# Patient Record
Sex: FEMALE | Race: BLACK OR AFRICAN AMERICAN | Employment: OTHER | URBAN - METROPOLITAN AREA
[De-identification: names, ages, dates, MRNs, and addresses within clinical notes are randomized per-mention and may not be internally consistent; named-entity substitution may affect disease eponyms.]

---

## 2017-01-13 ENCOUNTER — TELEPHONE (OUTPATIENT)
Dept: SURGERY | Age: 67
End: 2017-01-13

## 2017-01-13 NOTE — TELEPHONE ENCOUNTER
Returned call to Macho DILLARD appt 1/25/17 for newly diagnosed right breast cancer. Patient has also had an MRI, left breast MRI Bx is recommended. Patient is considering prophylactic mastectomies. Please advise if you would like to see patient sooner.

## 2017-01-13 NOTE — TELEPHONE ENCOUNTER
Spoke to Mane Quinones, appt 1/20/17      I can see her 1/20. Mariel Magallanes should have the left breast biopsy even if leaning toward bilateral mastectomies     Macho Vasquez MD FACS

## 2017-01-19 ENCOUNTER — OFFICE VISIT (OUTPATIENT)
Dept: SURGERY | Age: 67
End: 2017-01-19

## 2017-01-19 VITALS
SYSTOLIC BLOOD PRESSURE: 151 MMHG | DIASTOLIC BLOOD PRESSURE: 79 MMHG | OXYGEN SATURATION: 100 % | HEIGHT: 65 IN | BODY MASS INDEX: 32.96 KG/M2 | WEIGHT: 197.8 LBS | HEART RATE: 74 BPM | RESPIRATION RATE: 20 BRPM

## 2017-01-19 DIAGNOSIS — C50.411 BREAST CANCER OF UPPER-OUTER QUADRANT OF RIGHT FEMALE BREAST (HCC): Primary | ICD-10-CM

## 2017-01-19 DIAGNOSIS — Z85.3 HX OF BREAST CANCER: ICD-10-CM

## 2017-01-19 DIAGNOSIS — R92.8 ABNORMAL MRI, BREAST: ICD-10-CM

## 2017-01-19 NOTE — PROGRESS NOTES
HISTORY OF PRESENT ILLNESS  Presley Crawley is a 73 y.o. female who comes in for consultation by Dr Eve Duong for a new right breast cancer  HPI  She went for routine screening mammogram in late Dec 2016 and was noted to have a developing density in the outer right breast.  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 She had a breast MRI suggesting the area to be 2.3 cm or two separate lesions. She was also noted to have two foci in the left breast for which MRI biopsy was recommended. She denies feeling any masses, skin changes, nipple changes, unexplained weight loss or bone pain. She previously underwent a right lumpectomy and ALND followed by chemotherapy Dortha Pea) and WBRT in 2003 for a ??stage 1-2 triple negative breast cancer. She has had other bilateral breast biopsies as well. She had menarche at 16, first of two pregnancies at 35, menopause at 51 and did not take HRT. She denies family hx of breast or ovarian cancer but her mother had kidney cancer and her brother had lung cancer. History reviewed. No pertinent past medical history. Past Surgical History   Procedure Laterality Date    Pr breast surgery procedure unlisted  2003     right breast lumpectomy     Family History   Problem Relation Age of Onset    Cancer Mother      kidney    Cancer Brother      lung     Social History   Substance Use Topics    Smoking status: Unknown If Ever Smoked    Smokeless tobacco: None    Alcohol use No     No current outpatient prescriptions on file. No current facility-administered medications for this visit. No Known Allergies    Review of Systems   Constitutional: Negative for chills, diaphoresis, fever, malaise/fatigue and weight loss. HENT: Negative for congestion, ear pain and sore throat. Eyes: Negative for blurred vision and pain. Respiratory: Negative for cough, hemoptysis, sputum production, shortness of breath, wheezing and stridor. Cardiovascular: Negative for chest pain, palpitations, orthopnea, claudication, leg swelling and PND. Gastrointestinal: Negative for abdominal pain, blood in stool, constipation, diarrhea, heartburn, melena, nausea and vomiting. Genitourinary: Negative for dysuria, flank pain, frequency, hematuria and urgency. Musculoskeletal: Negative for back pain, joint pain, myalgias and neck pain. Skin: Negative for itching and rash. Neurological: Negative for dizziness, tremors, focal weakness, seizures, weakness and headaches. Endo/Heme/Allergies: Negative for polydipsia. Psychiatric/Behavioral: Negative for depression and memory loss. The patient is nervous/anxious. Blood pressure 151/79, pulse 74, resp. rate 20, height 5' 5\" (1.651 m), weight 89.7 kg (197 lb 12.8 oz), SpO2 100 %. Physical Exam   Constitutional: She is oriented to person, place, and time. She appears well-developed and well-nourished. No distress. HENT:   Head: Normocephalic and atraumatic. Mouth/Throat: Oropharynx is clear and moist. No oropharyngeal exudate. Eyes: Conjunctivae and EOM are normal. Pupils are equal, round, and reactive to light. No scleral icterus. Neck: Normal range of motion. Neck supple. No JVD present. No tracheal deviation present. No thyromegaly present. Cardiovascular: Normal rate and regular rhythm. Exam reveals no gallop and no friction rub. No murmur heard. Pulmonary/Chest: Effort normal and breath sounds normal. No respiratory distress. She has no wheezes. She has no rales. Right breast exhibits tenderness (lower outer at bx site). Right breast exhibits no inverted nipple, no mass, no nipple discharge and no skin change. Left breast exhibits no inverted nipple, no mass, no nipple discharge, no skin change and no tenderness. Breasts are symmetrical.       Abdominal: Soft.  Bowel sounds are normal. She exhibits no distension and no mass. There is no hepatosplenomegaly. There is no tenderness. There is no rebound, no guarding and no CVA tenderness. No hernia. Hernia confirmed negative in the ventral area. Musculoskeletal: Normal range of motion. She exhibits no edema. Lymphadenopathy:     She has no cervical adenopathy. She has no axillary adenopathy. Right: No supraclavicular adenopathy present. Left: No supraclavicular adenopathy present. Neurological: She is alert and oriented to person, place, and time. No cranial nerve deficit. Skin: Skin is warm and dry. No rash noted. She is not diaphoretic. No erythema. No pallor. Psychiatric: She has a normal mood and affect. Her behavior is normal. Judgment and thought content normal.     I reviewed her pathology and radiology reports today. I was unable to open the images in office today and brought them to radiology for review  ASSESSMENT and PLAN  1. Newly diagnosed right breast cancer early stage, Clinically stage 2 (T2NxM0)  ER 98% RI 83% and her2/wil amplified. Her lymph nodes were previously removed and cannot be assessed. I spent over 60 minutes discussing the anatomy and pathophysiology of breast cancer and treatment options, prognosis. We discussed breast conservation therapy vs mastectomy with and without reconstruction, sentinel lymph node biopsy and axillary dissection, various forms of radiation (whole vs accelerated partial breast), medical oncology therapy (SERM vs Aromatase inhibitors, chemotherapy, herceptin). I discussed about BRCA genetic testing and oncotype DX gene mapping of the tumor. I discussed the risks and benefits of surgery including bleeding, infection, paresthesias and numbness, cosmetic changes, lymphedema, seroma, chronic pain, and need for reoperation for positive margins/sentinel nodes. I discussed websites for her to review.   She will require right mastectomy unless we can get her on a clinical trial for APBI after WBRT. She is a good candidate for reconstruction if she opts for   I explained that given the size of the tumor (thought to be 2.4 cm per radiology) she would likely benefit from neoadjuvant chemotherapy as pertuzamab is only available in the neoadjuvant setting. 2.  Hx right breast cancer in 2003 triple negative (treated by DR Jorge Hernández). This is unrelated to the new cancer  3. Abnormal left breast MRI with two suspicious foci. Recommended biopsy to see if they are malignant as it would change whether to perform SLNB if she opts for bilateral mastectomy  She is in agreement and we will arrange this at [facility]  4. High risk for genetic mutation due to triple negative breast ca at age 49 and now a metachronous breast cancer.    Will perform My Risk genetic testing  Will get Dr Jorge Hernández to see for possible neoadjuvant chemotherapy  Discussed port a cath insertion  Will ultimately need referral for plastic surgery as we get closer to surgery  RTC 3-4 weeks    Otilia Haley MD FACS

## 2017-01-19 NOTE — MR AVS SNAPSHOT
Visit Information Date & Time Provider Department Dept. Phone Encounter #  
 1/19/2017  2:20 PM Heidi Hamilton MD [facility]  [address] [phone] [id number] Upcoming Health Maintenance Date Due Hepatitis C Screening 1950 DTaP/Tdap/Td series (1 - Tdap) 7/17/1972 BREAST CANCER SCRN MAMMOGRAM 7/17/2001 FOBT Q 1 YEAR AGE 50-75 7/17/2001 ZOSTER VACCINE AGE 60> 7/17/2011 GLAUCOMA SCREENING Q2Y 7/17/2016 OSTEOPOROSIS SCREENING (DEXA) 7/17/2016 Pneumococcal 65+ High/Highest Risk (1 of 2 - PCV13) 7/17/2016 MEDICARE YEARLY EXAM 7/17/2016 INFLUENZA AGE 9 TO ADULT 8/1/2016 Allergies as of 1/19/2017  Review Complete On: 1/19/2017 By: Heidi Hamilton MD  
 No Known Allergies Current Immunizations  Never Reviewed No immunizations on file. Not reviewed this visit You Were Diagnosed With   
  
 Codes Comments Breast cancer of upper-outer quadrant of right female breast ([facility] [address]    -  Primary ICD-10-CM: J28.137 ICD-9-CM: 174.4 T2NxM0 ER pos [place] pos Her2/wil 3+ amplified Abnormal MRI, breast     ICD-10-CM: R92.8 ICD-9-CM: 793.89 Hx of breast cancer     ICD-10-CM: Z85.3 ICD-9-CM: V10.3 2003 T2N0M0 triple negative Vitals BP Pulse Resp Height(growth percentile) Weight(growth percentile) SpO2  
 151/79 74 20 5' 5\" (1.651 m) 197 lb 12.8 oz (89.7 kg) 100% BMI Smoking Status 32.92 kg/m2 Unknown If Ever Smoked BMI and BSA Data Body Mass Index Body Surface Area  
 32.92 kg/m 2 2.03 m 2 Your Updated Medication List  
  
Notice  As of 1/19/2017 11:59 PM  
 You have not been prescribed any medications. To-Do List   
 01/19/2017 Imaging:  MRI BX BREAST VAC LT 1ST LESION W/CLIP AND SPECIMEN   
  
 01/27/2017 1:00 PM  
  Appointment with [facility] 2 at [facility] ([phone]) 1. Please bring a list or a bag of your current medications to your appointment 2. Please be sure to remove ALL hair clips, pins, extensions, etc., prior to arriving for your MRI procedure. 3. Bring any non [facility] films or CDs pertaining to the area being imaged with you on the day of appointment. 4. A written order with a valid diagnosis and Physicians  signature is required for all scheduled tests. 5. Check in at registration 1 hour before your appointment time unless you were instructed to do otherwise. 6. If taking birth control, hormone replacement therapy or herbal supplements (black cohosh) it is advised that you cease 1 month prior to appointment to ensure quality of imaging obtained. Introducing [facility] & HEALTH SERVICES! [facility] introduces Tidal patient portal. Now you can access parts of your medical record, email your doctor's office, and request medication refills online. 1. In your internet browser, go to [email or web address] 2. Click on the First Time User? Click Here link in the Sign In box. You will see the New Member Sign Up page. 3. Enter your Tidal Access Code exactly as it appears below. You will not need to use this code after youve completed the sign-up process. If you do not sign up before the expiration date, you must request a new code. · Tidal Access Code: [email or web address] Expires: 4/19/2017  1:53 PM 
 
4. Enter the last four digits of your Social Security Number (xxxx) and Date of Birth (mm/dd/yyyy) as indicated and click Submit. You will be taken to the next sign-up page. 5. Create a ICVRxt ID. This will be your Tidal login ID and cannot be changed, so think of one that is secure and easy to remember. 6. Create a Tidal password. You can change your password at any time. 7. Enter your Password Reset Question and Answer. This can be used at a later time if you forget your password. 8. Enter your e-mail address. You will receive e-mail notification when new information is available in [address] 9. Click Sign Up. You can now view and download portions of your medical record. 10. Click the Download Summary menu link to download a portable copy of your medical information. If you have questions, please visit the Frequently Asked Questions section of the Novelo website. Remember, Novelo is NOT to be used for urgent needs. For medical emergencies, dial 911. Now available from your iPhone and Android! Please provide this summary of care documentation to your next provider. Your primary care clinician is listed as Danielle Blanc. If you have any questions after today's visit, please call [phone].

## 2017-01-24 ENCOUNTER — HOSPITAL ENCOUNTER (OUTPATIENT)
Dept: GENERAL RADIOLOGY | Age: 67
Discharge: HOME | End: 2017-01-24
Payer: COMMERCIAL

## 2017-01-24 DIAGNOSIS — C50.311 MALIGNANT NEOPLASM OF LOWER-INNER QUADRANT OF RIGHT FEMALE BREAST (HCC): ICD-10-CM

## 2017-01-24 PROCEDURE — 71020 XR CHEST PA LAT: CPT

## 2017-01-27 ENCOUNTER — HOSPITAL ENCOUNTER (OUTPATIENT)
Dept: MRI IMAGING | Age: 67
Discharge: HOME | End: 2017-01-27
Attending: SURGERY
Payer: COMMERCIAL

## 2017-01-27 ENCOUNTER — HOSPITAL ENCOUNTER (OUTPATIENT)
Dept: MAMMOGRAPHY | Age: 67
Discharge: HOME | End: 2017-01-27
Attending: SURGERY
Payer: COMMERCIAL

## 2017-01-27 DIAGNOSIS — C50.411 BREAST CANCER OF UPPER-OUTER QUADRANT OF RIGHT FEMALE BREAST (HCC): ICD-10-CM

## 2017-01-27 DIAGNOSIS — R92.8 ABNORMAL MRI, BREAST: ICD-10-CM

## 2017-01-27 DIAGNOSIS — R92.8 ABNORMAL MAMMOGRAM: ICD-10-CM

## 2017-01-27 PROCEDURE — 19085 BX BREAST 1ST LESION MR IMAG: CPT

## 2017-01-27 PROCEDURE — A9585 GADOBUTROL INJECTION: HCPCS | Performed by: SURGERY

## 2017-01-27 PROCEDURE — 77065 DX MAMMO INCL CAD UNI: CPT

## 2017-01-27 PROCEDURE — 88305 TISSUE EXAM BY PATHOLOGIST: CPT | Performed by: RADIOLOGY

## 2017-01-27 PROCEDURE — 74011250636 HC RX REV CODE- 250/636: Performed by: SURGERY

## 2017-01-27 RX ADMIN — GADOBUTROL 10 ML: 604.72 INJECTION INTRAVENOUS at 13:58

## 2017-01-27 NOTE — IP AVS SNAPSHOT
[address] [facility] 
[phone] Patient: Zoila Allan MRN: [id number] NZZ:[id number] You are allergic to the following No active allergies Recent Documentation Height Weight Breastfeeding? BMI Smoking Status 1.651 m 89.8 kg No 32.95 kg/m2 Unknown If Ever Smoked Emergency Contacts Name Discharge Info Relation Home Work Mobile Yuri Ron DISCHARGE CAREGIVER [3] Spouse [3] [phone] About your hospitalization You were admitted on:  January 27, 2017 You last received care in the:  [facility] You were discharged on:  January 27, 2017 Unit phone number:  [phone] Why you were hospitalized Your primary diagnosis was:  Not on File Providers Seen During Your Hospitalizations Provider Role Specialty Primary office phone Luigi Dahl MD Attending Provider General Surgery [phone] Your Primary Care Physician (PCP) Primary Care Physician Office Phone Office Fax Reyes Solo [phone] Follow-up Information None Your Appointments Friday February 17, 2017  9:40 AM EST  
ESTABLISHED PATIENT with Luigi Dahl MD  
[facility] Dr. Frank Bentley ([facility]) [address] [place] [address], [address] [facility]  
[phone] Current Discharge Medication List  
  
Notice You have not been prescribed any medications. Discharge Instructions Instructions Following Your Breast Biopsy Pain Control · Tylenol should be taken as directed on the back of the bottle (pradip 4-6 hours) for the next 24 hours post breast biopsy unless directed otherwise by a physician.  
· No Aspirin or Anti-Inflammatory  (Motrin, Advil ) medications for 24 hours. 
· Wearing a soft, comfortable (Wire free ) bra, even at night, for 24 hours is helpful. · Use a clean, covered ice pack over the site every 1- 2 hours til bedtime, for 20-30 minutes at a time for the first 24 hours. Biopsy Site · A small amount of bleeding and /or oozing from the Biopsy site is normal, as well as bloody nipple discharge, which is rare. · You may notice bruising on the skin over the next few days. · Steri Strips and a dressing have been applied. · The steri strips can remain on for up to 5 days. · The clean dressing can be removed in 48 hours, however, if the dressing becomes loose, causes redness or irritation of the skin or any drainage has collected, please remove it an replace it with a Band-Aid. · Avoid getting the Biopsy site wet for 48 hours, avoid showering, swimming and hot tubs. · Should you have excessive bleeding or pain, please seek medical treatment or call [address] [phone], 7:30 - 4:00 p.m. After hours (ask to speak to the on-call Radiology Nurse-RN)                        [phone] or [phone] Activity · Rest the day of the biopsy, avoiding strenuous activities and any heavy lifting. · You may resume most activities/ work the following day. Pathology Report · The results of your Pathology report, from the laboratory, should be available in 3-5 business days. The Radiologist will review your results and, based on your preference, we will be happy to provide results to you by phone or in person. · You will also be advised, at that time, of any further appointments or follow- up you may need. Discharge Orders None Introducing [facility] & HEALTH SERVICES! [employer] introduces Starriser patient portal. Now you can access parts of your medical record, email your doctor's office, and request medication refills online.    
 
1. In your internet browser, go to [email or web address] 2. Click on the First Time User? Click Here link in the Sign In box. You will see the New Member Sign Up page. 3. Enter your Fanitics Access Code exactly as it appears below. You will not need to use this code after youve completed the sign-up process. If you do not sign up before the expiration date, you must request a new code. · Fanitics Access Code: [email or web address] Expires: 4/19/2017  1:53 PM 
 
4. Enter the last four digits of your Social Security Number (xxxx) and Date of Birth (mm/dd/yyyy) as indicated and click Submit. You will be taken to the next sign-up page. 5. Create a Fanitics ID. This will be your Fanitics login ID and cannot be changed, so think of one that is secure and easy to remember. 6. Create a Fanitics password. You can change your password at any time. 7. Enter your Password Reset Question and Answer. This can be used at a later time if you forget your password. 8. Enter your e-mail address. You will receive e-mail notification when new information is available in [address] 9. Click Sign Up. You can now view and download portions of your medical record. 10. Click the Download Summary menu link to download a portable copy of your medical information. If you have questions, please visit the Frequently Asked Questions section of the Fanitics website. Remember, Fanitics is NOT to be used for urgent needs. For medical emergencies, dial 911. Now available from your iPhone and Android! General Information Please provide this summary of care documentation to your next provider. Patient Signature:  ____________________________________________________________ Date:  ____________________________________________________________  
  
To [place] Provider Signature:  ____________________________________________________________ Date:  ____________________________________________________________

## 2017-01-27 NOTE — DISCHARGE INSTRUCTIONS
Instructions Following Your Breast Biopsy         Pain Control    · Tylenol should be taken as directed on the back of the bottle (pradip 4-6 hours) for the next 24 hours post breast biopsy unless directed otherwise by a physician. · No Aspirin or Anti-Inflammatory  (Motrin, Advil ) medications for 24 hours. · Wearing a soft, comfortable (Wire free ) bra, even at night, for 24 hours is helpful. · Use a clean, covered ice pack over the site every 1- 2 hours til bedtime, for 20-30 minutes at a time for the first 24 hours. Biopsy Site     · A small amount of bleeding and /or oozing from the Biopsy site is normal, as well as bloody nipple discharge, which is rare. · You may notice bruising on the skin over the next few days. · Steri Strips and a dressing have been applied. · The steri strips can remain on for up to 5 days. · The clean dressing can be removed in 48 hours, however, if the dressing becomes loose, causes redness or irritation of the skin or any drainage has collected, please remove it an replace it with a Band-Aid. · Avoid getting the Biopsy site wet for 48 hours, avoid showering, swimming and hot tubs. · Should you have excessive bleeding or pain, please seek medical treatment or call                 [address] [phone], 7:30 - 4:00 p.m. After hours (ask to speak to the on-call Radiology Nurse-RN)                        [phone] or [phone]      Activity      · Rest the day of the biopsy, avoiding strenuous activities and any heavy lifting. · You may resume most activities/ work the following day. Pathology Report     · The results of your Pathology report, from the laboratory, should be available in 3-5 business days. The Radiologist will review your results and, based on your preference, we will be happy to provide results to you by phone or in person.   · You will also be advised, at that time, of any further appointments or follow- up you may need.

## 2017-01-30 VITALS — HEIGHT: 65 IN | RESPIRATION RATE: 20 BRPM | BODY MASS INDEX: 32.99 KG/M2 | WEIGHT: 198 LBS

## 2017-01-30 NOTE — PROGRESS NOTES
Dr. Vishal Guzman reviewed pathology report. I spoke w/Jimmie, Dr. Ny Rose nurse that pathology results are available in [address] [facility] (Dr. Chelly Page is in surgery). I spoke w/pt because she had questions concerning if she should keep the appointment for preadmit testing - advised her she needed to continue as Dr. Chelly Page and her had planned. I encouraged pt to contact Dr. Ny Rose office for any additional question.

## 2017-01-30 NOTE — ROUTINE PROCESS
Discharge instructions reviewed with understanding, questions reviewed, copy given to patient. Post procedure Mammo completed and reviewed MD, pt cleared for discharge. Biopsy sites clean and dry, hemostasis achieved. Steri strips with DSD placed. Ice pack held by patient. Pt verbalized she would like to be notified by phone of any results. Pt encouraged to call department if she has not received her results in 3-5 business days. Pt advised not answer cell phone call while driving. Specimens carried to lab by RN.

## 2017-01-30 NOTE — PROGRESS NOTES
Present for pre procedure consult and consent - questions reviewed with understanding - consent signed.

## 2017-01-31 ENCOUNTER — TELEPHONE (OUTPATIENT)
Dept: SURGERY | Age: 67
End: 2017-01-31

## 2017-01-31 ENCOUNTER — HOSPITAL ENCOUNTER (OUTPATIENT)
Dept: PREADMISSION TESTING | Age: 67
Discharge: HOME | End: 2017-01-31
Attending: SURGERY
Payer: COMMERCIAL

## 2017-01-31 VITALS
SYSTOLIC BLOOD PRESSURE: 164 MMHG | OXYGEN SATURATION: 99 % | HEART RATE: 82 BPM | BODY MASS INDEX: 32.58 KG/M2 | WEIGHT: 195.55 LBS | TEMPERATURE: 98.8 F | DIASTOLIC BLOOD PRESSURE: 77 MMHG | RESPIRATION RATE: 18 BRPM | HEIGHT: 65 IN

## 2017-01-31 PROCEDURE — 93005 ELECTROCARDIOGRAM TRACING: CPT

## 2017-01-31 RX ORDER — SODIUM CHLORIDE, SODIUM LACTATE, POTASSIUM CHLORIDE, CALCIUM CHLORIDE 600; 310; 30; 20 MG/100ML; MG/100ML; MG/100ML; MG/100ML
25 INJECTION, SOLUTION INTRAVENOUS CONTINUOUS
Status: CANCELLED | OUTPATIENT
Start: 2017-02-01

## 2017-01-31 RX ORDER — ASPIRIN 81 MG/1
81 TABLET ORAL DAILY
COMMUNITY
End: 2021-01-01

## 2017-01-31 RX ORDER — BISMUTH SUBSALICYLATE 262 MG
1 TABLET,CHEWABLE ORAL
COMMUNITY

## 2017-01-31 RX ORDER — CITALOPRAM 20 MG/1
20 TABLET, FILM COATED ORAL DAILY
COMMUNITY
End: 2017-02-17 | Stop reason: ALTCHOICE

## 2017-01-31 RX ORDER — ALPRAZOLAM 0.25 MG/1
0.25 TABLET ORAL
COMMUNITY
End: 2017-02-17 | Stop reason: ALTCHOICE

## 2017-01-31 NOTE — PERIOP NOTES
[facility]  Preoperative Instructions        Surgery Date 02/01/2017          Time of Arrival 0900 am Contact # [phone] or Monique Stanislav cell [phone]    1. On the day of your surgery, please report to the Surgical Services Registration Desk and sign in at your designated time. The Surgery Center is located to the right of the Emergency Room. 2. You must have someone with you to drive you home. You should not drive a car for 24 hours following surgery. Please make arrangements for a friend or family member to stay with you for the first 24 hours after your surgery. 3. Do not have anything to eat or drink (including water, gum, mints, coffee, juice) after midnight 01/31/2017. This may not apply to medications prescribed by your physician. Please note special instructions, if applicable. If you are currently taking Plavix, Coumadin, or other blood-thinning agents, contact your surgeon for instructions. 4. We recommend you do not drink any alcoholic beverages for 24 hours before and after your surgery. 5. Have a list of all current medications, including vitamins, herbal supplements and any other over the counter medications. Stop all Aspirin and non-steroidal anti-inflammatory drugs (I.e. Advil, Aleve), as directed by your surgeon's office. Stop all vitamins and herbal supplements seven days prior to your surgery. 6. Wear comfortable clothes. Wear glasses instead of contacts. Do not bring any money or jewelry. Please bring picture ID, insurance card, and any prearranged co-payment or hospital payment. Do not wear make-up, particularly mascara the morning of your surgery. Do not wear nail polish, particularly if you are having foot /hand surgery. Wear your hair loose or down, no ponytails, buns, elena pins or clips. All body piercings must be removed.   Please shower with antibacterial soap for three consecutive days before and on the morning of surgery, but do not apply any lotions, powders or deodorants after the shower on the day of surgery. Please use a fresh towels after each shower. Please sleep in clean clothes and change bed linens the night before surgery. Please do not shave for 48 hours prior to surgery. Shaving of the face is acceptable. 7. You should understand that if you do not follow these instructions your surgery may be cancelled. If your physical condition changes (I.e. fever, cold or flu) please contact your surgeon as soon as possible. 8. It is important that you be on time. If a situation occurs where you may be late, please call [phone] (OR Holding Area). 9. If you have any questions and or problems, please call [phone] (Pre-admission Testing). 10. Your surgery time may be subject to change. You will receive a phone call the evening prior if your time changes. 11.  If having outpatient surgery, you must have someone to drive you here, stay with you during the duration of your stay, and to drive you home at time of discharge. Special Instructions:    MEDICATIONS TO TAKE THE MORNING OF SURGERY WITH A SIP OF WATER:xanax if needed, celexa      I understand a pre-operative phone call will be made to verify my surgery time. In the event that I am not available, I give permission for a message to be left on my answering service and/or with another person?   no         ___________________      __________   _________    (Signature of Patient)             (Witness)                (Date and Time)

## 2017-01-31 NOTE — TELEPHONE ENCOUNTER
Left breast MRI bxs are benign    Continue with the option for neoadjuvant chemotherapy  Plan port placement      Denilson Rouse MD FACS

## 2017-02-01 ENCOUNTER — APPOINTMENT (OUTPATIENT)
Dept: GENERAL RADIOLOGY | Age: 67
End: 2017-02-01
Attending: SURGERY
Payer: COMMERCIAL

## 2017-02-01 ENCOUNTER — HOSPITAL ENCOUNTER (OUTPATIENT)
Age: 67
Setting detail: OUTPATIENT SURGERY
Discharge: HOME | End: 2017-02-01
Attending: SURGERY | Admitting: SURGERY
Payer: COMMERCIAL

## 2017-02-01 ENCOUNTER — ANESTHESIA EVENT (OUTPATIENT)
Dept: SURGERY | Age: 67
End: 2017-02-01
Payer: COMMERCIAL

## 2017-02-01 ENCOUNTER — ANESTHESIA (OUTPATIENT)
Dept: SURGERY | Age: 67
End: 2017-02-01
Payer: COMMERCIAL

## 2017-02-01 VITALS
TEMPERATURE: 98 F | RESPIRATION RATE: 16 BRPM | HEART RATE: 74 BPM | HEIGHT: 65 IN | WEIGHT: 195.11 LBS | OXYGEN SATURATION: 99 % | SYSTOLIC BLOOD PRESSURE: 170 MMHG | DIASTOLIC BLOOD PRESSURE: 71 MMHG | BODY MASS INDEX: 32.51 KG/M2

## 2017-02-01 LAB
ATRIAL RATE: 77 BPM
CALCULATED P AXIS, ECG09: 63 DEGREES
CALCULATED R AXIS, ECG10: 58 DEGREES
CALCULATED T AXIS, ECG11: 34 DEGREES
DIAGNOSIS, 93000: NORMAL
P-R INTERVAL, ECG05: 106 MS
Q-T INTERVAL, ECG07: 406 MS
QRS DURATION, ECG06: 80 MS
QTC CALCULATION (BEZET), ECG08: 459 MS
VENTRICULAR RATE, ECG03: 77 BPM

## 2017-02-01 PROCEDURE — 76210000020 HC REC RM PH II FIRST 0.5 HR: Performed by: SURGERY

## 2017-02-01 PROCEDURE — 77030020256 HC SOL INJ NACL 0.9%  500ML: Performed by: SURGERY

## 2017-02-01 PROCEDURE — 76060000033 HC ANESTHESIA 1 TO 1.5 HR: Performed by: SURGERY

## 2017-02-01 PROCEDURE — 74011000250 HC RX REV CODE- 250: Performed by: SURGERY

## 2017-02-01 PROCEDURE — 77030002986 HC SUT PROL J&J -A: Performed by: SURGERY

## 2017-02-01 PROCEDURE — 74011000250 HC RX REV CODE- 250

## 2017-02-01 PROCEDURE — 74011000272 HC RX REV CODE- 272: Performed by: SURGERY

## 2017-02-01 PROCEDURE — C1788 PORT, INDWELLING, IMP: HCPCS | Performed by: SURGERY

## 2017-02-01 PROCEDURE — 76210000006 HC OR PH I REC 0.5 TO 1 HR: Performed by: SURGERY

## 2017-02-01 PROCEDURE — 76010000149 HC OR TIME 1 TO 1.5 HR: Performed by: SURGERY

## 2017-02-01 PROCEDURE — 76000 FLUOROSCOPY <1 HR PHYS/QHP: CPT

## 2017-02-01 PROCEDURE — 74011250636 HC RX REV CODE- 250/636: Performed by: SURGERY

## 2017-02-01 PROCEDURE — 77030011640 HC PAD GRND REM COVD -A: Performed by: SURGERY

## 2017-02-01 PROCEDURE — 71010 XR CHEST PORT: CPT

## 2017-02-01 PROCEDURE — 77030031139 HC SUT VCRL2 J&J -A: Performed by: SURGERY

## 2017-02-01 PROCEDURE — 74011250636 HC RX REV CODE- 250/636

## 2017-02-01 PROCEDURE — 74011250636 HC RX REV CODE- 250/636: Performed by: ANESTHESIOLOGY

## 2017-02-01 PROCEDURE — 77030010507 HC ADH SKN DERMBND J&J -B: Performed by: SURGERY

## 2017-02-01 DEVICE — POWERPORT IMPLANTABLE PORT WITH ATTACHABLE 8F CHRONOFLEX OPEN-ENDED SINGLE-LUMEN VENOUS CATHETER INTERMEDIATE KIT (WITH SUTURE PLUGS)
Type: IMPLANTABLE DEVICE | Site: CHEST | Status: FUNCTIONAL
Brand: POWERPORT, CHRONOFLEX

## 2017-02-01 RX ORDER — SODIUM CHLORIDE, SODIUM LACTATE, POTASSIUM CHLORIDE, CALCIUM CHLORIDE 600; 310; 30; 20 MG/100ML; MG/100ML; MG/100ML; MG/100ML
25 INJECTION, SOLUTION INTRAVENOUS CONTINUOUS
Status: DISCONTINUED | OUTPATIENT
Start: 2017-02-01 | End: 2017-02-01 | Stop reason: HOSPADM

## 2017-02-01 RX ORDER — MORPHINE SULFATE 10 MG/ML
2 INJECTION, SOLUTION INTRAMUSCULAR; INTRAVENOUS
Status: DISCONTINUED | OUTPATIENT
Start: 2017-02-01 | End: 2017-02-01 | Stop reason: HOSPADM

## 2017-02-01 RX ORDER — SODIUM CHLORIDE 0.9 % (FLUSH) 0.9 %
5-10 SYRINGE (ML) INJECTION AS NEEDED
Status: DISCONTINUED | OUTPATIENT
Start: 2017-02-01 | End: 2017-02-01 | Stop reason: HOSPADM

## 2017-02-01 RX ORDER — CEFAZOLIN SODIUM IN 0.9 % NACL 2 G/100 ML
2 PLASTIC BAG, INJECTION (ML) INTRAVENOUS ONCE
Status: COMPLETED | OUTPATIENT
Start: 2017-02-01 | End: 2017-02-01

## 2017-02-01 RX ORDER — SODIUM CHLORIDE 0.9 % (FLUSH) 0.9 %
5-10 SYRINGE (ML) INJECTION EVERY 8 HOURS
Status: DISCONTINUED | OUTPATIENT
Start: 2017-02-01 | End: 2017-02-01 | Stop reason: HOSPADM

## 2017-02-01 RX ORDER — ONDANSETRON 2 MG/ML
INJECTION INTRAMUSCULAR; INTRAVENOUS AS NEEDED
Status: DISCONTINUED | OUTPATIENT
Start: 2017-02-01 | End: 2017-02-01 | Stop reason: HOSPADM

## 2017-02-01 RX ORDER — DIPHENHYDRAMINE HYDROCHLORIDE 50 MG/ML
12.5 INJECTION, SOLUTION INTRAMUSCULAR; INTRAVENOUS AS NEEDED
Status: DISCONTINUED | OUTPATIENT
Start: 2017-02-01 | End: 2017-02-01 | Stop reason: HOSPADM

## 2017-02-01 RX ORDER — LIDOCAINE HYDROCHLORIDE AND EPINEPHRINE 10; 10 MG/ML; UG/ML
1.5 INJECTION, SOLUTION INFILTRATION; PERINEURAL ONCE
Status: COMPLETED | OUTPATIENT
Start: 2017-02-01 | End: 2017-02-01

## 2017-02-01 RX ORDER — ACETAMINOPHEN 10 MG/ML
INJECTION, SOLUTION INTRAVENOUS AS NEEDED
Status: DISCONTINUED | OUTPATIENT
Start: 2017-02-01 | End: 2017-02-01 | Stop reason: HOSPADM

## 2017-02-01 RX ORDER — HYDROCODONE BITARTRATE AND ACETAMINOPHEN 5; 325 MG/1; MG/1
1-2 TABLET ORAL
Qty: 30 TAB | Refills: 0 | Status: SHIPPED | OUTPATIENT
Start: 2017-02-01 | End: 2017-02-17 | Stop reason: ALTCHOICE

## 2017-02-01 RX ORDER — FENTANYL CITRATE 50 UG/ML
25 INJECTION, SOLUTION INTRAMUSCULAR; INTRAVENOUS
Status: DISCONTINUED | OUTPATIENT
Start: 2017-02-01 | End: 2017-02-01 | Stop reason: HOSPADM

## 2017-02-01 RX ORDER — PROPOFOL 10 MG/ML
INJECTION, EMULSION INTRAVENOUS AS NEEDED
Status: DISCONTINUED | OUTPATIENT
Start: 2017-02-01 | End: 2017-02-01 | Stop reason: HOSPADM

## 2017-02-01 RX ORDER — FENTANYL CITRATE 50 UG/ML
INJECTION, SOLUTION INTRAMUSCULAR; INTRAVENOUS AS NEEDED
Status: DISCONTINUED | OUTPATIENT
Start: 2017-02-01 | End: 2017-02-01 | Stop reason: HOSPADM

## 2017-02-01 RX ORDER — HYDROMORPHONE HYDROCHLORIDE 1 MG/ML
.2-.5 INJECTION, SOLUTION INTRAMUSCULAR; INTRAVENOUS; SUBCUTANEOUS
Status: DISCONTINUED | OUTPATIENT
Start: 2017-02-01 | End: 2017-02-01 | Stop reason: HOSPADM

## 2017-02-01 RX ORDER — PROPOFOL 10 MG/ML
INJECTION, EMULSION INTRAVENOUS
Status: DISCONTINUED | OUTPATIENT
Start: 2017-02-01 | End: 2017-02-01 | Stop reason: HOSPADM

## 2017-02-01 RX ORDER — LIDOCAINE HYDROCHLORIDE 20 MG/ML
INJECTION, SOLUTION EPIDURAL; INFILTRATION; INTRACAUDAL; PERINEURAL AS NEEDED
Status: DISCONTINUED | OUTPATIENT
Start: 2017-02-01 | End: 2017-02-01 | Stop reason: HOSPADM

## 2017-02-01 RX ORDER — DEXAMETHASONE SODIUM PHOSPHATE 4 MG/ML
INJECTION, SOLUTION INTRA-ARTICULAR; INTRALESIONAL; INTRAMUSCULAR; INTRAVENOUS; SOFT TISSUE AS NEEDED
Status: DISCONTINUED | OUTPATIENT
Start: 2017-02-01 | End: 2017-02-01 | Stop reason: HOSPADM

## 2017-02-01 RX ORDER — GLYCOPYRROLATE 0.2 MG/ML
INJECTION INTRAMUSCULAR; INTRAVENOUS AS NEEDED
Status: DISCONTINUED | OUTPATIENT
Start: 2017-02-01 | End: 2017-02-01 | Stop reason: HOSPADM

## 2017-02-01 RX ORDER — ONDANSETRON 2 MG/ML
4 INJECTION INTRAMUSCULAR; INTRAVENOUS AS NEEDED
Status: DISCONTINUED | OUTPATIENT
Start: 2017-02-01 | End: 2017-02-01 | Stop reason: HOSPADM

## 2017-02-01 RX ORDER — MIDAZOLAM HYDROCHLORIDE 1 MG/ML
INJECTION, SOLUTION INTRAMUSCULAR; INTRAVENOUS AS NEEDED
Status: DISCONTINUED | OUTPATIENT
Start: 2017-02-01 | End: 2017-02-01 | Stop reason: HOSPADM

## 2017-02-01 RX ADMIN — PROPOFOL 20 MG: 10 INJECTION, EMULSION INTRAVENOUS at 10:34

## 2017-02-01 RX ADMIN — ONDANSETRON 4 MG: 2 INJECTION INTRAMUSCULAR; INTRAVENOUS at 11:10

## 2017-02-01 RX ADMIN — PROPOFOL 100 MCG/KG/MIN: 10 INJECTION, EMULSION INTRAVENOUS at 10:34

## 2017-02-01 RX ADMIN — CEFAZOLIN 2 G: 10 INJECTION, POWDER, FOR SOLUTION INTRAVENOUS; PARENTERAL at 10:27

## 2017-02-01 RX ADMIN — FENTANYL CITRATE 25 MCG: 50 INJECTION, SOLUTION INTRAMUSCULAR; INTRAVENOUS at 10:32

## 2017-02-01 RX ADMIN — LIDOCAINE HYDROCHLORIDE 20 MG: 20 INJECTION, SOLUTION EPIDURAL; INFILTRATION; INTRACAUDAL; PERINEURAL at 10:32

## 2017-02-01 RX ADMIN — ACETAMINOPHEN 1000 MG: 10 INJECTION, SOLUTION INTRAVENOUS at 10:52

## 2017-02-01 RX ADMIN — SODIUM CHLORIDE, POTASSIUM CHLORIDE, SODIUM LACTATE AND CALCIUM CHLORIDE: 600; 310; 30; 20 INJECTION, SOLUTION INTRAVENOUS at 10:04

## 2017-02-01 RX ADMIN — MIDAZOLAM HYDROCHLORIDE 2 MG: 1 INJECTION, SOLUTION INTRAMUSCULAR; INTRAVENOUS at 10:27

## 2017-02-01 RX ADMIN — GLYCOPYRROLATE 0.2 MG: 0.2 INJECTION INTRAMUSCULAR; INTRAVENOUS at 10:27

## 2017-02-01 RX ADMIN — DEXAMETHASONE SODIUM PHOSPHATE 8 MG: 4 INJECTION, SOLUTION INTRA-ARTICULAR; INTRALESIONAL; INTRAMUSCULAR; INTRAVENOUS; SOFT TISSUE at 11:10

## 2017-02-01 NOTE — DISCHARGE INSTRUCTIONS
Discharge Instructions:  Port a cath Insertion  Dr. Alma Trimble    Call for appointment for follow up in 2 weeks [phone]    Activity:    Walk regularly. You may resume driving in 24 hours unless still requiring narcotics for pain. It is ok to use the arm on side of surgery but do not over do it. Work:    You may return to work in 3-7 days to light activity. No lifting more than 10 pounds for one week. Diet:    You may resume normal diet after 24 hours. Anesthesia and narcotics may cause nausea and vomiting. If persistent please call the office. Wound Care: You have a special dressing called Dermabond. It is okay to shower and let the water run over the incision but do not scrub the area or soak in a tub. If you have a small amount of drainage you may place a dry bandage over the wound and change it daily. If you experience a lot of drainage, develop redness around the wound, or a fever over 101 F occurs please call the office. Medications:    Resume home medications as indicated on the Medical Reconciliation form. Aspirin, Coumadin, and Plavix can be restarted on post operative day 2 if you were taking them preoperatively. Pain medications:  Non steroidal antiinflammatories seem to work best for post surgical pain. Try these first as prescribed. A narcotic prescription will also be given for breakthrough pain. Over the counter stool softeners and laxatives may be used if needed. Do not hesitate to call with questions or concerns. How to Care for Your Wound After Its Treated With  DERMABOND* Topical Skin Adhesive  DERMABOND* Topical Skin Adhesive (2-octyl cyanoacrylate) is a sterile, liquid skin adhesive  that holds wound edges together. The film will usually remain in place for 5 to 10 days, then  naturally fall off your skin.   The following will answer some of your questions and provide instructions for proper care for your  wound while it is healing:    CHECK WOUND APPEARANCE   Some swelling, redness, and pain are common with all wounds and normally will go away as the  wound heals. If swelling, redness, or pain increases or if the wound feels warm to the touch,  contact a doctor. Also contact a doctor if the wound edges reopen or separate. REPLACE BANDAGES   If your wound is bandaged, keep the bandage dry.  Replace the dressing daily until the adhesive film has fallen off or if the  bandage should become wet, unless otherwise instructed by your  physician.  When changing the dressing, do not place tape directly over the  DERMABOND adhesive film, because removing the tape later may also  remove the film. AVOID TOPICAL MEDICATIONS   Do not apply liquid or ointment medications or any other product to your wound while the  DERMABOND adhesive film is in place. These may loosen the film before your wound is healed. KEEP WOUND DRY AND PROTECTED   You may occasionally and briefly wet your wound in the shower or bath. Do not soak or scrub  your wound, do not swim, and avoid periods of heavy perspiration until the DERMABOND  adhesive has naturally fallen off. After showering or bathing, gently blot your wound dry with a  soft towel. If a protective dressing is being used, apply a fresh, dry bandage, being sure to keep  the tape off the DERMABOND adhesive film.  Apply a clean, dry bandage over the wound if necessary to protect it.  Protect your wound from injury until the skin has had sufficient time to heal.   Do not scratch, rub, or pick at the DERMABOND adhesive film. This may loosen the film before  your wound is healed.  Protect the wound from prolonged exposure to sunlight or tanning lamps while the film is in  place. If you have any questions or concerns about this product, please consult your doctor.   *Trademark ©[employer] 2002    Hydrocodone/Acetaminophen (By mouth)   Acetaminophen (f-asmo-a-MIN-oh-fen), Hydrocodone Bitartrate (tyo-cpmj-NQZ-done bye-TAR-trate)  Treats pain. This medicine contains a narcotic pain reliever. Brand Name(s):Hycet, Lorcet, Lorcet HD, Lorcet Plus, Lortab 10/325, Lortab 5/325, Lortab 7.5/325, Lortab Elixir, Norco, Verdrocet, Vicodin, Vicodin ES, Vicodin HP, Xodol, Xodol 5/300   There may be other brand names for this medicine. When This Medicine Should Not Be Used: This medicine is not right for everyone. Do not use it if you had an allergic reaction to acetaminophen, hydrocodone, or other narcotic medicines. How to Use This Medicine:   Tablet, Liquid, Capsule  · Your doctor will tell you how much medicine to use. Do not use more than directed. · Measure the oral liquid medicine with a marked measuring spoon, oral syringe, or medicine cup. · Drink plenty of liquids to help avoid constipation. · Missed dose: Take a dose as soon as you remember. If it is almost time for your next dose, wait until then and take a regular dose. Do not take extra medicine to make up for a missed dose. · Store the medicine in a closed container at room temperature, away from heat, moisture, and direct light. Drugs and Foods to Avoid:   Ask your doctor or pharmacist before using any other medicine, including over-the-counter medicines, vitamins, and herbal products. · Some medicines can affect how hydrocodone/acetaminophen works. Tell your doctor if you are using any of the following:   ¨ An MAO inhibitor  ¨ Medicine to treat depression  · This medicine can intensify the effects of alcohol, sedatives, or tranquilizers. Tell your doctor if you drink alcohol or if you are using any medicine that makes you sleepy, such as allergy medicine or another narcotic pain medicine. Acetaminophen can damage your liver, and your risk is higher if you also drink alcohol.   Warnings While Using This Medicine:   · Tell your doctor if you are pregnant or breastfeeding, or if you have lung disease, liver disease, kidney disease, problems urinating, an underactive thyroid, Qu[place] disease, prostate problems, stomach problems, or a history of head injury or brain tumor. · This medicine may cause the following problems:   ¨ Liver problems  ¨ Serious skin reactions  · This medicine can be habit-forming. Do not use more than your prescribed dose. Call your doctor if you think your medicine is not working. · This medicine may make you dizzy or drowsy. Do not drive or doing anything else that could be dangerous until you know how this medicine affects you. · Too much of this medicine can cause death. Symptoms of an overdose include extreme dizziness or weakness, trouble breathing, slow heartbeat, seizure, and cold, clammy skin. · This medicine contains acetaminophen. Read the labels of all other medicines you are using to see if they also contain acetaminophen, or ask your doctor or pharmacist. David Cainell not use more than 4 grams (4,000 milligrams) total of acetaminophen in one day. · Tell any doctor or dentist who treats you that you are using this medicine. This medicine may affect certain medical test results. · This medicine may cause constipation, especially with long-term use. Ask your doctor if you should use a laxative to prevent and treat constipation. · Keep all medicine out of the reach of children. Never share your medicine with anyone.   Possible Side Effects While Using This Medicine:   Call your doctor right away if you notice any of these side effects:  · Allergic reaction: Itching or hives, swelling in your face or hands, swelling or tingling in your mouth or throat, chest tightness, trouble breathing  · Blistering, peeling, red skin rash  · Change in how much or how often you urinate  · Dark urine or pale stools, loss of appetite, stomach pain, yellow skin or eyes  · Extreme weakness, shallow breathing, slow heartbeat, sweating, cold or clammy skin  · Lightheadedness, dizziness, fainting  · Unusual bleeding or bruising  If you notice these less serious side effects, talk with your doctor:   · Constipation, nausea, vomiting  · Tiredness or sleepiness  If you notice other side effects that you think are caused by this medicine, tell your doctor. Call your doctor for medical advice about side effects. You may report side effects to FDA at 1-634-IYZ-7911  © 2016 [address] is for End User's use only and may not be sold, redistributed or otherwise used for commercial purposes. The above information is an  only. It is not intended as medical advice for individual conditions or treatments. Talk to your doctor, nurse or pharmacist before following any medical regimen to see if it is safe and effective for you. DISCHARGE SUMMARY from Nurse    The following personal items are in your possession at time of discharge:    Dental Appliances:  (given to )  Visual Aid: Glasses     Home Medications: None  Jewelry: None  Clothing: Other (comment) (street clothes)  Other Valuables: None       PATIENT INSTRUCTIONS:    After general anesthesia or intravenous sedation, for 24 hours or while taking prescription Narcotics:  · Limit your activities  · Do not drive and operate hazardous machinery  · Do not make important personal or business decisions  · Do  not drink alcoholic beverages  · If you have not urinated within 8 hours after discharge, please contact your surgeon on call.     Report the following to your surgeon:  · Excessive pain, swelling, redness or odor of or around the surgical area  · Temperature over 100.5  · Nausea and vomiting lasting longer than 4 hours or if unable to take medications  · Any signs of decreased circulation or nerve impairment to extremity: change in color, persistent  numbness, tingling, coldness or increase pain  · Any questions      These are general instructions for a healthy lifestyle:    No smoking/ No tobacco products/ Avoid exposure to second hand smoke    Surgeon Mario Rebollar Warning:  Quitting smoking now greatly reduces serious risk to your health. Obesity, smoking, and sedentary lifestyle greatly increases your risk for illness    A healthy diet, regular physical exercise & weight monitoring are important for maintaining a healthy lifestyle    You may be retaining fluid if you have a history of heart failure or if you experience any of the following symptoms:  Weight gain of 3 pounds or more overnight or 5 pounds in a week, increased swelling in our hands or feet or shortness of breath while lying flat in bed. Please call your doctor as soon as you notice any of these symptoms; do not wait until your next office visit. Recognize signs and symptoms of STROKE:    F-face looks uneven    A-arms unable to move or move unevenly    S-speech slurred or non-existent    T-time-call 911 as soon as signs and symptoms begin-DO NOT go       Back to bed or wait to see if you get better-TIME IS BRAIN. Warning Signs of HEART ATTACK     Call 911 if you have these symptoms:   Chest discomfort. Most heart attacks involve discomfort in the center of the chest that lasts more than a few minutes, or that goes away and comes back. It can feel like uncomfortable pressure, squeezing, fullness, or pain.  Discomfort in other areas of the upper body. Symptoms can include pain or discomfort in one or both arms, the back, neck, jaw, or stomach.  Shortness of breath with or without chest discomfort.  Other signs may include breaking out in a cold sweat, nausea, or lightheadedness. Don't wait more than five minutes to call 911 - MINUTES MATTER! Fast action can save your life. Calling 911 is almost always the fastest way to get lifesaving treatment. Emergency Medical Services staff can begin treatment when they arrive -- up to an hour sooner than if someone gets to the hospital by car. The discharge information has been reviewed with the patient and caregiver.   The patient and caregiver verbalized understanding. Discharge medications reviewed with the patient and caregiver and appropriate educational materials and side effects teaching were provided.

## 2017-02-01 NOTE — ANESTHESIA POSTPROCEDURE EVALUATION
Post-Anesthesia Evaluation and Assessment    Patient: cEtor Hamm MRN: [id number]  SSN: [id number]    YOB: 1950  Age: 73 y.o. Sex: female       Cardiovascular Function/Vital Signs  Visit Vitals    /67 (BP 1 Location: Right arm, BP Patient Position: At rest)    Pulse 80    Temp 36.7 °C (98 °F)    Resp 16    Ht 5' 5\" (1.651 m)    Wt 88.5 kg (195 lb 1.7 oz)    SpO2 98%    BMI 32.47 kg/m2       Patient is status post MAC anesthesia for Procedure(s):  LEFT SIDE PORTACATH INSERTION. Nausea/Vomiting: None    Postoperative hydration reviewed and adequate. Pain:  Pain Scale 1: Numeric (0 - 10) (02/01/17 1230)  Pain Intensity 1: 0 (02/01/17 1230)   Managed    Neurological Status:   Neuro (WDL): Exceptions to WDL (02/01/17 1230)  Neuro  Neurologic State: Alert;Eyes open spontaneously (02/01/17 1230)  Orientation Level: Oriented X4 (02/01/17 1230)  Cognition: Follows commands (02/01/17 1230)  Speech: Clear (02/01/17 1230)  LUE Motor Response: Purposeful (02/01/17 1230)  LLE Motor Response: Purposeful (02/01/17 1230)  RUE Motor Response: Purposeful (02/01/17 1230)  RLE Motor Response: Purposeful (02/01/17 1230)   At baseline    Mental Status and Level of Consciousness: Arousable    Pulmonary Status:   O2 Device: Room air (02/01/17 1230)   Adequate oxygenation and airway patent    Complications related to anesthesia: None    Post-anesthesia assessment completed.  No concerns    Signed By: Mariam Ellington MD     February 1, 2017

## 2017-02-01 NOTE — PERIOP NOTES
Discharge instructions reviewed with patient, , & daughter. Opportunity for questions/answers given, able to indicate understanding. Patient dressed, voided, and escorted out for discharge home via wheelchair by nurse. Patient provided with one prescription for pain medication.

## 2017-02-01 NOTE — OP NOTES
Doctor Duffy [phone]   OP NOTE       Name:  Shelbie Hernandez   MR#:  [id number]   :  1950   Account #:  [de-identified]    Surgery Date:  2017   Date of Adm:  2017       PREOPERATIVE DIAGNOSIS: Right breast carcinoma. POSTOPERATIVE DIAGNOSIS: Right breast carcinoma. PROCEDURES PERFORMED: Insertion of a left subclavian vein 8-  Swati Lasso PowerPort with supervision and interpretation by Radiology. SURGEON: Fatmata Dial. Adams Ramirez MD     ANESTHESIA: MAC.    ESTIMATED BLOOD LOSS: 20 mL. COMPLICATIONS: None. SPECIMENS REMOVED: None. FINDINGS: Left subclavian vein approach. COMPLICATIONS: None. 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 She is to receive   neoadjuvant chemotherapy and needs central access. She now   presents for that. She understands the risks and benefits and wished   to proceed. DESCRIPTION OF PROCEDURE: The patient was taken to the   operating room and placed on the operating table in a supine position,   underwent IV sedation, and the arms were tucked and padded at the   side and the neck and chest were prepped and draped in the usual   sterile fashion. After appropriate time-out and antibiotics were given,   1% lidocaine with epinephrine and bicarbonate were infiltrated into the   skin and subcutaneous tissues in the left brachial pectoral fold. She   had previously had a Port-A-Cath for a right breast cancer in the   remote past. Utilizing, with the patient in Trendelenburg, an 18-gauge   needle was inserted in the subclavian vein on first pass and wire   placed over it utilizing Seldinger technique without difficulty. After that   was completed, the needle was removed and the wire left in place. A transverse incision was made through the old port site and including   the insertion site of the wire.  We created a pocket inferiorly for the   Port-A-Cath to sit in. An 8-[place] dilator and peel-away sheath were   placed over the guidewire, and the wire and the dilator were removed   and the catheter placed through the peel-away sheath. Utilizing   fluoroscopic guidance in the right direction and the peel-away sheath   removed and the catheter pulled back to the right atrial-SVC junction. The catheter was amputated off and attached to the port. The port was   sewn onto the chest wall with 2 interrupted 2-0 Prolene sutures. The   port was next accessed with a right angle 21-gauge Estrada needle, had   excellent blood return, was flushed with heparinized saline, followed by   3 mL of concentrated heparin flush and interrupted 4-0 Vicryl was used   to approximate the deep tissue and deep dermis, and a running 4-0   Vicryl used to close the skin. Port was then de-accessed. Running 4-0   Vicryl was used to close the skin and a Dermabond dressing applied. Upon completion of the operation, the needle, sponge, and instrument   counts were correct x2. The patient tolerated the procedure well and   the patient was brought to the recovery room in stable condition. MD Meghana Vega / [facility]   D:  02/01/2017   11:27   T:  02/01/2017   14:52   Job #:  [id number]

## 2017-02-01 NOTE — IP AVS SNAPSHOT
[address] [facility] 
[phone] Patient: Branden Valenzuela MRN: [id number] IOO:[id number] You are allergic to the following No active allergies Recent Documentation Height Weight BMI OB Status Smoking Status 1.651 m 88.5 kg 32.47 kg/m2 Postmenopausal Never Smoker Emergency Contacts Name Discharge Info Relation Home Work Mobile Yuri Ron DISCHARGE CAREGIVER [3] Spouse [3] [phone] About your hospitalization You were admitted on:  February 1, 2017 You last received care in the:  [facility] PACU You were discharged on:  February 1, 2017 Unit phone number:  [phone] Why you were hospitalized Your primary diagnosis was:  Not on File Providers Seen During Your Hospitalizations Provider Role Specialty Primary office phone Denilson Rouse MD Attending Provider General Surgery [phone] Your Primary Care Physician (PCP) Primary Care Physician Office Phone Office Fax Steven Carpenter [phone] Follow-up Information Follow up With Details Comments Contact Info Chaitanya Jones MD   [address] [address] 
[phone] Denilson Rouse MD Schedule an appointment as soon as possible for a visit in 2 week(s)  [address] [address] [address] [facility] 
[phone] Your Appointments Friday February 17, 2017  9:40 AM EST  
ESTABLISHED PATIENT with Denilson Rouse MD  
[facility] Dr. Frank Alegre [facility] ([facility]) [address] [place] [address], [address] [address] [facility]  
[phone] Current Discharge Medication List  
  
START taking these medications Dose & Instructions Dispensing Information Comments Morning Noon Evening Bedtime HYDROcodone-acetaminophen 5-325 mg per tablet Commonly known as:  Lenon Gauze Your next dose is: Today, Tomorrow Other:  _________ Dose:  1-2 Tab Take 1-2 Tabs by mouth every six (6) hours as needed for Pain. Max Daily Amount: 8 Tabs. Quantity:  30 Tab Refills:  0 CONTINUE these medications which have NOT CHANGED Dose & Instructions Dispensing Information Comments Morning Noon Evening Bedtime  
 aspirin delayed-release 81 mg tablet Your next dose is: Today, Tomorrow Other:  _________ Dose:  81 mg Take 81 mg by mouth daily. Refills:  0  
     
   
   
   
  
 CALTRATE 600 + D PO Your next dose is: Today, Tomorrow Other:  _________ Dose:  2 Tab Take 2 Tabs by mouth daily. Refills:  0 CeleXA 20 mg tablet Generic drug:  citalopram  
   
Your next dose is: Today, Tomorrow Other:  _________ Dose:  20 mg Take 20 mg by mouth daily. Refills:  0  
     
   
   
   
  
 FISH OIL 1,000 mg Cap Generic drug:  omega-3 fatty acids-vitamin e Your next dose is: Today, Tomorrow Other:  _________ Dose:  1 Cap Take 1 Cap by mouth daily. Refills:  0  
     
   
   
   
  
 multivitamin tablet Commonly known as:  ONE A DAY Your next dose is: Today, Tomorrow Other:  _________ Dose:  1 Tab Take 1 Tab by mouth daily. Refills:  0  
     
   
   
   
  
 XANAX 0.25 mg tablet Generic drug:  ALPRAZolam  
   
Your next dose is: Today, Tomorrow Other:  _________ Dose:  0.25 mg Take 0.25 mg by mouth two (2) times daily as needed for Anxiety. Indications: ANXIETY Refills:  0 Where to Get Your Medications Information on where to get these meds will be given to you by the nurse or doctor. ! Ask your nurse or doctor about these medications HYDROcodone-acetaminophen 5-325 mg per tablet Discharge Instructions Discharge Instructions:  Port a cath Insertion Dr. Sathya Milian Call for appointment for follow up in 2 weeks [phone] Activity: 
 
Walk regularly. You may resume driving in 24 hours unless still requiring narcotics for pain. It is ok to use the arm on side of surgery but do not over do it. Work: 
 
You may return to work in 3-7 days to light activity. No lifting more than 10 pounds for one week. Diet: 
 
You may resume normal diet after 24 hours. Anesthesia and narcotics may cause nausea and vomiting. If persistent please call the office. Wound Care: You have a special dressing called Dermabond. It is okay to shower and let the water run over the incision but do not scrub the area or soak in a tub. If you have a small amount of drainage you may place a dry bandage over the wound and change it daily. If you experience a lot of drainage, develop redness around the wound, or a fever over 101 F occurs please call the office. Medications: 
 
Resume home medications as indicated on the Medical Reconciliation form. Aspirin, Coumadin, and Plavix can be restarted on post operative day 2 if you were taking them preoperatively. Pain medications:  Non steroidal antiinflammatories seem to work best for post surgical pain. Try these first as prescribed. A narcotic prescription will also be given for breakthrough pain. Over the counter stool softeners and laxatives may be used if needed. Do not hesitate to call with questions or concerns. How to Care for Your Wound After Its Treated With DERMABOND* Topical Skin Adhesive DERMABOND* Topical Skin Adhesive (2-octyl cyanoacrylate) is a sterile, liquid skin adhesive 
that holds wound edges together. The film will usually remain in place for 5 to 10 days, then 
naturally fall off your skin. The following will answer some of your questions and provide instructions for proper care for your wound while it is healing: CHECK WOUND APPEARANCE 
 Some swelling, redness, and pain are common with all wounds and normally will go away as the 
wound heals. If swelling, redness, or pain increases or if the wound feels warm to the touch, 
contact a doctor. Also contact a doctor if the wound edges reopen or separate. REPLACE BANDAGES 
 If your wound is bandaged, keep the bandage dry.  Replace the dressing daily until the adhesive film has fallen off or if the 
bandage should become wet, unless otherwise instructed by your 
physician.  When changing the dressing, do not place tape directly over the DERMABOND adhesive film, because removing the tape later may also 
remove the film. AVOID TOPICAL MEDICATIONS  Do not apply liquid or ointment medications or any other product to your wound while the DERMABOND adhesive film is in place. These may loosen the film before your wound is healed. KEEP WOUND DRY AND PROTECTED  You may occasionally and briefly wet your wound in the shower or bath. Do not soak or scrub 
your wound, do not swim, and avoid periods of heavy perspiration until the DERMABOND 
adhesive has naturally fallen off. After showering or bathing, gently blot your wound dry with a 
soft towel. If a protective dressing is being used, apply a fresh, dry bandage, being sure to keep 
the tape off the DERMABOND adhesive film.  Apply a clean, dry bandage over the wound if necessary to protect it.  Protect your wound from injury until the skin has had sufficient time to heal. 
 Do not scratch, rub, or pick at the DERMABOND adhesive film. This may loosen the film before 
your wound is healed.  Protect the wound from prolonged exposure to sunlight or tanning lamps while the film is in 
place. If you have any questions or concerns about this product, please consult your doctor. *Trademark ©[employer] 2002 Hydrocodone/Acetaminophen (By mouth) Acetaminophen (r-cojd-v-MIN-oh-fen), Hydrocodone Bitartrate (qcm-fiqx-HSP-done bye-TAR-trate) Treats pain. This medicine contains a narcotic pain reliever. Brand Name(s):Hycet, Lorcet, Lorcet HD, Lorcet Plus, Lortab 10/325, Lortab 5/325, Lortab 7.5/325, Lortab Elixir, Norco, Verdrocet, Vicodin, Vicodin ES, Vicodin HP, Leticia Noon 5/300 There may be other brand names for this medicine. When This Medicine Should Not Be Used: This medicine is not right for everyone. Do not use it if you had an allergic reaction to acetaminophen, hydrocodone, or other narcotic medicines. How to Use This Medicine:  
Tablet, Liquid, Capsule · Your doctor will tell you how much medicine to use. Do not use more than directed. · Measure the oral liquid medicine with a marked measuring spoon, oral syringe, or medicine cup. · Drink plenty of liquids to help avoid constipation. · Missed dose: Take a dose as soon as you remember. If it is almost time for your next dose, wait until then and take a regular dose. Do not take extra medicine to make up for a missed dose. · Store the medicine in a closed container at room temperature, away from heat, moisture, and direct light. Drugs and Foods to Avoid: Ask your doctor or pharmacist before using any other medicine, including over-the-counter medicines, vitamins, and herbal products. · Some medicines can affect how hydrocodone/acetaminophen works. Tell your doctor if you are using any of the following: ¨ An MAO inhibitor ¨ Medicine to treat depression · This medicine can intensify the effects of alcohol, sedatives, or tranquilizers. Tell your doctor if you drink alcohol or if you are using any medicine that makes you sleepy, such as allergy medicine or another narcotic pain medicine. Acetaminophen can damage your liver, and your risk is higher if you also drink alcohol. Warnings While Using This Medicine: · Tell your doctor if you are pregnant or breastfeeding, or if you have lung disease, liver disease, kidney disease, problems urinating, an underactive thyroid, Tejas disease, prostate problems, stomach problems, or a history of head injury or brain tumor. · This medicine may cause the following problems:  
¨ Liver problems ¨ Serious skin reactions · This medicine can be habit-forming. Do not use more than your prescribed dose. Call your doctor if you think your medicine is not working. · This medicine may make you dizzy or drowsy. Do not drive or doing anything else that could be dangerous until you know how this medicine affects you. · Too much of this medicine can cause death. Symptoms of an overdose include extreme dizziness or weakness, trouble breathing, slow heartbeat, seizure, and cold, clammy skin. · This medicine contains acetaminophen. Read the labels of all other medicines you are using to see if they also contain acetaminophen, or ask your doctor or pharmacist. Peggy Andersonbrynn not use more than 4 grams (4,000 milligrams) total of acetaminophen in one day. · Tell any doctor or dentist who treats you that you are using this medicine. This medicine may affect certain medical test results. · This medicine may cause constipation, especially with long-term use. Ask your doctor if you should use a laxative to prevent and treat constipation. · Keep all medicine out of the reach of children. Never share your medicine with anyone. Possible Side Effects While Using This Medicine:  
Call your doctor right away if you notice any of these side effects: · Allergic reaction: Itching or hives, swelling in your face or hands, swelling or tingling in your mouth or throat, chest tightness, trouble breathing · Blistering, peeling, red skin rash · Change in how much or how often you urinate · Dark urine or pale stools, loss of appetite, stomach pain, yellow skin or eyes · Extreme weakness, shallow breathing, slow heartbeat, sweating, cold or clammy skin · Lightheadedness, dizziness, fainting · Unusual bleeding or bruising If you notice these less serious side effects, talk with your doctor: · Constipation, nausea, vomiting · Tiredness or sleepiness If you notice other side effects that you think are caused by this medicine, tell your doctor. Call your doctor for medical advice about side effects. You may report side effects to FDA at 3-535-JOG-4667 © 2016 [address] is for End User's use only and may not be sold, redistributed or otherwise used for commercial purposes. The above information is an  only. It is not intended as medical advice for individual conditions or treatments. Talk to your doctor, nurse or pharmacist before following any medical regimen to see if it is safe and effective for you. DISCHARGE SUMMARY from Nurse The following personal items are in your possession at time of discharge: 
 
Dental Appliances:  (given to ) Visual Aid: Glasses Home Medications: None Jewelry: None Clothing: Other (comment) (street clothes) Other Valuables: None PATIENT INSTRUCTIONS: 
 
After general anesthesia or intravenous sedation, for 24 hours or while taking prescription Narcotics: · Limit your activities · Do not drive and operate hazardous machinery · Do not make important personal or business decisions · Do  not drink alcoholic beverages · If you have not urinated within 8 hours after discharge, please contact your surgeon on call. Report the following to your surgeon: 
· Excessive pain, swelling, redness or odor of or around the surgical area · Temperature over 100.5 · Nausea and vomiting lasting longer than 4 hours or if unable to take medications · Any signs of decreased circulation or nerve impairment to extremity: change in color, persistent  numbness, tingling, coldness or increase pain · Any questions These are general instructions for a healthy lifestyle: No smoking/ No tobacco products/ Avoid exposure to second hand smoke Surgeon General's Warning:  Quitting smoking now greatly reduces serious risk to your health. Obesity, smoking, and sedentary lifestyle greatly increases your risk for illness A healthy diet, regular physical exercise & weight monitoring are important for maintaining a healthy lifestyle You may be retaining fluid if you have a history of heart failure or if you experience any of the following symptoms:  Weight gain of 3 pounds or more overnight or 5 pounds in a week, increased swelling in our hands or feet or shortness of breath while lying flat in bed. Please call your doctor as soon as you notice any of these symptoms; do not wait until your next office visit. Recognize signs and symptoms of STROKE: 
 
F-face looks uneven A-arms unable to move or move unevenly S-speech slurred or non-existent T-time-call 911 as soon as signs and symptoms begin-DO NOT go Back to bed or wait to see if you get better-TIME IS BRAIN. Warning Signs of HEART ATTACK Call 911 if you have these symptoms: 
? Chest discomfort. Most heart attacks involve discomfort in the center of the chest that lasts more than a few minutes, or that goes away and comes back. It can feel like uncomfortable pressure, squeezing, fullness, or pain. ? Discomfort in other areas of the upper body. Symptoms can include pain or discomfort in one or both arms, the back, neck, jaw, or stomach. ? Shortness of breath with or without chest discomfort. ? Other signs may include breaking out in a cold sweat, nausea, or lightheadedness. Don't wait more than five minutes to call [address]! Fast action can save your life. Calling 911 is almost always the fastest way to get lifesaving treatment. Emergency Medical Services staff can begin treatment when they arrive  up to an hour sooner than if someone gets to the hospital by car. The discharge information has been reviewed with the patient and caregiver. The patient and caregiver verbalized understanding. Discharge medications reviewed with the patient and caregiver and appropriate educational materials and side effects teaching were provided. Discharge Orders None Introducing [facility]! Maximozana Frost introduces Eventful patient portal. Now you can access parts of your medical record, email your doctor's office, and request medication refills online. 1. In your internet browser, go to https://[employer]. [employer] 2. Click on the First Time User? Click Here link in the Sign In box. You will see the New Member Sign Up page. 3. Enter your Eventful Access Code exactly as it appears below. You will not need to use this code after youve completed the sign-up process. If you do not sign up before the expiration date, you must request a new code. · Eventful Access Code: [email or web address] Expires: 4/19/2017  1:53 PM 
 
4. Enter the last four digits of your Social Security Number (xxxx) and Date of Birth (mm/dd/yyyy) as indicated and click Submit. You will be taken to the next sign-up page. 5. Create a Eventful ID. This will be your Eventful login ID and cannot be changed, so think of one that is secure and easy to remember. 6. Create a Eventful password. You can change your password at any time. 7. Enter your Password Reset Question and Answer. This can be used at a later time if you forget your password. 8. Enter your e-mail address. You will receive e-mail notification when new information is available in [address] 9. Click Sign Up. You can now view and download portions of your medical record. 10. Click the Download Summary menu link to download a portable copy of your medical information. If you have questions, please visit the Frequently Asked Questions section of the Eventful website.  Remember, Eventful is NOT to be used for urgent needs. For medical emergencies, dial 911. Now available from your iPhone and Android! General Information Please provide this summary of care documentation to your next provider. Patient Signature:  ____________________________________________________________ Date:  ____________________________________________________________  
  
Aloma Snellen Provider Signature:  ____________________________________________________________ Date:  ____________________________________________________________

## 2017-02-01 NOTE — PERIOP NOTES
Handoff Report from Operating Room to PACU    Report received from [address] [place] [address],  [address], DWAYNE and Sharmin Hamm RN regarding Jeri Rings. Surgeon(s):  Paris Vickers MD  And Procedure(s) (LRB):  LEFT SIDE PORTACATH INSERTION (Left)  confirmed   with allergies and dressings discussed. Anesthesia type, drugs, patient history, complications, estimated blood loss, vital signs, intake and output, and last pain medication, lines and temperature were reviewed.

## 2017-02-01 NOTE — ANESTHESIA PREPROCEDURE EVALUATION
Anesthetic History   No history of anesthetic complications            Review of Systems / Medical History  Patient summary reviewed, nursing notes reviewed and pertinent labs reviewed    Pulmonary  Within defined limits                 Neuro/Psych         Psychiatric history (anxiety)     Cardiovascular  Within defined limits                Exercise tolerance: >4 METS     GI/Hepatic/Renal  Within defined limits              Endo/Other        Obesity and cancer (breast cancer)     Other Findings            Physical Exam    Airway  Mallampati: II  TM Distance: 4 - 6 cm  Neck ROM: normal range of motion   Mouth opening: Normal     Cardiovascular  Regular rate and rhythm,  S1 and S2 normal,  no murmur, click, rub, or gallop             Dental    Dentition: Full upper dentures and Full lower dentures     Pulmonary  Breath sounds clear to auscultation               Abdominal  GI exam deferred       Other Findings            Anesthetic Plan    ASA: 2  Anesthesia type: MAC            Anesthetic plan and risks discussed with: Patient

## 2017-02-01 NOTE — BRIEF OP NOTE
BRIEF OPERATIVE NOTE    Date of Procedure: 2/1/2017   Preoperative Diagnosis: RIGHT BREAST CANCER  Postoperative Diagnosis: RIGHT BREAST CANCER    Procedure(s):  LEFT SIDE PORTACATH INSERTION with supervision and interpretation of radiology  Surgeon(s) and Role:     * Yoel Cohen MD - Primary            Surgical Staff:  Circ-1: Patel Pinon  Circ-Relief: Tami Lieberman RN  Radiology Technician: Evangelina Lucero, RT  Scrub Tech-1: Nitza Vazquez  Surg Asst-1: Phoebe Hong McPhipps  Event Time In   Incision Start 1101   Incision Close 1114     Anesthesia: MAC   Estimated Blood Loss: 25 ml  Specimens: * No specimens in log *   Findings: left subclavian vein approach   Complications: none  Implants:   Implant Name Type Inv.  Item Serial No.  Lot No. LRB No. Used Action   PORT ATTACH CATH POWERPORT 8FR --  - [id number]   PORT ATTACH CATH POWERPORT 8FR --    [employer] [id number] 1 Implanted

## 2017-02-17 ENCOUNTER — OFFICE VISIT (OUTPATIENT)
Dept: SURGERY | Age: 67
End: 2017-02-17

## 2017-02-17 VITALS
DIASTOLIC BLOOD PRESSURE: 64 MMHG | WEIGHT: 195 LBS | HEART RATE: 99 BPM | SYSTOLIC BLOOD PRESSURE: 132 MMHG | HEIGHT: 65 IN | RESPIRATION RATE: 20 BRPM | OXYGEN SATURATION: 98 % | BODY MASS INDEX: 32.49 KG/M2

## 2017-02-17 DIAGNOSIS — Z09 POSTOPERATIVE EXAMINATION: Primary | ICD-10-CM

## 2017-02-17 NOTE — PROGRESS NOTES
Surgery  Follow up  Procedure: port a cath placement  OR date:  2/1/2017  Path:  none    S I feel fine, no drainage    Visit Vitals    /64 (BP 1 Location: Right arm, BP Patient Position: Sitting)    Pulse 99    Resp 20    Ht 5' 5\" (1.651 m)    Wt 88.5 kg (195 lb)    SpO2 98%    BMI 32.45 kg/m2       O Incisions healing well without infection     A/P Doing well   Continue with chemotherapy   She is leaning toward bilateral mastectomy and right SLNB/ALND and reconstruction after chemotherapy   MyRisk testing is still pending   RTC 2 months    Shanna Chandler MD FACS

## 2017-02-17 NOTE — MR AVS SNAPSHOT
Visit Information Date & Time Provider Department Dept. Phone Encounter #  
 2/17/2017  9:40 AM Macho Vasquez MD [facility] [address] [facility] [phone] [id number] Your Appointments 4/21/2017  9:40 AM  
ESTABLISHED PATIENT with Macho Vasquez MD  
[facility] [address] rFank Bentley ([address]) Appt Note: 2 month breast check [address] [facility] [address], [address] [address]  
[address] [facility], [address] [place] [address] [address] [address] Upcoming Health Maintenance Date Due Hepatitis C Screening 1950 DTaP/Tdap/Td series (1 - Tdap) 7/17/1972 FOBT Q 1 YEAR AGE 50-75 7/17/2001 ZOSTER VACCINE AGE 60> 7/17/2011 GLAUCOMA SCREENING Q2Y 7/17/2016 OSTEOPOROSIS SCREENING (DEXA) 7/17/2016 Pneumococcal 65+ High/Highest Risk (1 of 2 - PCV13) 7/17/2016 MEDICARE YEARLY EXAM 7/17/2016 INFLUENZA AGE 9 TO ADULT 8/1/2016 BREAST CANCER SCRN MAMMOGRAM 1/3/2019 Allergies as of 2/17/2017  Review Complete On: 2/17/2017 By: Macho Vasquez MD  
 No Known Allergies Current Immunizations  Never Reviewed No immunizations on file. Not reviewed this visit You Were Diagnosed With   
  
 Codes Comments Postoperative examination    -  Primary ICD-10-CM: C36 ICD-9-CM: V67.00 Vitals BP Pulse Resp Height(growth percentile) Weight(growth percentile) SpO2  
 132/64 (BP 1 Location: Right arm, BP Patient Position: Sitting) 99 20 5' 5\" (1.651 m) 195 lb (88.5 kg) 98% BMI OB Status Smoking Status 32.45 kg/m2 Postmenopausal Never Smoker BMI and BSA Data Body Mass Index Body Surface Area  
 32.45 kg/m 2 2.01 m 2 Preferred Pharmacy Pharmacy Name Phone [facility] [phone] [address] [phone] Your Updated Medication List  
  
   
 This list is accurate as of: 2/17/17 10:51 AM.  Always use your most recent med list.  
  
  
  
  
 aspirin delayed-release 81 mg tablet Take 81 mg by mouth daily. CALTRATE 600 + D PO Take 2 Tabs by mouth daily. FISH OIL 1,000 mg Cap Generic drug:  omega-3 fatty acids-vitamin e Take 1 Cap by mouth daily. multivitamin tablet Commonly known as:  ONE A DAY Take 1 Tab by mouth daily. Introducing [facility] SERVICES! John Membreno introduces [employer] patient portal. Now you can access parts of your medical record, email your doctor's office, and request medication refills online. 1. In your internet browser, go to [email or web address] 2. Click on the First Time User? Click Here link in the Sign In box. You will see the New Member Sign Up page. 3. Enter your Shop[employer] Access Code exactly as it appears below. You will not need to use this code after youve completed the sign-up process. If you do not sign up before the expiration date, you must request a new code. · Shop[employer] Access Code: [email or web address] Expires: 4/19/2017  1:53 PM 
 
4. Enter the last four digits of your Social Security Number (xxxx) and Date of Birth (mm/dd/yyyy) as indicated and click Submit. You will be taken to the next sign-up page. 5. Create a Shopcliq ID. This will be your Shopcliq login ID and cannot be changed, so think of one that is secure and easy to remember. 6. Create a Shopcliq password. You can change your password at any time. 7. Enter your Password Reset Question and Answer. This can be used at a later time if you forget your password. 8. Enter your e-mail address. You will receive e-mail notification when new information is available in [address] 9. Click Sign Up. You can now view and download portions of your medical record. 10. Click the Download Summary menu link to download a portable copy of your medical information. If you have questions, please visit the Frequently Asked Questions section of the [employer] website. Remember, [employer] is NOT to be used for urgent needs. For medical emergencies, dial 911. Now available from your iPhone and Android! Please provide this summary of care documentation to your next provider. Your primary care clinician is listed as Avril Trotter. If you have any questions after today's visit, please call [phone].

## 2017-03-23 ENCOUNTER — TELEPHONE (OUTPATIENT)
Dept: SURGERY | Age: 67
End: 2017-03-23

## 2017-04-21 ENCOUNTER — OFFICE VISIT (OUTPATIENT)
Dept: SURGERY | Age: 67
End: 2017-04-21

## 2017-04-21 VITALS
HEIGHT: 65 IN | BODY MASS INDEX: 30.16 KG/M2 | WEIGHT: 181 LBS | DIASTOLIC BLOOD PRESSURE: 80 MMHG | SYSTOLIC BLOOD PRESSURE: 145 MMHG | HEART RATE: 124 BPM | OXYGEN SATURATION: 99 %

## 2017-04-21 DIAGNOSIS — C50.411 BREAST CANCER OF UPPER-OUTER QUADRANT OF RIGHT FEMALE BREAST (HCC): Primary | ICD-10-CM

## 2017-04-21 RX ORDER — GRANISETRON 3.1 MG/24H
PATCH TRANSDERMAL
COMMUNITY
Start: 2017-04-17 | End: 2017-06-23

## 2017-04-21 RX ORDER — ONDANSETRON 8 MG/1
8 TABLET, ORALLY DISINTEGRATING ORAL
Refills: 0 | COMMUNITY
Start: 2017-04-11 | End: 2019-02-13 | Stop reason: ALTCHOICE

## 2017-04-21 RX ORDER — PROCHLORPERAZINE MALEATE 10 MG
10 TABLET ORAL
COMMUNITY
End: 2019-02-13 | Stop reason: ALTCHOICE

## 2017-04-21 NOTE — PROGRESS NOTES
HISTORY OF PRESENT ILLNESS  Marcelle Leiva is a 73 y.o. female who comes in for her right breast cancer  Breast Problem   Pertinent negatives include no chest pain, no abdominal pain, no headaches and no shortness of breath. She went for routine screening mammogram in late Dec 2016 and was noted to have a developing density in the outer right breast.  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 She had a breast MRI suggesting the area to be 2.3 cm or two separate lesions. She was also noted to have two foci in the left breast for which MRI biopsy was recommended. She denies feeling any masses, skin changes, nipple changes, unexplained weight loss or bone pain. She previously underwent a right lumpectomy and ALND followed by chemotherapy Nick Emerson and WBRT in 2003 for a ??stage 1-2 triple negative breast cancer. She has had other bilateral breast biopsies as well. She had menarche at 16, first of two pregnancies at 35, menopause at 51 and did not take HRT. She denies family hx of breast or ovarian cancer but her mother had kidney cancer and her brother had lung cancer. She has been receiving neoadjuvant chemotherapy by Dr Vinita Lennox.     Past Medical History:   Diagnosis Date    Cancer [facility]) 2003    right breast cancer    Psychiatric disorder     anxiety     Past Surgical History:   Procedure Laterality Date    BREAST SURGERY PROCEDURE UNLISTED  2003    right breast lumpectomy     Family History   Problem Relation Age of Onset    Cancer Mother      kidney    Cancer Brother      lung    Heart Disease Father     Diabetes Sister     Diabetes Brother      Social History   Substance Use Topics    Smoking status: Never Smoker    Smokeless tobacco: Never Used    Alcohol use No     Current Outpatient Prescriptions   Medication Sig    SANCUSO 3.1 mg/24 hour ptwk     ondansetron (ZOFRAN ODT) 8 mg disintegrating tablet     prochlorperazine (COMPAZINE) 10 mg tablet Take 5 mg by mouth every six (6) hours as needed.  multivitamin (ONE A DAY) tablet Take 1 Tab by mouth daily.  aspirin delayed-release 81 mg tablet Take 81 mg by mouth daily.  omega-3 fatty acids-vitamin e (FISH OIL) 1,000 mg cap Take 1 Cap by mouth daily.  CALCIUM CARBONATE/VITAMIN D3 (CALTRATE 600 + D PO) Take 2 Tabs by mouth daily. No current facility-administered medications for this visit. No Known Allergies    Review of Systems   Constitutional: Negative for chills, diaphoresis, fever, malaise/fatigue and weight loss. HENT: Negative for congestion, ear pain and sore throat. Eyes: Negative for blurred vision and pain. Respiratory: Negative for cough, hemoptysis, sputum production, shortness of breath, wheezing and stridor. Cardiovascular: Negative for chest pain, palpitations, orthopnea, claudication, leg swelling and PND. Gastrointestinal: Negative for abdominal pain, blood in stool, constipation, diarrhea, heartburn, melena, nausea and vomiting. Genitourinary: Negative for dysuria, flank pain, frequency, hematuria and urgency. Musculoskeletal: Negative for back pain, joint pain, myalgias and neck pain. Skin: Negative for itching and rash. Neurological: Negative for dizziness, tremors, focal weakness, seizures, weakness and headaches. Endo/Heme/Allergies: Negative for polydipsia. Psychiatric/Behavioral: Negative for depression and memory loss. The patient is nervous/anxious. Blood pressure 145/80, pulse (!) 124, height 5' 5\" (1.651 m), weight 82.1 kg (181 lb), SpO2 99 %. Physical Exam   Constitutional: She is oriented to person, place, and time. She appears well-developed and well-nourished. No distress. HENT:   Head: Normocephalic and atraumatic. Mouth/Throat: Oropharynx is clear and moist. No oropharyngeal exudate. Eyes: Conjunctivae and EOM are normal. Pupils are equal, round, and reactive to light. No scleral icterus. Neck: Normal range of motion. Neck supple. No JVD present. No tracheal deviation present. No thyromegaly present. Cardiovascular: Normal rate and regular rhythm. Exam reveals no gallop and no friction rub. No murmur heard. Pulmonary/Chest: Effort normal and breath sounds normal. No respiratory distress. She has no wheezes. She has no rales. Right breast exhibits tenderness (lower outer at bx site). Right breast exhibits no inverted nipple, no mass, no nipple discharge and no skin change. Left breast exhibits no inverted nipple, no mass, no nipple discharge, no skin change and no tenderness. Breasts are symmetrical.       Abdominal: Soft. Bowel sounds are normal. She exhibits no distension and no mass. There is no hepatosplenomegaly. There is no tenderness. There is no rebound, no guarding and no CVA tenderness. No hernia. Hernia confirmed negative in the ventral area. Musculoskeletal: Normal range of motion. She exhibits no edema. Lymphadenopathy:     She has no cervical adenopathy. She has no axillary adenopathy. Right: No supraclavicular adenopathy present. Left: No supraclavicular adenopathy present. Neurological: She is alert and oriented to person, place, and time. No cranial nerve deficit. Skin: Skin is warm and dry. No rash noted. She is not diaphoretic. No erythema. No pallor. Psychiatric: She has a normal mood and affect. Her behavior is normal. Judgment and thought content normal.     I reviewed her pathology and radiology reports today. I was unable to open the images in office today and brought them to radiology for review  ASSESSMENT and PLAN  1. Right breast cancer early stage, Clinically stage 2 (T2NxM0)  ER 98% NH 83% and her2/wil amplified. Her lymph nodes were previously removed and cannot be assessed.   I spent over 60 minutes discussing the anatomy and pathophysiology of breast cancer and treatment options, prognosis. We discussed breast conservation therapy vs mastectomy with and without reconstruction, sentinel lymph node biopsy and axillary dissection, various forms of radiation (whole vs accelerated partial breast), medical oncology therapy (SERM vs Aromatase inhibitors, chemotherapy, herceptin). I discussed about BRCA genetic testing and oncotype DX gene mapping of the tumor. I discussed the risks and benefits of surgery including bleeding, infection, paresthesias and numbness, cosmetic changes, lymphedema, seroma, chronic pain, and need for reoperation for positive margins/sentinel nodes. I discussed websites for her to review. She will require right mastectomy unless we can get her on a clinical trial for APBI after WBRT. She is a good candidate for reconstruction if she opts for     2. Hx right breast cancer in 2003 triple negative (treated by DR Dana Omalley). This is unrelated to the new cancer  3. Abnormal left breast MRI with two suspicious foci. bx benign 1/2017  4. High risk for genetic mutation due to triple negative breast ca at age 49 and now a metachronous breast cancer.    My Risk genetic testing negative  She is interested in bilateral mastectomy with right axillary SLNB/ALND after completion of chemotherapy  Offered reconstruction with plastic surgery consultation by she is not interested at this time  Plan for surgery 3-4 weeks after completion of reconstruction  RTC at end of chemotherapy (late May/early June) to schedule surgery    Genaro Lancaster MD FACS

## 2017-04-21 NOTE — MR AVS SNAPSHOT
Visit Information Date & Time Provider Department Dept. Phone Encounter #  
 4/21/2017  9:40 AM Laureen Pate MD [facility] [address] [facility] [phone] [id number] Your Appointments 6/9/2017 11:40 AM  
ESTABLISHED PATIENT with Laureen Pate MD  
[facility] [address] Mc ([address]) Appt Note: 2 month breast check [address] [facility] [address] [address]  
[address] [place] [facility], [address] [place] [address] [address] [address] Upcoming Health Maintenance Date Due Hepatitis C Screening 1950 DTaP/Tdap/Td series (1 - Tdap) 7/17/1972 FOBT Q 1 YEAR AGE 50-75 7/17/2001 ZOSTER VACCINE AGE 60> 7/17/2011 GLAUCOMA SCREENING Q2Y 7/17/2016 OSTEOPOROSIS SCREENING (DEXA) 7/17/2016 Pneumococcal 65+ High/Highest Risk (1 of 2 - PCV13) 7/17/2016 MEDICARE YEARLY EXAM 7/17/2016 INFLUENZA AGE 9 TO ADULT 8/1/2016 BREAST CANCER SCRN MAMMOGRAM 1/3/2019 Allergies as of 4/21/2017  Review Complete On: 4/21/2017 By: Laureen Pate MD  
 No Known Allergies Current Immunizations  Never Reviewed No immunizations on file. Not reviewed this visit You Were Diagnosed With   
  
 Codes Comments Breast cancer of upper-outer quadrant of right female breast ([facility] [address]    -  Primary ICD-10-CM: H07.866 ICD-9-CM: 174.4 Vitals BP Pulse Height(growth percentile) Weight(growth percentile) SpO2 BMI  
 145/80 (BP 1 Location: Right arm, BP Patient Position: Sitting) (!) 124 5' 5\" (1.651 m) 181 lb (82.1 kg) 99% 30.12 kg/m2 OB Status Smoking Status Postmenopausal Never Smoker Vitals History BMI and BSA Data Body Mass Index Body Surface Area  
 30.12 kg/m 2 1.94 m 2 Preferred Pharmacy Pharmacy Name Phone [facility] [phone] [address] [phone] Your Updated Medication List  
  
   
This list is accurate as of: 4/21/17 10:46 AM.  Always use your most recent med list.  
  
  
  
  
 aspirin delayed-release 81 mg tablet Take 81 mg by mouth daily. CALTRATE 600 + D PO Take 2 Tabs by mouth daily. COMPAZINE 10 mg tablet Generic drug:  prochlorperazine Take 5 mg by mouth every six (6) hours as needed. FISH OIL 1,000 mg Cap Generic drug:  omega-3 fatty acids-vitamin e Take 1 Cap by mouth daily. multivitamin tablet Commonly known as:  ONE A DAY Take 1 Tab by mouth daily. ondansetron 8 mg disintegrating tablet Commonly known as:  ZOFRAN ODT  
  
 SANCUSO 3.1 mg/24 hour Ptwk Generic drug:  Granisetron Introducing [facility] HE[facility] SERVICES! Paco Valenzuela introduces [employer] patient portal. Now you can access parts of your medical record, email your doctor's office, and request medication refills online. 1. In your internet browser, go to [email or web address]. [email or web address] 2. Click on the First Time User? Click Here link in the Sign In box. You will see the New Member Sign Up page. 3. Enter your Dime Access Code exactly as it appears below. You will not need to use this code after youve completed the sign-up process. If you do not sign up before the expiration date, you must request a new code. · [employer] Access Code: [email or web address] Expires: 7/20/2017  9:37 AM 
 
4. Enter the last four digits of your Social Security Number (xxxx) and Date of Birth (mm/dd/yyyy) as indicated and click Submit. You will be taken to the next sign-up page. 5. Create a InitMet ID. This will be your Dime login ID and cannot be changed, so think of one that is secure and easy to remember. 6. Create a Dime password. You can change your password at any time. 7. Enter your Password Reset Question and Answer. This can be used at a later time if you forget your password. 8. Enter your e-mail address. You will receive e-mail notification when new information is available in [address] 9. Click Sign Up. You can now view and download portions of your medical record. 10. Click the Download Summary menu link to download a portable copy of your medical information. If you have questions, please visit the Frequently Asked Questions section of the [facility] website. Remember, [facility] is NOT to be used for urgent needs. For medical emergencies, dial 911. Now available from your iPhone and Android! Please provide this summary of care documentation to your next provider. Your primary care clinician is listed as Aubrey Mccoy. If you have any questions after today's visit, please call [phone].

## 2017-06-16 ENCOUNTER — OFFICE VISIT (OUTPATIENT)
Dept: SURGERY | Age: 67
End: 2017-06-16

## 2017-06-16 VITALS
HEART RATE: 56 BPM | BODY MASS INDEX: 30.49 KG/M2 | DIASTOLIC BLOOD PRESSURE: 73 MMHG | HEIGHT: 65 IN | WEIGHT: 183 LBS | SYSTOLIC BLOOD PRESSURE: 143 MMHG | OXYGEN SATURATION: 96 %

## 2017-06-16 DIAGNOSIS — C50.411 BREAST CANCER OF UPPER-OUTER QUADRANT OF RIGHT FEMALE BREAST (HCC): Primary | ICD-10-CM

## 2017-06-16 NOTE — PROGRESS NOTES
HISTORY OF PRESENT ILLNESS  Eva Phillip is a 73 y.o. female who comes in for her right breast cancer  Breast Cancer   Pertinent negatives include no chest pain, no abdominal pain, no headaches and no shortness of breath. Breast Problem   Pertinent negatives include no chest pain, no abdominal pain, no headaches and no shortness of breath. She went for routine screening mammogram in late Dec 2016 and was noted to have a developing density in the outer right breast.  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 She had a breast MRI suggesting the area to be 2.3 cm or two separate lesions. She was also noted to have two foci in the left breast for which MRI biopsy was recommended. She denies feeling any masses, skin changes, nipple changes, unexplained weight loss or bone pain. She previously underwent a right lumpectomy and ALND followed by chemotherapy Krishan Huber and WBRT in 2003 for a ??stage 1-2 triple negative breast cancer. She has had other bilateral breast biopsies as well. She had menarche at 16, first of two pregnancies at 35, menopause at 51 and did not take HRT. She denies family hx of breast or ovarian cancer but her mother had kidney cancer and her brother had lung cancer. She has been received neoadjuvant chemotherapy by Dr Padmini Willis. She is having ongoing herceptin.     Past Medical History:   Diagnosis Date    Cancer [facility]) 2003    right breast cancer    Psychiatric disorder     anxiety     Past Surgical History:   Procedure Laterality Date    BREAST SURGERY PROCEDURE UNLISTED  2003    right breast lumpectomy     Family History   Problem Relation Age of Onset    Cancer Mother      kidney    Cancer Brother      lung    Heart Disease Father     Diabetes Sister     Diabetes Brother      Social History   Substance Use Topics    Smoking status: Never Smoker    Smokeless tobacco: Never Used    Alcohol use No     Current Outpatient Prescriptions   Medication Sig    SANCUSO 3.1 mg/24 hour ptwk     ondansetron (ZOFRAN ODT) 8 mg disintegrating tablet     prochlorperazine (COMPAZINE) 10 mg tablet Take 5 mg by mouth every six (6) hours as needed.  multivitamin (ONE A DAY) tablet Take 1 Tab by mouth daily.  aspirin delayed-release 81 mg tablet Take 81 mg by mouth daily.  omega-3 fatty acids-vitamin e (FISH OIL) 1,000 mg cap Take 1 Cap by mouth daily.  CALCIUM CARBONATE/VITAMIN D3 (CALTRATE 600 + D PO) Take 2 Tabs by mouth daily. No current facility-administered medications for this visit. No Known Allergies    Review of Systems   Constitutional: Negative for chills, diaphoresis, fever, malaise/fatigue and weight loss. HENT: Negative for congestion, ear pain and sore throat. Eyes: Negative for blurred vision and pain. Respiratory: Negative for cough, hemoptysis, sputum production, shortness of breath, wheezing and stridor. Cardiovascular: Negative for chest pain, palpitations, orthopnea, claudication, leg swelling and PND. Gastrointestinal: Negative for abdominal pain, blood in stool, constipation, diarrhea, heartburn, melena, nausea and vomiting. Genitourinary: Negative for dysuria, flank pain, frequency, hematuria and urgency. Musculoskeletal: Negative for back pain, joint pain, myalgias and neck pain. Skin: Negative for itching and rash. Neurological: Negative for dizziness, tremors, focal weakness, seizures, weakness and headaches. Endo/Heme/Allergies: Negative for polydipsia. Psychiatric/Behavioral: Negative for depression and memory loss. The patient is nervous/anxious. Blood pressure 143/73, pulse (!) 56, height 5' 5\" (1.651 m), weight 83 kg (183 lb), SpO2 96 %. Physical Exam   Constitutional: She is oriented to person, place, and time.  She appears well-developed and well-nourished. No distress. HENT:   Head: Normocephalic and atraumatic. Mouth/Throat: Oropharynx is clear and moist. No oropharyngeal exudate. Eyes: Conjunctivae and EOM are normal. Pupils are equal, round, and reactive to light. No scleral icterus. Neck: Normal range of motion. Neck supple. No JVD present. No tracheal deviation present. No thyromegaly present. Cardiovascular: Normal rate and regular rhythm. Exam reveals no gallop and no friction rub. No murmur heard. Pulmonary/Chest: Effort normal and breath sounds normal. No respiratory distress. She has no wheezes. She has no rales. Right breast exhibits tenderness (lower outer at bx site). Right breast exhibits no inverted nipple, no mass, no nipple discharge and no skin change. Left breast exhibits no inverted nipple, no mass, no nipple discharge, no skin change and no tenderness. Breasts are symmetrical.       Abdominal: Soft. Bowel sounds are normal. She exhibits no distension and no mass. There is no hepatosplenomegaly. There is no tenderness. There is no rebound, no guarding and no CVA tenderness. No hernia. Hernia confirmed negative in the ventral area. Musculoskeletal: Normal range of motion. She exhibits no edema. Lymphadenopathy:     She has no cervical adenopathy. She has no axillary adenopathy. Right: No supraclavicular adenopathy present. Left: No supraclavicular adenopathy present. Neurological: She is alert and oriented to person, place, and time. No cranial nerve deficit. Skin: Skin is warm and dry. No rash noted. She is not diaphoretic. No erythema. No pallor. Psychiatric: She has a normal mood and affect. Her behavior is normal. Judgment and thought content normal.     I reviewed her pathology and radiology reports today. I was unable to open the images in office today and brought them to radiology for review  ASSESSMENT and PLAN  1.   Right breast cancer early stage, Clinically stage 2 (T2NxM0)  ER 98% IA 83% and her2/wil amplified. Her lymph nodes were previously removed and cannot be assessed. I spent over 60 minutes discussing the anatomy and pathophysiology of breast cancer and treatment options, prognosis. We discussed breast conservation therapy vs mastectomy with and without reconstruction, sentinel lymph node biopsy and axillary dissection, various forms of radiation (whole vs accelerated partial breast), medical oncology therapy (SERM vs Aromatase inhibitors, chemotherapy, herceptin). I discussed about BRCA genetic testing and oncotype DX gene mapping of the tumor. I discussed the risks and benefits of surgery including bleeding, infection, paresthesias and numbness, cosmetic changes, lymphedema, seroma, chronic pain, and need for reoperation for positive margins/sentinel nodes. I discussed websites for her to review. She will require right mastectomy unless we can get her on a clinical trial for APBI after WBRT. She is a good candidate for reconstruction if she opts for it but is not interested. 2.  Hx right breast cancer in 2003 triple negative (treated by DR Sri Jeong). This is unrelated to the new cancer  3. Abnormal left breast MRI with two suspicious foci. bx benign 1/2017  4. High risk for genetic mutation due to triple negative breast ca at age 49 and now a metachronous breast cancer.    My Risk genetic testing negative  She is interested in bilateral mastectomy with right axillary SLNB/ALND after completion of chemotherapy  Offered reconstruction with plastic surgery consultation by she is not interested at this time    She desires a left prophylactic mastectomy, right mastectomy and sentinel lymph node biopsy and possible axillary node dissection under general anesthesia as an outpatient with an overnight stay    Kena Crews MD FACS

## 2017-06-16 NOTE — PATIENT INSTRUCTIONS
Surgery Instruction Sheet    You have been scheduled for surgery on 06/29/2017 at 4:00 at [facility]. Please report to the Surgery Center at 2:00, this is approximately 2 hours prior to your surgery time. The Surgery Center is located on the [facility] [address] side of the hospital, just next to the Emergency Room. Reserved parking is available and  parking if lot is full. You will need to have a Pre-op Visit prior to your surgery. Report to the Surgery Center on 06/23/2017 at 10:00. Bring a list of medications and your insurance cards with you. You may eat/drink prior to this visit. Call your physician immediately if you notice a change in your health between the time you saw your physician and the day of surgery. If you take a blood thinner, please let us know. Call your ordering Doctor to make sure you can stop taking it prior to your surgery. STOP YOUR ASPIRIN 10 DAYS PRIOR TO SURGERY. DO NOT TAKE  IBUPROFEN, ADVIL, MOTRIN, ALEVE, EXCEDRIN, BC POWDER, GOODIES, FISH OIL OR ANY MEDICATION CONTAINING ASPIRIN 10 DAYS PRIOR TO YOUR SURGERY. MAY TAKE TYLENOL. Eat a light dinner the evening before your surgery. DO NOT EAT OR DRINK ANYTHING AFTER MIDNIGHT THE NIGHT BEFORE YOUR SURGERY. This includes water, chewing gum, lifesavers, etc.  The Pre op nurse will check with you about any medication that you may need to take the morning of surgery. Shower with a new bar of anti-bacterial soap (Dial, Safeguard) or solution given to you by Pre-op, the night before surgery. Do not use lotion, powder, deodorant on the skin after showering. Wear loose, comfortable clothing the day of surgery and bring a container to store your contacts, eyeglasses, dentures, hearing aid, etc.  Do not bring money, valuables, jewelry, etc. to the hospital.      If you are having outpatient surgery, someone must come with you the morning of surgery to drive you home.   You can not drive for 24 hours after any anesthesia. Sometimes it is necessary to stay overnight and leave the next morning. This is still considered outpatient for most insurance deductibles. Someone will still need to drive you home. If you have questions or concerns, please feel free to call Dr Romulo Martel at [phone]. If you need to cancel your surgery, please call as soon as possible.

## 2017-06-16 NOTE — MR AVS SNAPSHOT
Visit Information Date & Time Provider Department Dept. Phone Encounter #  
 6/16/2017 11:00 AM Feroz Bernal MD [facility]  [address] [phone] [id number] Upcoming Health Maintenance Date Due Hepatitis C Screening 1950 DTaP/Tdap/Td series (1 - Tdap) 7/17/1972 FOBT Q 1 YEAR AGE 50-75 7/17/2001 ZOSTER VACCINE AGE 60> 7/17/2011 GLAUCOMA SCREENING Q2Y 7/17/2016 OSTEOPOROSIS SCREENING (DEXA) 7/17/2016 Pneumococcal 65+ High/Highest Risk (1 of 2 - PCV13) 7/17/2016 MEDICARE YEARLY EXAM 7/17/2016 INFLUENZA AGE 9 TO ADULT 8/1/2017 BREAST CANCER SCRN MAMMOGRAM 1/3/2019 Allergies as of 6/16/2017  Review Complete On: 6/16/2017 By: Feroz Bernal MD  
 No Known Allergies Current Immunizations  Never Reviewed No immunizations on file. Not reviewed this visit Vitals BP Pulse Height(growth percentile) Weight(growth percentile) SpO2 BMI  
 143/73 (BP 1 Location: Right arm, BP Patient Position: Sitting) (!) 56 5' 5\" (1.651 m) 183 lb (83 kg) 96% 30.45 kg/m2 OB Status Smoking Status Postmenopausal Never Smoker BMI and BSA Data Body Mass Index Body Surface Area  
 30.45 kg/m 2 1.95 m 2 Preferred Pharmacy Pharmacy Name Phone [facility] [phone] [facility] [phone] Your Updated Medication List  
  
   
This list is accurate as of: 6/16/17 12:15 PM.  Always use your most recent med list.  
  
  
  
  
 aspirin delayed-release 81 mg tablet Take 81 mg by mouth daily. CALTRATE 600 + D PO Take 2 Tabs by mouth daily. COMPAZINE 10 mg tablet Generic drug:  prochlorperazine Take 5 mg by mouth every six (6) hours as needed. FISH OIL 1,000 mg Cap Generic drug:  omega-3 fatty acids-vitamin e Take 1 Cap by mouth daily. multivitamin tablet Commonly known as:  ONE A DAY Take 1 Tab by mouth daily. ondansetron 8 mg disintegrating tablet Commonly known as:  ZOFRAN ODT  
  
 SANCUSO 3.1 mg/24 hour Ptwk Generic drug:  Granisetron To-Do List   
 06/23/2017 10:00 AM  
  Appointment with [facility] P3 at [address] [place] [address] ([phone]) Patient Instructions Surgery Instruction Sheet You have been scheduled for surgery on 06/29/2017 at 4:00 at [facility]. Please report to the Surgery Center at 2:00, this is approximately 2 hours prior to your surgery time. The Surgery Center is located on the [address] [place] [address] Street side of the [facility], just next to the Emergency Room. Reserved parking is available and  parking if lot is full. You will need to have a Pre-op Visit prior to your surgery. Report to the Surgery Center on 06/23/2017 at 10:00. Bring a list of medications and your insurance cards with you. You may eat/drink prior to this visit. Call your physician immediately if you notice a change in your health between the time you saw your physician and the day of surgery. If you take a blood thinner, please let us know. Call your ordering Doctor to make sure you can stop taking it prior to your surgery. STOP YOUR ASPIRIN 10 DAYS PRIOR TO SURGERY. DO NOT TAKE  IBUPROFEN, ADVIL, MOTRIN, ALEVE, EXCEDRIN, BC POWDER, GOODIES, FISH OIL OR ANY MEDICATION CONTAINING ASPIRIN 10 DAYS PRIOR TO YOUR SURGERY. MAY TAKE TYLENOL. Eat a light dinner the evening before your surgery. DO NOT EAT OR DRINK ANYTHING AFTER MIDNIGHT THE NIGHT BEFORE YOUR SURGERY. This includes water, chewing gum, lifesavers, etc.  The Pre op nurse will check with you about any medication that you may need to take the morning of surgery. Shower with a new bar of anti-bacterial soap (Dial, Safeguard) or solution given to you by Pre-op, the night before surgery. Do not use lotion, powder, deodorant on the skin after showering.   Wear loose, comfortable clothing the day of surgery and bring a container to store your contacts, eyeglasses, dentures, hearing aid, etc.  Do not bring money, valuables, jewelry, etc. to the hospital.   
 
If you are having outpatient surgery, someone must come with you the morning of surgery to drive you home. You can not drive for 24 hours after any anesthesia. Sometimes it is necessary to stay overnight and leave the next morning. This is still considered outpatient for most insurance deductibles. Someone will still need to drive you home. If you have questions or concerns, please feel free to call Dr Shira Patterson at [phone]. If you need to cancel your surgery, please call as soon as possible. Introducing \[facility]\"" & HEALTH SERVICES! Tanis Epley introduces [employer] patient portal. Now you can access parts of your medical record, email your doctor's office, and request medication refills online. 1. In your internet browser, go to [email or web address] 2. Click on the First Time User? Click Here link in the Sign In box. You will see the New Member Sign Up page. 3. Enter your [employer] Access Code exactly as it appears below. You will not need to use this code after youve completed the sign-up process. If you do not sign up before the expiration date, you must request a new code. · [employer] Access Code: [email or web address] Expires: 7/20/2017  9:37 AM 
 
4. Enter the last four digits of your Social Security Number (xxxx) and Date of Birth (mm/dd/yyyy) as indicated and click Submit. You will be taken to the next sign-up page. 5. Create a [employer] ID. This will be your [employer] login ID and cannot be changed, so think of one that is secure and easy to remember. 6. Create a [employer] password. You can change your password at any time. 7. Enter your Password Reset Question and Answer. This can be used at a later time if you forget your password. 8. Enter your e-mail address.  You will receive e-mail notification when new information is available in [employer]. 9. Click Sign Up. You can now view and download portions of your medical record. 10. Click the Download Summary menu link to download a portable copy of your medical information. If you have questions, please visit the Frequently Asked Questions section of the [employer] website. Remember, [employer] is NOT to be used for urgent needs. For medical emergencies, dial 911. Now available from your iPhone and Android! Please provide this summary of care documentation to your next provider. Your primary care clinician is listed as Arely Beltran. If you have any questions after today's visit, please call [phone].

## 2017-06-20 DIAGNOSIS — C50.411 BREAST CANCER OF UPPER-OUTER QUADRANT OF RIGHT FEMALE BREAST (HCC): Primary | ICD-10-CM

## 2017-06-23 ENCOUNTER — HOSPITAL ENCOUNTER (OUTPATIENT)
Dept: PREADMISSION TESTING | Age: 67
Discharge: HOME | End: 2017-06-23
Attending: SURGERY
Payer: COMMERCIAL

## 2017-06-23 VITALS
OXYGEN SATURATION: 100 % | HEIGHT: 65 IN | TEMPERATURE: 98.8 F | SYSTOLIC BLOOD PRESSURE: 138 MMHG | HEART RATE: 100 BPM | BODY MASS INDEX: 30.41 KG/M2 | RESPIRATION RATE: 18 BRPM | DIASTOLIC BLOOD PRESSURE: 61 MMHG | WEIGHT: 182.54 LBS

## 2017-06-23 LAB
ALBUMIN SERPL BCP-MCNC: 3.3 G/DL (ref 3.5–5)
ALBUMIN/GLOB SERPL: 0.9 {RATIO} (ref 1.1–2.2)
ALP SERPL-CCNC: 104 U/L (ref 45–117)
ALT SERPL-CCNC: 31 U/L (ref 12–78)
ANION GAP BLD CALC-SCNC: 7 MMOL/L (ref 5–15)
APPEARANCE UR: CLEAR
AST SERPL W P-5'-P-CCNC: 29 U/L (ref 15–37)
BACTERIA URNS QL MICRO: NEGATIVE /HPF
BILIRUB SERPL-MCNC: 0.3 MG/DL (ref 0.2–1)
BILIRUB UR QL: NEGATIVE
BUN SERPL-MCNC: 8 MG/DL (ref 6–20)
BUN/CREAT SERPL: 11 (ref 12–20)
CALCIUM SERPL-MCNC: 9 MG/DL (ref 8.5–10.1)
CHLORIDE SERPL-SCNC: 104 MMOL/L (ref 97–108)
CO2 SERPL-SCNC: 25 MMOL/L (ref 21–32)
COLOR UR: ABNORMAL
CREAT SERPL-MCNC: 0.71 MG/DL (ref 0.55–1.02)
EPITH CASTS URNS QL MICRO: ABNORMAL /LPF
ERYTHROCYTE [DISTWIDTH] IN BLOOD BY AUTOMATED COUNT: 16.3 % (ref 11.5–14.5)
GLOBULIN SER CALC-MCNC: 3.5 G/DL (ref 2–4)
GLUCOSE SERPL-MCNC: 91 MG/DL (ref 65–100)
GLUCOSE UR STRIP.AUTO-MCNC: NEGATIVE MG/DL
HCT VFR BLD AUTO: 34.6 % (ref 35–47)
HGB BLD-MCNC: 11.2 G/DL (ref 11.5–16)
HGB UR QL STRIP: NEGATIVE
KETONES UR QL STRIP.AUTO: NEGATIVE MG/DL
LEUKOCYTE ESTERASE UR QL STRIP.AUTO: NEGATIVE
MCH RBC QN AUTO: 31.6 PG (ref 26–34)
MCHC RBC AUTO-ENTMCNC: 32.4 G/DL (ref 30–36.5)
MCV RBC AUTO: 97.7 FL (ref 80–99)
MUCOUS THREADS URNS QL MICRO: ABNORMAL /LPF
NITRITE UR QL STRIP.AUTO: NEGATIVE
PH UR STRIP: 5 [PH] (ref 5–8)
PLATELET # BLD AUTO: 295 K/UL (ref 150–400)
POTASSIUM SERPL-SCNC: 3.7 MMOL/L (ref 3.5–5.1)
PROT SERPL-MCNC: 6.8 G/DL (ref 6.4–8.2)
PROT UR STRIP-MCNC: NEGATIVE MG/DL
RBC # BLD AUTO: 3.54 M/UL (ref 3.8–5.2)
RBC #/AREA URNS HPF: ABNORMAL /HPF (ref 0–5)
SODIUM SERPL-SCNC: 136 MMOL/L (ref 136–145)
SP GR UR REFRACTOMETRY: 1.02 (ref 1–1.03)
UA: UC IF INDICATED,UAUC: ABNORMAL
UROBILINOGEN UR QL STRIP.AUTO: 0.2 EU/DL (ref 0.2–1)
WBC # BLD AUTO: 7.7 K/UL (ref 3.6–11)
WBC URNS QL MICRO: ABNORMAL /HPF (ref 0–4)

## 2017-06-23 PROCEDURE — 85027 COMPLETE CBC AUTOMATED: CPT | Performed by: SURGERY

## 2017-06-23 PROCEDURE — 36415 COLL VENOUS BLD VENIPUNCTURE: CPT | Performed by: SURGERY

## 2017-06-23 PROCEDURE — 81001 URINALYSIS AUTO W/SCOPE: CPT | Performed by: SURGERY

## 2017-06-23 PROCEDURE — 87086 URINE CULTURE/COLONY COUNT: CPT | Performed by: SURGERY

## 2017-06-23 PROCEDURE — 80053 COMPREHEN METABOLIC PANEL: CPT | Performed by: SURGERY

## 2017-06-23 RX ORDER — PYRIDOXINE HCL (VITAMIN B6) 100 MG
100 TABLET ORAL DAILY
COMMUNITY
End: 2019-02-13 | Stop reason: ALTCHOICE

## 2017-06-23 NOTE — PERIOP NOTES
Called Dr Jona Chung office and spoke to Yadiel Barajas to clarify posting and order for consent. Pt will sign consent the day of surgery.

## 2017-06-23 NOTE — PERIOP NOTES
[facility]  Preoperative Instructions        Surgery Date 06/29/2017          Time of Arrival 1200 Contact # [phone]    1. On the day of your surgery, please report to the Surgical Services Registration Desk and sign in at your designated time. The Surgery Center is located to the right of the Emergency Room. 2. You must have someone with you to drive you home. You should not drive a car for 24 hours following surgery. Please make arrangements for a friend or family member to stay with you for the first 24 hours after your surgery. 3. Do not have anything to eat or drink (including water, gum, mints, coffee, juice) after midnight 06/28/2017. This may not apply to medications prescribed by your physician. Please note special instructions, if applicable. If you are currently taking Plavix, Coumadin, or other blood-thinning agents, contact your surgeon for instructions. 4. We recommend you do not drink any alcoholic beverages for 24 hours before and after your surgery. 5. Have a list of all current medications, including vitamins, herbal supplements and any other over the counter medications. Stop all Aspirin and non-steroidal anti-inflammatory drugs (I.e. Advil, Aleve), as directed by your surgeon's office. Stop all vitamins and herbal supplements seven days prior to your surgery. 6. Wear comfortable clothes. Wear glasses instead of contacts. Do not bring any money or jewelry. Please bring picture ID, insurance card, and any prearranged co-payment or hospital payment. Do not wear make-up, particularly mascara the morning of your surgery. Do not wear nail polish, particularly if you are having foot /hand surgery. Wear your hair loose or down, no ponytails, buns, elena pins or clips. All body piercings must be removed.   Please shower with antibacterial soap for three consecutive days before and on the morning of surgery, but do not apply any lotions, powders or deodorants after the shower on the day of surgery. Please use a fresh towels after each shower. Please sleep in clean clothes and change bed linens the night before surgery. Please do not shave for 48 hours prior to surgery. Shaving of the face is acceptable. 7. You should understand that if you do not follow these instructions your surgery may be cancelled. If your physical condition changes (I.e. fever, cold or flu) please contact your surgeon as soon as possible. 8. It is important that you be on time. If a situation occurs where you may be late, please call [phone] (OR Holding Area). 9. If you have any questions and or problems, please call [phone] (Pre-admission Testing). 10. Your surgery time may be subject to change. You will receive a phone call the evening prior if your time changes. 11.  If having outpatient surgery, you must have someone to drive you here, stay with you during the duration of your stay, and to drive you home at time of discharge. Special Instructions:    MEDICATIONS TO TAKE THE MORNING OF SURGERY WITH A SIP OF WATER:ondansetron or compazine if needed      I understand a pre-operative phone call will be made to verify my surgery time. In the event that I am not available, I give permission for a message to be left on my answering service and/or with another person?   yes         ___________________      __________   _________    (Signature of Patient)             (Witness)                (Date and Time)

## 2017-06-25 LAB
BACTERIA SPEC CULT: NORMAL
CC UR VC: NORMAL
SERVICE CMNT-IMP: NORMAL

## 2017-06-29 ENCOUNTER — ANESTHESIA EVENT (OUTPATIENT)
Dept: SURGERY | Age: 67
End: 2017-06-29
Payer: MEDICARE

## 2017-06-29 ENCOUNTER — ANESTHESIA (OUTPATIENT)
Dept: SURGERY | Age: 67
End: 2017-06-29
Payer: MEDICARE

## 2017-06-29 ENCOUNTER — HOSPITAL ENCOUNTER (OUTPATIENT)
Age: 67
Setting detail: OBSERVATION
Discharge: HOME | End: 2017-06-30
Attending: SURGERY | Admitting: SURGERY
Payer: MEDICARE

## 2017-06-29 ENCOUNTER — APPOINTMENT (OUTPATIENT)
Dept: NUCLEAR MEDICINE | Age: 67
End: 2017-06-29
Attending: SURGERY
Payer: MEDICARE

## 2017-06-29 DIAGNOSIS — C50.411 BREAST CANCER OF UPPER-OUTER QUADRANT OF RIGHT FEMALE BREAST (HCC): ICD-10-CM

## 2017-06-29 PROBLEM — C50.919 BREAST CANCER (HCC): Status: ACTIVE | Noted: 2017-06-29

## 2017-06-29 PROCEDURE — 77030032490 HC SLV COMPR SCD KNE COVD -B: Performed by: SURGERY

## 2017-06-29 PROCEDURE — 77030026438 HC STYL ET INTUB CARD -A: Performed by: ANESTHESIOLOGY

## 2017-06-29 PROCEDURE — 77030010938 HC CLP LIG TELE -A: Performed by: SURGERY

## 2017-06-29 PROCEDURE — 88331 PATH CONSLTJ SURG 1 BLK 1SPC: CPT | Performed by: SURGERY

## 2017-06-29 PROCEDURE — 77030034626 HC LIGASURE SM JAW SEAL OPN SURG COVD -E: Performed by: SURGERY

## 2017-06-29 PROCEDURE — 74011000250 HC RX REV CODE- 250

## 2017-06-29 PROCEDURE — 74011250636 HC RX REV CODE- 250/636: Performed by: ANESTHESIOLOGY

## 2017-06-29 PROCEDURE — 77030012407 HC DRN WND BARD -B: Performed by: SURGERY

## 2017-06-29 PROCEDURE — 77030018846 HC SOL IRR STRL H20 ICUM -A: Performed by: SURGERY

## 2017-06-29 PROCEDURE — 74011250636 HC RX REV CODE- 250/636: Performed by: SURGERY

## 2017-06-29 PROCEDURE — 77030020782 HC GWN BAIR PAWS FLX 3M -B

## 2017-06-29 PROCEDURE — 88342 IMHCHEM/IMCYTCHM 1ST ANTB: CPT | Performed by: SURGERY

## 2017-06-29 PROCEDURE — 77030010516 HC APPL HEMA CLP TELE -B: Performed by: SURGERY

## 2017-06-29 PROCEDURE — 77030018836 HC SOL IRR NACL ICUM -A: Performed by: SURGERY

## 2017-06-29 PROCEDURE — 76060000037 HC ANESTHESIA 3 TO 3.5 HR: Performed by: SURGERY

## 2017-06-29 PROCEDURE — 99218 HC RM OBSERVATION: CPT

## 2017-06-29 PROCEDURE — 77030010507 HC ADH SKN DERMBND J&J -B: Performed by: SURGERY

## 2017-06-29 PROCEDURE — 77030031139 HC SUT VCRL2 J&J -A: Performed by: SURGERY

## 2017-06-29 PROCEDURE — 77030013567 HC DRN WND RESERV BARD -A: Performed by: SURGERY

## 2017-06-29 PROCEDURE — 88307 TISSUE EXAM BY PATHOLOGIST: CPT | Performed by: SURGERY

## 2017-06-29 PROCEDURE — 76210000000 HC OR PH I REC 2 TO 2.5 HR: Performed by: SURGERY

## 2017-06-29 PROCEDURE — 76010000133 HC OR TIME 3 TO 3.5 HR: Performed by: SURGERY

## 2017-06-29 PROCEDURE — 77030020061 HC IV BLD WRMR ADMIN SET 3M -B: Performed by: ANESTHESIOLOGY

## 2017-06-29 PROCEDURE — 77030008684 HC TU ET CUF COVD -B: Performed by: ANESTHESIOLOGY

## 2017-06-29 PROCEDURE — 77030011640 HC PAD GRND REM COVD -A: Performed by: SURGERY

## 2017-06-29 PROCEDURE — 74011250636 HC RX REV CODE- 250/636

## 2017-06-29 PROCEDURE — 77030003029 HC SUT VCRL J&J -B: Performed by: SURGERY

## 2017-06-29 PROCEDURE — 77030011264 HC ELECTRD BLD EXT COVD -A: Performed by: SURGERY

## 2017-06-29 PROCEDURE — 77030002996 HC SUT SLK J&J -A: Performed by: SURGERY

## 2017-06-29 PROCEDURE — 77030013079 HC BLNKT BAIR HGGR 3M -A: Performed by: ANESTHESIOLOGY

## 2017-06-29 PROCEDURE — A9541 TC99M SULFUR COLLOID: HCPCS

## 2017-06-29 RX ORDER — MIDAZOLAM HYDROCHLORIDE 1 MG/ML
INJECTION, SOLUTION INTRAMUSCULAR; INTRAVENOUS AS NEEDED
Status: DISCONTINUED | OUTPATIENT
Start: 2017-06-29 | End: 2017-06-29 | Stop reason: HOSPADM

## 2017-06-29 RX ORDER — ONDANSETRON 4 MG/1
4 TABLET, ORALLY DISINTEGRATING ORAL
Status: DISCONTINUED | OUTPATIENT
Start: 2017-06-29 | End: 2017-06-30 | Stop reason: HOSPADM

## 2017-06-29 RX ORDER — DEXTROSE, SODIUM CHLORIDE, AND POTASSIUM CHLORIDE 5; .45; .15 G/100ML; G/100ML; G/100ML
75 INJECTION INTRAVENOUS CONTINUOUS
Status: DISCONTINUED | OUTPATIENT
Start: 2017-06-29 | End: 2017-06-30 | Stop reason: HOSPADM

## 2017-06-29 RX ORDER — PROPOFOL 10 MG/ML
INJECTION, EMULSION INTRAVENOUS AS NEEDED
Status: DISCONTINUED | OUTPATIENT
Start: 2017-06-29 | End: 2017-06-29 | Stop reason: HOSPADM

## 2017-06-29 RX ORDER — HYDROMORPHONE HYDROCHLORIDE 1 MG/ML
0.2 INJECTION, SOLUTION INTRAMUSCULAR; INTRAVENOUS; SUBCUTANEOUS
Status: DISCONTINUED | OUTPATIENT
Start: 2017-06-29 | End: 2017-06-29 | Stop reason: HOSPADM

## 2017-06-29 RX ORDER — SODIUM CHLORIDE, SODIUM LACTATE, POTASSIUM CHLORIDE, CALCIUM CHLORIDE 600; 310; 30; 20 MG/100ML; MG/100ML; MG/100ML; MG/100ML
25 INJECTION, SOLUTION INTRAVENOUS CONTINUOUS
Status: DISCONTINUED | OUTPATIENT
Start: 2017-06-29 | End: 2017-06-29 | Stop reason: HOSPADM

## 2017-06-29 RX ORDER — FENTANYL CITRATE 50 UG/ML
INJECTION, SOLUTION INTRAMUSCULAR; INTRAVENOUS AS NEEDED
Status: DISCONTINUED | OUTPATIENT
Start: 2017-06-29 | End: 2017-06-29 | Stop reason: HOSPADM

## 2017-06-29 RX ORDER — NALOXONE HYDROCHLORIDE 0.4 MG/ML
0.2 INJECTION, SOLUTION INTRAMUSCULAR; INTRAVENOUS; SUBCUTANEOUS
Status: DISCONTINUED | OUTPATIENT
Start: 2017-06-29 | End: 2017-06-30 | Stop reason: HOSPADM

## 2017-06-29 RX ORDER — DEXAMETHASONE SODIUM PHOSPHATE 4 MG/ML
INJECTION, SOLUTION INTRA-ARTICULAR; INTRALESIONAL; INTRAMUSCULAR; INTRAVENOUS; SOFT TISSUE AS NEEDED
Status: DISCONTINUED | OUTPATIENT
Start: 2017-06-29 | End: 2017-06-29 | Stop reason: HOSPADM

## 2017-06-29 RX ORDER — MORPHINE SULFATE 2 MG/ML
1-2 INJECTION, SOLUTION INTRAMUSCULAR; INTRAVENOUS
Status: DISCONTINUED | OUTPATIENT
Start: 2017-06-29 | End: 2017-06-30 | Stop reason: HOSPADM

## 2017-06-29 RX ORDER — ROCURONIUM BROMIDE 10 MG/ML
INJECTION, SOLUTION INTRAVENOUS AS NEEDED
Status: DISCONTINUED | OUTPATIENT
Start: 2017-06-29 | End: 2017-06-29 | Stop reason: HOSPADM

## 2017-06-29 RX ORDER — SODIUM CHLORIDE 0.9 % (FLUSH) 0.9 %
5-10 SYRINGE (ML) INJECTION AS NEEDED
Status: DISCONTINUED | OUTPATIENT
Start: 2017-06-29 | End: 2017-06-29 | Stop reason: HOSPADM

## 2017-06-29 RX ORDER — OXYCODONE AND ACETAMINOPHEN 5; 325 MG/1; MG/1
1-2 TABLET ORAL
Qty: 35 TAB | Refills: 0 | Status: SHIPPED | OUTPATIENT
Start: 2017-06-29 | End: 2017-07-19

## 2017-06-29 RX ORDER — ENOXAPARIN SODIUM 100 MG/ML
40 INJECTION SUBCUTANEOUS EVERY 24 HOURS
Status: DISCONTINUED | OUTPATIENT
Start: 2017-06-30 | End: 2017-06-30 | Stop reason: HOSPADM

## 2017-06-29 RX ORDER — CEFAZOLIN SODIUM IN 0.9 % NACL 2 G/100 ML
2 PLASTIC BAG, INJECTION (ML) INTRAVENOUS EVERY 6 HOURS
Status: DISCONTINUED | OUTPATIENT
Start: 2017-06-29 | End: 2017-06-30 | Stop reason: HOSPADM

## 2017-06-29 RX ORDER — LIDOCAINE HYDROCHLORIDE 10 MG/ML
0.1 INJECTION, SOLUTION EPIDURAL; INFILTRATION; INTRACAUDAL; PERINEURAL AS NEEDED
Status: DISCONTINUED | OUTPATIENT
Start: 2017-06-29 | End: 2017-06-29 | Stop reason: HOSPADM

## 2017-06-29 RX ORDER — SODIUM CHLORIDE 0.9 % (FLUSH) 0.9 %
5-10 SYRINGE (ML) INJECTION AS NEEDED
Status: DISCONTINUED | OUTPATIENT
Start: 2017-06-29 | End: 2017-06-30 | Stop reason: HOSPADM

## 2017-06-29 RX ORDER — PHENYLEPHRINE HCL IN 0.9% NACL 0.4MG/10ML
SYRINGE (ML) INTRAVENOUS AS NEEDED
Status: DISCONTINUED | OUTPATIENT
Start: 2017-06-29 | End: 2017-06-29 | Stop reason: HOSPADM

## 2017-06-29 RX ORDER — OXYCODONE AND ACETAMINOPHEN 5; 325 MG/1; MG/1
1 TABLET ORAL
Status: DISCONTINUED | OUTPATIENT
Start: 2017-06-29 | End: 2017-06-30 | Stop reason: HOSPADM

## 2017-06-29 RX ORDER — HYDROMORPHONE HYDROCHLORIDE 2 MG/ML
INJECTION, SOLUTION INTRAMUSCULAR; INTRAVENOUS; SUBCUTANEOUS AS NEEDED
Status: DISCONTINUED | OUTPATIENT
Start: 2017-06-29 | End: 2017-06-29 | Stop reason: HOSPADM

## 2017-06-29 RX ORDER — LIDOCAINE HYDROCHLORIDE 20 MG/ML
INJECTION, SOLUTION EPIDURAL; INFILTRATION; INTRACAUDAL; PERINEURAL AS NEEDED
Status: DISCONTINUED | OUTPATIENT
Start: 2017-06-29 | End: 2017-06-29 | Stop reason: HOSPADM

## 2017-06-29 RX ORDER — FENTANYL CITRATE 50 UG/ML
25 INJECTION, SOLUTION INTRAMUSCULAR; INTRAVENOUS
Status: DISCONTINUED | OUTPATIENT
Start: 2017-06-29 | End: 2017-06-29 | Stop reason: HOSPADM

## 2017-06-29 RX ORDER — SODIUM CHLORIDE 0.9 % (FLUSH) 0.9 %
5-10 SYRINGE (ML) INJECTION EVERY 8 HOURS
Status: DISCONTINUED | OUTPATIENT
Start: 2017-06-29 | End: 2017-06-30 | Stop reason: HOSPADM

## 2017-06-29 RX ORDER — ONDANSETRON 2 MG/ML
INJECTION INTRAMUSCULAR; INTRAVENOUS AS NEEDED
Status: DISCONTINUED | OUTPATIENT
Start: 2017-06-29 | End: 2017-06-29 | Stop reason: HOSPADM

## 2017-06-29 RX ORDER — SODIUM CHLORIDE 0.9 % (FLUSH) 0.9 %
5-10 SYRINGE (ML) INJECTION EVERY 8 HOURS
Status: DISCONTINUED | OUTPATIENT
Start: 2017-06-29 | End: 2017-06-29 | Stop reason: HOSPADM

## 2017-06-29 RX ORDER — DIPHENHYDRAMINE HYDROCHLORIDE 50 MG/ML
12.5 INJECTION, SOLUTION INTRAMUSCULAR; INTRAVENOUS AS NEEDED
Status: DISCONTINUED | OUTPATIENT
Start: 2017-06-29 | End: 2017-06-29 | Stop reason: HOSPADM

## 2017-06-29 RX ORDER — OXYCODONE AND ACETAMINOPHEN 5; 325 MG/1; MG/1
1-2 TABLET ORAL
Qty: 30 TAB | Refills: 0 | Status: SHIPPED | OUTPATIENT
Start: 2017-06-29 | End: 2017-06-29

## 2017-06-29 RX ORDER — ACETAMINOPHEN 10 MG/ML
INJECTION, SOLUTION INTRAVENOUS AS NEEDED
Status: DISCONTINUED | OUTPATIENT
Start: 2017-06-29 | End: 2017-06-29 | Stop reason: HOSPADM

## 2017-06-29 RX ADMIN — LIDOCAINE HYDROCHLORIDE 100 MG: 20 INJECTION, SOLUTION EPIDURAL; INFILTRATION; INTRACAUDAL; PERINEURAL at 14:52

## 2017-06-29 RX ADMIN — ONDANSETRON 4 MG: 2 INJECTION INTRAMUSCULAR; INTRAVENOUS at 17:30

## 2017-06-29 RX ADMIN — PROPOFOL 200 MG: 10 INJECTION, EMULSION INTRAVENOUS at 14:52

## 2017-06-29 RX ADMIN — DEXTROSE MONOHYDRATE, SODIUM CHLORIDE, AND POTASSIUM CHLORIDE 75 ML/HR: 50; 4.5; 1.49 INJECTION, SOLUTION INTRAVENOUS at 19:22

## 2017-06-29 RX ADMIN — Medication 100 MCG: at 17:08

## 2017-06-29 RX ADMIN — SODIUM CHLORIDE, POTASSIUM CHLORIDE, SODIUM LACTATE AND CALCIUM CHLORIDE: 600; 310; 30; 20 INJECTION, SOLUTION INTRAVENOUS at 14:11

## 2017-06-29 RX ADMIN — Medication 80 MCG: at 15:58

## 2017-06-29 RX ADMIN — DEXAMETHASONE SODIUM PHOSPHATE 8 MG: 4 INJECTION, SOLUTION INTRA-ARTICULAR; INTRALESIONAL; INTRAMUSCULAR; INTRAVENOUS; SOFT TISSUE at 17:30

## 2017-06-29 RX ADMIN — CEFAZOLIN 2 G: 10 INJECTION, POWDER, FOR SOLUTION INTRAVENOUS; PARENTERAL at 14:49

## 2017-06-29 RX ADMIN — Medication 100 MCG: at 17:11

## 2017-06-29 RX ADMIN — FENTANYL CITRATE 50 MCG: 50 INJECTION, SOLUTION INTRAMUSCULAR; INTRAVENOUS at 16:17

## 2017-06-29 RX ADMIN — Medication 10 ML: at 20:55

## 2017-06-29 RX ADMIN — Medication 80 MCG: at 16:04

## 2017-06-29 RX ADMIN — ACETAMINOPHEN 1000 MG: 10 INJECTION, SOLUTION INTRAVENOUS at 16:40

## 2017-06-29 RX ADMIN — HYDROMORPHONE HYDROCHLORIDE 1 MG: 2 INJECTION, SOLUTION INTRAMUSCULAR; INTRAVENOUS; SUBCUTANEOUS at 15:26

## 2017-06-29 RX ADMIN — FENTANYL CITRATE 100 MCG: 50 INJECTION, SOLUTION INTRAMUSCULAR; INTRAVENOUS at 14:52

## 2017-06-29 RX ADMIN — MIDAZOLAM HYDROCHLORIDE 2 MG: 1 INJECTION, SOLUTION INTRAMUSCULAR; INTRAVENOUS at 14:49

## 2017-06-29 RX ADMIN — SODIUM CHLORIDE, POTASSIUM CHLORIDE, SODIUM LACTATE AND CALCIUM CHLORIDE: 600; 310; 30; 20 INJECTION, SOLUTION INTRAVENOUS at 16:36

## 2017-06-29 RX ADMIN — FENTANYL CITRATE 50 MCG: 50 INJECTION, SOLUTION INTRAMUSCULAR; INTRAVENOUS at 16:43

## 2017-06-29 RX ADMIN — Medication 80 MCG: at 16:01

## 2017-06-29 RX ADMIN — ROCURONIUM BROMIDE 50 MG: 10 INJECTION, SOLUTION INTRAVENOUS at 14:52

## 2017-06-29 RX ADMIN — Medication 100 MCG: at 16:15

## 2017-06-29 NOTE — ANESTHESIA PREPROCEDURE EVALUATION
Anesthetic History   No history of anesthetic complications            Review of Systems / Medical History  Patient summary reviewed, nursing notes reviewed and pertinent labs reviewed    Pulmonary  Within defined limits                 Neuro/Psych         Psychiatric history    Comments: Anxiety Cardiovascular  Within defined limits                Exercise tolerance: >4 METS     GI/Hepatic/Renal  Within defined limits              Endo/Other        Obesity, cancer and anemia    Comments: Right Breast Cancer Other Findings            Physical Exam    Airway  Mallampati: II    Neck ROM: normal range of motion   Mouth opening: Normal     Cardiovascular  Regular rate and rhythm,  S1 and S2 normal,  no murmur, click, rub, or gallop             Dental    Dentition: Edentulous, Full lower dentures and Full upper dentures     Pulmonary  Breath sounds clear to auscultation               Abdominal  GI exam deferred       Other Findings            Anesthetic Plan    ASA: 3  Anesthesia type: general          Induction: Intravenous  Anesthetic plan and risks discussed with: Patient

## 2017-06-29 NOTE — IP AVS SNAPSHOT
[address] [facility] 
[phone] Patient: Abhilash Jean-Baptiste MRN: [id number] You are allergic to the following No active allergies Recent Documentation Height Weight Breastfeeding? BMI OB Status Smoking Status 1.651 m 83.2 kg No 30.52 kg/m2 Postmenopausal Never Smoker Emergency Contacts Name Discharge Info Relation Home Work Mobile Yuri Ron DISCHARGE CAREGIVER [3] Spouse [3] [phone] About your hospitalization You were admitted on:  June 29, 2017 You last received care in the:  [facility] 2 GENERAL SURGERY You were discharged on:  June 30, 2017 Unit phone number:  [phone] Why you were hospitalized Your primary diagnosis was:  Not on File Your diagnoses also included:  Breast Cancer (Hcc) Providers Seen During Your Hospitalizations Provider Role Specialty Primary office phone Earl Burciaga MD Attending Provider General Surgery [phone] Your Primary Care Physician (PCP) Primary Care Physician Office Phone Office Fax Yahir Wallace [phone] Follow-up Information Follow up With Details Comments Contact Info Ghazala Ugalde MD   [address] [address] 
[phone] Current Discharge Medication List  
  
START taking these medications Dose & Instructions Dispensing Information Comments Morning Noon Evening Bedtime  
 oxyCODONE-acetaminophen 5-325 mg per tablet Commonly known as:  PERCOCET Your last dose was: Your next dose is:    
   
   
 Dose:  1-2 Tab Take 1-2 Tabs by mouth every six (6) hours as needed for Pain for up to 20 days. Max Daily Amount: 8 Tabs. Quantity:  35 Tab Refills:  0 CONTINUE these medications which have NOT CHANGED Dose & Instructions Dispensing Information Comments Morning Noon Evening Bedtime  
 aspirin delayed-release 81 mg tablet Your last dose was: Your next dose is:    
   
   
 Dose:  81 mg Take 81 mg by mouth daily. Refills:  0  
     
   
   
   
  
 CALTRATE 600 + D PO Your last dose was: Your next dose is:    
   
   
 Dose:  1 Tab Take 1 Tab by mouth two (2) times a day. Refills:  0  
     
   
   
   
  
 COMPAZINE 10 mg tablet Generic drug:  prochlorperazine Your last dose was: Your next dose is:    
   
   
 Dose:  10 mg Take 10 mg by mouth every four (4) hours as needed. Refills:  0  
     
   
   
   
  
 FISH OIL 1,000 mg Cap Generic drug:  omega-3 fatty acids-vitamin e Your last dose was: Your next dose is:    
   
   
 Dose:  1 Cap Take 1 Cap by mouth three (3) times daily. Refills:  0  
     
   
   
   
  
 multivitamin tablet Commonly known as:  ONE A DAY Your last dose was: Your next dose is:    
   
   
 Dose:  1 Tab Take 1 Tab by mouth daily. Refills:  0  
     
   
   
   
  
 ondansetron 8 mg disintegrating tablet Commonly known as:  ZOFRAN ODT Your last dose was: Your next dose is:    
   
   
 Dose:  8 mg Take 8 mg by mouth every eight (8) hours as needed. Refills:  0  
     
   
   
   
  
 VITAMIN B-6 100 mg tablet Generic drug:  pyridoxine (vitamin B6) Your last dose was: Your next dose is:    
   
   
 Dose:  100 mg Take 100 mg by mouth daily. Refills:  0 Where to Get Your Medications Information on where to get these meds will be given to you by the nurse or doctor. ! Ask your nurse or doctor about these medications  
  oxyCODONE-acetaminophen 5-325 mg per tablet Discharge Instructions Discharge Instructions:   Krzysztof Gama Call on discharge for anappointment for follow up in 10 days  [phone] Activity: 
 
Walk regularly. You may resume driving in once drains are removed unless still requiring narcotics for pain. It is ok to use the arm on side of surgery but do not over do it. Perform range of motion exercises to arm less than 90 degrees three times daily. Work: 
 
You may return to work in 2 to 4 weeks to light activity. No lifting more than 10 pounds for three weeks. Diet: 
 
You may resume normal diet after 24 hours. Anesthesia and narcotics may cause nausea and vomiting. If persistent please call the office. Wound Care: 
 
Remove dressing over the drains on 7/2/2017. It is ok to shower after that. You have a special dressing called Dermabond. It is okay to shower and let the water run over the incision but do not scrub the area or soak in a tub. If you have a small amount of drainage you may place a dry bandage over the wound and change it daily. If you experience a lot of drainage, develop redness around the wound, or a fever over 101 F occurs please call the office. No deodorant, lotions, or powders to right axilla for four weeks. Empty, milk, and record drain output three times daily. Keep log for each drain and bring to office visit. Medications: 
 
Resume home medications as indicated on the Medical Reconciliation form. Aspirin, Coumadin, and Plavix can be restarted on post operative day 2 if you were taking them preoperatively. Pain medications:  Non steroidal antiinflammatories seem to work best for post surgical pain. Try these first as prescribed. A narcotic prescription will also be given for breakthrough pain. Over the counter stool softeners and laxatives may be used if needed. Do not hesitate to call with questions or concerns.  
 
There is a lifetime risk of lymphedema if nodes were removed, so no blood pressures or needle sticks should be performed to arm on side of procedure. Discharge Orders None Introducing [facility] & HEALTH SERVICES! [address] introduces [employer] patient portal. Now you can access parts of your medical record, email your doctor's office, and request medication refills online. 1. In your internet browser, go to [email or web address] 2. Click on the First Time User? Click Here link in the Sign In box. You will see the New Member Sign Up page. 3. Enter your [employer] Access Code exactly as it appears below. You will not need to use this code after youve completed the sign-up process. If you do not sign up before the expiration date, you must request a new code. · [employer] Access Code: [email or web address] Expires: 7/20/2017  9:37 AM 
 
4. Enter the last four digits of your Social Security Number (xxxx) and Date of Birth (mm/dd/yyyy) as indicated and click Submit. You will be taken to the next sign-up page. 5. Create a [employer] ID. This will be your [employer] login ID and cannot be changed, so think of one that is secure and easy to remember. 6. Create a [employer] password. You can change your password at any time. 7. Enter your Password Reset Question and Answer. This can be used at a later time if you forget your password. 8. Enter your e-mail address. You will receive e-mail notification when new information is available in [address] 9. Click Sign Up. You can now view and download portions of your medical record. 10. Click the Download Summary menu link to download a portable copy of your medical information. If you have questions, please visit the Frequently Asked Questions section of the [employer] website. Remember, [employer] is NOT to be used for urgent needs. For medical emergencies, dial 911. Now available from your iPhone and Android! General Information Please provide this summary of care documentation to your next provider.  
  
  
    
    
 Patient Signature: ____________________________________________________________ Date:  ____________________________________________________________  
  
Stefan Cart Provider Signature:  ____________________________________________________________ Date:  ____________________________________________________________

## 2017-06-29 NOTE — IP AVS SNAPSHOT
Current Discharge Medication List  
  
START taking these medications Dose & Instructions Dispensing Information Comments Morning Noon Evening Bedtime  
 oxyCODONE-acetaminophen 5-325 mg per tablet Commonly known as:  PERCOCET Your last dose was: Your next dose is:    
   
   
 Dose:  1-2 Tab Take 1-2 Tabs by mouth every six (6) hours as needed for Pain for up to 20 days. Max Daily Amount: 8 Tabs. Quantity:  35 Tab Refills:  0 CONTINUE these medications which have NOT CHANGED Dose & Instructions Dispensing Information Comments Morning Noon Evening Bedtime  
 aspirin delayed-release 81 mg tablet Your last dose was: Your next dose is:    
   
   
 Dose:  81 mg Take 81 mg by mouth daily. Refills:  0  
     
   
   
   
  
 CALTRATE 600 + D PO Your last dose was: Your next dose is:    
   
   
 Dose:  1 Tab Take 1 Tab by mouth two (2) times a day. Refills:  0  
     
   
   
   
  
 COMPAZINE 10 mg tablet Generic drug:  prochlorperazine Your last dose was: Your next dose is:    
   
   
 Dose:  10 mg Take 10 mg by mouth every four (4) hours as needed. Refills:  0  
     
   
   
   
  
 FISH OIL 1,000 mg Cap Generic drug:  omega-3 fatty acids-vitamin e Your last dose was: Your next dose is:    
   
   
 Dose:  1 Cap Take 1 Cap by mouth three (3) times daily. Refills:  0  
     
   
   
   
  
 multivitamin tablet Commonly known as:  ONE A DAY Your last dose was: Your next dose is:    
   
   
 Dose:  1 Tab Take 1 Tab by mouth daily. Refills:  0  
     
   
   
   
  
 ondansetron 8 mg disintegrating tablet Commonly known as:  ZOFRAN ODT Your last dose was: Your next dose is:    
   
   
 Dose:  8 mg Take 8 mg by mouth every eight (8) hours as needed. Refills:  0  
     
   
   
   
  
 VITAMIN B-6 100 mg tablet Generic drug:  pyridoxine (vitamin B6) Your last dose was: Your next dose is:    
   
   
 Dose:  100 mg Take 100 mg by mouth daily. Refills:  0 Where to Get Your Medications Information on where to get these meds will be given to you by the nurse or doctor. ! Ask your nurse or doctor about these medications  
  oxyCODONE-acetaminophen 5-325 mg per tablet

## 2017-06-29 NOTE — DISCHARGE INSTRUCTIONS
Discharge Instructions:   Mastectomy  Dr. Addison Aldana    Call on discharge for anappointment for follow up in 10 days  [phone]    Activity:    Walk regularly. You may resume driving in once drains are removed unless still requiring narcotics for pain. It is ok to use the arm on side of surgery but do not over do it. Perform range of motion exercises to arm less than 90 degrees three times daily. Work:    You may return to work in 2 to 4 weeks to light activity. No lifting more than 10 pounds for three weeks. Diet:    You may resume normal diet after 24 hours. Anesthesia and narcotics may cause nausea and vomiting. If persistent please call the office. Wound Care:    Remove dressing over the drains on 7/2/2017. It is ok to shower after that. You have a special dressing called Dermabond. It is okay to shower and let the water run over the incision but do not scrub the area or soak in a tub. If you have a small amount of drainage you may place a dry bandage over the wound and change it daily. If you experience a lot of drainage, develop redness around the wound, or a fever over 101 F occurs please call the office. No deodorant, lotions, or powders to right axilla for four weeks. Empty, milk, and record drain output three times daily. Keep log for each drain and bring to office visit. Medications:    Resume home medications as indicated on the Medical Reconciliation form. Aspirin, Coumadin, and Plavix can be restarted on post operative day 2 if you were taking them preoperatively. Pain medications:  Non steroidal antiinflammatories seem to work best for post surgical pain. Try these first as prescribed. A narcotic prescription will also be given for breakthrough pain. Over the counter stool softeners and laxatives may be used if needed. Do not hesitate to call with questions or concerns.     There is a lifetime risk of lymphedema if nodes were removed, so no blood pressures or needle sticks should be performed to arm on side of procedure.

## 2017-06-29 NOTE — PERIOP NOTES
09:33= spoke with Dr Francois Wagoner in [place]; wanted to see if pt could be brought in earlier for surgery. Called and spoke with pt; she lives in [place] and the earliest she can get here is 11:30. Informed Leonor Covert; she will inform Dr Francois Wagoner of pt's arrival time. Also called [facility] to see if they could take pt in earlier for needle localization; stated that is fine. 11:52= called [facility] and stated pt is ready to go there. Will have Tech send. 11:58= off floor by wheelchair to [facility].    12:31= [facility] called to state pt is ready for pick-up; GEOFF Cronin on way to . 12:36= pt returned to room by wheelchair; informed to use CHG wipes then place purple gown on.

## 2017-06-29 NOTE — ANESTHESIA POSTPROCEDURE EVALUATION
Post-Anesthesia Evaluation and Assessment    Patient: Gearldine Severe MRN: [id number]  SSN: [id number]    YOB: 1950  Age: 73 y.o. Sex: female       Cardiovascular Function/Vital Signs  Visit Vitals    /60    Pulse (!) 111    Temp 36.8 °C (98.3 °F)    Resp 16    Ht 5' 5\" (1.651 m)    Wt 83.2 kg (183 lb 6.8 oz)    SpO2 100%    BMI 30.52 kg/m2       Patient is status post general anesthesia for Procedure(s):  RIGHT AND LEFT MASTECTOMY, RIGHT AXILLARY SENTINAL LYMPH NODE BIOPSY. Nausea/Vomiting: None    Postoperative hydration reviewed and adequate. Pain:  Pain Scale 1: Numeric (0 - 10) (06/29/17 1915)  Pain Intensity 1: 0 (06/29/17 1915)   Managed    Neurological Status:   Neuro (WDL): Exceptions to WDL (06/29/17 1751)  Neuro  Neurologic State: Drowsy (06/29/17 1751)  Orientation Level: Oriented to person (06/29/17 1751)  Cognition: Decreased attention/concentration;Decreased command following (06/29/17 1751)  Speech: Clear (06/29/17 1751)  LUE Motor Response: Purposeful (06/29/17 1751)  LLE Motor Response: Purposeful (06/29/17 1751)  RUE Motor Response: Purposeful (06/29/17 1751)  RLE Motor Response: Purposeful (06/29/17 1751)   At baseline    Mental Status and Level of Consciousness: Arousable    Pulmonary Status:   O2 Device: Nasal cannula (06/29/17 1915)   Adequate oxygenation and airway patent    Complications related to anesthesia: None    Post-anesthesia assessment completed.  No concerns    Signed By: Bernardino Cunningham MD     June 29, 2017

## 2017-06-29 NOTE — PERIOP NOTES
Handoff Report from Operating Room to PACU    Report received from Sabiha Bright RN and Shanna Garcia CRNA regarding Corine Morel. Surgeon(s):  Andra Berry MD  And Procedure(s) (LRB):  RIGHT AND LEFT MASTECTOMY, RIGHT AXILLARY SENTINAL LYMPH NODE BIOPSY (Bilateral)  confirmed   with drains and dressings discussed. Anesthesia type, drugs, patient history, complications, estimated blood loss, vital signs, intake and output, and last pain medication, lines and temperature were reviewed.

## 2017-06-29 NOTE — BRIEF OP NOTE
BRIEF OPERATIVE NOTE    Date of Procedure: 6/29/2017   Preoperative Diagnosis: RIGHT BREAST CANCER  Postoperative Diagnosis: RIGHT BREAST CANCER    Procedure(s):  RIGHT AND LEFT MASTECTOMY, RIGHT AXILLARY SENTINAL LYMPH NODE BIOPSY  Surgeon(s) and Role:     * Candace Hollis MD - Primary         Assistant Staff:       Surgical Staff:  Circ-1: Bryce Faith RN  Circ-Relief: Flomanuelene Violette; Lorry Kanner, RN  Scrub Tech-1: [facility]  Scrub Tech-Relief: Asad Fanning  Surg Asst-1: Sarika Lambing  Surg Asst-Relief: Jalil Tejada Liggitt  Event Time In   Incision Start 1510   Incision Close 1724     Anesthesia: General   Estimated Blood Loss: 50 ml  Specimens:   ID Type Source Tests Collected by Time Destination   1 : LEFT BREAST, STITCH AXILLARY TAIL Preservative Breast  Candace Hollis MD 6/29/2017 1547 Pathology   2 : Right Sentinal Node Biopsy Frozen Section Breast  Candace Hollis MD 6/29/2017 1645 Pathology   3 : Right Breast - Stitch Axillary Tail Preservative Breast  Candace Hollis MD 6/29/2017 1657 Pathology      Findings: SLNB benign on FS x 1   Complications: none  Implants: * No implants in log *

## 2017-06-30 VITALS
RESPIRATION RATE: 20 BRPM | HEIGHT: 65 IN | TEMPERATURE: 98.4 F | WEIGHT: 183.42 LBS | DIASTOLIC BLOOD PRESSURE: 55 MMHG | OXYGEN SATURATION: 100 % | BODY MASS INDEX: 30.56 KG/M2 | HEART RATE: 110 BPM | SYSTOLIC BLOOD PRESSURE: 108 MMHG

## 2017-06-30 PROCEDURE — 99218 HC RM OBSERVATION: CPT

## 2017-06-30 PROCEDURE — 74011250636 HC RX REV CODE- 250/636: Performed by: SURGERY

## 2017-06-30 RX ADMIN — CEFAZOLIN 2 G: 10 INJECTION, POWDER, FOR SOLUTION INTRAVENOUS; PARENTERAL at 13:43

## 2017-06-30 RX ADMIN — Medication 10 ML: at 07:58

## 2017-06-30 RX ADMIN — CEFAZOLIN 2 G: 10 INJECTION, POWDER, FOR SOLUTION INTRAVENOUS; PARENTERAL at 10:13

## 2017-06-30 RX ADMIN — DEXTROSE MONOHYDRATE, SODIUM CHLORIDE, AND POTASSIUM CHLORIDE 75 ML/HR: 50; 4.5; 1.49 INJECTION, SOLUTION INTRAVENOUS at 10:13

## 2017-06-30 RX ADMIN — CEFAZOLIN 2 G: 10 INJECTION, POWDER, FOR SOLUTION INTRAVENOUS; PARENTERAL at 02:26

## 2017-06-30 RX ADMIN — Medication 10 ML: at 13:43

## 2017-06-30 RX ADMIN — ENOXAPARIN SODIUM 40 MG: 40 INJECTION SUBCUTANEOUS at 07:58

## 2017-06-30 NOTE — PROGRESS NOTES
Problem: Pain - Acute  Goal: *Pain is controlled to three or less  Outcome: Progressing Towards Goal  Assess pain Q4h & prn. Medicating prn.

## 2017-06-30 NOTE — PROGRESS NOTES
Primary Nurse Mikhail Jansen RN and Juan Serrato RN performed a dual skin assessment on this patient Impairment noted- see wound doc flow sheet incision across breasts with dermabond cdi, 2 anju's left, 2 anju's right.   Bruce score is 21

## 2017-06-30 NOTE — PROGRESS NOTES
Patient being discharged home with family. Patient is A&OX3, resting in bed. IVF & Abx infusing. Family in room.

## 2017-06-30 NOTE — PROGRESS NOTES
End of Shift Nursing Note    Bedside shift change report given to Taylor Amezquita (oncoming nurse) by Marcella Srinivasan (offgoing nurse). Report included the following information SBAR, Kardex, Procedure Summary and Recent Results. Zone Phone:   [phone]    Significant changes during shift:    none   Non-emergent issues for physician to address:   none     Number times ambulated in hallway past shift: none      Number of times OOB to chair past shift: none    POD #: 0     Vital Signs:    Temp: 98.8 °F (37.1 °C)     Pulse (Heart Rate): (!) 110     BP: 156/78     Resp Rate: 16     O2 Sat (%): 100 %    Lines & Drains:     Urinary Catheter? No   Placement Date:    Medical Necessity:   Central Line? No   Placement Date:    Medical Necessity:   PICC Line? No   Placement Date:    Medical Necessity:     NG tube [] in [] removed [x] not applicable   Drains [x] in [] removed [] not applicable     Skin Integrity:      Wounds: no   Dressings Present: no    Wound Concerns: no      GI:    Current diet:  DIET CLEAR LIQUID  DIET REGULAR    Nausea: NO  Vomiting: NO  Bowel Sounds: YES  Flatus: NO  Last Bowel Movement: yesterday   Appearance:     Respiratory:  Supplemental O2: Yes      Device: n/c   via 2 Liters/min     Incentive Spirometer: YES  Volume:   Coughing and Deep Breathing: YES  Oral Care: NO  Understanding (patient/family education): YES   Getting out of bed: YES  Head of bed elevation: YES    Patient Safety:    Falls Score: 1  Mobility Score: 1  Bed Alarm On? No  Sitter? No      Opportunity for questions and clarification was given to oncoming nurse. Patient bed is in low position, side rails are up x 2, door & observation blinds open as needed, call bell within reach and patient not in distress.     Yoana Guerra RN

## 2017-06-30 NOTE — PROGRESS NOTES
Interdisciplinary Rounds were completed on this patient. Rounds included nursing, clinical care leader, pharmacy, and case management. Patient was doing well without problems. Patient had the following concerns: none. Goals for the day will include: mobilize.

## 2017-06-30 NOTE — PROGRESS NOTES
TRANSFER - IN REPORT:    [facility] [place]  being received from the PACU unit    Report consisted of patients Situation, Background, Assessment and   Recommendations(SBAR). Information from the following report(s) SBAR, Kardex, PACU Summary and Recent Results was reviewed . Opportunity for questions was provided.

## 2017-06-30 NOTE — OP NOTES
[address]   OP NOTE       Name:  Zana Littlejohn   MR#:  [id number]   :  1950   Account #:  [de-identified]    Surgery Date:  2017   Date of Adm:  2017       PREOPERATIVE DIAGNOSIS: Right breast carcinoma. POSTOPERATIVE DIAGNOSIS: Right breast carcinoma. PROCEDURES PERFORMED: Right mastectomy and sentinel lymph   node biopsy; left prophylactic mastectomy. SURGEON: Israel Levy. Lolly Doherty MD     ANESTHESIA: General.    ESTIMATED BLOOD LOSS: 50 mL. COMPLICATIONS: None. SPECIMENS REMOVED   1. Left breast, stitch at axillary tail. 2. Right axillary sentinel lymph nodes. 3. Right breast, stitch at axillary tail. FINDINGS: [place] lymph node biopsy in the right breast was benign   x1. No gross adenopathy. A lot of scar tissue in the right breast.     BRIEF HISTORY: The patient is a pleasant 72-year-old female. She   previously had right breast carcinoma and received chemotherapy and   radiation. She now has a right breast carcinoma which is HER2/wil   positive. She elected to undergo bilateral mastectomies and right   axillary sentinel lymph node biopsy without sentinel axillary node   dissection. She understands the risks and benefits and wished to   proceed. PROCEDURE: The patient was taken to the operating room, placed in   a supine position, and underwent general anesthesia. Both breasts   were prepped and draped in the usual sterile fashion. After appropriate   timeout and antibiotics were given, an elliptical incision was made   around the left breast, to include the nipple-areola complex. The   subcutaneous tissues were reflected up to the level of the clavicle,   inferior to the 6th rib, then laterally up to the latissimus muscle, taken   off the chest wall from a medial to lateral approach from the pectoralis   musculature laterally.  Once that was completed, we amputated off at   the axillary fat pad and a stitch was placed at the axillary tail. Two 19   round drains were placed underneath the flaps through a separate stab   incision laterally. Interrupted 3-0 Vicryl sutures were used to   reapproximate the deep tissues and deep dermis. A running 4-0 Vicryl   was used to close the skin. Attention was then turned to the right breast, where again an elliptical   incision was made. The area was excised to the axillary tail. The region   of lymph nodes was identified. I found 1 hot node. I injected with   technetium sulfur colloid. This was sent to Pathology and came back   as a benign lymph node. Once that was completed, 2 drains were   placed. Interrupted 3-0 Vicryl was used to reapproximate the deep   tissue and deep dermis, and a running 4-0 Vicryl was used to close the   skin. A Dermabond dressing was placed over both dressings. At the end of the operation, the needle, sponge, and instrument counts   were correct x2. The patient tolerated the procedure well and was   extubated and brought to recovery room. MD Kofi Yanes / [place]   D:  06/29/2017   17:50   T:  06/30/2017   17:59   Job #:  [id number]

## 2017-06-30 NOTE — PERIOP NOTES
TRANSFER - OUT REPORT:    Verbal report given to Zachary Juarez RN(name) on [facility]  being transferred to Gen Surg 2133(unit) for routine post - op       Report consisted of patients Situation, Background, Assessment and   Recommendations(SBAR). Information from the following report(s) SBAR, Kardex, OR Summary, Intake/Output, MAR and Recent Results was reviewed with the receiving nurse. Opportunity for questions and clarification was provided.       Patient transported with:   O2 @ 2 liters  Registered Nurse

## 2017-06-30 NOTE — PROGRESS NOTES
Demonstrated to patient and family how to empty & record output of SMOOTH drains. Given clean cups. Discontinued SL. No Bleeding. Patient is A&Ox3, up in chair with family.

## 2017-07-12 ENCOUNTER — OFFICE VISIT (OUTPATIENT)
Dept: SURGERY | Age: 67
End: 2017-07-12

## 2017-07-12 VITALS
WEIGHT: 177.8 LBS | DIASTOLIC BLOOD PRESSURE: 77 MMHG | SYSTOLIC BLOOD PRESSURE: 147 MMHG | BODY MASS INDEX: 29.62 KG/M2 | HEIGHT: 65 IN | OXYGEN SATURATION: 98 % | HEART RATE: 110 BPM

## 2017-07-12 DIAGNOSIS — Z09 POSTOPERATIVE EXAMINATION: Primary | ICD-10-CM

## 2017-07-12 NOTE — PROGRESS NOTES
Surgery  Follow up  Procedure: right mastectomy/ALNB, left mastectomy  OR date:  6/29/2017  Path:       FINAL PATHOLOGIC DIAGNOSIS   1. Right sentinel lymph node, biopsy:   One lymph node, negative for carcinoma (levels and cytokeratin stain examined)   2. Left breast, simple mastectomy:   Biopsy site; negative for carcinoma   Dense interstitial fibrosis, fibrocystic changes and focal benign ductal calcifications   Skin tag (acrochordon)   3. Right breast, simple mastectomy:   Biopsy site   No residual invasive carcinoma identified, stromal fibrosis consistent with neoadjuvant treatment; entire tumor bed (4.0 cm) submitted for histologic evaluation   Associated lymph node: One (1) lymph node, negative for carcinoma   Lumpectomy site   Focal ductal and stromal atypia (may represent neoadjuvant cytologic atypia)   Fibrocystic changes with benign ductal calcification and small hyalinized fibroadenoma (0.3 cm)   BREAST, Invasive Carcinoma   SYNOPTIC REPORT   SPECIMEN   Procedure:  Total mastectomy (including nipple and skin)   Lymph Node Sampling: [place] lymph node(s), Lymph nodes present within the breast specimen (i.e., intramammary lymph nodes)   Specimen Laterality: Right   TUMOR   Primary Tumor Site: Invasive Carcinoma: Upper outer quadrant   Presence of Invasive Carcinoma: No residual invasive carcinoma   Ductal Carcinoma In Situ (DCIS): No DCIS is present   Lobular Carcinoma In Situ (LCIS): Not identified   Tumor Size / Focality: No residual invasive carcinoma   Tumor Extent   Macroscopic and Microscopic Extent of Tumor   Skin and skeletal muscle: Not invloved   Nipple DCIS: DCIS does not involve the nipple epidermis   Accessory Tumor Findings   Lymph-Vascular Invasion: Not identified   Dermal Lymph-Vascular Invasion: Not identified   Microcalcifications: Present in nonneoplastic tissue   In the Breast: No residual invasive carcinoma is present in the breast after presurgical therapy   In the Lymph Nodes: No lymph node metastases and no prominent fibrous scarring in the nodes   MARGINS   Invasive Carcinoma: Margins cannot be assessed: No residual invasive carcinoma identified   Ductal Carcinoma In Situ (DCIS): DCIS not present in specimen   LYMPH NODES   Regional Lymph Nodes:   [place] Node Status: [place] lymph node biopsy performed   Number of [place] Nodes Examined: 1   Method of Evaluation of [place] Lymph Node(s): H&E, multiple levels, Immunohistochemistry   Number of Lymph Node(s) Examined (sentinel and nonsentinel): 2   STAGE (pTNM)   TNM Descriptors: y (post-treatment)   Primary Tumor (Invasive Carcinoma) (pT): pT0   Category (pN): pN0 (sn)     S I feel fine, no     Visit Vitals    /77 (BP Patient Position: Sitting)    Pulse (!) 110    Ht 5' 5\" (1.651 m)    Wt 80.6 kg (177 lb 12.8 oz)    SpO2 98%    BMI 29.59 kg/m2       O Incisions healing well without infection   JPs serosanguinous low volume    A/P Doing well   Path pCR     Removed drains   RTC 2 weeks    Peter Hauser MD FACS

## 2017-07-12 NOTE — MR AVS SNAPSHOT
Visit Information Date & Time Provider Department Dept. Phone Encounter #  
 7/12/2017  1:40 PM Amaury Pineda MD [facility] [id number] Your Appointments 7/26/2017  1:20 PM  
ESTABLISHED PATIENT with Amaury Pineda MD  
[facility] ([address]) Appt Note: 2 week follow up breast cancer [address] [facility] [address], [address] [phone]  
[address] [place] [facility], [address] [place] [address] [address] [phone] Upcoming Health Maintenance Date Due Hepatitis C Screening 1950 DTaP/Tdap/Td series (1 - Tdap) 7/17/1972 FOBT Q 1 YEAR AGE 50-75 7/17/2001 ZOSTER VACCINE AGE 60> 7/17/2011 GLAUCOMA SCREENING Q2Y 7/17/2016 OSTEOPOROSIS SCREENING (DEXA) 7/17/2016 Pneumococcal 65+ High/Highest Risk (1 of 2 - PCV13) 7/17/2016 MEDICARE YEARLY EXAM 7/17/2016 INFLUENZA AGE 9 TO ADULT 8/1/2017 BREAST CANCER SCRN MAMMOGRAM 1/3/2019 Allergies as of 7/12/2017  Review Complete On: 7/12/2017 By: Amaury Pineda MD  
 No Known Allergies Current Immunizations  Never Reviewed No immunizations on file. Not reviewed this visit You Were Diagnosed With   
  
 Codes Comments Postoperative examination    -  Primary ICD-10-CM: S36 ICD-9-CM: V67.00 Vitals BP Pulse Height(growth percentile) Weight(growth percentile) SpO2 BMI  
 147/77 (BP Patient Position: Sitting) (!) 110 5' 5\" (1.651 m) 177 lb 12.8 oz (80.6 kg) 98% 29.59 kg/m2 OB Status Smoking Status Postmenopausal Never Smoker BMI and BSA Data Body Mass Index Body Surface Area  
 29.59 kg/m 2 1.92 m 2 [facility] Phone [phone] [facility] [phone] Your Updated Medication List  
  
   
 This list is accurate as of: 7/12/17  2:17 PM.  Always use your most recent med list.  
  
  
  
  
 aspirin delayed-release 81 mg tablet Take 81 mg by mouth daily. CALTRATE 600 + D PO Take 1 Tab by mouth two (2) times a day. COMPAZINE 10 mg tablet Generic drug:  prochlorperazine Take 10 mg by mouth every four (4) hours as needed. FISH OIL 1,000 mg Cap Generic drug:  omega-3 fatty acids-vitamin e Take 1 Cap by mouth three (3) times daily. multivitamin tablet Commonly known as:  ONE A DAY Take 1 Tab by mouth daily. ondansetron 8 mg disintegrating tablet Commonly known as:  ZOFRAN ODT Take 8 mg by mouth every eight (8) hours as needed. oxyCODONE-acetaminophen 5-325 mg per tablet Commonly known as:  PERCOCET Take 1-2 Tabs by mouth every six (6) hours as needed for Pain for up to 20 days. Max Daily Amount: 8 Tabs. VITAMIN B-6 100 mg tablet Generic drug:  pyridoxine (vitamin B6) Take 100 mg by mouth daily. Introducing [facility]! Javier Hays introduces [employer] patient portal. Now you can access parts of your medical record, email your doctor's office, and request medication refills online. 1. In your internet browser, go to [email or web address] 2. Click on the First Time User? Click Here link in the Sign In box. You will see the New Member Sign Up page. 3. Enter your [employer] Access Code exactly as it appears below. You will not need to use this code after youve completed the sign-up process. If you do not sign up before the expiration date, you must request a new code. · [employer] Access Code: [email or web address] Expires: 7/20/2017  9:37 AM 
 
4. Enter the last four digits of your Social Security Number (xxxx) and Date of Birth (mm/dd/yyyy) as indicated and click Submit. You will be taken to the next sign-up page. 5. Create a [employer] ID.  This will be your [employer] login ID and cannot be changed, so think of one that is secure and easy to remember. 6. Create a [employer] password. You can change your password at any time. 7. Enter your Password Reset Question and Answer. This can be used at a later time if you forget your password. 8. Enter your e-mail address. You will receive e-mail notification when new information is available in [address] 9. Click Sign Up. You can now view and download portions of your medical record. 10. Click the Download Summary menu link to download a portable copy of your medical information. If you have questions, please visit the Frequently Asked Questions section of the [employer] website. Remember, [employer] is NOT to be used for urgent needs. For medical emergencies, dial 911. Now available from your iPhone and Android! Please provide this summary of care documentation to your next provider. Your primary care clinician is listed as Za Sampson. If you have any questions after today's visit, please call [phone].

## 2017-08-14 ENCOUNTER — OFFICE VISIT (OUTPATIENT)
Dept: SURGERY | Age: 67
End: 2017-08-14

## 2017-08-14 VITALS
HEART RATE: 93 BPM | HEIGHT: 65 IN | OXYGEN SATURATION: 99 % | DIASTOLIC BLOOD PRESSURE: 79 MMHG | WEIGHT: 180 LBS | BODY MASS INDEX: 29.99 KG/M2 | SYSTOLIC BLOOD PRESSURE: 160 MMHG

## 2017-08-14 DIAGNOSIS — Z09 POSTOPERATIVE EXAMINATION: ICD-10-CM

## 2017-08-14 DIAGNOSIS — C50.411 BREAST CANCER OF UPPER-OUTER QUADRANT OF RIGHT FEMALE BREAST (HCC): Primary | ICD-10-CM

## 2017-08-14 NOTE — PROGRESS NOTES
Surgery  Follow up  Procedure: right mastectomy/ALNB, left mastectomy  OR date:  6/29/2017  Path:       FINAL PATHOLOGIC DIAGNOSIS   1. Right sentinel lymph node, biopsy:   One lymph node, negative for carcinoma (levels and cytokeratin stain examined)   2. Left breast, simple mastectomy:   Biopsy site; negative for carcinoma   Dense interstitial fibrosis, fibrocystic changes and focal benign ductal calcifications   Skin tag (acrochordon)   3. Right breast, simple mastectomy:   Biopsy site   No residual invasive carcinoma identified, stromal fibrosis consistent with neoadjuvant treatment; entire tumor bed (4.0 cm) submitted for histologic evaluation   Associated lymph node: One (1) lymph node, negative for carcinoma   Lumpectomy site   Focal ductal and stromal atypia (may represent neoadjuvant cytologic atypia)   Fibrocystic changes with benign ductal calcification and small hyalinized fibroadenoma (0.3 cm)   BREAST, Invasive Carcinoma   SYNOPTIC REPORT   SPECIMEN   Procedure:  Total mastectomy (including nipple and skin)   Lymph Node Sampling: [place] lymph node(s), Lymph nodes present within the breast specimen (i.e., intramammary lymph nodes)   Specimen Laterality: Right   TUMOR   Primary Tumor Site: Invasive Carcinoma: Upper outer quadrant   Presence of Invasive Carcinoma: No residual invasive carcinoma   Ductal Carcinoma In Situ (DCIS): No DCIS is present   Lobular Carcinoma In Situ (LCIS): Not identified   Tumor Size / Focality: No residual invasive carcinoma   Tumor Extent   Macroscopic and Microscopic Extent of Tumor   Skin and skeletal muscle: Not invloved   Nipple DCIS: DCIS does not involve the nipple epidermis   Accessory Tumor Findings   Lymph-Vascular Invasion: Not identified   Dermal Lymph-Vascular Invasion: Not identified   Microcalcifications: Present in nonneoplastic tissue   In the Breast: No residual invasive carcinoma is present in the breast after presurgical therapy   In the Lymph Nodes: No lymph node metastases and no prominent fibrous scarring in the nodes   MARGINS   Invasive Carcinoma: Margins cannot be assessed: No residual invasive carcinoma identified   Ductal Carcinoma In Situ (DCIS): DCIS not present in specimen   LYMPH NODES   Regional Lymph Nodes:   [place] Node Status: [place] lymph node biopsy performed   Number of [place] Nodes Examined: 1   Method of Evaluation of [place] Lymph Node(s): H&E, multiple levels, Immunohistochemistry   Number of Lymph Node(s) Examined (sentinel and nonsentinel): 2   STAGE (pTNM)   TNM Descriptors: y (post-treatment)   Primary Tumor (Invasive Carcinoma) (pT): pT0   Category (pN): pN0 (sn)     S I feel fine, no     Visit Vitals    /79 (BP 1 Location: Left arm, BP Patient Position: Sitting)    Pulse 93    Ht 5' 5\" (1.651 m)    Wt 81.6 kg (180 lb)    SpO2 99%    BMI 29.95 kg/m2       O Incisions healing well without infection   Right sided seroma    A/P Doing well   Path pCR     Aspirated 70 ml serous fluid   Rx bras and prosthesis   RTC 2 weeks    Darryl Sandoval MD FACS

## 2017-08-14 NOTE — MR AVS SNAPSHOT
Visit Information Date & Time Provider Department Dept. Phone Encounter #  
 8/14/2017  8:40 AM Aruna Lucio MD [facility] [id number] Upcoming Health Maintenance Date Due Hepatitis C Screening 1950 DTaP/Tdap/Td series (1 - Tdap) 7/17/1972 FOBT Q 1 YEAR AGE 50-75 7/17/2001 ZOSTER VACCINE AGE 60> 5/17/2011 GLAUCOMA SCREENING Q2Y 7/17/2016 OSTEOPOROSIS SCREENING (DEXA) 7/17/2016 Pneumococcal 65+ High/Highest Risk (1 of 2 - PCV13) 7/17/2016 MEDICARE YEARLY EXAM 7/17/2016 INFLUENZA AGE 9 TO ADULT 8/1/2017 BREAST CANCER SCRN MAMMOGRAM 1/3/2019 Allergies as of 8/14/2017  Review Complete On: 8/14/2017 By: Aruna Lucio MD  
 No Known Allergies Current Immunizations  Never Reviewed No immunizations on file. Not reviewed this visit You Were Diagnosed With   
  
 Codes Comments Breast cancer of upper-outer quadrant of right [facility]    -  Primary ICD-10-CM: G58.798 ICD-9-CM: 174.4 Postoperative examination     ICD-10-CM: L64 ICD-9-CM: V67.00 Vitals BP Pulse Height(growth percentile) Weight(growth percentile) SpO2 BMI  
 160/79 (BP 1 Location: Left arm, BP Patient Position: Sitting) 93 5' 5\" (1.651 m) 180 lb (81.6 kg) 99% 29.95 kg/m2 OB Status Smoking Status Postmenopausal Never Smoker Vitals History BMI and BSA Data Body Mass Index Body Surface Area  
 29.95 kg/m 2 1.93 m 2 [facility] [phone] [place] [address] [phone] Your Updated Medication List  
  
   
This list is accurate as of: 8/14/17  9:13 AM.  Always use your most recent med list.  
  
  
  
  
 aspirin delayed-release 81 mg tablet Take 81 mg by mouth daily. CALTRATE 600 + D PO Take 1 Tab by mouth two (2) times a day. COMPAZINE 10 mg tablet Generic drug:  prochlorperazine Take 10 mg by mouth every four (4) hours as needed. FISH OIL 1,000 mg Cap Generic drug:  omega-3 fatty acids-vitamin e Take 1 Cap by mouth three (3) times daily. multivitamin tablet Commonly known as:  ONE A DAY Take 1 Tab by mouth daily. ondansetron 8 mg disintegrating tablet Commonly known as:  ZOFRAN ODT Take 8 mg by mouth every eight (8) hours as needed. VITAMIN B-6 100 mg tablet Generic drug:  pyridoxine (vitamin B6) Take 100 mg by mouth daily. We Performed the Following AMB SUPPLY ORDER [[id number] Custom] Comments:  
 Bilateral breast prosthesis AMB SUPPLY ORDER [[id number] Custom] Comments:  
 Surgical bras Introducing [address]! [facility] introduces im3D patient portal. Now you can access parts of your medical record, email your doctor's office, and request medication refills online. 1. In your internet browser, go to [email or web address] 2. Click on the First Time User? Click Here link in the Sign In box. You will see the New Member Sign Up page. 3. Enter your im3D Access Code exactly as it appears below. You will not need to use this code after youve completed the sign-up process. If you do not sign up before the expiration date, you must request a new code. · im3D Access Code: [email or web address] Expires: 11/12/2017  9:13 AM 
 
4. Enter the last four digits of your Social Security Number (xxxx) and Date of Birth (mm/dd/yyyy) as indicated and click Submit. You will be taken to the next sign-up page. 5. Create a [employer] ID. This will be your im3D login ID and cannot be changed, so think of one that is secure and easy to remember. 6. Create a im3D password. You can change your password at any time. 7. Enter your Password Reset Question and Answer. This can be used at a later time if you forget your password. 8. Enter your e-mail address. You will receive e-mail notification when new information is available in [address] 9. Click Sign Up. You can now view and download portions of your medical record. 10. Click the Download Summary menu link to download a portable copy of your medical information. If you have questions, please visit the Frequently Asked Questions section of the Sensitive Object website. Remember, Sensitive Object is NOT to be used for urgent needs. For medical emergencies, dial 911. Now available from your iPhone and Android! Please provide this summary of care documentation to your next provider. Your primary care clinician is listed as Pastor Wolfe. If you have any questions after today's visit, please call [phone].

## 2017-08-28 ENCOUNTER — OFFICE VISIT (OUTPATIENT)
Dept: SURGERY | Age: 67
End: 2017-08-28

## 2017-08-28 VITALS
HEIGHT: 65 IN | BODY MASS INDEX: 29.82 KG/M2 | HEART RATE: 102 BPM | OXYGEN SATURATION: 99 % | SYSTOLIC BLOOD PRESSURE: 158 MMHG | WEIGHT: 179 LBS | DIASTOLIC BLOOD PRESSURE: 80 MMHG

## 2017-08-28 DIAGNOSIS — Z09 POSTOPERATIVE EXAMINATION: Primary | ICD-10-CM

## 2017-08-28 NOTE — MR AVS SNAPSHOT
Visit Information Date & Time Provider Department Dept. Phone Encounter #  
 8/28/2017  9:20 AM Kalia Zaragoza MD [facility] [id number] Upcoming Health Maintenance Date Due Hepatitis C Screening 1950 DTaP/Tdap/Td series (1 - Tdap) 7/17/1972 FOBT Q 1 YEAR AGE 50-75 7/17/2001 ZOSTER VACCINE AGE 60> 5/17/2011 GLAUCOMA SCREENING Q2Y 7/17/2016 OSTEOPOROSIS SCREENING (DEXA) 7/17/2016 Pneumococcal 65+ High/Highest Risk (1 of 2 - PCV13) 7/17/2016 MEDICARE YEARLY EXAM 7/17/2016 INFLUENZA AGE 9 TO ADULT 8/1/2017 BREAST CANCER SCRN MAMMOGRAM 1/3/2019 Allergies as of 8/28/2017  Review Complete On: 8/28/2017 By: Kalia Zaragoza MD  
 No Known Allergies Current Immunizations  Never Reviewed No immunizations on file. Not reviewed this visit You Were Diagnosed With   
  
 Codes Comments Postoperative examination    -  Primary ICD-10-CM: Q38 ICD-9-CM: V67.00 Vitals BP Pulse Height(growth percentile) Weight(growth percentile) SpO2 BMI  
 158/80 (BP 1 Location: Left arm, BP Patient Position: Sitting) (!) 102 5' 5\" (1.651 m) 179 lb (81.2 kg) 99% 29.79 kg/m2 OB Status Smoking Status Postmenopausal Never Smoker Vitals History BMI and BSA Data Body Mass Index Body Surface Area  
 29.79 kg/m 2 1.93 m 2 [facility] [phone] [place] [address] [phone] Your Updated Medication List  
  
   
This list is accurate as of: 8/28/17  9:41 AM.  Always use your most recent med list.  
  
  
  
  
 aspirin delayed-release 81 mg tablet Take 81 mg by mouth daily. CALTRATE 600 + D PO Take 1 Tab by mouth two (2) times a day. COMPAZINE 10 mg tablet Generic drug:  prochlorperazine Take 10 mg by mouth every four (4) hours as needed. FISH OIL 1,000 mg Cap Generic drug:  omega-3 fatty acids-vitamin e Take 1 Cap by mouth three (3) times daily. multivitamin tablet Commonly known as:  ONE A DAY Take 1 Tab by mouth daily. ondansetron 8 mg disintegrating tablet Commonly known as:  ZOFRAN ODT Take 8 mg by mouth every eight (8) hours as needed. VITAMIN B-6 100 mg tablet Generic drug:  pyridoxine (vitamin B6) Take 100 mg by mouth daily. Introducing [address]! Austyn Florez introduces [employer] patient portal. Now you can access parts of your medical record, email your doctor's office, and request medication refills online. 1. In your internet browser, go to [email or web address]. 2. Click on the First Time User? Click Here link in the Sign In box. You will see the New Member Sign Up page. 3. Enter your [employer] Access Code exactly as it appears below. You will not need to use this code after youve completed the sign-up process. If you do not sign up before the expiration date, you must request a new code. · [employer] Access Code: [email or web address] Expires: 11/12/2017  9:13 AM 
 
4. Enter the last four digits of your Social Security Number (xxxx) and Date of Birth (mm/dd/yyyy) as indicated and click Submit. You will be taken to the next sign-up page. 5. Create a [employer] ID. This will be your [employer] login ID and cannot be changed, so think of one that is secure and easy to remember. 6. Create a [employer] password. You can change your password at any time. 7. Enter your Password Reset Question and Answer. This can be used at a later time if you forget your password. 8. Enter your e-mail address. You will receive e-mail notification when new information is available in [address] 9. Click Sign Up. You can now view and download portions of your medical record. 10. Click the Download Summary menu link to download a portable copy of your medical information. If you have questions, please visit the Frequently Asked Questions section of the Primedict website. Remember, CareCloud is NOT to be used for urgent needs. For medical emergencies, dial 911. Now available from your iPhone and Android! Please provide this summary of care documentation to your next provider. Your primary care clinician is listed as Anna Case. If you have any questions after today's visit, please call [phone].

## 2017-08-28 NOTE — PROGRESS NOTES
Surgery  Follow up  Procedure: right mastectomy/ALNB, left mastectomy  OR date:  6/29/2017  Path:       FINAL PATHOLOGIC DIAGNOSIS   1. Right sentinel lymph node, biopsy:   One lymph node, negative for carcinoma (levels and cytokeratin stain examined)   2. Left breast, simple mastectomy:   Biopsy site; negative for carcinoma   Dense interstitial fibrosis, fibrocystic changes and focal benign ductal calcifications   Skin tag (acrochordon)   3. Right breast, simple mastectomy:   Biopsy site   No residual invasive carcinoma identified, stromal fibrosis consistent with neoadjuvant treatment; entire tumor bed (4.0 cm) submitted for histologic evaluation   Associated lymph node: One (1) lymph node, negative for carcinoma   Lumpectomy site   Focal ductal and stromal atypia (may represent neoadjuvant cytologic atypia)   Fibrocystic changes with benign ductal calcification and small hyalinized fibroadenoma (0.3 cm)   BREAST, Invasive Carcinoma   SYNOPTIC REPORT   SPECIMEN   Procedure:  Total mastectomy (including nipple and skin)   Lymph Node Sampling: [place] lymph node(s), Lymph nodes present within the breast specimen (i.e., intramammary lymph nodes)   Specimen Laterality: Right   TUMOR   Primary Tumor Site: Invasive Carcinoma: Upper outer quadrant   Presence of Invasive Carcinoma: No residual invasive carcinoma   Ductal Carcinoma In Situ (DCIS): No DCIS is present   Lobular Carcinoma In Situ (LCIS): Not identified   Tumor Size / Focality: No residual invasive carcinoma   Tumor Extent   Macroscopic and Microscopic Extent of Tumor   Skin and skeletal muscle: Not invloved   Nipple DCIS: DCIS does not involve the nipple epidermis   Accessory Tumor Findings   Lymph-Vascular Invasion: Not identified   Dermal Lymph-Vascular Invasion: Not identified   Microcalcifications: Present in nonneoplastic tissue   In the Breast: No residual invasive carcinoma is present in the breast after presurgical therapy   In the Lymph Nodes: No lymph node metastases and no prominent fibrous scarring in the nodes   MARGINS   Invasive Carcinoma: Margins cannot be assessed: No residual invasive carcinoma identified   Ductal Carcinoma In Situ (DCIS): DCIS not present in specimen   LYMPH NODES   Regional Lymph Nodes:   [place] Node Status: [place] lymph node biopsy performed   Number of [place] Nodes Examined: 1   Method of Evaluation of [place] Lymph Node(s): H&E, multiple levels, Immunohistochemistry   Number of Lymph Node(s) Examined (sentinel and nonsentinel): 2   STAGE (pTNM)   TNM Descriptors: y (post-treatment)   Primary Tumor (Invasive Carcinoma) (pT): pT0   Category (pN): pN0 (sn)     S I feel fine, still with some swelling    Visit Vitals    /80 (BP 1 Location: Left arm, BP Patient Position: Sitting)    Pulse (!) 102    Ht 5' 5\" (1.651 m)    Wt 81.2 kg (179 lb)    SpO2 99%    BMI 29.79 kg/m2       O Incisions healing well without infection   Right sided seroma    A/P Doing well   Path pCR     Aspirated 30 ml serous fluid   Rx bras and prosthesis   RTC 2 months    Kody Quintana MD FACS

## 2017-10-23 ENCOUNTER — OFFICE VISIT (OUTPATIENT)
Dept: SURGERY | Age: 67
End: 2017-10-23

## 2017-10-23 VITALS
RESPIRATION RATE: 18 BRPM | HEART RATE: 84 BPM | HEIGHT: 65 IN | DIASTOLIC BLOOD PRESSURE: 76 MMHG | WEIGHT: 178 LBS | SYSTOLIC BLOOD PRESSURE: 162 MMHG | TEMPERATURE: 96.1 F | BODY MASS INDEX: 29.66 KG/M2 | OXYGEN SATURATION: 100 %

## 2017-10-23 DIAGNOSIS — N64.89 SEROMA OF BREAST: ICD-10-CM

## 2017-10-23 DIAGNOSIS — C50.411 MALIGNANT NEOPLASM OF UPPER-OUTER QUADRANT OF RIGHT BREAST IN FEMALE, ESTROGEN RECEPTOR POSITIVE (HCC): Primary | ICD-10-CM

## 2017-10-23 DIAGNOSIS — Z17.0 MALIGNANT NEOPLASM OF UPPER-OUTER QUADRANT OF RIGHT BREAST IN FEMALE, ESTROGEN RECEPTOR POSITIVE (HCC): Primary | ICD-10-CM

## 2017-10-23 NOTE — MR AVS SNAPSHOT
Visit Information Date & Time Provider Department Dept. Phone Encounter #  
 10/23/2017 10:40 AM Shanna Chandler MD [facility] [id number] Upcoming Health Maintenance Date Due Hepatitis C Screening 1950 DTaP/Tdap/Td series (1 - Tdap) 7/17/1972 FOBT Q 1 YEAR AGE 50-75 7/17/2001 ZOSTER VACCINE AGE 60> 5/17/2011 GLAUCOMA SCREENING Q2Y 7/17/2016 OSTEOPOROSIS SCREENING (DEXA) 7/17/2016 Pneumococcal 65+ High/Highest Risk (1 of 2 - PCV13) 7/17/2016 MEDICARE YEARLY EXAM 7/17/2016 INFLUENZA AGE 9 TO ADULT 8/1/2017 BREAST CANCER SCRN MAMMOGRAM 1/3/2019 Allergies as of 10/23/2017  Review Complete On: 10/23/2017 By: Shanna Chandler MD  
 No Known Allergies Current Immunizations  Never Reviewed No immunizations on file. Not reviewed this visit Vitals BP Pulse Temp Resp Height(growth percentile) Weight(growth percentile) 162/76 (BP 1 Location: Left arm, BP Patient Position: Sitting) 84 96.1 °F (35.6 °C) (Oral) 18 5' 5\" (1.651 m) 178 lb (80.7 kg) SpO2 BMI OB Status Smoking Status 100% 29.62 kg/m2 Postmenopausal Never Smoker BMI and BSA Data Body Mass Index Body Surface Area  
 29.62 kg/m 2 1.92 m 2 Preferred Pharmacy Pharmacy Name Phone [facility] [phone] [place] [address] [phone] Your Updated Medication List  
  
   
This list is accurate as of: 10/23/17 11:18 AM.  Always use your most recent med list.  
  
  
  
  
 aspirin delayed-release 81 mg tablet Take 81 mg by mouth daily. CALTRATE 600 + D PO Take 1 Tab by mouth two (2) times a day. COMPAZINE 10 mg tablet Generic drug:  prochlorperazine Take 10 mg by mouth every four (4) hours as needed. FISH OIL 1,000 mg Cap Generic drug:  omega-3 fatty acids-vitamin e Take 1 Cap by mouth three (3) times daily. multivitamin tablet Commonly known as:  ONE A DAY Take 1 Tab by mouth daily. ondansetron 8 mg disintegrating tablet Commonly known as:  ZOFRAN ODT Take 8 mg by mouth every eight (8) hours as needed. VITAMIN B-6 100 mg tablet Generic drug:  pyridoxine (vitamin B6) Take 100 mg by mouth daily. Introducing \[facility]\"" & HEALTH SERVICES! Ariel Sage introduces Lingorami patient portal. Now you can access parts of your medical record, email your doctor's office, and request medication refills online. 1. In your internet browser, go to [email or web address] 2. Click on the First Time User? Click Here link in the Sign In box. You will see the New Member Sign Up page. 3. Enter your Lingorami Access Code exactly as it appears below. You will not need to use this code after youve completed the sign-up process. If you do not sign up before the expiration date, you must request a new code. · Lingorami Access Code: [email or web address] Expires: 11/12/2017  9:13 AM 
 
4. Enter the last four digits of your Social Security Number (xxxx) and Date of Birth (mm/dd/yyyy) as indicated and click Submit. You will be taken to the next sign-up page. 5. Create a Lingorami ID. This will be your Lingorami login ID and cannot be changed, so think of one that is secure and easy to remember. 6. Create a Lingorami password. You can change your password at any time. 7. Enter your Password Reset Question and Answer. This can be used at a later time if you forget your password. 8. Enter your e-mail address. You will receive e-mail notification when new information is available in [address] 9. Click Sign Up. You can now view and download portions of your medical record. 10. Click the Download Summary menu link to download a portable copy of your medical information. If you have questions, please visit the Frequently Asked Questions section of the Lingorami website.  Remember, Lingorami is NOT to be used for urgent needs. For medical emergencies, dial 911. Now available from your iPhone and Android! Please provide this summary of care documentation to your next provider. Your primary care clinician is listed as Danielle Blanc. If you have any questions after today's visit, please call [phone].

## 2017-10-23 NOTE — PROGRESS NOTES
Chief Complaint   Patient presents with    Breast Cancer     follow up     1. Have you been to the ER, urgent care clinic since your last visit? Hospitalized since your last visit? No    2. Have you seen or consulted any other health care providers outside of the [address] [place] [address] since your last visit? Include any pap smears or colon screening.  Yes, on 10/10/17 by Windell Dakins for follow up

## 2017-10-23 NOTE — PROGRESS NOTES
HISTORY OF PRESENT ILLNESS  Aleah Hernandez is a 78 y.o. female who comes in for her right breast cancer  Breast Cancer   Pertinent negatives include no chest pain, no abdominal pain, no headaches and no shortness of breath. Breast Problem   Pertinent negatives include no chest pain, no abdominal pain, no headaches and no shortness of breath. She went for routine screening mammogram in late Dec 2016 and was noted to have a developing density in the outer right breast.  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 She had a breast MRI suggesting the area to be 2.3 cm or two separate lesions. She was also noted to have two foci in the left breast for which MRI biopsy was recommended. She denies feeling any masses, skin changes, nipple changes, unexplained weight loss or bone pain. She previously underwent a right lumpectomy and ALND followed by chemotherapy Krishna Benjamin and WBRT in 2003 for a ??stage 1-2 triple negative breast cancer. She has had other bilateral breast biopsies as well. She had menarche at 16, first of two pregnancies at 35, menopause at 51 and did not take HRT. She denies family hx of breast or ovarian cancer but her mother had kidney cancer and her brother had lung cancer. She has been received neoadjuvant chemotherapy by Dr Francisco Clark. She is having ongoing herceptin. She underwent a right mastectomy/SLNB and left mastectomy 6/2017 with pCR.       Past Medical History:   Diagnosis Date    Cancer [facility]) 2003    right breast cancer    Psychiatric disorder     anxiety     Past Surgical History:   Procedure Laterality Date    BREAST SURGERY PROCEDURE UNLISTED  2003    right breast lumpectomy    HX MASTECTOMY Bilateral 6/29/2017    RIGHT AND LEFT MASTECTOMY, RIGHT AXILLARY SENTINAL LYMPH NODE BIOPSY performed by Guadalupe Landin Ortiz Encinas MD at hospitals MAIN OR    HX VASCULAR ACCESS      left chest portacath     Family History   Problem Relation Age of Onset    Cancer Mother      kidney    Cancer Brother      lung    Heart Disease Father     Diabetes Sister     Diabetes Brother      Social History   Substance Use Topics    Smoking status: Never Smoker    Smokeless tobacco: Never Used    Alcohol use No     Current Outpatient Prescriptions   Medication Sig    pyridoxine, vitamin B6, (VITAMIN B-6) 100 mg tablet Take 100 mg by mouth daily.  multivitamin (ONE A DAY) tablet Take 1 Tab by mouth daily.  aspirin delayed-release 81 mg tablet Take 81 mg by mouth daily.  omega-3 fatty acids-vitamin e (FISH OIL) 1,000 mg cap Take 1 Cap by mouth three (3) times daily.  CALCIUM CARBONATE/VITAMIN D3 (CALTRATE 600 + D PO) Take 1 Tab by mouth two (2) times a day.  ondansetron (ZOFRAN ODT) 8 mg disintegrating tablet Take 8 mg by mouth every eight (8) hours as needed.  prochlorperazine (COMPAZINE) 10 mg tablet Take 10 mg by mouth every four (4) hours as needed. No current facility-administered medications for this visit. No Known Allergies    Review of Systems   Constitutional: Negative for chills, diaphoresis, fever, malaise/fatigue and weight loss. HENT: Negative for congestion, ear pain and sore throat. Eyes: Negative for blurred vision and pain. Respiratory: Negative for cough, hemoptysis, sputum production, shortness of breath, wheezing and stridor. Cardiovascular: Negative for chest pain, palpitations, orthopnea, claudication, leg swelling and PND. Gastrointestinal: Negative for abdominal pain, blood in stool, constipation, diarrhea, heartburn, melena, nausea and vomiting. Genitourinary: Negative for dysuria, flank pain, frequency, hematuria and urgency. Musculoskeletal: Negative for back pain, joint pain, myalgias and neck pain. Skin: Negative for itching and rash.    Neurological: Negative for dizziness, tremors, focal weakness, seizures, weakness and headaches. Endo/Heme/Allergies: Negative for polydipsia. Psychiatric/Behavioral: Negative for depression and memory loss. The patient is nervous/anxious. Blood pressure 162/76, pulse 84, temperature 96.1 °F (35.6 °C), temperature source Oral, resp. rate 18, height 5' 5\" (1.651 m), weight 80.7 kg (178 lb), SpO2 100 %. Physical Exam   Constitutional: She is oriented to person, place, and time. She appears well-developed and well-nourished. No distress. HENT:   Head: Normocephalic and atraumatic. Mouth/Throat: Oropharynx is clear and moist. No oropharyngeal exudate. Eyes: Conjunctivae and EOM are normal. Pupils are equal, round, and reactive to light. No scleral icterus. Neck: Normal range of motion. Neck supple. No JVD present. No tracheal deviation present. No thyromegaly present. Cardiovascular: Normal rate and regular rhythm. Exam reveals no gallop and no friction rub. No murmur heard. Pulmonary/Chest: Effort normal and breath sounds normal. No respiratory distress. She has no wheezes. She has no rales. Right breast exhibits mass (right axillary seroma/hematoma). Right breast exhibits no skin change and no tenderness. Left breast exhibits no mass, no skin change and no tenderness. Breasts are symmetrical (bilateral amastia). Abdominal: Soft. Bowel sounds are normal. She exhibits no distension and no mass. There is no hepatosplenomegaly. There is no tenderness. There is no rebound, no guarding and no CVA tenderness. No hernia. Hernia confirmed negative in the ventral area. Musculoskeletal: Normal range of motion. She exhibits no edema. Lymphadenopathy:     She has no cervical adenopathy. She has no axillary adenopathy. Right: No supraclavicular adenopathy present. Left: No supraclavicular adenopathy present. Neurological: She is alert and oriented to person, place, and time. No cranial nerve deficit. Skin: Skin is warm and dry. No rash noted. She is not diaphoretic. No erythema. No pallor. Psychiatric: She has a normal mood and affect. Her behavior is normal. Judgment and thought content normal.     I reviewed her pathology and radiology reports today. I was unable to open the images in office today and brought them to radiology for review  ASSESSMENT and PLAN  1. Right breast cancer early stage, Clinically stage 2 (T2NxM0)  ER 98% VT 83% and her2/wil amplified. Finish herceptin  Continue arimidex  Keep f/u with Dr Carlene Childers  2. Hx right breast cancer in 2003 triple negative (treated by DR Carlene Childers). This is unrelated to the new cancer  3. Right axillary hematoma/seroma   Aspirated 20 ml old blood today  4. High risk for genetic mutation due to triple negative breast ca at age 49 and now a metachronous breast cancer.    My Risk genetic testing negative    RTC 2 months    Juanita Atkins MD FACS

## 2018-01-23 ENCOUNTER — OFFICE VISIT (OUTPATIENT)
Dept: SURGERY | Age: 68
End: 2018-01-23

## 2018-01-23 VITALS
DIASTOLIC BLOOD PRESSURE: 68 MMHG | OXYGEN SATURATION: 98 % | HEIGHT: 65 IN | BODY MASS INDEX: 30.82 KG/M2 | HEART RATE: 88 BPM | SYSTOLIC BLOOD PRESSURE: 143 MMHG | WEIGHT: 185 LBS

## 2018-01-23 DIAGNOSIS — C50.411 MALIGNANT NEOPLASM OF UPPER-OUTER QUADRANT OF RIGHT BREAST IN FEMALE, ESTROGEN RECEPTOR POSITIVE (HCC): Primary | ICD-10-CM

## 2018-01-23 DIAGNOSIS — Z17.0 MALIGNANT NEOPLASM OF UPPER-OUTER QUADRANT OF RIGHT BREAST IN FEMALE, ESTROGEN RECEPTOR POSITIVE (HCC): Primary | ICD-10-CM

## 2018-01-23 NOTE — PROGRESS NOTES
1. Have you been to the ER, urgent care clinic since your last visit? Hospitalized since your last visit? No    2. Have you seen or consulted any other health care providers outside of the [address] [place] [address] since your last visit? Include any pap smears or colon screening.  No

## 2018-01-23 NOTE — PROGRESS NOTES
HISTORY OF PRESENT ILLNESS  Yeni Hoskins is a 78 y.o. female who comes in for breast cancer follow up  Breast Cancer   Pertinent negatives include no chest pain, no abdominal pain, no headaches and no shortness of breath. Breast Problem   Pertinent negatives include no chest pain, no abdominal pain, no headaches and no shortness of breath. She went for routine screening mammogram in late Dec 2016 and was noted to have a developing density in the outer right breast.  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 She had a breast MRI suggesting the area to be 2.3 cm or two separate lesions. She was also noted to have two foci in the left breast for which MRI biopsy was recommended. She denies feeling any masses, skin changes, nipple changes, unexplained weight loss or bone pain. She previously underwent a right lumpectomy and ALND followed by chemotherapy Atiya Gone) and WBRT in 2003 for a ??stage 1-2 triple negative breast cancer. She has had other bilateral breast biopsies as well. She had menarche at 16, first of two pregnancies at 35, menopause at 51 and did not take HRT. She denies family hx of breast or ovarian cancer but her mother had kidney cancer and her brother had lung cancer. She has been received neoadjuvant chemotherapy by Dr Gio Roque. She is having ongoing herceptin. She underwent a right mastectomy/SLNB and left mastectomy 6/2017 with pCR. She has one last Herceptin treatment left. She has switched to Dr Ganesh Beltran as Dr Gio Roque retired.     Past Medical History:   Diagnosis Date    Cancer [facility]) 2003    right breast cancer    Psychiatric disorder     anxiety     Past Surgical History:   Procedure Laterality Date    BREAST SURGERY PROCEDURE UNLISTED  2003    right breast lumpectomy    HX MASTECTOMY Bilateral 6/29/2017    RIGHT AND LEFT MASTECTOMY, RIGHT AXILLARY SENTINAL LYMPH NODE BIOPSY performed by Ginger Calvo MD at [facility] MA[facility] OR    HX VASCULAR ACCESS      left chest portacath     Family History   Problem Relation Age of Onset    Cancer Mother      kidney    Cancer Brother      lung    Heart Disease Father     Diabetes Sister     Diabetes Brother      Social History   Substance Use Topics    Smoking status: Never Smoker    Smokeless tobacco: Never Used    Alcohol use No     Current Outpatient Prescriptions   Medication Sig    denosumab (PROLIA) 60 mg/mL injection 60 mg by SubCUTAneous route.  pyridoxine, vitamin B6, (VITAMIN B-6) 100 mg tablet Take 100 mg by mouth daily.  ondansetron (ZOFRAN ODT) 8 mg disintegrating tablet Take 8 mg by mouth every eight (8) hours as needed.  prochlorperazine (COMPAZINE) 10 mg tablet Take 10 mg by mouth every four (4) hours as needed.  multivitamin (ONE A DAY) tablet Take 1 Tab by mouth daily.  aspirin delayed-release 81 mg tablet Take 81 mg by mouth daily.  omega-3 fatty acids-vitamin e (FISH OIL) 1,000 mg cap Take 1 Cap by mouth three (3) times daily.  CALCIUM CARBONATE/VITAMIN D3 (CALTRATE 600 + D PO) Take 1 Tab by mouth two (2) times a day. No current facility-administered medications for this visit. No Known Allergies    Review of Systems   Constitutional: Negative for chills, diaphoresis, fever, malaise/fatigue and weight loss. HENT: Negative for congestion, ear pain and sore throat. Eyes: Negative for blurred vision and pain. Respiratory: Negative for cough, hemoptysis, sputum production, shortness of breath, wheezing and stridor. Cardiovascular: Negative for chest pain, palpitations, orthopnea, claudication, leg swelling and PND. Gastrointestinal: Negative for abdominal pain, blood in stool, constipation, diarrhea, heartburn, melena, nausea and vomiting.    Genitourinary: Negative for dysuria, flank pain, frequency, hematuria and urgency. Musculoskeletal: Negative for back pain, joint pain, myalgias and neck pain. Skin: Negative for itching and rash. Neurological: Negative for dizziness, tremors, focal weakness, seizures, weakness and headaches. Endo/Heme/Allergies: Negative for polydipsia. Psychiatric/Behavioral: Negative for depression and memory loss. The patient is nervous/anxious. Blood pressure 143/68, pulse 88, height 5' 5\" (1.651 m), weight 83.9 kg (185 lb), SpO2 98 %. Physical Exam   Constitutional: She is oriented to person, place, and time. She appears well-developed and well-nourished. No distress. HENT:   Head: Normocephalic and atraumatic. Mouth/Throat: Oropharynx is clear and moist. No oropharyngeal exudate. Eyes: Conjunctivae and EOM are normal. Pupils are equal, round, and reactive to light. No scleral icterus. Neck: Normal range of motion. Neck supple. No JVD present. No tracheal deviation present. No thyromegaly present. Cardiovascular: Normal rate and regular rhythm. Exam reveals no gallop and no friction rub. No murmur heard. Pulmonary/Chest: Effort normal and breath sounds normal. No respiratory distress. She has no wheezes. She has no rales. Right breast exhibits mass (right axillary seroma/hematoma). Right breast exhibits no skin change and no tenderness. Left breast exhibits no mass, no skin change and no tenderness. Breasts are symmetrical (bilateral amastia). Abdominal: Soft. Bowel sounds are normal. She exhibits no distension and no mass. There is no hepatosplenomegaly. There is no tenderness. There is no rebound, no guarding and no CVA tenderness. No hernia. Hernia confirmed negative in the ventral area. Musculoskeletal: Normal range of motion. She exhibits no edema. Lymphadenopathy:     She has no cervical adenopathy. She has no axillary adenopathy. Right: No supraclavicular adenopathy present.         Left: No supraclavicular adenopathy present. Neurological: She is alert and oriented to person, place, and time. No cranial nerve deficit. Skin: Skin is warm and dry. No rash noted. She is not diaphoretic. No erythema. No pallor. Psychiatric: She has a normal mood and affect. Her behavior is normal. Judgment and thought content normal.     I reviewed her pathology and radiology reports today. I was unable to open the images in office today and brought them to radiology for review  ASSESSMENT and PLAN  1. Right breast cancer early stage, Clinically stage 2 (T2NxM0)  ER 98% MA 83% and her2/wil amplified dx 1/2017  Finish herceptin  Continue arimidex  Keep f/u with Dr Gena Gonzalez  2. Hx right breast cancer in 2003 triple negative (treated by DR Mindy Marshall). This is unrelated to the new cancer  3. Right axillary hematoma/seroma   Still tight but improving  4. High risk for genetic mutation due to triple negative breast ca at age 49 and now a metachronous breast cancer. My Risk genetic testing negative  5. Will defer to Dr Gena Gonzalez whether to remove port (can be done in my office at convenience). 6.  Offered lymphedema appt but she declined.   She is currently at higher risk but asymptomatic  RTC  6 months    Raleigh Vargas MD FACS

## 2018-01-23 NOTE — MR AVS SNAPSHOT
[address], [address] [address] 
[phone] Patient: Bryanna Enciso MRN: [id number] Visit Information Date & Time Provider Department Dept. Phone Encounter #  
 1/23/2018 11:40 AM Keyur Kauffman MD [facility] [address] [phone] [id number] Upcoming Health Maintenance Date Due Hepatitis C Screening 1950 DTaP/Tdap/Td series (1 - Tdap) 7/17/1972 FOBT Q 1 YEAR AGE 50-75 7/17/2001 ZOSTER VACCINE AGE 60> 5/17/2011 GLAUCOMA SCREENING Q2Y 7/17/2016 OSTEOPOROSIS SCREENING (DEXA) 7/17/2016 Pneumococcal 65+ High/Highest Risk (1 of 2 - PCV13) 7/17/2016 MEDICARE YEARLY EXAM 7/17/2016 Influenza Age 5 to Adult 8/1/2017 BREAST CANCER SCRN MAMMOGRAM 1/3/2019 Allergies as of 1/23/2018  Review Complete On: 1/23/2018 By: Keyur Kauffman MD  
 No Known Allergies Current Immunizations  Never Reviewed No immunizations on file. Not reviewed this visit Vitals BP Pulse Height(growth percentile) Weight(growth percentile) SpO2 BMI  
 143/68 88 5' 5\" (1.651 m) 185 lb (83.9 kg) 98% 30.79 kg/m2 OB Status Smoking Status Postmenopausal Never Smoker BMI and BSA Data Body Mass Index Body Surface Area 30.79 kg/m 2 1.96 m 2 Your Updated Medication List  
  
   
This list is accurate as of: 1/23/18 11:41 AM.  Always use your most recent med list.  
  
  
  
  
 aspirin delayed-release 81 mg tablet Take 81 mg by mouth daily. CALTRATE 600 + D PO Take 1 Tab by mouth two (2) times a day. COMPAZINE 10 mg tablet Generic drug:  prochlorperazine Take 10 mg by mouth every four (4) hours as needed. FISH OIL 1,000 mg Cap Generic drug:  omega-3 fatty acids-vitamin e Take 1 Cap by mouth three (3) times daily. multivitamin tablet Commonly known as:  ONE A DAY  
 Take 1 Tab by mouth daily. ondansetron 8 mg disintegrating tablet Commonly known as:  ZOFRAN ODT Take 8 mg by mouth every eight (8) hours as needed. PROLIA 60 mg/mL injection Generic drug:  denosumab 60 mg by SubCUTAneous route. VITAMIN B-6 100 mg tablet Generic drug:  pyridoxine (vitamin B6) Take 100 mg by mouth daily. Introducing [facility]! [facility] introduces [employer] patient portal. Now you can access parts of your medical record, email your doctor's office, and request medication refills online. 1. In your internet browser, go to [email or web address] 2. Click on the First Time User? Click Here link in the Sign In box. You will see the New Member Sign Up page. 3. Enter your [employer] Access Code exactly as it appears below. You will not need to use this code after youve completed the sign-up process. If you do not sign up before the expiration date, you must request a new code. · [employer] Access Code: [email or web address] Expires: 4/23/2018 11:19 AM 
 
4. Enter the last four digits of your Social Security Number (xxxx) and Date of Birth (mm/dd/yyyy) as indicated and click Submit. You will be taken to the next sign-up page. 5. Create a [employer] ID. This will be your [employer] login ID and cannot be changed, so think of one that is secure and easy to remember. 6. Create a [employer] password. You can change your password at any time. 7. Enter your Password Reset Question and Answer. This can be used at a later time if you forget your password. 8. Enter your e-mail address. You will receive e-mail notification when new information is available in [address] 9. Click Sign Up. You can now view and download portions of your medical record. 10. Click the Download Summary menu link to download a portable copy of your medical information.  
 
If you have questions, please visit the Frequently Asked Questions section of the [employer]. Remember, [employer] is NOT to be used for urgent needs. For medical emergencies, dial 911. Now available from your iPhone and Android! Please provide this summary of care documentation to your next provider. Your primary care clinician is listed as Jeff Simon. If you have any questions after today's visit, please call [phone].

## 2018-08-13 ENCOUNTER — OFFICE VISIT (OUTPATIENT)
Dept: SURGERY | Age: 68
End: 2018-08-13

## 2018-08-13 VITALS
RESPIRATION RATE: 16 BRPM | SYSTOLIC BLOOD PRESSURE: 169 MMHG | WEIGHT: 191.5 LBS | OXYGEN SATURATION: 99 % | HEART RATE: 101 BPM | HEIGHT: 65 IN | BODY MASS INDEX: 31.9 KG/M2 | DIASTOLIC BLOOD PRESSURE: 83 MMHG

## 2018-08-13 DIAGNOSIS — Z17.0 MALIGNANT NEOPLASM OF UPPER-OUTER QUADRANT OF RIGHT BREAST IN FEMALE, ESTROGEN RECEPTOR POSITIVE (HCC): Primary | ICD-10-CM

## 2018-08-13 DIAGNOSIS — C50.411 MALIGNANT NEOPLASM OF UPPER-OUTER QUADRANT OF RIGHT BREAST IN FEMALE, ESTROGEN RECEPTOR POSITIVE (HCC): Primary | ICD-10-CM

## 2018-08-13 NOTE — PROGRESS NOTES
Anthony Madison is a 80 y.o. female     Chief Complaint   Patient presents with    Breast Cancer     6 month follow up       Visit Vitals    /83 (BP 1 Location: Left arm, BP Patient Position: Sitting)    Pulse (!) 101    Resp 16    Ht 5' 5\" (1.651 m)    Wt 191 lb 8 oz (86.9 kg)    SpO2 99%    BMI 31.87 kg/m2       Health Maintenance Due   Topic Date Due    Hepatitis C Screening  1950    DTaP/Tdap/Td series (1 - Tdap) 07/17/1972    FOBT Q 1 YEAR AGE 50-75  07/17/2001    ZOSTER VACCINE AGE 60>  05/17/2011    GLAUCOMA SCREENING Q2Y  07/17/2016    Bone Densitometry (Dexa) Screening  07/17/2016    Pneumococcal 65+ High/Highest Risk (1 of 2 - PCV13) 07/17/2016    MEDICARE YEARLY EXAM  03/14/2018    Influenza Age 9 to Adult  08/01/2018       1. Have you been to the ER, urgent care clinic since your last visit? Hospitalized since your last visit? No    2. Have you seen or consulted any other health care providers outside of the [address] [place] [address] since your last visit? Include any pap smears or colon screening.  No

## 2018-08-13 NOTE — MR AVS SNAPSHOT
[address] [facility] [address], [address] [facility], [address] [place] [address] [facility] 
[phone] Patient: Ruben Green MRN: [id number] Visit Information Date & Time Provider Department Dept. Phone Encounter #  
 8/13/2018 10:40 AM Tessy Zamarripa MD [facility]"" [id number] Your Appointments 2/13/2019  1:20 PM  
ESTABLISHED PATIENT with Tessy Zamarripa MD  
[facility] [address] Frank Bentley ([facility]) Appt Note: 6 month follow up  
 [address] [facility], [address] [facility], [address] [phone]  
[address] [facility], [address] [place] [address] [address] [phone] Upcoming Health Maintenance Date Due Hepatitis C Screening 1950 DTaP/Tdap/Td series (1 - Tdap) 7/17/1972 FOBT Q 1 YEAR AGE 50-75 7/17/2001 ZOSTER VACCINE AGE 60> 5/17/2011 GLAUCOMA SCREENING Q2Y 7/17/2016 Bone Densitometry (Dexa) Screening 7/17/2016 Pneumococcal 65+ High/Highest Risk (1 of 2 - PCV13) 7/17/2016 MEDICARE YEARLY EXAM 3/14/2018 Influenza Age 5 to Adult 8/1/2018 BREAST CANCER SCRN MAMMOGRAM 1/3/2019 Allergies as of 8/13/2018  Review Complete On: 8/13/2018 By: Tessy Zamarripa MD  
 No Known Allergies Current Immunizations  Never Reviewed No immunizations on file. Not reviewed this visit You Were Diagnosed With   
  
 Codes Comments Malignant neoplasm of upper-outer quadrant of right breast in female, estrogen receptor positive ([facility] [address]    -  Primary ICD-10-CM: C50.411, Z17.0 ICD-9-CM: 174.4, V86.0 Vitals BP Pulse Resp Height(growth percentile) Weight(growth percentile) SpO2  
 169/83 (BP 1 Location: Left arm, BP Patient Position: Sitting) (!) 101 16 5' 5\" (1.651 m) 191 lb 8 oz (86.9 kg) 99% BMI OB Status Smoking Status 31.87 kg/m2 Postmenopausal Never Smoker Vitals History BMI and BSA Data Body Mass Index Body Surface Area  
 31.87 kg/m 2 2 m 2 Your Updated Medication List  
  
   
This list is accurate as of 8/13/18 11:08 AM.  Always use your most recent med list.  
  
  
  
  
 aspirin delayed-release 81 mg tablet Take 81 mg by mouth daily. CALTRATE 600 + D PO Take 1 Tab by mouth two (2) times a day. COMPAZINE 10 mg tablet Generic drug:  prochlorperazine Take 10 mg by mouth every four (4) hours as needed. FISH OIL 1,000 mg Cap Generic drug:  omega-3 fatty acids-vitamin e Take 1 Cap by mouth three (3) times daily. multivitamin tablet Commonly known as:  ONE A DAY Take 1 Tab by mouth daily. ondansetron 8 mg disintegrating tablet Commonly known as:  ZOFRAN ODT Take 8 mg by mouth every eight (8) hours as needed. PROLIA 60 mg/mL injection Generic drug:  denosumab 60 mg by SubCUTAneous route. VITAMIN B-6 100 mg tablet Generic drug:  pyridoxine (vitamin B6) Take 100 mg by mouth daily. Introducing \[facility]\"" & HEALTH SERVICES! Donnell Lou introduces [employer] patient portal. Now you can access parts of your medical record, email your doctor's office, and request medication refills online. 1. In your internet browser, go to [email or web address]"[email or web address]. [employer]/"[employer] 2. Click on the First Time User? Click Here link in the Sign In box. You will see the New Member Sign Up page. 3. Enter your [employer] Access Code exactly as it appears below. You will not need to use this code after youve completed the sign-up process. If you do not sign up before the expiration date, you must request a new code. · [employer] Access Code: [email or web address] Expires: 11/11/2018 10:34 AM 
 
4. Enter the last four digits of your Social Security Number (xxxx) and Date of Birth (mm/dd/yyyy) as indicated and click Submit. You will be taken to the next sign-up page. 5. Create a [employer] ID.  This will be your [employer] login ID and cannot be changed, so think of one that is secure and easy to remember. 6. Create a [employer] password. You can change your password at any time. 7. Enter your Password Reset Question and Answer. This can be used at a later time if you forget your password. 8. Enter your e-mail address. You will receive e-mail notification when new information is available in [address] 9. Click Sign Up. You can now view and download portions of your medical record. 10. Click the Download Summary menu link to download a portable copy of your medical information. If you have questions, please visit the Frequently Asked Questions section of the [employer] website. Remember, [employer] is NOT to be used for urgent needs. For medical emergencies, dial 911. Now available from your iPhone and Android! Please provide this summary of care documentation to your next provider. Your primary care clinician is listed as Reese Smith. If you have any questions after today's visit, please call [phone].

## 2018-08-13 NOTE — PROGRESS NOTES
HISTORY OF PRESENT ILLNESS  Jeri Lee is a 80 y.o. female who comes in for breast cancer follow up. No complaints today  Breast Cancer   Pertinent negatives include no chest pain, no abdominal pain, no headaches and no shortness of breath. Breast Problem   Pertinent negatives include no chest pain, no abdominal pain, no headaches and no shortness of breath. She went for routine screening mammogram in late Dec 2016 and was noted to have a developing density in the outer right breast.  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 She had a breast MRI suggesting the area to be 2.3 cm or two separate lesions. She was also noted to have two foci in the left breast for which MRI biopsy was recommended. She denies feeling any masses, skin changes, nipple changes, unexplained weight loss or bone pain. She previously underwent a right lumpectomy and ALND followed by chemotherapy Zoie Dee) and WBRT in 2003 for a ??stage 1-2 triple negative breast cancer. She has had other bilateral breast biopsies as well. She had menarche at 16, first of two pregnancies at 35, menopause at 51 and did not take HRT. She denies family hx of breast or ovarian cancer but her mother had kidney cancer and her brother had lung cancer. She has been received neoadjuvant chemotherapy by Dr Christal Peabody. She is having ongoing herceptin. She underwent a right mastectomy/SLNB and left mastectomy 6/2017 with pCR. She completed adjuvant herceptin as well. She has switched to Dr Travon Brizuela as Dr Christal Peabody retired.     Past Medical History:   Diagnosis Date    Cancer [facility]) 2003    right breast cancer    Psychiatric disorder     anxiety     Past Surgical History:   Procedure Laterality Date    BREAST SURGERY PROCEDURE UNLISTED  2003    right breast lumpectomy    HX MASTECTOMY Bilateral 6/29/2017    RIGHT AND LEFT MASTECTOMY, RIGHT AXILLARY SENTINAL LYMPH NODE BIOPSY performed by Lexi Couch MD at [facility] MAIN OR    HX VASCULAR ACCESS      left chest portacath     Family History   Problem Relation Age of Onset    Cancer Mother      kidney    Cancer Brother      lung    Heart Disease Father     Diabetes Sister     Diabetes Brother      Social History   Substance Use Topics    Smoking status: Never Smoker    Smokeless tobacco: Never Used    Alcohol use No     Current Outpatient Prescriptions   Medication Sig    denosumab (PROLIA) 60 mg/mL injection 60 mg by SubCUTAneous route.  multivitamin (ONE A DAY) tablet Take 1 Tab by mouth daily.  aspirin delayed-release 81 mg tablet Take 81 mg by mouth daily.  omega-3 fatty acids-vitamin e (FISH OIL) 1,000 mg cap Take 1 Cap by mouth three (3) times daily.  CALCIUM CARBONATE/VITAMIN D3 (CALTRATE 600 + D PO) Take 1 Tab by mouth two (2) times a day.  pyridoxine, vitamin B6, (VITAMIN B-6) 100 mg tablet Take 100 mg by mouth daily.  ondansetron (ZOFRAN ODT) 8 mg disintegrating tablet Take 8 mg by mouth every eight (8) hours as needed.  prochlorperazine (COMPAZINE) 10 mg tablet Take 10 mg by mouth every four (4) hours as needed. No current facility-administered medications for this visit. No Known Allergies    Review of Systems   Constitutional: Negative for chills, diaphoresis, fever, malaise/fatigue and weight loss. HENT: Negative for congestion, ear pain and sore throat. Eyes: Negative for blurred vision and pain. Respiratory: Negative for cough, hemoptysis, sputum production, shortness of breath, wheezing and stridor. Cardiovascular: Negative for chest pain, palpitations, orthopnea, claudication, leg swelling and PND. Gastrointestinal: Negative for abdominal pain, blood in stool, constipation, diarrhea, heartburn, melena, nausea and vomiting.    Genitourinary: Negative for dysuria, flank pain, frequency, hematuria and urgency. Musculoskeletal: Negative for back pain, joint pain, myalgias and neck pain. Skin: Negative for itching and rash. Neurological: Negative for dizziness, tremors, focal weakness, seizures, weakness and headaches. Endo/Heme/Allergies: Negative for polydipsia. Psychiatric/Behavioral: Negative for depression and memory loss. The patient is nervous/anxious. Blood pressure 169/83, pulse (!) 101, resp. rate 16, height 5' 5\" (1.651 m), weight 86.9 kg (191 lb 8 oz), SpO2 99 %. Physical Exam   Constitutional: She is oriented to person, place, and time. She appears well-developed and well-nourished. No distress. HENT:   Head: Normocephalic and atraumatic. Mouth/Throat: Oropharynx is clear and moist. No oropharyngeal exudate. Eyes: Conjunctivae and EOM are normal. Pupils are equal, round, and reactive to light. No scleral icterus. Neck: Normal range of motion. Neck supple. No JVD present. No tracheal deviation present. No thyromegaly present. Cardiovascular: Normal rate and regular rhythm. Exam reveals no gallop and no friction rub. No murmur heard. Pulmonary/Chest: Effort normal and breath sounds normal. No respiratory distress. She has no wheezes. She has no rales. Right breast exhibits no mass, no skin change and no tenderness. Left breast exhibits no mass, no skin change and no tenderness. Breasts are symmetrical (bilateral amastia). Abdominal: Soft. Bowel sounds are normal. She exhibits no distension and no mass. There is no hepatosplenomegaly. There is no tenderness. There is no rebound, no guarding and no CVA tenderness. No hernia. Hernia confirmed negative in the ventral area. Musculoskeletal: Normal range of motion. She exhibits no edema. Lymphadenopathy:     She has no cervical adenopathy. She has no axillary adenopathy. Right: No supraclavicular adenopathy present. Left: No supraclavicular adenopathy present. Neurological: She is alert and oriented to person, place, and time. No cranial nerve deficit. Skin: Skin is warm and dry. No rash noted. She is not diaphoretic. No erythema. No pallor. Psychiatric: She has a normal mood and affect. Her behavior is normal. Judgment and thought content normal.       ASSESSMENT and PLAN  1. Right breast cancer early stage, Clinically stage 2 (T2NxM0)  ER 98% ME 83% and her2/wil amplified dx 1/2017. SELENE 1 year and 7  months    Continue arimidex  Keep f/u with Dr Kellie Tena  2. Hx right breast cancer in 2003 triple negative (treated by DR Brittany Hickman). This is unrelated to the new cancer  3. Right axillary hematoma/seroma   Minimal residual  4. High risk for genetic mutation due to triple negative breast ca at age 49 and now a metachronous breast cancer. My Risk genetic testing negative  5. Dr Kellie Tena wants to keep port for now  6. Offered lymphedema appt but she declined.   She is currently at higher risk but asymptomatic  RTC  6 months    Branden Lewis MD FACS

## 2019-02-13 ENCOUNTER — OFFICE VISIT (OUTPATIENT)
Dept: SURGERY | Age: 69
End: 2019-02-13

## 2019-02-13 VITALS
RESPIRATION RATE: 20 BRPM | BODY MASS INDEX: 32.15 KG/M2 | HEIGHT: 65 IN | HEART RATE: 96 BPM | OXYGEN SATURATION: 98 % | SYSTOLIC BLOOD PRESSURE: 151 MMHG | WEIGHT: 193 LBS | DIASTOLIC BLOOD PRESSURE: 76 MMHG | TEMPERATURE: 97.9 F

## 2019-02-13 DIAGNOSIS — Z17.0 MALIGNANT NEOPLASM OF UPPER-OUTER QUADRANT OF RIGHT BREAST IN FEMALE, ESTROGEN RECEPTOR POSITIVE (HCC): Primary | ICD-10-CM

## 2019-02-13 DIAGNOSIS — C50.411 MALIGNANT NEOPLASM OF UPPER-OUTER QUADRANT OF RIGHT BREAST IN FEMALE, ESTROGEN RECEPTOR POSITIVE (HCC): Primary | ICD-10-CM

## 2019-02-13 RX ORDER — ANASTROZOLE 1 MG/1
1 TABLET ORAL DAILY
COMMUNITY

## 2019-02-13 NOTE — PROGRESS NOTES
HISTORY OF PRESENT ILLNESS Dalila Oseguera is a 80 y.o. female who comes in for breast cancer follow up. No complaints today Breast Cancer Pertinent negatives include no chest pain, no abdominal pain, no headaches and no shortness of breath. Breast Problem Pertinent negatives include no chest pain, no abdominal pain, no headaches and no shortness of breath. She went for routine screening mammogram in late Dec 2016 and was noted to have a developing density in the outer right breast.  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 She had a breast MRI suggesting the area to be 2.3 cm or two separate lesions. She was also noted to have two foci in the left breast for which MRI biopsy was recommended. She denies feeling any masses, skin changes, nipple changes, unexplained weight loss or bone pain. She previously underwent a right lumpectomy and ALND followed by chemotherapy Sheeba Griffin and WBRT in 2003 for a ??stage 1-2 triple negative breast cancer. She has had other bilateral breast biopsies as well. She had menarche at 16, first of two pregnancies at 35, menopause at 51 and did not take HRT. She denies family hx of breast or ovarian cancer but her mother had kidney cancer and her brother had lung cancer. She has been received neoadjuvant chemotherapy by Dr Ulysses Trinh. She is having ongoing herceptin. She underwent a right mastectomy/SLNB and left mastectomy 6/2017 with pCR. She completed adjuvant herceptin as well. She has switched to Dr Felicita Lo as Dr Ulysses Trinh retired. Past Medical History:  
Diagnosis Date  Cancer [facility]) 2003  
 right breast cancer  Psychiatric disorder   
 anxiety Past Surgical History:  
Procedure Laterality Date  BREAST SURGERY PROCEDURE UNLISTED  2003  
 right breast lumpectomy  HX MASTECTOMY Bilateral 6/29/2017 RIGHT AND LEFT MASTECTOMY, RIGHT AXILLARY SENTINAL LYMPH NODE BIOPSY performed by Aryan Moreland MD at [facility] MAIN OR  
 HX VASCULAR ACCESS    
 left chest portacath Family History Problem Relation Age of Onset  Cancer Mother   
     kidney  Cancer Brother   
     lung  Heart Disease Father  Diabetes Sister  Diabetes Brother Social History Tobacco Use  Smoking status: Never Smoker  Smokeless tobacco: Never Used Substance Use Topics  Alcohol use: No  
 Drug use: No  
 
Current Outpatient Medications Medication Sig  
 anastrozole (ARIMIDEX) 1 mg tablet Take 1 mg by mouth daily.  denosumab (PROLIA) 60 mg/mL injection 60 mg by SubCUTAneous route.  multivitamin (ONE A DAY) tablet Take 1 Tab by mouth daily.  aspirin delayed-release 81 mg tablet Take 81 mg by mouth daily.  omega-3 fatty acids-vitamin e (FISH OIL) 1,000 mg cap Take 1 Cap by mouth three (3) times daily.  CALCIUM CARBONATE/VITAMIN D3 (CALTRATE 600 + D PO) Take 1 Tab by mouth two (2) times a day. No current facility-administered medications for this visit. No Known Allergies Review of Systems Constitutional: Negative for chills, diaphoresis, fever, malaise/fatigue and weight loss. HENT: Negative for congestion, ear pain and sore throat. Eyes: Negative for blurred vision and pain. Respiratory: Negative for cough, hemoptysis, sputum production, shortness of breath, wheezing and stridor. Cardiovascular: Negative for chest pain, palpitations, orthopnea, claudication, leg swelling and PND. Gastrointestinal: Negative for abdominal pain, blood in stool, constipation, diarrhea, heartburn, melena, nausea and vomiting. Genitourinary: Negative for dysuria, flank pain, frequency, hematuria and urgency. Musculoskeletal: Negative for back pain, joint pain, myalgias and neck pain. Skin: Negative for itching and rash. Neurological: Negative for dizziness, tremors, focal weakness, seizures, weakness and headaches. Endo/Heme/Allergies: Negative for polydipsia. Psychiatric/Behavioral: Negative for depression and memory loss. The patient is nervous/anxious. Blood pressure 151/76, pulse 96, temperature 97.9 °F (36.6 °C), resp. rate 20, height 5' 5\" (1.651 m), weight 87.5 kg (193 lb), SpO2 98 %. Physical Exam  
Constitutional: She is oriented to person, place, and time. She appears well-developed and well-nourished. No distress. HENT:  
Head: Normocephalic and atraumatic. Mouth/Throat: Oropharynx is clear and moist. No oropharyngeal exudate. Eyes: Conjunctivae and EOM are normal. Pupils are equal, round, and reactive to light. No scleral icterus. Neck: Normal range of motion. Neck supple. No JVD present. No tracheal deviation present. No thyromegaly present. Cardiovascular: Normal rate and regular rhythm. Exam reveals no gallop and no friction rub. No murmur heard. Pulmonary/Chest: Effort normal and breath sounds normal. No respiratory distress. She has no wheezes. She has no rales. Right breast exhibits no mass, no skin change and no tenderness. Left breast exhibits no mass, no skin change and no tenderness. Breasts are symmetrical (bilateral amastia). Abdominal: Soft. Bowel sounds are normal. She exhibits no distension and no mass. There is no hepatosplenomegaly. There is no tenderness. There is no rebound, no guarding and no CVA tenderness. No hernia. Hernia confirmed negative in the ventral area. Musculoskeletal: Normal range of motion. She exhibits no edema. Lymphadenopathy:  
  She has no cervical adenopathy. She has no axillary adenopathy. Right: No supraclavicular adenopathy present. Left: No supraclavicular adenopathy present. Neurological: She is alert and oriented to person, place, and time. No cranial nerve deficit. Skin: Skin is warm and dry. No rash noted. She is not diaphoretic. No erythema. No pallor. Psychiatric: She has a normal mood and affect. Her behavior is normal. Judgment and thought content normal.  
 
 
ASSESSMENT and PLAN 1. Right breast cancer early stage, Clinically stage 2 (T2NxM0)  ER 98% DC 83% and her2/wil amplified dx 1/2017. SELENE 2 year and 1  months Continue arimidex Keep f/u with Dr Andres Lazar 
2. Hx right breast cancer in 2003 triple negative (treated by Dr Julian Camara). This is unrelated to the new cancer 3. Right axillary hematoma/seroma   Minimal residual 
4. High risk for genetic mutation due to triple negative breast ca at age 49 and now a metachronous breast cancer. My Risk genetic testing negative 5. Dr Andres Lazar wants to keep port for now 6. Offered lymphedema appt but she declined. She is currently at higher risk but asymptomatic 7. Colon screening. She wishes to go to GI 
DME surgical bras (she does not want new prosthetics at this time) RTC  6 months Woody Mandujano MD FACS

## 2019-02-13 NOTE — PROGRESS NOTES
Chief Complaint Patient presents with  Breast Cancer 6 month breast check 1. Have you been to the ER, urgent care clinic since your last visit? Hospitalized since your last visit? No 
 
2. Have you seen or consulted any other health care providers outside of the [address] [place] [address] since your last visit? Include any pap smears or colon screening.  No

## 2019-04-11 ENCOUNTER — ANESTHESIA (OUTPATIENT)
Dept: ENDOSCOPY | Age: 69
End: 2019-04-11
Payer: MEDICARE

## 2019-04-11 ENCOUNTER — ANESTHESIA EVENT (OUTPATIENT)
Dept: ENDOSCOPY | Age: 69
End: 2019-04-11
Payer: MEDICARE

## 2019-04-11 ENCOUNTER — HOSPITAL ENCOUNTER (OUTPATIENT)
Age: 69
Setting detail: OUTPATIENT SURGERY
Discharge: HOME | End: 2019-04-11
Attending: INTERNAL MEDICINE | Admitting: INTERNAL MEDICINE
Payer: MEDICARE

## 2019-04-11 VITALS
RESPIRATION RATE: 17 BRPM | TEMPERATURE: 98.2 F | DIASTOLIC BLOOD PRESSURE: 79 MMHG | SYSTOLIC BLOOD PRESSURE: 147 MMHG | HEIGHT: 65 IN | WEIGHT: 187.13 LBS | BODY MASS INDEX: 31.18 KG/M2 | HEART RATE: 88 BPM | OXYGEN SATURATION: 100 %

## 2019-04-11 PROCEDURE — 74011250636 HC RX REV CODE- 250/636: Performed by: INTERNAL MEDICINE

## 2019-04-11 PROCEDURE — 76060000031 HC ANESTHESIA FIRST 0.5 HR: Performed by: INTERNAL MEDICINE

## 2019-04-11 PROCEDURE — 74011250636 HC RX REV CODE- 250/636

## 2019-04-11 PROCEDURE — 74011250637 HC RX REV CODE- 250/637: Performed by: INTERNAL MEDICINE

## 2019-04-11 PROCEDURE — 76040000019: Performed by: INTERNAL MEDICINE

## 2019-04-11 RX ORDER — EPINEPHRINE 0.1 MG/ML
1 INJECTION INTRACARDIAC; INTRAVENOUS
Status: DISCONTINUED | OUTPATIENT
Start: 2019-04-11 | End: 2019-04-11 | Stop reason: HOSPADM

## 2019-04-11 RX ORDER — SODIUM CHLORIDE 0.9 % (FLUSH) 0.9 %
5-40 SYRINGE (ML) INJECTION AS NEEDED
Status: DISCONTINUED | OUTPATIENT
Start: 2019-04-11 | End: 2019-04-11 | Stop reason: HOSPADM

## 2019-04-11 RX ORDER — FLUMAZENIL 0.1 MG/ML
0.2 INJECTION INTRAVENOUS
Status: DISCONTINUED | OUTPATIENT
Start: 2019-04-11 | End: 2019-04-11 | Stop reason: HOSPADM

## 2019-04-11 RX ORDER — DEXTROMETHORPHAN/PSEUDOEPHED 2.5-7.5/.8
1.2 DROPS ORAL
Status: DISCONTINUED | OUTPATIENT
Start: 2019-04-11 | End: 2019-04-11 | Stop reason: HOSPADM

## 2019-04-11 RX ORDER — ATROPINE SULFATE 0.1 MG/ML
0.5 INJECTION INTRAVENOUS
Status: DISCONTINUED | OUTPATIENT
Start: 2019-04-11 | End: 2019-04-11 | Stop reason: HOSPADM

## 2019-04-11 RX ORDER — SODIUM CHLORIDE 0.9 % (FLUSH) 0.9 %
5-40 SYRINGE (ML) INJECTION EVERY 8 HOURS
Status: DISCONTINUED | OUTPATIENT
Start: 2019-04-11 | End: 2019-04-11 | Stop reason: HOSPADM

## 2019-04-11 RX ORDER — LIDOCAINE HYDROCHLORIDE 20 MG/ML
INJECTION, SOLUTION EPIDURAL; INFILTRATION; INTRACAUDAL; PERINEURAL AS NEEDED
Status: DISCONTINUED | OUTPATIENT
Start: 2019-04-11 | End: 2019-04-11 | Stop reason: HOSPADM

## 2019-04-11 RX ORDER — NALOXONE HYDROCHLORIDE 0.4 MG/ML
0.4 INJECTION, SOLUTION INTRAMUSCULAR; INTRAVENOUS; SUBCUTANEOUS
Status: DISCONTINUED | OUTPATIENT
Start: 2019-04-11 | End: 2019-04-11 | Stop reason: HOSPADM

## 2019-04-11 RX ORDER — SODIUM CHLORIDE 9 MG/ML
75 INJECTION, SOLUTION INTRAVENOUS CONTINUOUS
Status: DISCONTINUED | OUTPATIENT
Start: 2019-04-11 | End: 2019-04-11 | Stop reason: HOSPADM

## 2019-04-11 RX ORDER — PROPOFOL 10 MG/ML
INJECTION, EMULSION INTRAVENOUS AS NEEDED
Status: DISCONTINUED | OUTPATIENT
Start: 2019-04-11 | End: 2019-04-11 | Stop reason: HOSPADM

## 2019-04-11 RX ADMIN — SIMETHICONE 80 MG: 20 SUSPENSION/ DROPS ORAL at 12:10

## 2019-04-11 RX ADMIN — LIDOCAINE HYDROCHLORIDE 40 MG: 20 INJECTION, SOLUTION EPIDURAL; INFILTRATION; INTRACAUDAL; PERINEURAL at 11:59

## 2019-04-11 RX ADMIN — SODIUM CHLORIDE 75 ML/HR: 900 INJECTION, SOLUTION INTRAVENOUS at 11:07

## 2019-04-11 RX ADMIN — PROPOFOL 280 MG: 10 INJECTION, EMULSION INTRAVENOUS at 12:13

## 2019-04-11 NOTE — PROGRESS NOTES
Anesthesia reports 280 mg Propofol, 40 mg Lidocaine and 300 ml of Normal Saline were given during procedure. Received report from anesthesia staff on vital signs and status of patient.

## 2019-04-11 NOTE — PROGRESS NOTES
Shantal Jeong 1950 
[id number] Situation: 
Verbal report received from: Papito Jeong rn 
Procedure: Procedure(s): 
COLONOSCOPY Background: 
 
Preoperative diagnosis: SCREENING Postoperative diagnosis: internal hemorrhoids :  Dr. Sridhar Pemberton Assistant(s): Endoscopy Technician-1: Vincent Hogan Endoscopy RN-1: Tomasz Avendano RN Specimens: * No specimens in log * H. Pylori  no Assessment: 
Intra-procedure medications Anesthesia gave intra-procedure sedation and medications, see anesthesia flow sheet yes Intravenous fluids: NS@ Char [employer]s Vital signs stable  yes Abdominal assessment: round and soft  yes Recommendation: 
Discharge patient per MD order yes. Family or Friend  yes Permission to share finding with family or friend yes

## 2019-04-11 NOTE — H&P
Gastroenterology Outpatient History and Physical 
 
Patient: Aleah Hernandez Physician: Virginie Pickens MD 
 
Chief Complaint: H/o colon polyps History of Present Illness: No GI complaints History: 
Past Medical History:  
Diagnosis Date  Cancer [facility]) 2003  
 right breast cancer  Psychiatric disorder   
 anxiety Past Surgical History:  
Procedure Laterality Date  BREAST SURGERY PROCEDURE UNLISTED  2003  
 right breast lumpectomy  HX MASTECTOMY Bilateral 6/29/2017 RIGHT AND LEFT MASTECTOMY, RIGHT AXILLARY SENTINAL LYMPH NODE BIOPSY performed by Verna Cleveland MD at [facility] MAIN OR  
 HX VASCULAR ACCESS    
 left chest portacath Social History Socioeconomic History  Marital status:  Spouse name: Not on file  Number of children: Not on file  Years of education: Not on file  Highest education level: Not on file Tobacco Use  Smoking status: Never Smoker  Smokeless tobacco: Never Used Substance and Sexual Activity  Alcohol use: No  
 Drug use: No  
  
Family History Problem Relation Age of Onset  Cancer Mother   
     kidney  Cancer Brother   
     lung  Heart Disease Father  Diabetes Sister  Diabetes Brother Patient Active Problem List  
Diagnosis Code  Hx of breast cancer Z85.3  Abnormal MRI, breast R92.8  Breast cancer of upper-outer quadrant of right female breast ([facility] [address] C50.411  Breast cancer ([facility] [address] C50.919  Seroma of breast N64.89 Allergies: No Known Allergies Medications:  
Prior to Admission medications Medication Sig Start Date End Date Taking? Authorizing Provider  
anastrozole (ARIMIDEX) 1 mg tablet Take 1 mg by mouth daily. Yes Provider, Historical  
denosumab (PROLIA) 60 mg/mL injection 60 mg by SubCUTAneous route. Provider, Historical  
multivitamin (ONE A DAY) tablet Take 1 Tab by mouth daily.     Provider, Historical  
 aspirin delayed-release 81 mg tablet Take 81 mg by mouth daily. Provider, Historical  
omega-3 fatty acids-vitamin e (FISH OIL) 1,000 mg cap Take 1 Cap by mouth three (3) times daily. Provider, Historical  
CALCIUM CARBONATE/VITAMIN D3 (CALTRATE 600 + D PO) Take 1 Tab by mouth two (2) times a day. Provider, Historical  
 
Physical Exam:  
Vital Signs: Blood pressure 147/87, pulse (!) 103, temperature 98.1 °F (36.7 °C), resp. rate 14, height 5' 5\" (1.651 m), weight 84.9 kg (187 lb 2 oz), SpO2 100 %, not currently breastfeeding. General: well developed, well nourished HEENT: unremarkable Heart: regular rhythm no mumur Lungs: clear Abdominal:  benign Neurological: unremarkable Extremities: no edema Findings/Diagnosis: H/o colon polyps Plan of Care/Planned Procedure: Colonoscopy with conscious/deep sedation Signed: 
Jodee Pantoja MD 4/11/2019

## 2019-04-11 NOTE — ANESTHESIA POSTPROCEDURE EVALUATION
Procedure(s): 
COLONOSCOPY. 
 
total IV anesthesia Anesthesia Post Evaluation Patient location during evaluation: PACU Note status: Adequate. Level of consciousness: responsive to verbal stimuli and sleepy but conscious Pain management: satisfactory to patient Airway patency: patent Anesthetic complications: no 
Cardiovascular status: acceptable Respiratory status: acceptable Hydration status: acceptable Comments: +Post-Anesthesia Evaluation and Assessment Patient: Mazin Lay MRN: [id number]  SSN: [id number] YOB: 1950  Age: 80 y.o. Sex: female Cardiovascular Function/Vital Signs /79   Pulse 88   Temp 36.8 °C (98.2 °F)   Resp 17   Ht 5' 5\" (1.651 m)   Wt 84.9 kg (187 lb 2 oz)   SpO2 100%   Breastfeeding? No   BMI 31.14 kg/m² Patient is status post Procedure(s): 
COLONOSCOPY. Nausea/Vomiting: Controlled. Postoperative hydration reviewed and adequate. Pain: 
Pain Scale 1: Numeric (0 - 10) (04/11/19 1245) Pain Intensity 1: 0 (04/11/19 1245) Managed. Neurological Status: At baseline. Mental Status and Level of Consciousness: Arousable. Pulmonary Status:  
O2 Device: Room air (04/11/19 1245) Adequate oxygenation and airway patent. Complications related to anesthesia: None Post-anesthesia assessment completed. No concerns. Signed By: Alireza Hoyos DO  
 4/11/2019 Post anesthesia nausea and vomiting:  controlled Vitals Value Taken Time /79 4/11/2019 12:53 PM  
Temp 36.8 °C (98.2 °F) 4/11/2019 12:25 PM  
Pulse 93 4/11/2019 12:53 PM  
Resp 16 4/11/2019 12:53 PM  
SpO2 100 % 4/11/2019 12:53 PM  
Vitals shown include unvalidated device data.

## 2019-04-11 NOTE — PROCEDURES
NAME:  Lehman Boast :   1950 MRN:   [id number] Date/Time:  2019 12:14 PMColonoscopy Operative Report Procedure Type:   Colonoscopy --screening Indications:     Personal history of colon polyps (screening only) Pre-operative Diagnosis: see indication above Post-operative Diagnosis:  See findings below :  John Narvaez MD 
Referring Provider: --Patrick Rodas MD 
 
Exam: Airway: clear, no airway problems anticipated Heart: RRR, without gallops or rubs Lungs: clear bilaterally without wheezes, crackles, or rhonchi Abdomen: soft, nontender, nondistended, bowel sounds present Mental Status: awake, alert and oriented to person, place and time Sedation:  MAC anesthesia Propofol Procedure Details:  After informed consent was obtained with all risks and benefits of procedure explained and preoperative exam completed, the patient was taken to the endoscopy suite and placed in the left lateral decubitus position. Upon sequential sedation as per above, a digital rectal exam was performed demonstrating internal hemorrhoids. The Olympus videocolonoscope  was inserted in the rectum and carefully advanced to the cecum, which was identified by the ileocecal valve and appendiceal orifice. The quality of preparation was good. The colonoscope was slowly withdrawn with careful evaluation between folds. Retroflexion in the rectum was completed demonstrating internal hemorrhoids. Findings:  
1. Normal colonoscopy through to the cecum 2. Medium sized internal hemorrhoids seen on retroflexion Specimen Removed:  None Complications: None. EBL:  None. Impression: 1. Normal colonoscopy through to the cecum 2. Medium sized internal hemorrhoids seen on retroflexion Recommendations: 1. Repeat colonoscopy for surveillance in 5 years Discharge Disposition:  Home in the company of a  when able to ambulate.  
 
 
Nidhi Zuluaga MD

## 2019-04-11 NOTE — DISCHARGE INSTRUCTIONS
Penney Babinski  [id number]  1950    COLON DISCHARGE INSTRUCTIONS  Discomfort:  Redness at IV site- apply warm compress to area; if redness or soreness persist- contact your physician  There may be a slight amount of blood passed from the rectum  Gaseous discomfort- walking, belching will help relieve any discomfort  You may not operate a vehicle for 12 hours  You may not engage in an occupation involving machinery or appliances for rest of today  You may not drink alcoholic beverages for at least 12 hours  Avoid making any critical decisions for at least 24 hour  DIET:   Regular diet. - however -  remember your colon is empty and a heavy meal will produce gas. Avoid these foods:  vegetables, fried / greasy foods, carbonated drinks for today  MEDICATION:  Per Medication Reconciliation       ACTIVITY:  You may not resume your normal daily activities until tomorrow AM; it is recommended that you spend the remainder of the day resting -  avoid any strenuous activity. CALL M.D. ANY SIGN OF:   Increasing pain, nausea, vomiting  Abdominal distension (swelling)  New increased bleeding (oral or rectal)  Fever (chills)    IMPRESSION:  Impression:    1. Normal colonoscopy through to the cecum  2. Medium sized internal hemorrhoids seen on retroflexion    Recommendations:   1.  Repeat colonoscopy for surveillance in 5 years    Follow-up Instructions:   Call Dr. Michael Maurice for the results of procedure / biopsy in 7-10 days  Telephone # [phone]    Pastor Tran MD

## 2019-04-11 NOTE — ANESTHESIA PREPROCEDURE EVALUATION
Anesthetic History No history of anesthetic complications Review of Systems / Medical History Patient summary reviewed, nursing notes reviewed and pertinent labs reviewed Pulmonary Within defined limits Neuro/Psych Psychiatric history Comments: Anxiety Cardiovascular Within defined limits Exercise tolerance: >4 METS 
  
GI/Hepatic/Renal 
Within defined limits Endo/Other Obesity, cancer and anemia Comments: Right Breast Cancer Other Findings Comments: Screening Physical Exam 
 
Airway Mallampati: II 
 
Neck ROM: normal range of motion Mouth opening: Normal 
 
 Cardiovascular Regular rate and rhythm,  S1 and S2 normal,  no murmur, click, rub, or gallop Dental 
 
Dentition: Edentulous, Full lower dentures and Full upper dentures Pulmonary Breath sounds clear to auscultation Abdominal 
GI exam deferred Other Findings Anesthetic Plan ASA: 2 Anesthesia type: total IV anesthesia and MAC Induction: Intravenous Anesthetic plan and risks discussed with: Patient

## 2019-04-26 ENCOUNTER — OFFICE VISIT (OUTPATIENT)
Dept: SURGERY | Age: 69
End: 2019-04-26

## 2019-04-26 VITALS
WEIGHT: 187 LBS | RESPIRATION RATE: 16 BRPM | DIASTOLIC BLOOD PRESSURE: 78 MMHG | HEIGHT: 65 IN | OXYGEN SATURATION: 98 % | HEART RATE: 91 BPM | SYSTOLIC BLOOD PRESSURE: 149 MMHG | TEMPERATURE: 98.3 F | BODY MASS INDEX: 31.16 KG/M2

## 2019-04-26 DIAGNOSIS — C50.411 MALIGNANT NEOPLASM OF UPPER-OUTER QUADRANT OF RIGHT FEMALE BREAST, UNSPECIFIED ESTROGEN RECEPTOR STATUS (HCC): Primary | ICD-10-CM

## 2019-04-26 RX ORDER — LIDOCAINE HYDROCHLORIDE AND EPINEPHRINE 10; 10 MG/ML; UG/ML
20 INJECTION, SOLUTION INFILTRATION; PERINEURAL ONCE
Qty: 1 VIAL | Refills: 0
Start: 2019-04-26 | End: 2019-04-26

## 2019-04-26 RX ORDER — LIDOCAINE HYDROCHLORIDE AND EPINEPHRINE 10; 10 MG/ML; UG/ML
1.5 INJECTION, SOLUTION INFILTRATION; PERINEURAL ONCE
Qty: 1 VIAL | Refills: 0 | Status: SHIPPED | OUTPATIENT
Start: 2019-04-26 | End: 2019-04-26 | Stop reason: CLARIF

## 2019-04-26 NOTE — PROGRESS NOTES
Procedure Note Pre OP Dx: Breast cancer, unneeded port a cath Post OP Dx Breast cancer, unneeded port a cath Procedure Removal of left sided port a cath Surgeon Mary Herzog Anesthesia 1% Lidocaine with epi  15 ml EBL   Minimal 
Pathology none Procedure After informed consent and time out, the left anterior chest was prepped with chlorhexidine and draped sterilely. Local anesthetic was injected into the skin and subcutaneous tissues around the mass. A 2 cm skin incision was made over the port and the port was sharply excised. It came out intact with the catheter. Interrupted 4-0 vicryl was used to close the deep layers and deep dermis and a running 4-0 vicryl was utilized as a subcuticular closure. A sterile dressing was applied. The patient tolerated the procedure well.  
 
Nam Meza MD FACS

## 2019-04-26 NOTE — PROGRESS NOTES
Chief Complaint Patient presents with  Procedure Excision of port a cath. 1. Have you been to the ER, urgent care clinic since your last visit? Hospitalized since your last visit? No 
 
2. Have you seen or consulted any other health care providers outside of the [address] [place] [address] since your last visit? Include any pap smears or colon screening.  No

## 2019-04-26 NOTE — PATIENT INSTRUCTIONS
REMOVE TOP DRESSING ON Wednesday. STERI STRIP STAY ON 1 WEEK AFTER.[facility] PROCEDURE PROGRESS NOTE Chart reviewed for the following: 
 Corazon Moran LPN, have reviewed the History, Physical and updated the Allergic reactions for Lorrain Lipps TIME OUT performed immediately prior to start of procedure: 
 Gracie ROBERSON LPN, have performed the following reviews on Lorrain Lipps prior to the start of the procedure: 
         
* Patient was identified by name and date of birth * Agreement on procedure being performed was verified * Risks and Benefits explained to the patient * Procedure site verified and marked as necessary * Patient was positioned for comfort * Consent was signed and verified Time: 1:55pm 
 
 
Date of procedure: 4/26/2019 Procedure performed by:  Macho Vasquez MD 
 
Provider assisted by: Saba Barcenas LPN Patient assisted by: self How tolerated by patient: tolerated the procedure well with no complications Post Procedural Pain Scale: 0 - No Hurt Comments: none

## 2019-05-10 ENCOUNTER — OFFICE VISIT (OUTPATIENT)
Dept: SURGERY | Age: 69
End: 2019-05-10

## 2019-05-10 VITALS
HEIGHT: 65 IN | DIASTOLIC BLOOD PRESSURE: 91 MMHG | WEIGHT: 182 LBS | BODY MASS INDEX: 30.32 KG/M2 | SYSTOLIC BLOOD PRESSURE: 161 MMHG | RESPIRATION RATE: 20 BRPM | OXYGEN SATURATION: 99 % | HEART RATE: 84 BPM

## 2019-05-10 DIAGNOSIS — Z09 POSTOPERATIVE EXAMINATION: Primary | ICD-10-CM

## 2019-05-10 RX ORDER — LISINOPRIL 20 MG/1
20 TABLET ORAL DAILY
COMMUNITY
Start: 2019-05-09 | End: 2019-06-08

## 2019-05-10 RX ORDER — POTASSIUM CHLORIDE 600 MG/1
8 TABLET, FILM COATED, EXTENDED RELEASE ORAL 2 TIMES DAILY
COMMUNITY
Start: 2019-05-09 | End: 2019-05-20

## 2019-05-10 RX ORDER — ZINC GLUCONATE 10 MG
1 LOZENGE ORAL DAILY
COMMUNITY
Start: 2019-05-09 | End: 2019-06-08

## 2019-05-10 RX ORDER — METFORMIN HYDROCHLORIDE 500 MG/1
500 TABLET ORAL 2 TIMES DAILY WITH MEALS
Status: ON HOLD | COMMUNITY
Start: 2019-05-09 | End: 2019-05-20 | Stop reason: SDUPTHER

## 2019-05-10 NOTE — PROGRESS NOTES
Chief Complaint   Patient presents with    Breast Cancer     F/U port removal 04/26/2019     1. Have you been to the ER, urgent care clinic since your last visit? Hospitalized since your last visit? Yes When: 05/09/2019 [facility] ER low mag, pitassium,elevated  & BP      2. Have you seen or consulted any other health care providers outside of the [address] [place] [address] since your last visit? Include any pap smears or colon screening.  No

## 2019-05-10 NOTE — PROGRESS NOTES
Surgery  Follow up  Procedure: port removal  OR date:  4/26/2019  Path:    none    S I feel fine, no drainage    Visit Vitals  BP (!) 161/91 (BP 1 Location: Left arm, BP Patient Position: Sitting)   Pulse 84   Resp 20   Ht 5' 5\" (1.651 m)   Wt 82.6 kg (182 lb)   SpO2 99%   BMI 30.29 kg/m²       O Incisions healing well without infection      A/P Doing fine   Went to ER yesterday due to hyperglycemia ()   RTC Aug 2019 for scheduled breast ca f/u    Severo Sloan, MD FACS

## 2019-05-15 ENCOUNTER — OFFICE VISIT (OUTPATIENT)
Dept: FAMILY MEDICINE CLINIC | Age: 69
End: 2019-05-15

## 2019-05-15 ENCOUNTER — HOSPITAL ENCOUNTER (OUTPATIENT)
Dept: ULTRASOUND IMAGING | Age: 69
Discharge: HOME | DRG: 435 | End: 2019-05-15
Attending: NURSE PRACTITIONER
Payer: MEDICARE

## 2019-05-15 ENCOUNTER — HOSPITAL ENCOUNTER (OUTPATIENT)
Dept: LAB | Age: 69
Discharge: HOME | End: 2019-05-15
Payer: MEDICARE

## 2019-05-15 VITALS
OXYGEN SATURATION: 96 % | RESPIRATION RATE: 16 BRPM | WEIGHT: 179 LBS | SYSTOLIC BLOOD PRESSURE: 161 MMHG | TEMPERATURE: 98.1 F | HEIGHT: 65 IN | BODY MASS INDEX: 29.82 KG/M2 | HEART RATE: 112 BPM | DIASTOLIC BLOOD PRESSURE: 88 MMHG

## 2019-05-15 DIAGNOSIS — R11.0 NAUSEA: ICD-10-CM

## 2019-05-15 DIAGNOSIS — R94.5 LIVER FUNCTION ABNORMALITY: ICD-10-CM

## 2019-05-15 DIAGNOSIS — Z85.3 HISTORY OF RIGHT BREAST CANCER: ICD-10-CM

## 2019-05-15 DIAGNOSIS — R17 JAUNDICE: ICD-10-CM

## 2019-05-15 DIAGNOSIS — R79.0 LOW MAGNESIUM LEVEL: ICD-10-CM

## 2019-05-15 DIAGNOSIS — E87.6 LOW SERUM POTASSIUM LEVEL: ICD-10-CM

## 2019-05-15 DIAGNOSIS — Z76.89 ESTABLISHING CARE WITH NEW DOCTOR, ENCOUNTER FOR: ICD-10-CM

## 2019-05-15 DIAGNOSIS — E11.9 DIABETES MELLITUS, NEW ONSET (HCC): Primary | ICD-10-CM

## 2019-05-15 LAB
ALBUMIN UR QL STRIP: 30 MG/L
BILIRUB UR QL STRIP: NORMAL
CREATININE, URINE POC: 200 MG/DL
GLUCOSE UR-MCNC: NEGATIVE MG/DL
KETONES P FAST UR STRIP-MCNC: NORMAL MG/DL
MICROALBUMIN/CREAT RATIO POC: <30 MG/G
PH UR STRIP: 5.5 [PH] (ref 4.6–8)
PROT UR QL STRIP: NORMAL
SP GR UR STRIP: 1.01 (ref 1–1.03)
UA UROBILINOGEN AMB POC: NORMAL (ref 0.2–1)
URINALYSIS CLARITY POC: NORMAL
URINALYSIS COLOR POC: NORMAL
URINE BLOOD POC: NORMAL
URINE LEUKOCYTES POC: NEGATIVE
URINE NITRITES POC: NEGATIVE

## 2019-05-15 PROCEDURE — 76705 ECHO EXAM OF ABDOMEN: CPT

## 2019-05-15 PROCEDURE — 82378 CARCINOEMBRYONIC ANTIGEN: CPT

## 2019-05-15 PROCEDURE — 83690 ASSAY OF LIPASE: CPT

## 2019-05-15 PROCEDURE — 85025 COMPLETE CBC W/AUTO DIFF WBC: CPT

## 2019-05-15 PROCEDURE — 80074 ACUTE HEPATITIS PANEL: CPT

## 2019-05-15 PROCEDURE — 80048 BASIC METABOLIC PNL TOTAL CA: CPT

## 2019-05-15 PROCEDURE — 83735 ASSAY OF MAGNESIUM: CPT

## 2019-05-15 PROCEDURE — 80076 HEPATIC FUNCTION PANEL: CPT

## 2019-05-15 PROCEDURE — 85610 PROTHROMBIN TIME: CPT

## 2019-05-15 NOTE — PROGRESS NOTES
Subjective:     Chief Complaint   Patient presents with    Establish [facility]    Diabetes     newly diagnosed type II        HPI:  Penney Babinski is a 80 y.o. female is a new patient, here to establish [facility]. She was previously followed by Dr Mirza Conn but has not been seen him for over two years. Recently seen in ER at [facility] on 5/9/19 for complaints of not feeling well for about two weeks. she was complaining of nausea, dark urine, blurry vision, daughter started noticing jaundiced eye coloring. ER Diagnosed with DM with blood sugar in 300s and HgbA1c 10.6. Was started on Metformin 500 mg BID. Her liver enzymes were extremely elevated but no further testing done and no imaging done. Her BP was elevated so ER started her on lisinopril on discharge as well. Her potassium was low and her magnesium was low so ER told her to take supplements and she was discharged with instructions to follow up with PCP. She feels that her urine is still dark and her eyes are still yellow. She says she is \"barely eating anything because Im scared to eat anything because I dont want my blood sugars to go up\". Notes that her blood sugars have remained elevated, ranging 270-320. She is eating and drinking normally when she does eat (no choking or difficulty swallowing). She has occasional nausea but no vomiting. She denies any abdominal pain. Occasional headache but no AMS and no changes in vision. She has history of right breast cancer and s/p double mastectomy, last chemo done 5/2017. Followed by Dr Ketty Deutsch at [facility]. Just had port removed two weeks ago  Last follow up with oncologist was in March. I called and requested labs done at that time, only labs done on 3/26/19 were CMP and vitamin d. All normal except her glucose was 210 but normal LFT and normal electrolytes. She says she was never told that her blood sugar was elevated. She has no prior history of diabetes.     She denies use of tylenol or ETOH. She notes that the only new medication that she was taking was Viviscal for hair growth that she started taking in April and stopped taking beginning of May after she started feeling bad. Past Medical History:   Diagnosis Date    Cancer [facility]) 2003    right breast cancer    Psychiatric disorder     anxiety       Social History     Tobacco Use    Smoking status: Never Smoker    Smokeless tobacco: Never Used   Substance Use Topics    Alcohol use: No    Drug use: No       Outpatient Medications Marked as Taking for the 5/15/19 encounter (Office Visit) with Zohreh Duong NP   Medication Sig Dispense Refill    lisinopril (PRINIVIL, ZESTRIL) 20 mg tablet Take 20 mg by mouth.  magnesium 250 mg tab Take 1 Tab by mouth.  metFORMIN (GLUCOPHAGE) 500 mg tablet Take 500 mg by mouth.  potassium chloride SR (KLOR-CON 8) 8 mEq tablet Take 8 mEq by mouth.  anastrozole (ARIMIDEX) 1 mg tablet Take 1 mg by mouth daily.  denosumab (PROLIA) 60 mg/mL injection 60 mg by SubCUTAneous route.  multivitamin (ONE A DAY) tablet Take 1 Tab by mouth daily.  aspirin delayed-release 81 mg tablet Take 81 mg by mouth daily.  omega-3 fatty acids-vitamin e (FISH OIL) 1,000 mg cap Take 1 Cap by mouth three (3) times daily.  CALCIUM CARBONATE/VITAMIN D3 (CALTRATE 600 + D PO) Take 1 Tab by mouth two (2) times a day.          No Known Allergies    Health Maintenance reviewed -       ROS:  Gen: +fatigue, no fever, no chills, no unexplained weight loss or weight gain  Eyes: no excessive tearing, itching, or discharge  Nose: no rhinorrhea, no sinus pain  Mouth: no oral lesions, no sore throat, no difficulty swallowing  Resp: no shortness of breath, no wheezing, no cough  CV: no chest pain, no orthopnea, no paroxysmal nocturnal dyspnea, no lower extremity edema, no palpitations  Abd: occasional nausea, no heartburn, no diarrhea, no constipation, no abdominal pain  Neuro: no headaches, no syncope or presyncopal episodes  Endo: no polyuria, no polydipsia. : no hematuria, no dysuria, no frequency, no incontinence  Heme: no lymphadenopathy, no easy bruising or bleeding, no night sweats  MSK: no joint pain or swelling    PE:  Visit Vitals  /88 (BP 1 Location: Left arm, BP Patient Position: Sitting)   Pulse (!) 112   Temp 98.1 °F (36.7 °C) (Oral)   Resp 16   Ht 5' 5\" (1.651 m)   Wt 179 lb (81.2 kg)   SpO2 96%   BMI 29.79 kg/m²     Gen: alert, oriented, no acute distress  Head: normocephalic, atraumatic  Ears: external auditory canals clear, TMs without erythema or effusion  Eyes: pupils equal round reactive to light, sclera yellow, conjunctiva pale  Oral: moist mucus membranes, no oral lesions, no pharyngeal inflammation or exudate  Neck: symmetric normal sized thyroid, no carotid bruits, no jugular vein distention  Resp: no increase work of breathing, lungs clear to ausculation bilaterally, no wheezing, rales or rhonchi  CV: S1, S2 normal.  No murmurs, rubs, or gallops. Abd: soft, not tender, not distended. No obvious hepatosplenomegaly. Normal bowel sounds. No hernias. No abdominal or renal bruits heard. Neuro: cranial nerves intact, normal strength and movement in all extremities, reflexes and sensation intact and symmetric. Skin: no lesion or rash.   Yellow tint is seen  Extremities: no cyanosis or edema    Results for orders placed or performed in visit on 05/15/19   AMB POC URINALYSIS DIP STICK AUTO W/O MICRO   Result Value Ref Range    Color (UA POC) Red     Clarity (UA POC) Cloudy     Glucose (UA POC) Negative Negative    Bilirubin (UA POC) 3+ Negative    Ketones (UA POC) Trace Negative    Specific gravity (UA POC) 1.015 1.001 - 1.035    Blood (UA POC) 1+ Negative    pH (UA POC) 5.5 4.6 - 8.0    Protein (UA POC) 1+ Negative    Urobilinogen (UA POC) 0.2 mg/dL 0.2 - 1    Nitrites (UA POC) Negative Negative    Leukocyte esterase (UA POC) Negative Negative   AMB POC URINE, MICROALBUMIN, SEMIQUANT (3 RESULTS)   Result Value Ref Range    ALBUMIN, URINE POC 30 Negative mg/L    CREATININE, URINE  mg/dL    Microalbumin/creat ratio (POC) <30 <30 MG/G       Assessment/Plan:  Differential diagnosis and treatment options reviewed with patient who is in agreement with treatment plan as outlined below. ICD-10-CM ICD-9-CM    1. Diabetes mellitus, new onset ([facility].) E11.9 250.00 AMB POC URINALYSIS DIP STICK AUTO W/O MICRO      AMB POC URINE, MICROALBUMIN, SEMIQUANT (3 RESULTS)      METABOLIC PANEL, BASIC   2. Establishing care with new doctor, encounter for Z76.89 V65.8    3. History of right breast cancer Z85.3 V10.3 CBC WITH AUTOMATED DIFF      CEA      US ABD LTD   4. Jaundice R17 782.4 CBC WITH AUTOMATED DIFF      HEPATITIS PANEL, ACUTE      LIPASE      US ABD LTD      AMMONIA   5. Liver function abnormality R94.5 794.8 CBC WITH AUTOMATED DIFF      HEPATITIS PANEL, ACUTE      LIPASE      HEPATIC FUNCTION PANEL      METABOLIC PANEL, BASIC      CEA      US ABD LTD      AMMONIA   6. Low magnesium level R79.0 790.6 MAGNESIUM      PROTHROMBIN TIME + INR   7. Low serum potassium level K64.2 209.5 METABOLIC PANEL, BASIC   8. Nausea R11.0 787.02      Concern regarding jaundice and abnormal liver enzymes. Labs and US ordered. Differentials include pancreatitis, pancreatic cancer, liver failure, hepatitis, gallbladder cancer or common bile duct occlusion. Need to assess pancreatic and liver function to decide next best treatment. May need to switch to insulin and this was discussed with patient. Will need referral to specialist but would like to see what data shows first to decide which specialist is appropriate at this time. She will go to ER if worsening of symptoms. Will likely also need referral to diabetic education. Discussed following bland, low carb diet for now and increase water intake. Will plan follow up after evaluation.   Called and got her STAT US abdomen today.    I have discussed the diagnosis with the patient and the intended plan as seen in the above orders. The patient has received an after-visit summary and questions were answered concerning future plans. I have discussed medication side effects and warnings with the patient as well. The patient verbalizes understanding and agreement with the plan.

## 2019-05-15 NOTE — PATIENT INSTRUCTIONS
Learning About Meal Planning for Diabetes  Why plan your meals? Meal planning can be a key part of managing diabetes. Planning meals and snacks with the right balance of carbohydrate, protein, and fat can help you keep your blood sugar at the target level you set with your doctor. You don't have to eat special foods. You can eat what your family eats, including sweets once in a while. But you do have to pay attention to how often you eat and how much you eat of certain foods. You may want to work with a dietitian or a certified diabetes educator. He or she can give you tips and meal ideas and can answer your questions about meal planning. This health professional can also help you reach a healthy weight if that is one of your goals. What plan is right for you? Your dietitian or diabetes educator may suggest that you start with the plate format or carbohydrate counting. The plate format  The plate format is a simple way to help you manage how you eat. You plan meals by learning how much space each food should take on a plate. Using the plate format helps you spread carbohydrate throughout the day. It can make it easier to keep your blood sugar level within your target range. It also helps you see if you're eating healthy portion sizes. To use the plate format, you put non-starchy vegetables on half your plate. Add meat or meat substitutes on one-quarter of the plate. Put a grain or starchy vegetable (such as brown rice or a potato) on the final quarter of the plate. You can add a small piece of fruit and some low-fat or fat-free milk or yogurt, depending on your carbohydrate goal for each meal.  Here are some tips for using the plate format:  · Make sure that you are not using an oversized plate. A 9-inch plate is best. Many restaurants use larger plates. · Get used to using the plate format at home. Then you can use it when you eat out. · Write down your questions about using the plate format.  Talk to your doctor, a dietitian, or a diabetes educator about your concerns. Carbohydrate counting  With carbohydrate counting, you plan meals based on the amount of carbohydrate in each food. Carbohydrate raises blood sugar higher and more quickly than any other nutrient. It is found in desserts, breads and cereals, and fruit. It's also found in starchy vegetables such as potatoes and corn, grains such as rice and pasta, and milk and yogurt. Spreading carbohydrate throughout the day helps keep your blood sugar levels within your target range. Your daily amount depends on several things, including your weight, how active you are, which diabetes medicines you take, and what your goals are for your blood sugar levels. A registered dietitian or diabetes educator can help you plan how much carbohydrate to include in each meal and snack. A guideline for your daily amount of carbohydrate is:  · 45 to 60 grams at each meal. That's about the same as 3 to 4 carbohydrate servings. · 15 to 20 grams at each snack. That's about the same as 1 carbohydrate serving. The Nutrition Facts label on packaged foods tells you how much carbohydrate is in a serving of the food. First, look at the serving size on the food label. Is that the amount you eat in a serving? All of the nutrition information on a food label is based on that serving size. So if you eat more or less than that, you'll need to adjust the other numbers. Total carbohydrate is the next thing you need to look for on the label. If you count carbohydrate servings, one serving of carbohydrate is 15 grams. For foods that don't come with labels, such as fresh fruits and vegetables, you'll need a guide that lists carbohydrate in these foods. Ask your doctor, dietitian, or diabetes educator about books or other nutrition guides you can use.   If you take insulin, you need to know how many grams of carbohydrate are in a meal. This lets you know how much rapid-acting insulin to take before you eat. If you use an insulin pump, you get a constant rate of insulin during the day. So the pump must be programmed at meals to give you extra insulin to cover the rise in blood sugar after meals. When you know how much carbohydrate you will eat, you can take the right amount of insulin. Or, if you always use the same amount of insulin, you need to make sure that you eat the same amount of carbohydrate at meals. If you need more help to understand carbohydrate counting and food labels, ask your doctor, dietitian, or diabetes educator. How do you get started with meal planning? Here are some tips to get started:  · Plan your meals a week at a time. Don't forget to include snacks too. · Use cookbooks or online recipes to plan several main meals. Plan some quick meals for busy nights. You also can double some recipes that freeze well. Then you can save half for other busy nights when you don't have time to cook. · Make sure you have the ingredients you need for your recipes. If you're running low on basic items, put these items on your shopping list too. · List foods that you use to make breakfasts, lunches, and snacks. List plenty of fruits and vegetables. · Post this list on the refrigerator. Add to it as you think of more things you need. · Take the list to the store to do your weekly shopping. Follow-up care is a key part of your treatment and safety. Be sure to make and go to all appointments, and call your doctor if you are having problems. It's also a good idea to know your test results and keep a list of the medicines you take. Where can you learn more? Go to http://mehnaz-xochilt.info/. Irena Acuña in the search box to learn more about \"Learning About Meal Planning for Diabetes. \"  Current as of: July 25, 2018  Content Version: 11.9  © 8999-9539 [employer].  Care instructions adapted under license by [employer] (which disclaims liability or warranty for this information). If you have questions about a medical condition or this instruction, always ask your healthcare professional. [address] any warranty or liability for your use of this information. Diabetes Blood Sugar Emergencies: Your Action Plan  How can you prevent a blood sugar emergency? An important part of living with diabetes is keeping your blood sugar in your target range. You'll need to know what to do if it's too high or too low. Managing your blood sugar levels helps you avoid emergencies. This care sheet will teach you about the signs of high and low blood sugar. It will help you make an action plan with your doctor for when these signs occur. Low blood sugar is more likely to happen if you take certain medicines for diabetes. It can also happen if you skip a meal, drink alcohol, or exercise more than usual.  You may get high blood sugar if you eat differently than you normally do. One example is eating more carbohydrate than usual. Having a cold, the flu, or other sudden illness can also cause high blood sugar levels. Levels can also rise if you miss a dose of medicine. Any change in how you take your medicine may affect your blood sugar level. So it's important to work with your doctor before you make any changes. Check your blood sugar  Work with your doctor to fill in the blank spaces below that apply to you. Track your levels, know your target range, and write down ways you can get your blood sugar back in your target range. A log book can help you track your levels. Take the book to all of your medical appointments. · Check your blood sugar _____ times a day, at these times:________________________________________________. (For example: Before meals, at bedtime, before exercise, during exercise, other.)  · Your blood sugar target range before a meal is ___________________.  Your blood sugar target range after a meal is _______________________. · Do this--___________________________________________________--to get your blood sugar back within your safe range if your blood sugar results are _________________________________________. (For example: Less than 70 or above 250 mg/dL.)  Call your doctor when your blood sugar results are ___________________________________. (For example: Less than 70 or above 250 mg/dL.)  What are the symptoms of low and high blood sugar? Common symptoms of low blood sugar are sweating and feeling shaky, weak, hungry, or confused. Symptoms can start quickly. Common symptoms of high blood sugar are feeling very thirsty or very hungry. You may also pass urine more often than usual. You may have blurry vision and may lose weight without trying. But some people may have high or low blood sugar without having any symptoms. That's a good reason to check your blood sugar on a regular schedule. What should you do if you have symptoms? Work with your doctor to fill in the blank spaces below that apply to you. Low blood sugar  If you have symptoms of low blood sugar, check your blood sugar. If it's below _____ ( for example, below 70), eat or drink a quick-sugar food that has about 15 grams of carbohydrate. Your goal is to get your level back to your safe range. Check your blood sugar again 15 minutes later. If it's still not in your target range, take another 15 grams of carbohydrate and check your blood sugar again in 15 minutes. Repeat this until you reach your target. Then go back to your regular testing schedule. When you have low blood sugar, it's best to stop or reduce any physical activity until your blood sugar is back in your target range and is stable. If you must stay active, eat or drink 30 grams of carbohydrate. Then check your blood sugar again in 15 minutes. If it's not in your target range, take another 30 grams of carbohydrates. Check your blood sugar again in 15 minutes.  Keep doing this until you reach your target. You can then go back to your regular testing schedule. If your symptoms or blood sugar levels are getting worse or have not improved after 15 minutes, seek medical care right away. Here are some examples of quick-sugar foods with 15 grams of carbohydrate:  · 3 or 4 glucose tablets  · 1 tube of glucose gel  · Hard candy (such as 3 Jolly Ranchers or 5 to 7 Life Savers)  · ½ cup to ¾ cup (4 to 6 ounces) of fruit juice or regular (not diet) soda  High blood sugar  If you have symptoms of high blood sugar, check your blood sugar. Your goal is to get your level back to your target range. If it's above ______ ( for example, above 250), follow these steps:  · If you missed a dose of your diabetes medicine, take it now. Take only the amount of medicine that you have been prescribed. Do not take more or less medicine. · Give yourself insulin if your doctor has prescribed it for high blood sugar. · Test for ketones, if the doctor told you to do so. If the results of the ketone test show a moderate-to-large amount of ketones, call the doctor for advice. · Wait 30 minutes after you take the extra insulin or the missed medicine. Check your blood sugar again. If your symptoms or blood sugar levels are getting worse or have not improved after taking these steps, seek medical care right away. Follow-up care is a key part of your treatment and safety. Be sure to make and go to all appointments, and call your doctor if you are having problems. It's also a good idea to know your test results and keep a list of the medicines you take. Where can you learn more? Go to http://mehnaz-xochilt.info/. Enter J107 in the search box to learn more about \"Diabetes Blood Sugar Emergencies: Your Action Plan. \"  Current as of: July 25, 2018  Content Version: 11.9  © 0328-8406 [employer].  Care instructions adapted under license by [employer] (which disclaims liability or warranty for this information). If you have questions about a medical condition or this instruction, always ask your healthcare professional. [address] any warranty or liability for your use of this information. Nutrition Tips for Diabetes: After Your Visit  Your Care Instructions  A healthy diet is important to manage diabetes. It helps you lose weight (if you need to) and keep it off. It gives you the nutrition and energy your body needs and helps prevent heart disease. But a diet for diabetes does not mean that you have to eat special foods. You can eat what your family eats, including occasional sweets and other favorites. But you do have to pay attention to how often you eat and how much you eat of certain foods. The right plan for you will give you meals that help you keep your blood sugar at healthy levels. Try to eat a variety of foods and to spread carbohydrate throughout the day. Carbohydrate raises blood sugar higher and more quickly than any other nutrient does. Carbohydrate is found in sugar, breads and cereals, fruit, starchy vegetables such as potatoes and corn, and milk and yogurt. You may want to work with a dietitian or diabetes educator to help you plan meals and snacks. A dietitian or diabetes educator also can help you lose weight if that is one of your goals. The following tips can help you enjoy your meals and stay healthy. Follow-up care is a key part of your treatment and safety. Be sure to make and go to all appointments, and call your doctor if you are having problems. Its also a good idea to know your test results and keep a list of the medicines you take. How can you care for yourself at home? · Learn which foods have carbohydrate and how much carbohydrate to eat. A dietitian or diabetes educator can help you learn to keep track of how much carbohydrate you eat. · Spread carbohydrate throughout the day.  Eat some carbohydrate at all meals, but do not eat too much at any one time. · Plan meals to include food from all the food groups. These are the food groups and some example portion sizes:  ¨ Grains: 1 slice of bread (1 ounce), ½ cup of cooked cereal, and 1/3 cup of cooked pasta or rice. These have about 15 grams of carbohydrate in a serving. Choose whole grains such as whole wheat bread or crackers, oatmeal, and brown rice more often than refined grains. ¨ Fruit: 1 small fresh fruit, such as an apple or orange; ½ of a banana; ½ cup of chopped, cooked, or canned fruit; ½ cup of fruit juice; 1 cup of melon or raspberries; and 2 tablespoons of dried fruit. These have about 15 grams of carbohydrate in a serving. ¨ Dairy: 1 cup of nonfat or low-fat milk and 2/3 cup of plain yogurt. These have about 15 grams of carbohydrate in a serving. ¨ Protein foods: Beef, chicken, turkey, fish, eggs, tofu, cheese, cottage cheese, and peanut butter. A serving size of meat is 3 ounces, which is about the size of a deck of cards. Examples of meat substitute serving sizes (equal to 1 ounce of meat) are 1/4 cup of cottage cheese, 1 egg, 1 tablespoon of peanut butter, and ½ cup of tofu. These have very little or no carbohydrate per serving. ¨ Vegetables: Starchy vegetables such as ½ cup of cooked dried beans, peas, potatoes, or corn have about 15 grams of carbohydrate. Nonstarchy vegetables have very little carbohydrate, such as 1 cup of raw leafy vegetables (such as spinach), ½ cup of other vegetables (cooked or chopped), and 3/4 cup of vegetable juice. · Use the plate format to plan meals. It is a good, quick way to make sure that you have a balanced meal. It also helps you spread carbohydrate throughout the day. You divide your plate by types of foods. Put vegetables on half the plate, meat or meat substitutes on one-quarter of the plate, and a grain or starchy vegetable (such as brown rice or a potato) in the final quarter of the plate.  To this you can add a small piece of fruit and 1 cup of milk or yogurt, depending on how much carbohydrate you are supposed to eat at a meal.  · Talk to your dietitian or diabetes educator about ways to add limited amounts of sweets into your meal plan. You can eat these foods now and then, as long as you include the amount of carbohydrate they have in your daily carbohydrate allowance. · If you drink alcohol, limit it to no more than 1 drink a day for women and 2 drinks a day for men. If you are pregnant, no amount of alcohol is known to be safe. · Protein, fat, and fiber do not raise blood sugar as much as carbohydrate does. If you eat a lot of these nutrients in a meal, your blood sugar will rise more slowly than it would otherwise. · Limit saturated fats, such as those from meat and dairy products. Try to replace it with monounsaturated fat, such as olive oil. This is a healthier choice because people who have diabetes are at higher-than-average risk of heart disease. But use a modest amount of olive oil. A tablespoon of olive oil has 14 grams of fat and 120 calories. · Exercise lowers blood sugar. If you take insulin by shots or pump, you can use less than you would if you were not exercising. Keep in mind that timing matters. If you exercise within 1 hour after a meal, your body may need less insulin for that meal than it would if you exercised 3 hours after the meal. Test your blood sugar to find out how exercise affects your need for insulin. · Exercise on most days of the week. Aim for at least 30 minutes. Exercise helps you stay at a healthy weight and helps your body use insulin. Walking is an easy way to get exercise. Gradually increase the amount you walk every day. You also may want to swim, bike, or do other activities. When you eat out  · Learn to estimate the serving sizes of foods that have carbohydrate. If you measure food at home, it will be easier to estimate the amount in a serving of restaurant food.   · If the meal you order has too much carbohydrate (such as potatoes, corn, or baked beans), ask to have a low-carbohydrate food instead. Ask for a salad or green vegetables. · If you use insulin, check your blood sugar before and after eating out to help you plan how much to eat in the future. · If you eat more carbohydrate at a meal than you had planned, take a walk or do other exercise. This will help lower your blood sugar. Where can you learn more? Go to [employer]  Enter J525 in the search box to learn more about \"Nutrition Tips for Diabetes: After Your Visit. \"   © 3309-0835 [employer]. Care instructions adapted under license by [facility] (which disclaims liability or warranty for this information). This care instruction is for use with your licensed healthcare professional. If you have questions about a medical condition or this instruction, always ask your healthcare professional. [address] any warranty or liability for your use of this information.   Content Version: 90.0.885153; Current as of: June 4, 2014            Ultra sound scheduled for 5 pm please arrive at 4:30 pm at [facility] one at [facility]

## 2019-05-15 NOTE — PROGRESS NOTES
Chief Complaint   Patient presents with    Establish Care    Diabetes     newly diagnosed type II     1. Have you been to the ER, urgent care clinic since your last visit? Hospitalized since your last visit?n/a np  Pt went to [facility] 2 weeks ago. 2. Have you seen or consulted any other health care providers outside of the [address] [place] [address] since your last visit? Include any pap smears or colon screening. n/a np    Former PCP: Dr. Glenny Rea. Pt reports that she went to the ER two weeks ago because pt had been feeling bad, pt was then diagnosed with DM II. Last mammogram: 1/2017   Last eye exam:pt unable to recall       Health maintenance reviewed. Pt informed of health maintenance past due and/or upcoming. Pt verbalized understanding.

## 2019-05-16 ENCOUNTER — APPOINTMENT (OUTPATIENT)
Dept: MRI IMAGING | Age: 69
DRG: 435 | End: 2019-05-16
Attending: INTERNAL MEDICINE
Payer: MEDICARE

## 2019-05-16 ENCOUNTER — HOSPITAL ENCOUNTER (INPATIENT)
Age: 69
LOS: 4 days | Discharge: HOME | DRG: 435 | End: 2019-05-20
Attending: EMERGENCY MEDICINE | Admitting: INTERNAL MEDICINE
Payer: MEDICARE

## 2019-05-16 ENCOUNTER — DOCUMENTATION ONLY (OUTPATIENT)
Dept: FAMILY MEDICINE CLINIC | Age: 69
End: 2019-05-16

## 2019-05-16 ENCOUNTER — APPOINTMENT (OUTPATIENT)
Dept: CT IMAGING | Age: 69
DRG: 435 | End: 2019-05-16
Attending: EMERGENCY MEDICINE
Payer: MEDICARE

## 2019-05-16 DIAGNOSIS — K86.89 PANCREATIC MASS: Primary | ICD-10-CM

## 2019-05-16 PROBLEM — R17 ELEVATED BILIRUBIN: Status: ACTIVE | Noted: 2019-05-16

## 2019-05-16 PROBLEM — Z85.3 HX: BREAST CANCER: Status: ACTIVE | Noted: 2019-05-16

## 2019-05-16 LAB
ALBUMIN SERPL-MCNC: 2.7 G/DL (ref 3.5–5)
ALBUMIN SERPL-MCNC: 4.4 G/DL (ref 3.6–4.8)
ALBUMIN/GLOB SERPL: 0.7 {RATIO} (ref 1.1–2.2)
ALP SERPL-CCNC: 196 U/L (ref 45–117)
ALP SERPL-CCNC: 237 IU/L (ref 39–117)
ALT SERPL-CCNC: 450 U/L (ref 12–78)
ALT SERPL-CCNC: 617 IU/L (ref 0–32)
AMMONIA PLAS-MCNC: NORMAL UG/DL (ref 27–102)
AMMONIA PLAS-SCNC: <10 UMOL/L
ANION GAP SERPL CALC-SCNC: 7 MMOL/L (ref 5–15)
AST SERPL-CCNC: 200 U/L (ref 15–37)
AST SERPL-CCNC: 311 IU/L (ref 0–40)
BASOPHILS # BLD AUTO: 0.1 X10E3/UL (ref 0–0.2)
BASOPHILS # BLD: 0 K/UL (ref 0–0.1)
BASOPHILS NFR BLD AUTO: 1 %
BASOPHILS NFR BLD: 1 % (ref 0–1)
BILIRUB DIRECT SERPL-MCNC: 11.99 MG/DL (ref 0–0.4)
BILIRUB SERPL-MCNC: 12.4 MG/DL (ref 0.2–1)
BILIRUB SERPL-MCNC: 14.3 MG/DL (ref 0–1.2)
BUN SERPL-MCNC: 16 MG/DL (ref 6–20)
BUN SERPL-MCNC: 16 MG/DL (ref 8–27)
BUN/CREAT SERPL: 18 (ref 12–20)
BUN/CREAT SERPL: 20 (ref 12–28)
CALCIUM SERPL-MCNC: 10.5 MG/DL (ref 8.7–10.3)
CALCIUM SERPL-MCNC: 8.4 MG/DL (ref 8.5–10.1)
CEA SERPL-MCNC: 8.5 NG/ML (ref 0–4.7)
CHLORIDE SERPL-SCNC: 102 MMOL/L (ref 97–108)
CHLORIDE SERPL-SCNC: 94 MMOL/L (ref 96–106)
CO2 SERPL-SCNC: 16 MMOL/L (ref 20–29)
CO2 SERPL-SCNC: 24 MMOL/L (ref 21–32)
COMMENT, HOLDF: NORMAL
COMMENT, HOLDF: NORMAL
CREAT SERPL-MCNC: 0.8 MG/DL (ref 0.57–1)
CREAT SERPL-MCNC: 0.87 MG/DL (ref 0.55–1.02)
DIFFERENTIAL METHOD BLD: ABNORMAL
EOSINOPHIL # BLD AUTO: 0.1 X10E3/UL (ref 0–0.4)
EOSINOPHIL # BLD: 0.1 K/UL (ref 0–0.4)
EOSINOPHIL NFR BLD AUTO: 1 %
EOSINOPHIL NFR BLD: 1 % (ref 0–7)
ERYTHROCYTE [DISTWIDTH] IN BLOOD BY AUTOMATED COUNT: 18.8 % (ref 12.3–15.4)
ERYTHROCYTE [DISTWIDTH] IN BLOOD BY AUTOMATED COUNT: 19.5 % (ref 11.5–14.5)
GLOBULIN SER CALC-MCNC: 3.8 G/DL (ref 2–4)
GLUCOSE SERPL-MCNC: 150 MG/DL (ref 65–100)
GLUCOSE SERPL-MCNC: 202 MG/DL (ref 65–99)
HAV IGM SERPL QL IA: NEGATIVE
HBV CORE IGM SERPL QL IA: NEGATIVE
HBV SURFACE AG SERPL QL IA: NEGATIVE
HCT VFR BLD AUTO: 35.9 % (ref 35–47)
HCT VFR BLD AUTO: 41.9 % (ref 34–46.6)
HCV AB S/CO SERPL IA: <0.1 S/CO RATIO (ref 0–0.9)
HGB BLD-MCNC: 12.7 G/DL (ref 11.5–16)
HGB BLD-MCNC: 13.5 G/DL (ref 11.1–15.9)
IMM GRANULOCYTES # BLD AUTO: 0 K/UL (ref 0–0.04)
IMM GRANULOCYTES # BLD AUTO: 0 X10E3/UL (ref 0–0.1)
IMM GRANULOCYTES NFR BLD AUTO: 0 %
IMM GRANULOCYTES NFR BLD AUTO: 0 % (ref 0–0.5)
INR PPP: NORMAL
LIPASE SERPL-CCNC: 267 U/L (ref 73–393)
LIPASE SERPL-CCNC: 66 U/L (ref 14–72)
LYMPHOCYTES # BLD AUTO: 1.8 X10E3/UL (ref 0.7–3.1)
LYMPHOCYTES # BLD: 1.5 K/UL (ref 0.8–3.5)
LYMPHOCYTES NFR BLD AUTO: 36 %
LYMPHOCYTES NFR BLD: 33 % (ref 12–49)
MAGNESIUM SERPL-MCNC: 1.5 MG/DL (ref 1.6–2.3)
MCH RBC QN AUTO: 27.8 PG (ref 26.6–33)
MCH RBC QN AUTO: 28.3 PG (ref 26–34)
MCHC RBC AUTO-ENTMCNC: 32.2 G/DL (ref 31.5–35.7)
MCHC RBC AUTO-ENTMCNC: 35.4 G/DL (ref 30–36.5)
MCV RBC AUTO: 80 FL (ref 80–99)
MCV RBC AUTO: 86 FL (ref 79–97)
MONOCYTES # BLD AUTO: 0.5 X10E3/UL (ref 0.1–0.9)
MONOCYTES # BLD: 0.7 K/UL (ref 0–1)
MONOCYTES NFR BLD AUTO: 11 %
MONOCYTES NFR BLD: 15 % (ref 5–13)
NEUTROPHILS # BLD AUTO: 2.5 X10E3/UL (ref 1.4–7)
NEUTROPHILS NFR BLD AUTO: 51 %
NEUTS SEG # BLD: 2.3 K/UL (ref 1.8–8)
NEUTS SEG NFR BLD: 50 % (ref 32–75)
NRBC # BLD: 0 K/UL (ref 0–0.01)
NRBC BLD-RTO: 0 PER 100 WBC
PLATELET # BLD AUTO: 213 K/UL (ref 150–400)
PLATELET # BLD AUTO: 271 X10E3/UL (ref 150–379)
PMV BLD AUTO: 11.1 FL (ref 8.9–12.9)
POTASSIUM SERPL-SCNC: 3.5 MMOL/L (ref 3.5–5.1)
POTASSIUM SERPL-SCNC: 4.7 MMOL/L (ref 3.5–5.2)
PROT SERPL-MCNC: 6.5 G/DL (ref 6.4–8.2)
PROT SERPL-MCNC: 7.3 G/DL (ref 6–8.5)
PROTHROMBIN TIME: NORMAL S
RBC # BLD AUTO: 4.49 M/UL (ref 3.8–5.2)
RBC # BLD AUTO: 4.85 X10E6/UL (ref 3.77–5.28)
SAMPLES BEING HELD,HOLD: NORMAL
SAMPLES BEING HELD,HOLD: NORMAL
SODIUM SERPL-SCNC: 133 MMOL/L (ref 136–145)
SODIUM SERPL-SCNC: 134 MMOL/L (ref 134–144)
WBC # BLD AUTO: 4.5 K/UL (ref 3.6–11)
WBC # BLD AUTO: 4.9 X10E3/UL (ref 3.4–10.8)

## 2019-05-16 PROCEDURE — 74011250636 HC RX REV CODE- 250/636: Performed by: EMERGENCY MEDICINE

## 2019-05-16 PROCEDURE — 74183 MRI ABD W/O CNTR FLWD CNTR: CPT

## 2019-05-16 PROCEDURE — 82140 ASSAY OF AMMONIA: CPT

## 2019-05-16 PROCEDURE — 99284 EMERGENCY DEPT VISIT MOD MDM: CPT

## 2019-05-16 PROCEDURE — 74011636320 HC RX REV CODE- 636/320: Performed by: EMERGENCY MEDICINE

## 2019-05-16 PROCEDURE — 85025 COMPLETE CBC W/AUTO DIFF WBC: CPT

## 2019-05-16 PROCEDURE — 65270000015 HC RM PRIVATE ONCOLOGY

## 2019-05-16 PROCEDURE — 80053 COMPREHEN METABOLIC PANEL: CPT

## 2019-05-16 PROCEDURE — 74177 CT ABD & PELVIS W/CONTRAST: CPT

## 2019-05-16 PROCEDURE — 83690 ASSAY OF LIPASE: CPT

## 2019-05-16 PROCEDURE — A9585 GADOBUTROL INJECTION: HCPCS | Performed by: EMERGENCY MEDICINE

## 2019-05-16 PROCEDURE — 74011250636 HC RX REV CODE- 250/636: Performed by: INTERNAL MEDICINE

## 2019-05-16 PROCEDURE — 36415 COLL VENOUS BLD VENIPUNCTURE: CPT

## 2019-05-16 RX ORDER — HEPARIN SODIUM 5000 [USP'U]/ML
5000 INJECTION, SOLUTION INTRAVENOUS; SUBCUTANEOUS EVERY 8 HOURS
Status: DISCONTINUED | OUTPATIENT
Start: 2019-05-16 | End: 2019-05-20 | Stop reason: HOSPADM

## 2019-05-16 RX ORDER — IBUPROFEN 200 MG
200 TABLET ORAL
COMMUNITY
End: 2021-01-01

## 2019-05-16 RX ORDER — MORPHINE SULFATE 10 MG/ML
3 INJECTION, SOLUTION INTRAMUSCULAR; INTRAVENOUS
Status: DISCONTINUED | OUTPATIENT
Start: 2019-05-16 | End: 2019-05-17

## 2019-05-16 RX ORDER — SODIUM CHLORIDE 0.9 % (FLUSH) 0.9 %
10 SYRINGE (ML) INJECTION
Status: COMPLETED | OUTPATIENT
Start: 2019-05-16 | End: 2019-05-16

## 2019-05-16 RX ORDER — METFORMIN HYDROCHLORIDE 500 MG/1
500 TABLET ORAL 2 TIMES DAILY WITH MEALS
Status: DISCONTINUED | OUTPATIENT
Start: 2019-05-17 | End: 2019-05-17

## 2019-05-16 RX ORDER — SODIUM CHLORIDE 0.9 % (FLUSH) 0.9 %
5-40 SYRINGE (ML) INJECTION AS NEEDED
Status: DISCONTINUED | OUTPATIENT
Start: 2019-05-16 | End: 2019-05-18

## 2019-05-16 RX ORDER — SODIUM CHLORIDE 0.9 % (FLUSH) 0.9 %
5-40 SYRINGE (ML) INJECTION EVERY 8 HOURS
Status: DISCONTINUED | OUTPATIENT
Start: 2019-05-16 | End: 2019-05-18

## 2019-05-16 RX ORDER — ANASTROZOLE 1 MG/1
1 TABLET ORAL DAILY
Status: DISCONTINUED | OUTPATIENT
Start: 2019-05-17 | End: 2019-05-17 | Stop reason: DRUGHIGH

## 2019-05-16 RX ORDER — ONDANSETRON 2 MG/ML
4 INJECTION INTRAMUSCULAR; INTRAVENOUS
Status: DISCONTINUED | OUTPATIENT
Start: 2019-05-16 | End: 2019-05-20 | Stop reason: HOSPADM

## 2019-05-16 RX ORDER — SODIUM CHLORIDE 9 MG/ML
75 INJECTION, SOLUTION INTRAVENOUS CONTINUOUS
Status: DISCONTINUED | OUTPATIENT
Start: 2019-05-16 | End: 2019-05-17

## 2019-05-16 RX ORDER — LISINOPRIL 20 MG/1
20 TABLET ORAL DAILY
Status: DISCONTINUED | OUTPATIENT
Start: 2019-05-17 | End: 2019-05-17

## 2019-05-16 RX ORDER — ASPIRIN 81 MG/1
81 TABLET ORAL DAILY
Status: DISCONTINUED | OUTPATIENT
Start: 2019-05-17 | End: 2019-05-20 | Stop reason: HOSPADM

## 2019-05-16 RX ADMIN — IOPAMIDOL 100 ML: 755 INJECTION, SOLUTION INTRAVENOUS at 17:55

## 2019-05-16 RX ADMIN — Medication 10 ML: at 22:24

## 2019-05-16 RX ADMIN — SODIUM CHLORIDE 1000 ML: 900 INJECTION, SOLUTION INTRAVENOUS at 19:45

## 2019-05-16 RX ADMIN — Medication 10 ML: at 17:56

## 2019-05-16 RX ADMIN — GADOBUTROL 10 ML: 604.72 INJECTION INTRAVENOUS at 22:02

## 2019-05-16 RX ADMIN — SODIUM CHLORIDE 75 ML/HR: 900 INJECTION, SOLUTION INTRAVENOUS at 22:22

## 2019-05-16 NOTE — PROGRESS NOTES
Pharmacy Clarification of Prior to Admission Medication Regimen The patient was interviewed regarding clarification of the prior to admission medication regimen. Daughter, Alli Carranza, was present in room and obtained permission from patient to discuss drug regimen with visitor(s) present. Patient was questioned regarding use of any other inhalers, topical products, over the counter medications, herbal medications, vitamin products or ophthalmic/nasal/otic medication use. Information Obtained From: Patient, med list, RX Query Pertinent Pharmacy Findings: 
Patient's daughter, Alli Carranza, manages the patient's medications PTA medication list was corrected to the following:  
 
Prior to Admission Medications Prescriptions Last Dose Informant Patient Reported? Taking? CALCIUM CARBONATE/VITAMIN D3 (CALTRATE 600 + D PO) 2019 at Unknown time Child Yes Yes Sig: Take 2 Tabs by mouth daily (with lunch). anastrozole (ARIMIDEX) 1 mg tablet 2019 at Unknown time Child Yes Yes Sig: Take 1 mg by mouth daily. aspirin delayed-release 81 mg tablet 2019 at Unknown time Child Yes Yes Sig: Take 81 mg by mouth daily. denosumab (PROLIA) 60 mg/mL injection 1/15/2019 at Unknown time Child Yes Yes Si mg by SubCUTAneous route every 6 months. At [facility]  
ibuprofen (ADVIL) 200 mg tablet 2019 at Unknown time Child Yes Yes Sig: Take 200 mg by mouth every four (4) hours as needed for Pain. lisinopril (PRINIVIL, ZESTRIL) 20 mg tablet 2019 at Unknown time Child Yes Yes Sig: Take 20 mg by mouth daily. magnesium 250 mg tab 2019 at Unknown time Child Yes Yes Sig: Take 1 Tab by mouth daily. metFORMIN (GLUCOPHAGE) 500 mg tablet 2019 at Unknown time Child Yes Yes Sig: Take 500 mg by mouth two (2) times daily (with meals). multivitamin (ONE A DAY) tablet 2019 at Unknown time Child Yes Yes Sig: Take 1 Tab by mouth daily (with lunch). omega-3 fatty acids-vitamin e (FISH OIL) 1,000 mg cap 5/14/2019 at Unknown time Child Yes Yes Sig: Take 2 Caps by mouth daily (with lunch). potassium chloride SR (KLOR-CON 8) 8 mEq tablet 5/16/2019 at Unknown time Child Yes Yes Sig: Take 8 mEq by mouth two (2) times a day. Facility-Administered Medications: None Thank you, 
Chet Martinez CPhT Medication History Pharmacy Technician

## 2019-05-16 NOTE — PROGRESS NOTES
Called Dr Sal Davis (patient oncologist) to discuss patient presentation and results. Patient bilirubin increased since she was seen at [facility] ER. CEA is also a little elevated. Dr Sal Davis agrees with me that patient needs to go to ER for admission to have further evaluation, scans, IVF and consultation to GI/Hepatology. I called patient to discuss findings of labs and US. US not conclusive and bilirubin worsening. Ammonia and PT/INR hemolyzed per [facility].  Patient still feeling nauseated but no other worsening of symptoms, her home blood sugars remain in upper 200s. Patient states understanding and will go to ER at [facility] for admission. [facility] can consult Dr Sal Davis as well. Daughter will drive her to ER. She will follow up with me after discharge.

## 2019-05-16 NOTE — ED NOTES
Patient presents to ED with complaint of nausea and jaundice. Patient stated that she first noticed it a little over a week ago. Patient stated she has also been having nausea for last 2 weeks. Patient stated  she had blood drawn yesterday and her bilirubin was elevated so she was told to come into the ER. Patient has hx of breast CA and has had a double mastectomy and was told no BP on right. Patient is alert and responding appropriately. Will continue to monitor and assess patient needs. Patient has been stuck multiple times with no success. Pt stated that she is generally a hard stick. Adelina Rasmussen

## 2019-05-16 NOTE — ED PROVIDER NOTES
EMERGENCY DEPARTMENT HISTORY AND PHYSICAL EXAM 
 
 
Date: 5/16/2019 Patient Name: Eulalio Rodriguez History of Presenting Illness Chief Complaint Patient presents with  Nausea Pt states recent dx for Diabetes and has been nauseous and yellow.  Jaundice Pt state she has been yellow for 1.5 weeks History Provided By: Patient HPI: Eulalio Rodriguez, 80 y.o. female with PMHx significant for breast cancer, presents to the ED with cc of nausea, \"yellow eyes,\" and abnormal outpatient lab work. Patient states that she is felt nauseous for last week and a half and has noted that her eyes appeared more yellow. She denies any abdominal pain but has had vomiting x1 today. Had outpatient lab work done that showed a significant elevated bilirubin at 14. Patient also reports that her \"cancer blood level\" is going up. She is followed by Dr. Александр Rivera from oncology. No fevers, chest pain, cough or cold symptoms, urinary symptoms. PCP: Nasim Marks NP There are no other complaints, changes, or physical findings at this time. Current Facility-Administered Medications Medication Dose Route Frequency Provider Last Rate Last Dose  [START ON 5/17/2019] anastrozole (ARIMIDEX) tablet 1 mg  1 mg Oral DAILY Jeremias Almeida MD      
 [START ON 5/17/2019] aspirin delayed-release tablet 81 mg  81 mg Oral DAILY Jeremias Almeida MD      
 [START ON 5/17/2019] lisinopril (PRINIVIL, ZESTRIL) tablet 20 mg  20 mg Oral DAILY Jeremias Almeida MD      
 [START ON 5/17/2019] metFORMIN (GLUCOPHAGE) tablet 500 mg  500 mg Oral BID WITH MEALS Damian Almeida MD      
 sodium chloride (NS) flush 5-40 mL  5-40 mL IntraVENous Q8H Damian Almeida MD      
 sodium chloride (NS) flush 5-40 mL  5-40 mL IntraVENous PRN Damian Almeida MD      
 ondansetron [facility]) injection 4 mg  4 mg IntraVENous Q4H PRN Lux Lopez MD      
  morphine 10 mg/ml injection 3 mg  3 mg IntraVENous Q4H PRN Akosua Almeida MD      
 heparin (porcine) injection 5,000 Units  5,000 Units SubCUTAneous Q8H Olga Lidia Almeida MD      
 0.9% sodium chloride infusion  75 mL/hr IntraVENous CONTINUOUS Batool Whipple MD 75 mL/hr at 05/16/19 2222 75 mL/hr at 05/16/19 2222 Past History Past Medical History: 
Past Medical History:  
Diagnosis Date  Cancer [facility]) 2003  
 right breast cancer  Psychiatric disorder   
 anxiety Past Surgical History: 
Past Surgical History:  
Procedure Laterality Date  BREAST SURGERY PROCEDURE UNLISTED  2003  
 right breast lumpectomy  COLONOSCOPY N/A 4/11/2019 COLONOSCOPY performed by Christine Lowe MD at [facility] ENDOSCOPY  
 HX MASTECTOMY Bilateral 6/29/2017 RIGHT AND LEFT MASTECTOMY, RIGHT AXILLARY SENTINAL LYMPH NODE BIOPSY performed by Alison Gonzalez MD at [facility] MAIN OR  
 HX OTHER SURGICAL  04/26/2019  
 port removal   
 HX VASCULAR ACCESS    
 left chest portacath Family History: 
Family History Problem Relation Age of Onset  Cancer Mother   
     kidney  Cancer Brother   
     lung  Heart Disease Father  Diabetes Sister  Diabetes Brother Social History: 
Social History Tobacco Use  Smoking status: Never Smoker  Smokeless tobacco: Never Used Substance Use Topics  Alcohol use: No  
 Drug use: No  
 
Allergies: 
No Known Allergies Review of Systems Review of Systems Constitutional: Negative for chills and fever. HENT: Negative for congestion, rhinorrhea and sore throat. Respiratory: Negative for cough and shortness of breath. Cardiovascular: Negative for chest pain. Gastrointestinal: Positive for nausea. Negative for abdominal pain and vomiting. Genitourinary: Negative for dysuria and urgency. Skin: Positive for color change (jaundice). Negative for rash. Neurological: Negative for dizziness, light-headedness and headaches. All other systems reviewed and are negative. Physical Exam  
Physical Exam  
Constitutional: She is oriented to person, place, and time. She appears well-developed and well-nourished. No distress. HENT:  
Head: Normocephalic and atraumatic. Eyes: Pupils are equal, round, and reactive to light. Conjunctivae and EOM are normal. Scleral icterus is present. Neck: Normal range of motion. Cardiovascular: Normal rate, regular rhythm and intact distal pulses. Pulmonary/Chest: Effort normal and breath sounds normal. No stridor. No respiratory distress. Abdominal: Soft. Bowel sounds are normal. She exhibits no distension. There is no tenderness. Musculoskeletal: Normal range of motion. Neurological: She is alert and oriented to person, place, and time. Skin: Skin is warm and dry. Psychiatric: She has a normal mood and affect. Nursing note and vitals reviewed. Diagnostic Study Results Labs - Recent Results (from the past 12 hour(s)) CBC WITH AUTOMATED DIFF Collection Time: 05/16/19  4:29 PM  
Result Value Ref Range WBC 4.5 3.6 - 11.0 K/uL  
 RBC 4.49 3.80 - 5.20 M/uL  
 HGB 12.7 11.5 - 16.0 g/dL HCT 35.9 35.0 - 47.0 % MCV 80.0 80.0 - 99.0 FL  
 MCH 28.3 26.0 - 34.0 PG  
 MCHC 35.4 30.0 - 36.5 g/dL  
 RDW 19.5 (H) 11.5 - 14.5 % PLATELET 006 040 - 141 K/uL MPV 11.1 8.9 - 12.9 FL  
 NRBC 0.0 0  WBC ABSOLUTE NRBC 0.00 0.00 - 0.01 K/uL NEUTROPHILS 50 32 - 75 % LYMPHOCYTES 33 12 - 49 % MONOCYTES 15 (H) 5 - 13 % EOSINOPHILS 1 0 - 7 % BASOPHILS 1 0 - 1 % IMMATURE GRANULOCYTES 0 0.0 - 0.5 % ABS. NEUTROPHILS 2.3 1.8 - 8.0 K/UL  
 ABS. LYMPHOCYTES 1.5 0.8 - 3.5 K/UL  
 ABS. MONOCYTES 0.7 0.0 - 1.0 K/UL  
 ABS. EOSINOPHILS 0.1 0.0 - 0.4 K/UL  
 ABS. BASOPHILS 0.0 0.0 - 0.1 K/UL  
 ABS. IMM.  GRANS. 0.0 0.00 - 0.04 K/UL  
 DF AUTOMATED    
SAMPLES BEING HELD  
 Collection Time: 05/16/19  6:24 PM  
Result Value Ref Range SAMPLES BEING HELD LAVENDER TUBE, DARK GREEN TUBE COMMENT Add-on orders for these samples will be processed based on acceptable specimen integrity and analyte stability, which may vary by analyte. SAMPLES BEING HELD Collection Time: 05/16/19  6:55 PM  
Result Value Ref Range SAMPLES BEING HELD LAVEDNER TUBE, DARK GREEN TUBE COMMENT Add-on orders for these samples will be processed based on acceptable specimen integrity and analyte stability, which may vary by analyte. AMMONIA Collection Time: 05/16/19  6:55 PM  
Result Value Ref Range Ammonia <10 <66 UMOL/L  
METABOLIC PANEL, COMPREHENSIVE Collection Time: 05/16/19  6:56 PM  
Result Value Ref Range Sodium 133 (L) 136 - 145 mmol/L Potassium 3.5 3.5 - 5.1 mmol/L Chloride 102 97 - 108 mmol/L  
 CO2 24 21 - 32 mmol/L Anion gap 7 5 - 15 mmol/L Glucose 150 (H) 65 - 100 mg/dL BUN 16 6 - 20 MG/DL Creatinine 0.87 0.55 - 1.02 MG/DL  
 BUN/Creatinine ratio 18 12 - 20 GFR est AA >60 >60 ml/min/1.73m2 GFR est non-AA >60 >60 ml/min/1.73m2 Calcium 8.4 (L) 8.5 - 10.1 MG/DL Bilirubin, total 12.4 (H) 0.2 - 1.0 MG/DL  
 ALT (SGPT) 450 (H) 12 - 78 U/L  
 AST (SGOT) 200 (H) 15 - 37 U/L Alk. phosphatase 196 (H) 45 - 117 U/L Protein, total 6.5 6.4 - 8.2 g/dL Albumin 2.7 (L) 3.5 - 5.0 g/dL Globulin 3.8 2.0 - 4.0 g/dL A-G Ratio 0.7 (L) 1.1 - 2.2 LIPASE Collection Time: 05/16/19  6:56 PM  
Result Value Ref Range Lipase 267 73 - 393 U/L Radiologic Studies -  
CT ABD PELV W CONT Final Result IMPRESSION:  
  
1. Obstruction of biliary and pancreatic ducts by an indeterminate process at  
the pancreatic head and uncinate level which may represent a pancreatic head  
mass or focal pancreatitis, as there is significant peripancreatic stranding of  
fat. Further evaluation will be necessary. 2. Gallbladder distention, likely associated. 3. Nonobstructing right intrarenal calculus. 4. Indeterminate left lower pole cortical mass which may represent a hyperdense  
cyst, but this cannot be confirmed. If there is prior imaging for comparison,  
this would be helpful. 5. Other incidentals as described. MRI ABD W MRCP W WO CONT    (Results Pending) Ct Abd Pelv W Cont Result Date: 5/16/2019 IMPRESSION: 1. Obstruction of biliary and pancreatic ducts by an indeterminate process at the pancreatic head and uncinate level which may represent a pancreatic head mass or focal pancreatitis, as there is significant peripancreatic stranding of fat. Further evaluation will be necessary. 2. Gallbladder distention, likely associated. 3. Nonobstructing right intrarenal calculus. 4. Indeterminate left lower pole cortical mass which may represent a hyperdense cyst, but this cannot be confirmed. If there is prior imaging for comparison, this would be helpful. 5. Other incidentals as described. Medical Decision Making I am the first provider for this patient. I reviewed the vital signs, available nursing notes, past medical history, past surgical history, family history and social history. Vital Signs-Reviewed the patient's vital signs. Patient Vitals for the past 12 hrs: 
 Temp Pulse Resp BP SpO2  
05/16/19 2218 98.9 °F (37.2 °C) 92 16 120/67 95 % 05/16/19 2030  100 15 148/72 98 % 05/16/19 2024 98.3 °F (36.8 °C) 94 12 149/67 97 % 05/16/19 2000  86 16 123/72 97 % 05/16/19 1946  87 19 140/62 98 % 05/16/19 1900  86 13 137/63 99 % 05/16/19 1525 98.3 °F (36.8 °C) (!) 106 16 (!) 162/93 100 % Records Reviewed: Nursing Notes and Old Medical Records Provider Notes (Medical Decision Making):  
Patient presents with nausea and elevated bilirubin outpatient labs. On exam she does appear jaundiced. Concern for malignancy.   Suspect likely obstructive process given jaundice and elevated bilirubin. Repeat lab work, get CT scan of the abdomen. ED Course:  
Initial assessment performed. The patients presenting problems have been discussed, and they are in agreement with the care plan formulated and outlined with them. I have encouraged them to ask questions as they arise throughout their visit. CT scan shows a pancreatic head mass with obstruction of common bile duct measuring 1.5 cm. Spoke with hospitalist regarding admission, also contact GI. Findings were discussed with the patient. ED Course as of May 16 2222 Thu May 16, 2019  
2023 Spoke with Dr. Alphonza Burkitt, requests NPO after midnight, likely ERCP tomorrow  
 [facility] ED Course User Index Argelia Paulino MD  
 
 
Critical Care: 
none Disposition: 
Admission Diagnosis Clinical Impression: 1. Pancreatic mass This note will not be viewable in [address]

## 2019-05-17 ENCOUNTER — ANESTHESIA EVENT (OUTPATIENT)
Dept: SURGERY | Age: 69
DRG: 435 | End: 2019-05-17
Payer: MEDICARE

## 2019-05-17 ENCOUNTER — APPOINTMENT (OUTPATIENT)
Dept: GENERAL RADIOLOGY | Age: 69
DRG: 435 | End: 2019-05-17
Attending: INTERNAL MEDICINE
Payer: MEDICARE

## 2019-05-17 ENCOUNTER — ANESTHESIA (OUTPATIENT)
Dept: SURGERY | Age: 69
DRG: 435 | End: 2019-05-17
Payer: MEDICARE

## 2019-05-17 LAB
ALBUMIN SERPL-MCNC: 2.8 G/DL (ref 3.5–5)
ALBUMIN/GLOB SERPL: 0.7 {RATIO} (ref 1.1–2.2)
ALP SERPL-CCNC: 194 U/L (ref 45–117)
ALT SERPL-CCNC: 441 U/L (ref 12–78)
ANION GAP SERPL CALC-SCNC: 9 MMOL/L (ref 5–15)
AST SERPL-CCNC: 199 U/L (ref 15–37)
ATRIAL RATE: 83 BPM
BILIRUB SERPL-MCNC: 13.2 MG/DL (ref 0.2–1)
BUN SERPL-MCNC: 14 MG/DL (ref 6–20)
BUN/CREAT SERPL: 18 (ref 12–20)
CALCIUM SERPL-MCNC: 8.9 MG/DL (ref 8.5–10.1)
CALCULATED P AXIS, ECG09: 66 DEGREES
CALCULATED R AXIS, ECG10: 52 DEGREES
CALCULATED T AXIS, ECG11: 35 DEGREES
CHLORIDE SERPL-SCNC: 104 MMOL/L (ref 97–108)
CO2 SERPL-SCNC: 23 MMOL/L (ref 21–32)
CREAT SERPL-MCNC: 0.76 MG/DL (ref 0.55–1.02)
DIAGNOSIS, 93000: NORMAL
ERYTHROCYTE [DISTWIDTH] IN BLOOD BY AUTOMATED COUNT: 19.5 % (ref 11.5–14.5)
GLOBULIN SER CALC-MCNC: 3.9 G/DL (ref 2–4)
GLUCOSE BLD STRIP.AUTO-MCNC: 158 MG/DL (ref 65–100)
GLUCOSE BLD STRIP.AUTO-MCNC: 162 MG/DL (ref 65–100)
GLUCOSE BLD STRIP.AUTO-MCNC: 178 MG/DL (ref 65–100)
GLUCOSE BLD STRIP.AUTO-MCNC: 216 MG/DL (ref 65–100)
GLUCOSE SERPL-MCNC: 167 MG/DL (ref 65–100)
HCT VFR BLD AUTO: 32.2 % (ref 35–47)
HGB BLD-MCNC: 11.3 G/DL (ref 11.5–16)
MCH RBC QN AUTO: 28.2 PG (ref 26–34)
MCHC RBC AUTO-ENTMCNC: 35.1 G/DL (ref 30–36.5)
MCV RBC AUTO: 80.3 FL (ref 80–99)
NRBC # BLD: 0 K/UL (ref 0–0.01)
NRBC BLD-RTO: 0 PER 100 WBC
P-R INTERVAL, ECG05: 132 MS
PLATELET # BLD AUTO: 213 K/UL (ref 150–400)
PMV BLD AUTO: 11.8 FL (ref 8.9–12.9)
POTASSIUM SERPL-SCNC: 4.2 MMOL/L (ref 3.5–5.1)
PROT SERPL-MCNC: 6.7 G/DL (ref 6.4–8.2)
Q-T INTERVAL, ECG07: 394 MS
QRS DURATION, ECG06: 86 MS
QTC CALCULATION (BEZET), ECG08: 462 MS
RBC # BLD AUTO: 4.01 M/UL (ref 3.8–5.2)
SERVICE CMNT-IMP: ABNORMAL
SODIUM SERPL-SCNC: 136 MMOL/L (ref 136–145)
VENTRICULAR RATE, ECG03: 83 BPM
WBC # BLD AUTO: 4.4 K/UL (ref 3.6–11)

## 2019-05-17 PROCEDURE — 77030032490 HC SLV COMPR SCD KNE COVD -B: Performed by: INTERNAL MEDICINE

## 2019-05-17 PROCEDURE — 82962 GLUCOSE BLOOD TEST: CPT

## 2019-05-17 PROCEDURE — 74011250637 HC RX REV CODE- 250/637: Performed by: INTERNAL MEDICINE

## 2019-05-17 PROCEDURE — 74011250636 HC RX REV CODE- 250/636: Performed by: ANESTHESIOLOGY

## 2019-05-17 PROCEDURE — 93005 ELECTROCARDIOGRAM TRACING: CPT

## 2019-05-17 PROCEDURE — 76210000006 HC OR PH I REC 0.5 TO 1 HR: Performed by: INTERNAL MEDICINE

## 2019-05-17 PROCEDURE — 77030018836 HC SOL IRR NACL ICUM -A: Performed by: INTERNAL MEDICINE

## 2019-05-17 PROCEDURE — 88305 TISSUE EXAM BY PATHOLOGIST: CPT

## 2019-05-17 PROCEDURE — 77030021593 HC FCPS BIOP ENDOSC BSC -A: Performed by: INTERNAL MEDICINE

## 2019-05-17 PROCEDURE — 77030036933 HC BRSH RX CYTO WREGD BSC -C: Performed by: INTERNAL MEDICINE

## 2019-05-17 PROCEDURE — 80053 COMPREHEN METABOLIC PANEL: CPT

## 2019-05-17 PROCEDURE — 88112 CYTOPATH CELL ENHANCE TECH: CPT

## 2019-05-17 PROCEDURE — 0F798DZ DILATION OF COMMON BILE DUCT WITH INTRALUMINAL DEVICE, VIA NATURAL OR ARTIFICIAL OPENING ENDOSCOPIC: ICD-10-PCS | Performed by: INTERNAL MEDICINE

## 2019-05-17 PROCEDURE — 74011250636 HC RX REV CODE- 250/636: Performed by: INTERNAL MEDICINE

## 2019-05-17 PROCEDURE — 85027 COMPLETE CBC AUTOMATED: CPT

## 2019-05-17 PROCEDURE — C1769 GUIDE WIRE: HCPCS | Performed by: INTERNAL MEDICINE

## 2019-05-17 PROCEDURE — 77030020782 HC GWN BAIR PAWS FLX 3M -B

## 2019-05-17 PROCEDURE — 76060000033 HC ANESTHESIA 1 TO 1.5 HR: Performed by: INTERNAL MEDICINE

## 2019-05-17 PROCEDURE — 65270000015 HC RM PRIVATE ONCOLOGY

## 2019-05-17 PROCEDURE — 77030010104 HC SEAL PRT ENDOSC BYRN -B: Performed by: INTERNAL MEDICINE

## 2019-05-17 PROCEDURE — 74330 X-RAY BILE/PANC ENDOSCOPY: CPT

## 2019-05-17 PROCEDURE — 74011636320 HC RX REV CODE- 636/320: Performed by: INTERNAL MEDICINE

## 2019-05-17 PROCEDURE — C1874 STENT, COATED/COV W/DEL SYS: HCPCS | Performed by: INTERNAL MEDICINE

## 2019-05-17 PROCEDURE — 77030009038 HC CATH BILI STN RTVR BSC -C: Performed by: INTERNAL MEDICINE

## 2019-05-17 PROCEDURE — 0DB98ZX EXCISION OF DUODENUM, VIA NATURAL OR ARTIFICIAL OPENING ENDOSCOPIC, DIAGNOSTIC: ICD-10-PCS | Performed by: INTERNAL MEDICINE

## 2019-05-17 PROCEDURE — 77030018846 HC SOL IRR STRL H20 ICUM -A: Performed by: INTERNAL MEDICINE

## 2019-05-17 PROCEDURE — 74011250636 HC RX REV CODE- 250/636

## 2019-05-17 PROCEDURE — 77030007288 HC DEV LOK BILI BSC -A: Performed by: INTERNAL MEDICINE

## 2019-05-17 PROCEDURE — 74011250637 HC RX REV CODE- 250/637: Performed by: HOSPITALIST

## 2019-05-17 PROCEDURE — 77030008684 HC TU ET CUF COVD -B: Performed by: NURSE ANESTHETIST, CERTIFIED REGISTERED

## 2019-05-17 PROCEDURE — 77030037186 HC VLV ENDOSC STRL DEFENDO DISP MVAT -A: Performed by: INTERNAL MEDICINE

## 2019-05-17 PROCEDURE — 74011636637 HC RX REV CODE- 636/637: Performed by: INTERNAL MEDICINE

## 2019-05-17 PROCEDURE — 76010000149 HC OR TIME 1 TO 1.5 HR: Performed by: INTERNAL MEDICINE

## 2019-05-17 PROCEDURE — 36415 COLL VENOUS BLD VENIPUNCTURE: CPT

## 2019-05-17 PROCEDURE — 77030026438 HC STYL ET INTUB CARD -A: Performed by: NURSE ANESTHETIST, CERTIFIED REGISTERED

## 2019-05-17 PROCEDURE — 0FD98ZX EXTRACTION OF COMMON BILE DUCT, VIA NATURAL OR ARTIFICIAL OPENING ENDOSCOPIC, DIAGNOSTIC: ICD-10-PCS | Performed by: INTERNAL MEDICINE

## 2019-05-17 DEVICE — STENT SYSTEM RMV
Type: IMPLANTABLE DEVICE | Site: BILE DUCT | Status: FUNCTIONAL
Brand: WALLFLEX BILIARY

## 2019-05-17 RX ORDER — ANASTROZOLE 1 MG/1
1 TABLET ORAL DAILY
Status: DISCONTINUED | OUTPATIENT
Start: 2019-05-17 | End: 2019-05-20 | Stop reason: HOSPADM

## 2019-05-17 RX ORDER — ONDANSETRON 2 MG/ML
INJECTION INTRAMUSCULAR; INTRAVENOUS AS NEEDED
Status: DISCONTINUED | OUTPATIENT
Start: 2019-05-17 | End: 2019-05-17 | Stop reason: HOSPADM

## 2019-05-17 RX ORDER — TRIPROLIDINE/PSEUDOEPHEDRINE 2.5MG-60MG
200 TABLET ORAL ONCE
Status: COMPLETED | OUTPATIENT
Start: 2019-05-17 | End: 2019-05-17

## 2019-05-17 RX ORDER — ONDANSETRON 2 MG/ML
4 INJECTION INTRAMUSCULAR; INTRAVENOUS AS NEEDED
Status: DISCONTINUED | OUTPATIENT
Start: 2019-05-17 | End: 2019-05-17 | Stop reason: HOSPADM

## 2019-05-17 RX ORDER — DIPHENHYDRAMINE HYDROCHLORIDE 50 MG/ML
12.5 INJECTION, SOLUTION INTRAMUSCULAR; INTRAVENOUS AS NEEDED
Status: DISCONTINUED | OUTPATIENT
Start: 2019-05-17 | End: 2019-05-17 | Stop reason: HOSPADM

## 2019-05-17 RX ORDER — EPINEPHRINE 0.1 MG/ML
1 INJECTION INTRACARDIAC; INTRAVENOUS
Status: CANCELLED | OUTPATIENT
Start: 2019-05-17 | End: 2019-05-18

## 2019-05-17 RX ORDER — MAGNESIUM SULFATE 100 %
4 CRYSTALS MISCELLANEOUS AS NEEDED
Status: DISCONTINUED | OUTPATIENT
Start: 2019-05-17 | End: 2019-05-20 | Stop reason: HOSPADM

## 2019-05-17 RX ORDER — FENTANYL CITRATE 50 UG/ML
INJECTION, SOLUTION INTRAMUSCULAR; INTRAVENOUS AS NEEDED
Status: DISCONTINUED | OUTPATIENT
Start: 2019-05-17 | End: 2019-05-17 | Stop reason: HOSPADM

## 2019-05-17 RX ORDER — NALOXONE HYDROCHLORIDE 0.4 MG/ML
0.4 INJECTION, SOLUTION INTRAMUSCULAR; INTRAVENOUS; SUBCUTANEOUS
Status: CANCELLED | OUTPATIENT
Start: 2019-05-17 | End: 2019-05-17

## 2019-05-17 RX ORDER — PROPOFOL 10 MG/ML
INJECTION, EMULSION INTRAVENOUS AS NEEDED
Status: DISCONTINUED | OUTPATIENT
Start: 2019-05-17 | End: 2019-05-17 | Stop reason: HOSPADM

## 2019-05-17 RX ORDER — LIDOCAINE HYDROCHLORIDE 10 MG/ML
0.1 INJECTION, SOLUTION EPIDURAL; INFILTRATION; INTRACAUDAL; PERINEURAL AS NEEDED
Status: DISCONTINUED | OUTPATIENT
Start: 2019-05-17 | End: 2019-05-17 | Stop reason: HOSPADM

## 2019-05-17 RX ORDER — DEXTROSE 50 % IN WATER (D50W) INTRAVENOUS SYRINGE
12.5-25 AS NEEDED
Status: DISCONTINUED | OUTPATIENT
Start: 2019-05-17 | End: 2019-05-20 | Stop reason: HOSPADM

## 2019-05-17 RX ORDER — ATROPINE SULFATE 0.1 MG/ML
0.5 INJECTION INTRAVENOUS
Status: CANCELLED | OUTPATIENT
Start: 2019-05-17 | End: 2019-05-18

## 2019-05-17 RX ORDER — MORPHINE SULFATE 10 MG/ML
2 INJECTION, SOLUTION INTRAMUSCULAR; INTRAVENOUS
Status: DISCONTINUED | OUTPATIENT
Start: 2019-05-17 | End: 2019-05-17 | Stop reason: HOSPADM

## 2019-05-17 RX ORDER — INSULIN LISPRO 100 [IU]/ML
INJECTION, SOLUTION INTRAVENOUS; SUBCUTANEOUS EVERY 6 HOURS
Status: DISCONTINUED | OUTPATIENT
Start: 2019-05-17 | End: 2019-05-20

## 2019-05-17 RX ORDER — FENTANYL CITRATE 50 UG/ML
25 INJECTION, SOLUTION INTRAMUSCULAR; INTRAVENOUS
Status: DISCONTINUED | OUTPATIENT
Start: 2019-05-17 | End: 2019-05-17 | Stop reason: HOSPADM

## 2019-05-17 RX ORDER — SODIUM CHLORIDE 0.9 % (FLUSH) 0.9 %
5-40 SYRINGE (ML) INJECTION EVERY 8 HOURS
Status: DISCONTINUED | OUTPATIENT
Start: 2019-05-17 | End: 2019-05-17 | Stop reason: HOSPADM

## 2019-05-17 RX ORDER — HYDROMORPHONE HYDROCHLORIDE 1 MG/ML
0.2 INJECTION, SOLUTION INTRAMUSCULAR; INTRAVENOUS; SUBCUTANEOUS
Status: DISCONTINUED | OUTPATIENT
Start: 2019-05-17 | End: 2019-05-17 | Stop reason: HOSPADM

## 2019-05-17 RX ORDER — DEXTROMETHORPHAN/PSEUDOEPHED 2.5-7.5/.8
1.2 DROPS ORAL
Status: CANCELLED | OUTPATIENT
Start: 2019-05-17

## 2019-05-17 RX ORDER — SODIUM CHLORIDE, SODIUM LACTATE, POTASSIUM CHLORIDE, CALCIUM CHLORIDE 600; 310; 30; 20 MG/100ML; MG/100ML; MG/100ML; MG/100ML
75 INJECTION, SOLUTION INTRAVENOUS CONTINUOUS
Status: DISCONTINUED | OUTPATIENT
Start: 2019-05-17 | End: 2019-05-17 | Stop reason: HOSPADM

## 2019-05-17 RX ORDER — LEVOFLOXACIN 5 MG/ML
500 INJECTION, SOLUTION INTRAVENOUS ONCE
Status: COMPLETED | OUTPATIENT
Start: 2019-05-17 | End: 2019-05-17

## 2019-05-17 RX ORDER — SUCCINYLCHOLINE CHLORIDE 20 MG/ML
INJECTION INTRAMUSCULAR; INTRAVENOUS AS NEEDED
Status: DISCONTINUED | OUTPATIENT
Start: 2019-05-17 | End: 2019-05-17 | Stop reason: HOSPADM

## 2019-05-17 RX ORDER — SODIUM CHLORIDE 0.9 % (FLUSH) 0.9 %
5-40 SYRINGE (ML) INJECTION EVERY 8 HOURS
Status: DISCONTINUED | OUTPATIENT
Start: 2019-05-17 | End: 2019-05-20 | Stop reason: HOSPADM

## 2019-05-17 RX ORDER — SODIUM CHLORIDE 9 MG/ML
50 INJECTION, SOLUTION INTRAVENOUS CONTINUOUS
Status: DISCONTINUED | OUTPATIENT
Start: 2019-05-17 | End: 2019-05-17 | Stop reason: HOSPADM

## 2019-05-17 RX ORDER — SODIUM CHLORIDE, SODIUM LACTATE, POTASSIUM CHLORIDE, CALCIUM CHLORIDE 600; 310; 30; 20 MG/100ML; MG/100ML; MG/100ML; MG/100ML
125 INJECTION, SOLUTION INTRAVENOUS CONTINUOUS
Status: DISCONTINUED | OUTPATIENT
Start: 2019-05-17 | End: 2019-05-20 | Stop reason: HOSPADM

## 2019-05-17 RX ORDER — SODIUM CHLORIDE 0.9 % (FLUSH) 0.9 %
5-40 SYRINGE (ML) INJECTION AS NEEDED
Status: DISCONTINUED | OUTPATIENT
Start: 2019-05-17 | End: 2019-05-17 | Stop reason: HOSPADM

## 2019-05-17 RX ORDER — FENTANYL CITRATE 50 UG/ML
50 INJECTION, SOLUTION INTRAMUSCULAR; INTRAVENOUS AS NEEDED
Status: DISCONTINUED | OUTPATIENT
Start: 2019-05-17 | End: 2019-05-17 | Stop reason: HOSPADM

## 2019-05-17 RX ORDER — FLUMAZENIL 0.1 MG/ML
0.2 INJECTION INTRAVENOUS
Status: CANCELLED | OUTPATIENT
Start: 2019-05-17 | End: 2019-05-17

## 2019-05-17 RX ORDER — PHENYLEPHRINE HCL IN 0.9% NACL 0.4MG/10ML
SYRINGE (ML) INTRAVENOUS AS NEEDED
Status: DISCONTINUED | OUTPATIENT
Start: 2019-05-17 | End: 2019-05-17 | Stop reason: HOSPADM

## 2019-05-17 RX ORDER — SODIUM CHLORIDE, SODIUM LACTATE, POTASSIUM CHLORIDE, CALCIUM CHLORIDE 600; 310; 30; 20 MG/100ML; MG/100ML; MG/100ML; MG/100ML
25 INJECTION, SOLUTION INTRAVENOUS CONTINUOUS
Status: DISCONTINUED | OUTPATIENT
Start: 2019-05-17 | End: 2019-05-17 | Stop reason: HOSPADM

## 2019-05-17 RX ORDER — MORPHINE SULFATE 2 MG/ML
1 INJECTION, SOLUTION INTRAMUSCULAR; INTRAVENOUS
Status: DISCONTINUED | OUTPATIENT
Start: 2019-05-17 | End: 2019-05-20 | Stop reason: HOSPADM

## 2019-05-17 RX ORDER — MIDAZOLAM HYDROCHLORIDE 1 MG/ML
1 INJECTION, SOLUTION INTRAMUSCULAR; INTRAVENOUS AS NEEDED
Status: DISCONTINUED | OUTPATIENT
Start: 2019-05-17 | End: 2019-05-17 | Stop reason: HOSPADM

## 2019-05-17 RX ORDER — SODIUM CHLORIDE 0.9 % (FLUSH) 0.9 %
5-40 SYRINGE (ML) INJECTION AS NEEDED
Status: DISCONTINUED | OUTPATIENT
Start: 2019-05-17 | End: 2019-05-20 | Stop reason: HOSPADM

## 2019-05-17 RX ORDER — LEVOFLOXACIN 5 MG/ML
500 INJECTION, SOLUTION INTRAVENOUS EVERY 24 HOURS
Status: COMPLETED | OUTPATIENT
Start: 2019-05-18 | End: 2019-05-20

## 2019-05-17 RX ORDER — MIDAZOLAM HYDROCHLORIDE 1 MG/ML
0.5 INJECTION, SOLUTION INTRAMUSCULAR; INTRAVENOUS
Status: DISCONTINUED | OUTPATIENT
Start: 2019-05-17 | End: 2019-05-17 | Stop reason: HOSPADM

## 2019-05-17 RX ORDER — OXYCODONE AND ACETAMINOPHEN 5; 325 MG/1; MG/1
1 TABLET ORAL AS NEEDED
Status: DISCONTINUED | OUTPATIENT
Start: 2019-05-17 | End: 2019-05-17 | Stop reason: HOSPADM

## 2019-05-17 RX ORDER — LIDOCAINE HYDROCHLORIDE 20 MG/ML
INJECTION, SOLUTION EPIDURAL; INFILTRATION; INTRACAUDAL; PERINEURAL AS NEEDED
Status: DISCONTINUED | OUTPATIENT
Start: 2019-05-17 | End: 2019-05-17 | Stop reason: HOSPADM

## 2019-05-17 RX ORDER — DEXAMETHASONE SODIUM PHOSPHATE 4 MG/ML
INJECTION, SOLUTION INTRA-ARTICULAR; INTRALESIONAL; INTRAMUSCULAR; INTRAVENOUS; SOFT TISSUE AS NEEDED
Status: DISCONTINUED | OUTPATIENT
Start: 2019-05-17 | End: 2019-05-17 | Stop reason: HOSPADM

## 2019-05-17 RX ADMIN — Medication 10 ML: at 13:41

## 2019-05-17 RX ADMIN — DEXAMETHASONE SODIUM PHOSPHATE 4 MG: 4 INJECTION, SOLUTION INTRA-ARTICULAR; INTRALESIONAL; INTRAMUSCULAR; INTRAVENOUS; SOFT TISSUE at 10:33

## 2019-05-17 RX ADMIN — SODIUM CHLORIDE, SODIUM LACTATE, POTASSIUM CHLORIDE, AND CALCIUM CHLORIDE 250 ML/HR: 600; 310; 30; 20 INJECTION, SOLUTION INTRAVENOUS at 16:51

## 2019-05-17 RX ADMIN — FENTANYL CITRATE 25 MCG: 50 INJECTION, SOLUTION INTRAMUSCULAR; INTRAVENOUS at 10:26

## 2019-05-17 RX ADMIN — INSULIN LISPRO 3 UNITS: 100 INJECTION, SOLUTION INTRAVENOUS; SUBCUTANEOUS at 15:40

## 2019-05-17 RX ADMIN — FENTANYL CITRATE 25 MCG: 50 INJECTION, SOLUTION INTRAMUSCULAR; INTRAVENOUS at 10:22

## 2019-05-17 RX ADMIN — INSULIN LISPRO 2 UNITS: 100 INJECTION, SOLUTION INTRAVENOUS; SUBCUTANEOUS at 21:26

## 2019-05-17 RX ADMIN — SODIUM CHLORIDE, SODIUM LACTATE, POTASSIUM CHLORIDE, AND CALCIUM CHLORIDE 25 ML/HR: 600; 310; 30; 20 INJECTION, SOLUTION INTRAVENOUS at 10:15

## 2019-05-17 RX ADMIN — LIDOCAINE HYDROCHLORIDE 80 MG: 20 INJECTION, SOLUTION EPIDURAL; INFILTRATION; INTRACAUDAL; PERINEURAL at 10:22

## 2019-05-17 RX ADMIN — Medication 10 ML: at 21:26

## 2019-05-17 RX ADMIN — Medication 120 MCG: at 11:02

## 2019-05-17 RX ADMIN — PROPOFOL 10 MG: 10 INJECTION, EMULSION INTRAVENOUS at 10:46

## 2019-05-17 RX ADMIN — SUCCINYLCHOLINE CHLORIDE 100 MG: 20 INJECTION INTRAMUSCULAR; INTRAVENOUS at 10:22

## 2019-05-17 RX ADMIN — Medication 120 MCG: at 10:55

## 2019-05-17 RX ADMIN — LEVOFLOXACIN 500 MG: 5 INJECTION, SOLUTION INTRAVENOUS at 10:26

## 2019-05-17 RX ADMIN — IBUPROFEN 200 MG: 100 SUSPENSION ORAL at 21:16

## 2019-05-17 RX ADMIN — PROPOFOL 20 MG: 10 INJECTION, EMULSION INTRAVENOUS at 10:30

## 2019-05-17 RX ADMIN — Medication 10 ML: at 12:00

## 2019-05-17 RX ADMIN — MORPHINE SULFATE 1 MG: 2 INJECTION, SOLUTION INTRAMUSCULAR; INTRAVENOUS at 23:49

## 2019-05-17 RX ADMIN — Medication 80 MCG: at 10:41

## 2019-05-17 RX ADMIN — PROPOFOL 40 MG: 10 INJECTION, EMULSION INTRAVENOUS at 10:44

## 2019-05-17 RX ADMIN — PROPOFOL 20 MG: 10 INJECTION, EMULSION INTRAVENOUS at 10:29

## 2019-05-17 RX ADMIN — ANASTROZOLE 1 MG: 1 TABLET ORAL at 15:35

## 2019-05-17 RX ADMIN — ONDANSETRON 4 MG: 2 INJECTION INTRAMUSCULAR; INTRAVENOUS at 11:24

## 2019-05-17 RX ADMIN — FENTANYL CITRATE 50 MCG: 50 INJECTION, SOLUTION INTRAMUSCULAR; INTRAVENOUS at 10:29

## 2019-05-17 RX ADMIN — PROPOFOL 20 MG: 10 INJECTION, EMULSION INTRAVENOUS at 10:26

## 2019-05-17 RX ADMIN — SODIUM CHLORIDE, SODIUM LACTATE, POTASSIUM CHLORIDE, AND CALCIUM CHLORIDE 250 ML/HR: 600; 310; 30; 20 INJECTION, SOLUTION INTRAVENOUS at 21:26

## 2019-05-17 RX ADMIN — SODIUM CHLORIDE, SODIUM LACTATE, POTASSIUM CHLORIDE, AND CALCIUM CHLORIDE 250 ML/HR: 600; 310; 30; 20 INJECTION, SOLUTION INTRAVENOUS at 12:00

## 2019-05-17 RX ADMIN — Medication 80 MCG: at 10:49

## 2019-05-17 RX ADMIN — Medication 10 ML: at 13:45

## 2019-05-17 RX ADMIN — PROPOFOL 140 MG: 10 INJECTION, EMULSION INTRAVENOUS at 10:22

## 2019-05-17 NOTE — PROGRESS NOTES
Oncology End of Shift Note Bedside shift change report given to Jan Gold RN (incoming nurse) by Ashley Pfeiffer (outgoing nurse) on Jeri Rings. Report included the following information SBAR, Kardex and MAR. Shift Summary:  
Patient tolerated care well throughout shift. No complaints of pain. Patient had a bowel movement prior to leaving for their procedure. Patient had their CHG bath prior to their procedure. The patients family was present at bedside throughout shift. Oncology consult called multiple times (No answer). Patient ambulated in their room with a steady gait. Patient tolerated clear liquid diet. Issues for Physician to Address:   
 
Patient on Cardiac Monitoring? [] Yes 
[x] No 
 
Rhythm:   
 
 
 
Shift Events Ashley Pfeiffer

## 2019-05-17 NOTE — PROGRESS NOTES
Oncology End of Shift Note Bedside shift change report given to Ana Castrejon RN (incoming nurse) by Tracee Bear (outgoing nurse) on [facility]. Report included the following information SBAR, Kardex and MAR. Shift Summary: pt. Stable upon arrival to unit, NPO @ midnight, no complaints of pain. Labs drawn Issues for Physician to Address:   
 
Patient on Cardiac Monitoring? No 
[] Yes 
[x] No 
 
Rhythm:  
 
 
 
Shift Events Tracee Bear

## 2019-05-17 NOTE — PERIOP NOTES
Handoff Report from Operating Room to PACU Report received from Abigail Garcia RN and Sarah Shipman CRNA regarding Nubia Embs. Surgeon(s): 
Robert Scott MD  And Procedure(s) (LRB): ENDOSCOPIC RETROGRADE CHOLANGIOPANCREATOGRAPHY (ERCP) SPINTEROTOMY, BRUSHING , STENTING AND BIOPSIES (N/A)  confirmed with allergies discussed. Anesthesia type, drugs, patient history, complications, estimated blood loss, vital signs, intake and output, and last pain medication, lines, reversal medications and temperature were reviewed.

## 2019-05-17 NOTE — PROGRESS NOTES
8511- GI NP stated to hold all meds before procedure including insulin. 1324- Patient returned from procedure, Vitals, assessment completed. 46- RN called NP to assess if patient can have meds post procedure. GI NP stated to hold heparin until tomorrow and to give Anastrozole. 1600-diet changed from NPO to clear liquid diet.

## 2019-05-17 NOTE — CONSULTS
[address], [facility] GI CONSULTATION NOTE Trini Flores, [phone] NP in-hospital cell phone M-F until 4:30 After 5pm or on weekends, please call  for physician on call NAME: Joey Muse :  1950 MRN:  [id number] Primary GI: Dr iLzbeth Escalera 
 
Date/Time:  2019 8:51 AM 
Assessment:  
Pancreatic mass - biliary dilation - hyperbilirubinemia - MRCP noted acute focal hemorrhagic pancreatitis with possible chronic pancreatitis, probable inflammatory stricture of the distal CBD and pancreatic duct. Also underlying distal CBD neoplasm - CEA of 8.5, TB of 14.3, and , , . - Nausea onset almost 2 weeks ago, one episode of vomiting 
- No abdominal pain or complaints Hx Breast Cancer Anxiety Plan: 1. ERCP today at 1010 with Dr Lizbeth Escalera 2. Strict NPO 3. Discontinue heparin 4. Serial labs 5. Supportive care - analgesic and antiemetics Spoke to Nurse Werner who is aware of patient procedure. Plan discussed with Dr Lizbeth Escalera and Dr Adams Ramirez Thank you for consultation Subjective:  
 
HISTORY OF PRESENT ILLNESS:    
Joey Muse is an 80 y.o.  female who we are asked to see for complaint of pancreatic mass, hyperbilirubinemia, and jaundice. Medically history includes breast cancer (last chemo 2 years ago) with double mastectomy and recent diagnosis of diabetes. States she was just started on Metformin about 2 weeks ago and nausea began about a week and a half ago. She didn't think much of the nausea due to the medication. One episode of vomiting last night but none prior. Denies any adominal pain, changes in bowel habits. Reports a \"little\" weight loss in the last week due to nausea. She was noted during outpatient labs to have elevated CEA of 8.5, TB of 14.3, and , , . Preliminary MRCP read:  Findings are most compatible with acute focal hemorrhagic pancreatitis of the head and uncinate process which appears likely superimposed on changes of chronic pancreatitis with probable inflammatory stricturing of the distal common bile duct and downstream pancreatic duct. This is complicated by compression of the portal confluence and possible occlusion of the superior mesenteric vein. Underlying distal common bile duct neoplasm such as cholangiocarcinoma is not excluded and ERCP or follow-up imaging once patient is clinically improved may be helpful. Colonoscopy 4/11/19 - Dr Shira Addison - Routine screening - normal with hemorrhoids. Past Medical History:  
Diagnosis Date  Cancer [facility]) 2003  
 right breast cancer  Psychiatric disorder   
 anxiety Past Surgical History:  
Procedure Laterality Date  BREAST SURGERY PROCEDURE UNLISTED  2003  
 right breast lumpectomy  COLONOSCOPY N/A 4/11/2019 COLONOSCOPY performed by Yony Dee MD at [facility] ENDOSCOPY  
 HX MASTECTOMY Bilateral 6/29/2017 RIGHT AND LEFT MASTECTOMY, RIGHT AXILLARY SENTINAL LYMPH NODE BIOPSY performed by Farhat Javier MD at [facility] MAIN OR  
 HX OTHER SURGICAL  04/26/2019  
 port removal   
 HX VASCULAR ACCESS    
 left chest portacath Social History Tobacco Use  Smoking status: Never Smoker  Smokeless tobacco: Never Used Substance Use Topics  Alcohol use: No  
  
Family History Problem Relation Age of Onset  Cancer Mother   
     kidney  Cancer Brother   
     lung  Heart Disease Father  Diabetes Sister  Diabetes Brother No Known Allergies Prior to Admission medications Medication Sig Start Date End Date Taking? Authorizing Provider  
ibuprofen (ADVIL) 200 mg tablet Take 200 mg by mouth every four (4) hours as needed for Pain. Yes Provider, Historical  
lisinopril (PRINIVIL, ZESTRIL) 20 mg tablet Take 20 mg by mouth daily. 5/9/19 6/8/19 Yes Provider, Historical  
magnesium 250 mg tab Take 1 Tab by mouth daily. 5/9/19 6/8/19 Yes Provider, Historical  
metFORMIN (GLUCOPHAGE) 500 mg tablet Take 500 mg by mouth two (2) times daily (with meals). 5/9/19 6/8/19 Yes Provider, Historical  
potassium chloride SR (KLOR-CON 8) 8 mEq tablet Take 8 mEq by mouth two (2) times a day. 5/9/19 5/19/19 Yes Provider, Historical  
anastrozole (ARIMIDEX) 1 mg tablet Take 1 mg by mouth daily. Yes Provider, Historical  
denosumab (PROLIA) 60 mg/mL injection 60 mg by SubCUTAneous route every 6 months. At [facility]   Yes Provider, Historical  
multivitamin (ONE A DAY) tablet Take 1 Tab by mouth daily (with lunch). Yes Provider, Historical  
aspirin delayed-release 81 mg tablet Take 81 mg by mouth daily. Yes Provider, Historical  
omega-3 fatty acids-vitamin e (FISH OIL) 1,000 mg cap Take 2 Caps by mouth daily (with lunch). Yes Provider, Historical  
CALCIUM CARBONATE/VITAMIN D3 (CALTRATE 600 + D PO) Take 2 Tabs by mouth daily (with lunch). Yes Provider, Historical  
 
 
Patient Active Problem List  
Diagnosis Code  Hx of breast cancer Z85.3  Abnormal MRI, breast R92.8  Breast cancer of upper-outer quadrant of right female breast ([facility] [address] C50.411  Breast cancer ([facility] [address] C50.919  Seroma of breast N64.89  
 HX: breast cancer Z85.3  Pancreatic mass K86.9  Elevated bilirubin R17 REVIEW OF SYSTEMS:   
Constitutional: negative fever, negative chills Eyes:   negative visual changes ENT:   negative sore throat, tongue or lip swelling Respiratory:  negative cough, negative dyspnea Cards:  negative for chest pain, palpitations, lower extremity edema GI:   See HPI 
:  negative for frequency, dysuria Integument:  negative for rash and pruritus Heme:  negative for easy bruising and gum/nose bleeding Musculoskel: negative for myalgias,  back pain and muscle weakness Neuro: negative for headaches, dizziness, vertigo Psych: negative for feelings of anxiety, depression Pertinent Positives: \"a little\" weight loss, nausea Objective: VITALS:   
Visit Vitals /67 (BP 1 Location: Left arm, BP Patient Position: Sitting) Pulse 99 Temp 98.9 °F (37.2 °C) Resp 18 Ht 5' 5\" (1.651 m) Wt 80.6 kg (177 lb 11.1 oz) SpO2 98% BMI 29.57 kg/m² PHYSICAL EXAM:  
General:          Alert, WD, WN, cooperative, no distress, appears stated age. Head:               Normocephalic, without obvious abnormality, atraumatic. Eyes:               Conjunctivae clear and pale, anicteric sclerae. Pupils are equal 
Nose:               Nares normal. No drainage or sinus tenderness. Throat:             Lips, mucosa, and tongue normal.  No Thrush Neck:               Supple, symmetrical,  no adenopathy, thyroid: non tender Back:               Symmetric,  No CVA tenderness. Lungs:             CTA bilaterally. No wheezing/rhonchi/rales. Chest wall:      Double mastectomy, No tenderness or deformity. No Accessory muscle use. Heart:              Regular rate and rhythm,  no murmur, rub or gallop. Abdomen:        Soft, non-tender. Not distended. Bowel sounds normal. No masses Extremities:     Atraumatic, No cyanosis. No edema. No clubbing Skin:                Texture, turgor normal. No rashes/lesions/jaundice Lymph:            Cervical, supraclavicular normal. 
Psych:             Good insight. Not depressed. Not anxious or agitated. Neurologic:      EOMs intact. No facial asymmetry. No aphasia or slurred speech. Normal strength, A/O X 3. LAB DATA REVIEWED:   
Recent Results (from the past 24 hour(s)) CBC WITH AUTOMATED DIFF Collection Time: 05/16/19  4:29 PM  
Result Value Ref Range WBC 4.5 3.6 - 11.0 K/uL  
 RBC 4.49 3.80 - 5.20 M/uL  
 HGB 12.7 11.5 - 16.0 g/dL HCT 35.9 35.0 - 47.0 % MCV 80.0 80.0 - 99.0 FL  
 MCH 28.3 26.0 - 34.0 PG  
 MCHC 35.4 30.0 - 36.5 g/dL  
 RDW 19.5 (H) 11.5 - 14.5 % PLATELET 161 679 - 655 K/uL MPV 11.1 8.9 - 12.9 FL  
 NRBC 0.0 0  WBC ABSOLUTE NRBC 0.00 0.00 - 0.01 K/uL NEUTROPHILS 50 32 - 75 % LYMPHOCYTES 33 12 - 49 % MONOCYTES 15 (H) 5 - 13 % EOSINOPHILS 1 0 - 7 % BASOPHILS 1 0 - 1 % IMMATURE GRANULOCYTES 0 0.0 - 0.5 % ABS. NEUTROPHILS 2.3 1.8 - 8.0 K/UL  
 ABS. LYMPHOCYTES 1.5 0.8 - 3.5 K/UL  
 ABS. MONOCYTES 0.7 0.0 - 1.0 K/UL  
 ABS. EOSINOPHILS 0.1 0.0 - 0.4 K/UL  
 ABS. BASOPHILS 0.0 0.0 - 0.1 K/UL  
 ABS. IMM. GRANS. 0.0 0.00 - 0.04 K/UL  
 DF AUTOMATED    
SAMPLES BEING HELD Collection Time: 05/16/19  6:24 PM  
Result Value Ref Range SAMPLES BEING HELD LAVENDER TUBE, DARK GREEN TUBE COMMENT Add-on orders for these samples will be processed based on acceptable specimen integrity and analyte stability, which may vary by analyte. SAMPLES BEING HELD Collection Time: 05/16/19  6:55 PM  
Result Value Ref Range SAMPLES BEING HELD LAVEDNER TUBE, DARK GREEN TUBE COMMENT Add-on orders for these samples will be processed based on acceptable specimen integrity and analyte stability, which may vary by analyte. AMMONIA Collection Time: 05/16/19  6:55 PM  
Result Value Ref Range Ammonia <10 <31 UMOL/L  
METABOLIC PANEL, COMPREHENSIVE Collection Time: 05/16/19  6:56 PM  
Result Value Ref Range Sodium 133 (L) 136 - 145 mmol/L Potassium 3.5 3.5 - 5.1 mmol/L Chloride 102 97 - 108 mmol/L  
 CO2 24 21 - 32 mmol/L Anion gap 7 5 - 15 mmol/L Glucose 150 (H) 65 - 100 mg/dL BUN 16 6 - 20 MG/DL Creatinine 0.87 0.55 - 1.02 MG/DL  
 BUN/Creatinine ratio 18 12 - 20 GFR est AA >60 >60 ml/min/1.73m2 GFR est non-AA >60 >60 ml/min/1.73m2 Calcium 8.4 (L) 8.5 - 10.1 MG/DL Bilirubin, total 12.4 (H) 0.2 - 1.0 MG/DL  
 ALT (SGPT) 450 (H) 12 - 78 U/L  
 AST (SGOT) 200 (H) 15 - 37 U/L Alk. phosphatase 196 (H) 45 - 117 U/L Protein, total 6.5 6.4 - 8.2 g/dL Albumin 2.7 (L) 3.5 - 5.0 g/dL Globulin 3.8 2.0 - 4.0 g/dL A-G Ratio 0.7 (L) 1.1 - 2.2 LIPASE Collection Time: 05/16/19  6:56 PM  
Result Value Ref Range Lipase 267 73 - 287 U/L  
METABOLIC PANEL, COMPREHENSIVE Collection Time: 05/17/19  4:21 AM  
Result Value Ref Range Sodium 136 136 - 145 mmol/L Potassium 4.2 3.5 - 5.1 mmol/L Chloride 104 97 - 108 mmol/L  
 CO2 23 21 - 32 mmol/L Anion gap 9 5 - 15 mmol/L Glucose 167 (H) 65 - 100 mg/dL BUN 14 6 - 20 MG/DL Creatinine 0.76 0.55 - 1.02 MG/DL  
 BUN/Creatinine ratio 18 12 - 20 GFR est AA >60 >60 ml/min/1.73m2 GFR est non-AA >60 >60 ml/min/1.73m2 Calcium 8.9 8.5 - 10.1 MG/DL Bilirubin, total 13.2 (H) 0.2 - 1.0 MG/DL  
 ALT (SGPT) 441 (H) 12 - 78 U/L  
 AST (SGOT) 199 (H) 15 - 37 U/L Alk. phosphatase 194 (H) 45 - 117 U/L Protein, total 6.7 6.4 - 8.2 g/dL Albumin 2.8 (L) 3.5 - 5.0 g/dL Globulin 3.9 2.0 - 4.0 g/dL A-G Ratio 0.7 (L) 1.1 - 2.2    
CBC W/O DIFF Collection Time: 05/17/19  4:21 AM  
Result Value Ref Range WBC 4.4 3.6 - 11.0 K/uL  
 RBC 4.01 3.80 - 5.20 M/uL  
 HGB 11.3 (L) 11.5 - 16.0 g/dL HCT 32.2 (L) 35.0 - 47.0 % MCV 80.3 80.0 - 99.0 FL  
 MCH 28.2 26.0 - 34.0 PG  
 MCHC 35.1 30.0 - 36.5 g/dL  
 RDW 19.5 (H) 11.5 - 14.5 % PLATELET 445 074 - 685 K/uL MPV 11.8 8.9 - 12.9 FL  
 NRBC 0.0 0  WBC ABSOLUTE NRBC 0.00 0.00 - 0.01 K/uL IMAGING RESULTS: 
I have personally reviewed the imaging reports PRELIMINARY REPORT 
  
MRCP demonstrates dilatation of intra and extra hepatic biliary ducts and 
pancreatic duct.  There is inflammation of the pancreatic head. 
  
Preliminary report was provided by Dr. Ezequiel Rodriguez, the on-call radiologist, at 
7:16 AM 
  
Final report to follow. 
  
END PRELIMINARY REPORT 
  
EXAM:  MRI ABD W MRCP W WO CONT 
  
INDICATION:   Jaundice, painless, weight loss or fatigue or anorexia or >3 
months duration, otherwise healthy 
  
 COMPARISON: CT 5/16/2019 and ultrasound 5/15/2019. 
  
CONTRAST: 10 mL Gadavist. 
  
TECHNIQUE: MR of the abdomen was performed with axial and coronal SSFSE T2, 
axial and coronal 2-D FIESTA, axial FIRM, 3-D dual echo in and opposed phase FSPGR T1, axial diffusion and axial LAVA sequences through the abdomen and 3-D 
respiratory triggered MRCP, thick and thin slab paracoronal MRCP SSFSE T2 
sequences through the biliary and pancreatic ductal tree. 
  
FINDINGS: 
  
There is inflammation around the pancreatic head and uncinate process with an 
inferior head and uncinate process area of confluent soft tissue prominence with 
intrinsic high T1 signal intensity and poor postcontrast enhancement which 
persists into delayed imaging with no clear focal mass lesion seen. Pancreatic 
parenchyma is mildly atrophic in the body and tail. Peripancreatic vascularity 
shows compression of the portal confluence and poor visualization of the 
superior mesenteric vein as it courses through the area of peripancreatic edema 
posterior to the pancreas. 
  
The liver is normal.  The gallbladder is distended with no filling defects, wall 
thickening, or pericholecystic fluid. The spleen is normal.  The adrenal glands 
are normal.  The kidneys are normal. 
  
There is intrahepatic biliary ductal dilation without beading or stricturing. The extrahepatic ducts are dilated to the level of the pancreatic head where 
there is rapid tapering in the region of soft tissue prominence of the 
pancreatic head and thin circumferential enhancement around the margin. The 
pancreatic duct is irregularly mildly dilated in the body and tail with areas of 
stricturing seen in the region of prominent soft tissue of the pancreatic head. There is no pancreas divisum. 
  
There is no lymphadenopathy or ascites. The visualized lower chest is 
unremarkable.  The surrounding musculoskeletal and soft tissue structures appear 
unremarkable. 
  
IMPRESSION 
 IMPRESSION:  Findings are most compatible with acute focal hemorrhagic 
pancreatitis of the head and uncinate process which appears likely superimposed 
on changes of chronic pancreatitis with probable inflammatory stricturing of the 
distal common bile duct and downstream pancreatic duct. This is complicated by 
compression of the portal confluence and possible occlusion of the superior 
mesenteric vein. Underlying distal common bile duct neoplasm such as 
cholangiocarcinoma is not excluded and ERCP or follow-up imaging once patient is 
clinically improved may be helpful. 
  
  
Total time spent with patient: 50 minutes ________________________________________________________________________ Care Plan discussed with: 
Patient y Family Justsu Castrejon Consultant:  pepe RANGEL  5/17/2019: 
________________________________________________________________________ 
 
___________________________________________________ Consulting Provider:  Azeb Medellin NP  
   5/17/2019  8:51 AM

## 2019-05-17 NOTE — PERIOP NOTES
Update provided to the patient's , Troy Rivera. No questions or concerns were expressed at the time of the update.

## 2019-05-17 NOTE — PROGRESS NOTES
Hospitalist Progress Note NAME: Ector Hamm :  1950 MRN:  [id number] Assessment / Plan: 
Obstructive Jaundice with Elevated Bilirubin POA Likely secondary to pancreatic malignancy POA- s/p ERCP with CBD stenting with [place] biopsy today by Dr Cassidy Smith Cont aggressive IVF Cont Nausea control GI consult noted - followup Brush biopsies done today during ERCP & CBD stenting Empiric IV Levaquin for now Clear diet per GI 
IP Surgery consulted- Dr Ana Maria Coates- following IP oncology consulted- Dr Petra Muñiz. ([facility]) 
  Hx HTN: Controlled, continue current meds, adjust as needed Hx Breast Cancer R side  Chemo/xrt, again in 2017 underwent bilateral mastectomy.  
 
  
  
Code Status: full Surrogate Decision Maker:  Recommended Disposition: Home w/Family Subjective: Chief Complaint / Reason for Physician Visit: Painless Jaundice, elevated Bilirubin \"I am fine\". Discussed with RN events overnight. Review of Systems: 
Symptom Y/N Comments  Symptom Y/N Comments Fever/Chills n   Chest Pain n   
Poor Appetite y   Edema n   
Cough n   Abdominal Pain n   
Sputum    Joint Pain n   
SOB/SAAB n   Pruritis/Rash Nausea/vomit n   Tolerating PT/OT y Diarrhea n   Tolerating Diet y Constipation    Other Could NOT obtain due to:   
 
Objective: VITALS:  
Last 24hrs VS reviewed since prior progress note. Most recent are: 
Patient Vitals for the past 24 hrs: 
 Temp Pulse Resp BP SpO2  
19 1546 98.6 °F (37 °C) 96 18 149/72 100 % 19 1332 98.4 °F (36.9 °C) 75 17 149/62 100 % 19 1310 97.9 °F (36.6 °C) 98 22 145/73 99 % 19 1300  85 17 143/70 99 % 19 1245  91 15 145/67 100 % 19 1230  87 18 148/71 100 % 19 1215  83 15 152/74 100 % 19 1200  81 15 156/67 100 % 19 1145  94 17 149/63 100 % 19 1140  96 19 152/72 100 % 19 1138 97.7 °F (36.5 °C) 97 16 151/70 100 % 05/17/19 1136 97.9 °F (36.6 °C) 97 16 151/70 100 % 05/17/19 0952 98.5 °F (36.9 °C) 91 13 152/75 97 % 05/17/19 0815 98.9 °F (37.2 °C) 99 18 152/67 98 % 05/16/19 2218 98.9 °F (37.2 °C) 92 16 120/67 95 % 05/16/19 2030  100 15 148/72 98 % 05/16/19 2024 98.3 °F (36.8 °C) 94 12 149/67 97 % 05/16/19 2000  86 16 123/72 97 % 05/16/19 1946  87 19 140/62 98 % 05/16/19 1900  86 13 137/63 99 % Intake/Output Summary (Last 24 hours) at 5/17/2019 1601 Last data filed at 5/17/2019 1310 Gross per 24 hour Intake 1241.67 ml Output  Net 1241.67 ml PHYSICAL EXAM: 
General: WD, WN. Alert, cooperative, no acute distress   
EENT:  EOMI. icteric sclerae noted +. MMM Resp:  CTA bilaterally, no wheezing or rales. No accessory muscle use CV:  Regular  rhythm,  No edema GI:  Soft, Non distended, Non tender.  +Bowel sounds Neurologic:  Alert and oriented X 3, normal speech, Psych:   Good insight. Not anxious nor agitated Skin:  No rashes. No jaundice Reviewed most current lab test results and cultures  YES Reviewed most current radiology test results   YES Review and summation of old records today    NO Reviewed patient's current orders and MAR    YES 
PMH/SH reviewed - no change compared to H&P 
________________________________________________________________________ Care Plan discussed with: 
  Comments Patient x Family  x At bedside RN x Care Manager Consultant  x Dr Luann Killian (GI) Multidiciplinary team rounds were held today with , nursing, pharmacist and clinical coordinator. Patient's plan of care was discussed; medications were reviewed and discharge planning was addressed. ________________________________________________________________________ Total NON critical care TIME:  36   Minutes Total CRITICAL CARE TIME Spent:   Minutes non procedure based Comments >50% of visit spent in counseling and coordination of care ________________________________________________________________________ Lorenzo Spicer MD  
 
Procedures: see electronic medical records for all procedures/Xrays and details which were not copied into this note but were reviewed prior to creation of Plan. LABS: 
I reviewed today's most current labs and imaging studies. Pertinent labs include: 
Recent Labs 05/17/19 
0421 05/16/19 
1629 05/15/19 
1422 WBC 4.4 4.5 4.9 HGB 11.3* 12.7 13.5 HCT 32.2* 35.9 41.9  213 271 Recent Labs 05/17/19 
0421 05/16/19 
1856 05/15/19 
1422  133* 134  
K 4.2 3.5 4.7  102 94* CO2 23 24 16* * 150* 202* BUN 14 16 16 CREA 0.76 0.87 0.80 CA 8.9 8.4* 10.5* MG  --   --  1.5* ALB 2.8* 2.7* 4.4 TBILI 13.2* 12.4* 14.3* SGOT 199* 200* 311* * 450* 617* INR  --   --  CANCELED Signed: Lorenzo Spicer MD

## 2019-05-17 NOTE — PROCEDURES
NAME:  Nico Lloyd :   1950 MRN:   [id number] Santino Pappas Date/Time:  2019 11:32 AM 
 
Procedure Type:   ERCP with biliary sphincterotomy, biliary stent placement, biliary tissue sampling Indications: jaundice, abnormal CT/MRCP, biliary obstruction Pre-operative Diagnosis: see indication above Post-operative Diagnosis:  See findings below : López Beck MD 
Referring Provider:    Vivian Dwyer MD, Colton Parson MD, Nohelia Quiros NP Exam: Airway: clear, no airway problems anticipated Heart: RRR, without gallops or rubs Lungs: clear bilaterally without wheezes, crackles, or rhonchi Abdomen: soft, nontender, nondistended, bowel sounds present Mental Status: awake, alert and oriented to person, place and time Sedation:  General anesthesia Medication: Levofloxacin 500 mg IV, Indomethacin 100 mg MO Procedure Details:  After informed consent was obtained with all risks and benefits of procedure explained, the patient was taken to the fluoroscopy suite and placed in the prone position. Upon sequential sedation as per above, the Olympus duodenoscope [id number]   was inserted via the mouthpeice and carefully advanced to the second portion of the duodenum. The quality of visualization was good. The duodenoscope was withdrawn into a short position. Findings:  
Endoscopic: 1. Grossly normal esophagus and stomach when viewed with a duodenoscope 2. The medial wall of the duodenum proximal to the ampulla was abnormal in appearance. Not clear mass-like but ?tumor ingrowth. Biopsied Ampulla: Normal in appearance Attempts at wire guided cannulation of the bile duct resulted in repeated inadvertent cannulation of the pancreatic duct. No contrast was injected. This is likely secondary to complete obliteration of the distal CBD by tumor.  Attempts at biliary cannulation by dual guidewire technique was also unsuccessful. To achieve biliary cannulation a transpancreatic septotomy was performed. Following septotomy wire guided cannulation was achieved with a 0.035 Dreamwire through the Dreatome Cholangiogram: 1. Severe malignant appearing stricture in distal CBD 2. Upstream biliary dilation Specimen Removed:  1. Distal CBD brushings 2. Duodenal biopsies Complications: None. EBL:  None. Interventions:   
Biliary:  
1. Sphincterotomy performed 2. Balloon sweep performed with removal of small amount of sludge 3. Brushings taken of distal CBD stricture 4. 10 mm by 6 cm FC-SEMS placed Impression: 1. The medial wall of the duodenum proximal to the ampulla was abnormal in appearance. Not clear mass-like but ?tumor ingrowth. Biopsied 2. Very difficult biliary cannulation, requiring transpancreatic septotomy to achieve biliary access, secondary to obliteration of distal CBD by tumor. 3. Severe malignant appearing stricture in distal CBD 4. Upstream biliary dilation 5. Biliary sphincterotomy performed 6. Balloon sweep performed with removal of small amount of sludge 7. Brushings taken of distal CBD stricture 8. 10 mm by 6 cm FC-SEMS placed Recommendations: 1. Aggressive IVF hydration with Yasin@Caviar (ordered) 2. Levofloxacin 500 mg IV x 3 days given minor filling of gallbladder with contrast (ordered) 3. NPO for 4 hours, can have clear liquid diet at 4 pm if no AP 4. Follow up brushings and biopsies. If non-diagnostic for malignancy may need EUS-FNB if definitive diagnosis is desired 5. Dr. Riccardo Hoskins following patient/knows of case. I have discussed above with him Discharge Disposition:  Back to floor following recovery in PACU José Miguel Montesinos MD

## 2019-05-17 NOTE — PROGRESS NOTES
-Please complete MRI History and Safety Screening Form for this patient using [employer] only under Orders Requiring a Screening Form: 
 

## 2019-05-17 NOTE — ANESTHESIA POSTPROCEDURE EVALUATION
Procedure(s): ENDOSCOPIC RETROGRADE CHOLANGIOPANCREATOGRAPHY (ERCP) SPINTEROTOMY, BRUSHING , STENTING AND BIOPSIES. general 
 
Anesthesia Post Evaluation Patient location during evaluation: PACU Patient participation: complete - patient participated Level of consciousness: awake and alert Pain management: adequate Airway patency: patent Anesthetic complications: no 
Cardiovascular status: acceptable Respiratory status: acceptable Hydration status: acceptable Comments: Seen and ready for discharge Post anesthesia nausea and vomiting:  none Vitals Value Taken Time /70 5/17/2019  1:00 PM  
Temp 36.5 °C (97.7 °F) 5/17/2019 11:38 AM  
Pulse 98 5/17/2019  1:10 PM  
Resp 22 5/17/2019  1:10 PM  
SpO2 99 % 5/17/2019  1:10 PM  
Vitals shown include unvalidated device data.

## 2019-05-17 NOTE — ANESTHESIA PREPROCEDURE EVALUATION
Relevant Problems No relevant active problems Anesthetic History No history of anesthetic complications Review of Systems / Medical History Patient summary reviewed, nursing notes reviewed and pertinent labs reviewed Pulmonary Within defined limits Neuro/Psych Psychiatric history Cardiovascular Within defined limits Exercise tolerance: >4 METS 
  
GI/Hepatic/Renal 
Within defined limits Endo/Other Diabetes Cancer (right breast) Other Findings Comments: acute focal hemorrhagic 
pancreatitis of the head and uncinate process which appears likely superimposed 
on changes of chronic pancreatitis Physical Exam 
 
Airway Mallampati: II 
TM Distance: 4 - 6 cm Neck ROM: normal range of motion Mouth opening: Normal 
 
 Cardiovascular Regular rate and rhythm,  S1 and S2 normal,  no murmur, click, rub, or gallop Rhythm: regular Rate: normal 
 
 
 
 Dental 
 
Dentition: Edentulous Pulmonary Breath sounds clear to auscultation Abdominal 
GI exam deferred Other Findings Anesthetic Plan ASA: 3 Anesthesia type: general 
 
 
 
 
 
Anesthetic plan and risks discussed with: Patient

## 2019-05-17 NOTE — PERIOP NOTES
TRANSFER - IN REPORT: 
 
Verbal report received from Richard Dejesus Handler  being received from [id number] for ordered procedure Report consisted of patients Situation, Background, Assessment and  
Recommendations(SBAR). Information from the following report(s) SBAR, Kardex, Intake/Output, MAR and Recent Results was reviewed with the receiving nurse. Opportunity for questions and clarification was provided. Assessment completed upon patients arrival to unit and care assumed.

## 2019-05-17 NOTE — ED NOTES
TRANSFER - OUT REPORT: 
 
Verbal report given to [facility] (name) on Darin Butler  being transferred to Oncology(unit) for routine progression of care Report consisted of patients Situation, Background, Assessment and  
Recommendations(SBAR). Information from the following report(s) SBAR was reviewed with the receiving nurse. Lines:    
 
Opportunity for questions and clarification was provided. Patient transported with: 
 Guang Lian Shi Dai

## 2019-05-17 NOTE — CONSULTS
Surgery Consult Subjective:  
  
Bruce London is a 80 y.o. female who I am asked to consult on by Dr Chyna Caballero for a pancreatic process. She was found to have an elevated bilirubin to 14. She had dark urine. CT suggested a pancreatic process and combination with MRI it appears she has an obstructing pancreatic cancer with at least partial occlusion of the PV/SMV with collateralization and likely involvement of the SMA. She states that she was recently dx with diabetes as well.  she denies nausea, vomiting, diarrhea, constipation, melena or hematochezia or unexplained weight loss. She was noted to have dark urine and felt bad. Past Medical History:  
Diagnosis Date  Cancer [facility]) 2003  
 right breast cancer  Psychiatric disorder   
 anxiety Past Surgical History:  
Procedure Laterality Date  BREAST SURGERY PROCEDURE UNLISTED  2003  
 right breast lumpectomy  COLONOSCOPY N/A 4/11/2019 COLONOSCOPY performed by [facility] Lowe [facility]  
 HX MASTECTOMY Bilateral 6/29/2017 RIGHT AND LEFT MASTECTOMY, RIGHT AXILLARY SENTINAL LYMPH NODE BIOPSY performed by Alison Gonzalez MD at [facility] MAIN OR  
 HX OTHER SURGICAL  04/26/2019  
 port removal   
 HX VASCULAR ACCESS    
 left chest portacath Family History Problem Relation Age of Onset  Cancer Mother   
     kidney  Cancer Brother   
     lung  Heart Disease Father  Diabetes Sister  Diabetes Brother Social History Socioeconomic History  Marital status:  Spouse name: Not on file  Number of children: Not on file  Years of education: Not on file  Highest education level: Not on file Tobacco Use  Smoking status: Never Smoker  Smokeless tobacco: Never Used Substance and Sexual Activity  Alcohol use: No  
 Drug use: No  
 Sexual activity: Yes Current Facility-Administered Medications Medication Dose Route Frequency Provider Last Rate Last Dose  insulin lispro (HUMALOG) injection   SubCUTAneous Q6H Marie STEVENSON MD   Stopped at 19 0900  
 glucose chewable tablet 16 g  4 Tab Oral PRN Rehana Corrigan MD      
 dextrose (D50W) injection syrg 12.5-25 g  12.5-25 g IntraVENous PRN Rehana Corrigan MD      
 glucagon (GLUCAGEN) injection 1 mg  1 mg IntraMUSCular PRN Rehana Corrigan MD      
 morphine injection 1 mg  1 mg IntraVENous Q3H PRN Rehana Corrigan MD      
 anastrozole (ARIMIDEX) tablet 1 mg  1 mg Oral DAILY Prince Marty Almeida MD   Stopped at 19 0900  [Held by provider] aspirin delayed-release tablet 81 mg  81 mg Oral DAILY Olga Lidia Almeida MD   Stopped at 19 0900  
 sodium chloride (NS) flush 5-40 mL  5-40 mL IntraVENous Q8H Francisco Vázquez MD   Stopped at 19 0600  
 sodium chloride (NS) flush 5-40 mL  5-40 mL IntraVENous PRN Prince Marty Almeida MD      
 ondansetron [facility]) injection 4 mg  4 mg IntraVENous Q4H PRN Francisco Vázquez MD      
 [Held by provider] heparin (porcine) injection 5,000 Units  5,000 Units SubCUTAneous Luna Kendrick MD   Stopped at 19 0700  
 0.9% sodium chloride infusion  75 mL/hr IntraVENous CONTINUOUS Francisco Vázquez MD 75 mL/hr at 19 2222 75 mL/hr at 19 2222 No Known Allergies Review of Systems: A comprehensive review of systems was negative except for that written in the History of Present Illness. Objective:  
 
  
Patient Vitals for the past 8 hrs: 
 BP Temp Pulse Resp SpO2  
19 0815 152/67 98.9 °F (37.2 °C) 99 18 98 % Temp (24hrs), Av.6 °F (37 °C), Min:98.3 °F (36.8 °C), Max:98.9 °F (37.2 °C) Physical Exam: 
General:  Alert, cooperative, no distress, appears stated age but jaundiced. Eyes:  Conjunctivae/corneas clear but has scleral icterus Mouth/Throat: Lips, mucosa, and tongue normal.   
 Neck: Supple, symmetrical, trachea midline, no adenopathy, thyroid: no enlargment/tenderness/nodules, no carotid bruit and no JVD. Back:   Symmetric, no curvature. ROM normal. No CVA tenderness. Lungs:   Clear to auscultation bilaterally. Heart:  Regular rate and rhythm, S1, S2 normal, no murmur, click, rub or gallop. Abdomen:   Soft, non-tender. Bowel sounds normal. No masses,  No organomegaly. Extremities: Extremities normal, atraumatic, no cyanosis or edema. Pulses: 2+ and symmetric all extremities. Skin: Skin color, texture, turgor normal. No rashes or lesions Lymph nodes: Cervical, supraclavicular, and axillary nodes normal.  
Neurologic: CNII-XII intact. Normal strength, sensation and reflexes throughout. Assessment:  
 
Hospital Problems  Date Reviewed: 5/17/2019 Codes Class Noted POA HX: breast cancer ICD-10-CM: Z85.3 ICD-9-CM: V10.3  5/16/2019 Unknown Pancreatic mass ICD-10-CM: K86.9 ICD-9-CM: 577.9  5/16/2019 Unknown Elevated bilirubin ICD-10-CM: R17 
ICD-9-CM: 277.4  5/16/2019 Unknown I feel that this is unresectable pancreatic cancer with involvement of the SMV/PV and possibly the SMA. I explained this to her and options. She needs biopsy and decompression of her biliary tree. I do not feel that this is related to her prior breast cancer. Plan: I spoke with Dr Chester Flaherty who is planning ERCP with stent and brushings and possibly EUS if brushings are non diagnostic. Her med onc is Ernie Lilly MD with [facility] Sand[facility] MD FACS

## 2019-05-17 NOTE — H&P
Hospitalist Admission NoteNAME: Bruce London :  1950 MRN:  [id number] Date/Time:  2019 8:14 PM 
 
Patient PCP: Antolin Vazquez NP 
________________________________________________________________________ My assessment of this patient's clinical condition and my plan of care is as follows. Assessment / Plan: 
 
1) Elevated Bilirubin CBD dilation:  Likely secondary to pancreatic process/mass. NPO, Nausea control, IV fluids, Will consult GI for eval potential ERCP. 2) Pancreatic process/mass: per CT \"  Obstruction of biliary and pancreatic ducts by an indeterminate process at the pancreatic head and uncinate level which may represent a pancreatic head mass or focal pancreatitis, as there is significant peripancreatic stranding of fat. Further evaluation will be necessary\"  Will order MRCP, also consult GI , Oncology and Surgery for further eval and recs 3) Hx HTN: Controlled, continue current meds, adjust as needed 4) Hx Breast Cancer R side  Chemo/xrt, again in 2017 underwent bilateral mastectomy. Code Status: full Surrogate Decision Maker: DVT Prophylaxis: yes GI Prophylaxis: not indicated Baseline: lives at home Subjective: CHIEF COMPLAINT: yellow color/PCP sent her over here HISTORY OF PRESENT ILLNESS:    
Adelaida Gonzalez is a 80 y.o. Female PMH Breast Ca,HTN, who presents to the ED because her PCP called her and told her to come here after he gor results back from blood work done yesterday. Patient started noticing \"yellow color\" in her eyes and face about 1 1/2 week ago. She went to [facility] ER 1 week ago but no labs were done and she was sent home, she continue to have the yellow color , no abdominal pain, so she went to see her PCP yesterday who ordered blood work and [address]. PCP then called her today with results and asked her to come to the ED for further eval and management. We were asked to admit for work up and evaluation of the above problems. Past Medical History:  
Diagnosis Date  Cancer [facility]) 2003  
 right breast cancer  Psychiatric disorder   
 anxiety Past Surgical History:  
Procedure Laterality Date  BREAST SURGERY PROCEDURE UNLISTED  2003  
 right breast lumpectomy  COLONOSCOPY N/A 4/11/2019 COLONOSCOPY performed by Geronimo Morris MD at [facility] ENDOSCOPY  
 HX MASTECTOMY Bilateral 6/29/2017 RIGHT AND LEFT MASTECTOMY, RIGHT AXILLARY SENTINAL LYMPH NODE BIOPSY performed by Nam Meza MD at [facility] MAIN OR  
 HX OTHER SURGICAL  04/26/2019  
 port removal   
 HX VASCULAR ACCESS    
 left chest portacath Social History Tobacco Use  Smoking status: Never Smoker  Smokeless tobacco: Never Used Substance Use Topics  Alcohol use: No  
  
 
Family History Problem Relation Age of Onset  Cancer Mother   
     kidney  Cancer Brother   
     lung  Heart Disease Father  Diabetes Sister  Diabetes Brother No Known Allergies Prior to Admission medications Medication Sig Start Date End Date Taking? Authorizing Provider  
ibuprofen (ADVIL) 200 mg tablet Take 200 mg by mouth every four (4) hours as needed for Pain. Yes Provider, Historical  
lisinopril (PRINIVIL, ZESTRIL) 20 mg tablet Take 20 mg by mouth daily. 5/9/19 6/8/19 Yes Provider, Historical  
magnesium 250 mg tab Take 1 Tab by mouth daily. 5/9/19 6/8/19 Yes Provider, Historical  
metFORMIN (GLUCOPHAGE) 500 mg tablet Take 500 mg by mouth two (2) times daily (with meals). 5/9/19 6/8/19 Yes Provider, Historical  
potassium chloride SR (KLOR-CON 8) 8 mEq tablet Take 8 mEq by mouth two (2) times a day. 5/9/19 5/19/19 Yes Provider, Historical  
anastrozole (ARIMIDEX) 1 mg tablet Take 1 mg by mouth daily.    Yes Provider, Historical  
denosumab (PROLIA) 60 mg/mL injection 60 mg by SubCUTAneous route every 6 months. At [facility]   Yes Provider, Historical  
multivitamin (ONE A DAY) tablet Take 1 Tab by mouth daily (with lunch). Yes Provider, Historical  
aspirin delayed-release 81 mg tablet Take 81 mg by mouth daily. Yes Provider, Historical  
omega-3 fatty acids-vitamin e (FISH OIL) 1,000 mg cap Take 2 Caps by mouth daily (with lunch). Yes Provider, Historical  
CALCIUM CARBONATE/VITAMIN D3 (CALTRATE 600 + D PO) Take 2 Tabs by mouth daily (with lunch). Yes Provider, Historical  
 
 
REVIEW OF SYSTEMS:    
I am not able to complete the review of systems because: The patient is intubated and sedated The patient has altered mental status due to his acute medical problems The patient has baseline aphasia from prior stroke(s) The patient has baseline dementia and is not reliable historian The patient is in acute medical distress and unable to provide information Total of 12 systems reviewed as follows:   
   POSITIVE= underlined text  Negative = text not underlined General:  fever, chills, sweats, generalized weakness, weight loss/gain,  
   loss of appetite Eyes:    blurred vision, eye pain, loss of vision, double vision ENT:    rhinorrhea, pharyngitis Respiratory:   cough, sputum production, SOB, SAAB, wheezing, pleuritic pain  
Cardiology:   chest pain, palpitations, orthopnea, PND, edema, syncope Gastrointestinal:  abdominal pain , N/V, diarrhea, dysphagia, constipation, bleeding Genitourinary:  frequency, urgency, dysuria, hematuria, incontinence Muskuloskeletal :  arthralgia, myalgia, back pain Hematology:  easy bruising, nose or gum bleeding, lymphadenopathy Dermatological: rash, ulceration, pruritis, color change / jaundice Endocrine:   hot flashes or polydipsia Neurological:  headache, dizziness, confusion, focal weakness, paresthesia, Speech difficulties, memory loss, gait difficulty Psychological: Feelings of anxiety, depression, agitation Objective: VITALS:   
Visit Vitals /72 Pulse 86 Temp 98.3 °F (36.8 °C) Resp 16 Ht 5' 5\" (1.651 m) Wt 80.6 kg (177 lb 11.1 oz) SpO2 97% BMI 29.57 kg/m² PHYSICAL EXAM: 
 
General:    Alert, cooperative, no distress, appears stated age. HEENT: Atraumatic, icteric sclerae, pink conjunctivae No oral ulcers, mucosa moist, throat clear, dentition fair Neck:  Supple, symmetrical,  thyroid: non tender Lungs:   Clear to auscultation bilaterally. No Wheezing or Rhonchi. No rales. Chest wall:  No tenderness  No Accessory muscle use. Heart:   Regular  rhythm,  No  murmur   No edema Abdomen:   Soft, non-tender. Not distended. Bowel sounds normal 
Extremities: No cyanosis. No clubbing, Yellow Skin color  turgor normal, Capillary refill normal, Radial dial pulse 2+ Skin:     Not pale. Not Jaundiced  No rashes Psych:  Good insight. Not depressed. Not anxious or agitated. Neurologic: EOMs intact. No facial asymmetry. No aphasia or slurred speech. Symmetrical strength, Sensation grossly intact. Alert and oriented X 3.  
 
_______________________________________________________________________ Care Plan discussed with: 
  Comments Patient x Family  x   
RN Care Manager Consultant:     
_______________________________________________________________________ Expected  Disposition:  
Home with Family x HH/PT/OT/RN   
SNF/LTC   
[facility]   
________________________________________________________________________ TOTAL TIME:  35  Minutes Critical Care Provided     Minutes non procedure based Comments Reviewed previous records  
>50% of visit spent in counseling and coordination of care  Discussion with patient and/or family and questions answered 
  
 
________________________________________________________________________ Signed: Bryan Sparks MD 
 
Procedures: see electronic medical records for all procedures/Xrays and details which were not copied into this note but were reviewed prior to creation of Plan. LAB DATA REVIEWED:   
Recent Results (from the past 24 hour(s)) CBC WITH AUTOMATED DIFF Collection Time: 05/16/19  4:29 PM  
Result Value Ref Range WBC 4.5 3.6 - 11.0 K/uL  
 RBC 4.49 3.80 - 5.20 M/uL  
 HGB 12.7 11.5 - 16.0 g/dL HCT 35.9 35.0 - 47.0 % MCV 80.0 80.0 - 99.0 FL  
 MCH 28.3 26.0 - 34.0 PG  
 MCHC 35.4 30.0 - 36.5 g/dL  
 RDW 19.5 (H) 11.5 - 14.5 % PLATELET 837 874 - 398 K/uL MPV 11.1 8.9 - 12.9 FL  
 NRBC 0.0 0  WBC ABSOLUTE NRBC 0.00 0.00 - 0.01 K/uL NEUTROPHILS 50 32 - 75 % LYMPHOCYTES 33 12 - 49 % MONOCYTES 15 (H) 5 - 13 % EOSINOPHILS 1 0 - 7 % BASOPHILS 1 0 - 1 % IMMATURE GRANULOCYTES 0 0.0 - 0.5 % ABS. NEUTROPHILS 2.3 1.8 - 8.0 K/UL  
 ABS. LYMPHOCYTES 1.5 0.8 - 3.5 K/UL  
 ABS. MONOCYTES 0.7 0.0 - 1.0 K/UL  
 ABS. EOSINOPHILS 0.1 0.0 - 0.4 K/UL  
 ABS. BASOPHILS 0.0 0.0 - 0.1 K/UL  
 ABS. IMM. GRANS. 0.0 0.00 - 0.04 K/UL  
 DF AUTOMATED    
SAMPLES BEING HELD Collection Time: 05/16/19  6:24 PM  
Result Value Ref Range SAMPLES BEING HELD LAVENDER TUBE, DARK GREEN TUBE COMMENT Add-on orders for these samples will be processed based on acceptable specimen integrity and analyte stability, which may vary by analyte. SAMPLES BEING HELD Collection Time: 05/16/19  6:55 PM  
Result Value Ref Range SAMPLES BEING HELD LAVEDNER TUBE, DARK GREEN TUBE COMMENT Add-on orders for these samples will be processed based on acceptable specimen integrity and analyte stability, which may vary by analyte. AMMONIA Collection Time: 05/16/19  6:55 PM  
Result Value Ref Range Ammonia <10 <02 UMOL/L  
METABOLIC PANEL, COMPREHENSIVE Collection Time: 05/16/19  6:56 PM  
Result Value Ref Range Sodium 133 (L) 136 - 145 mmol/L Potassium 3.5 3.5 - 5.1 mmol/L  Chloride 102 97 - 108 mmol/L  
 CO2 24 21 - 32 mmol/L  
 Anion gap 7 5 - 15 mmol/L Glucose 150 (H) 65 - 100 mg/dL BUN 16 6 - 20 MG/DL Creatinine 0.87 0.55 - 1.02 MG/DL  
 BUN/Creatinine ratio 18 12 - 20 GFR est AA >60 >60 ml/min/1.73m2 GFR est non-AA >60 >60 ml/min/1.73m2 Calcium 8.4 (L) 8.5 - 10.1 MG/DL Bilirubin, total 12.4 (H) 0.2 - 1.0 MG/DL  
 ALT (SGPT) 450 (H) 12 - 78 U/L  
 AST (SGOT) 200 (H) 15 - 37 U/L Alk. phosphatase 196 (H) 45 - 117 U/L Protein, total 6.5 6.4 - 8.2 g/dL Albumin 2.7 (L) 3.5 - 5.0 g/dL Globulin 3.8 2.0 - 4.0 g/dL A-G Ratio 0.7 (L) 1.1 - 2.2 LIPASE Collection Time: 05/16/19  6:56 PM  
Result Value Ref Range  Lipase 267 73 - 393 U/L

## 2019-05-17 NOTE — PERIOP NOTES
TRANSFER - OUT REPORT: 
 
Verbal report given to Jitendra Rodriguez RN on Nubia Saldivar  being transferred to Medical Oncology Unit for routine progression of care Report consisted of patients Situation, Background, Assessment and  
Recommendations(SBAR). Information from the following report(s) SBAR, Kardex, Procedure Summary, Intake/Output, MAR, Accordion, Recent Results and Cardiac Rhythm sinus rhythm was reviewed with the receiving nurse. Lines:  
Peripheral IV 05/17/19 Left Hand (Active) Site Assessment Clean, dry, & intact 5/17/2019 10:00 AM  
Phlebitis Assessment 0 5/17/2019 10:00 AM  
Infiltration Assessment 0 5/17/2019 10:00 AM  
Dressing Status Clean, dry, & intact; Occlusive 5/17/2019 10:00 AM  
Dressing Type Tape;Transparent 5/17/2019 10:00 AM  
Hub Color/Line Status Blue;Capped;Flushed;Patent 5/17/2019 10:00 AM  
Action Taken Other (comment) 5/17/2019 10:00 AM  
Alcohol Cap Used No 5/17/2019 10:00 AM  
  
 
Opportunity for questions and clarification was provided. Patient transported with: 
 O2 @ 2 liters Tech

## 2019-05-18 LAB
ALBUMIN SERPL-MCNC: 2.6 G/DL (ref 3.5–5)
ALBUMIN/GLOB SERPL: 0.7 {RATIO} (ref 1.1–2.2)
ALP SERPL-CCNC: 182 U/L (ref 45–117)
ALT SERPL-CCNC: 329 U/L (ref 12–78)
ANION GAP SERPL CALC-SCNC: 9 MMOL/L (ref 5–15)
AST SERPL-CCNC: 133 U/L (ref 15–37)
BILIRUB SERPL-MCNC: 8.9 MG/DL (ref 0.2–1)
BUN SERPL-MCNC: 8 MG/DL (ref 6–20)
BUN/CREAT SERPL: 12 (ref 12–20)
CALCIUM SERPL-MCNC: 8.8 MG/DL (ref 8.5–10.1)
CHLORIDE SERPL-SCNC: 103 MMOL/L (ref 97–108)
CO2 SERPL-SCNC: 26 MMOL/L (ref 21–32)
CREAT SERPL-MCNC: 0.65 MG/DL (ref 0.55–1.02)
ERYTHROCYTE [DISTWIDTH] IN BLOOD BY AUTOMATED COUNT: 19.7 % (ref 11.5–14.5)
GLOBULIN SER CALC-MCNC: 3.6 G/DL (ref 2–4)
GLUCOSE BLD STRIP.AUTO-MCNC: 147 MG/DL (ref 65–100)
GLUCOSE BLD STRIP.AUTO-MCNC: 189 MG/DL (ref 65–100)
GLUCOSE BLD STRIP.AUTO-MCNC: 207 MG/DL (ref 65–100)
GLUCOSE BLD STRIP.AUTO-MCNC: 214 MG/DL (ref 65–100)
GLUCOSE SERPL-MCNC: 148 MG/DL (ref 65–100)
HCT VFR BLD AUTO: 31.6 % (ref 35–47)
HGB BLD-MCNC: 11.1 G/DL (ref 11.5–16)
MCH RBC QN AUTO: 28 PG (ref 26–34)
MCHC RBC AUTO-ENTMCNC: 35.1 G/DL (ref 30–36.5)
MCV RBC AUTO: 79.8 FL (ref 80–99)
NRBC # BLD: 0 K/UL (ref 0–0.01)
NRBC BLD-RTO: 0 PER 100 WBC
PLATELET # BLD AUTO: 210 K/UL (ref 150–400)
PMV BLD AUTO: 11.8 FL (ref 8.9–12.9)
POTASSIUM SERPL-SCNC: 3.5 MMOL/L (ref 3.5–5.1)
PROT SERPL-MCNC: 6.2 G/DL (ref 6.4–8.2)
RBC # BLD AUTO: 3.96 M/UL (ref 3.8–5.2)
SERVICE CMNT-IMP: ABNORMAL
SODIUM SERPL-SCNC: 138 MMOL/L (ref 136–145)
WBC # BLD AUTO: 6.3 K/UL (ref 3.6–11)

## 2019-05-18 PROCEDURE — 36415 COLL VENOUS BLD VENIPUNCTURE: CPT

## 2019-05-18 PROCEDURE — 85027 COMPLETE CBC AUTOMATED: CPT

## 2019-05-18 PROCEDURE — 65270000015 HC RM PRIVATE ONCOLOGY

## 2019-05-18 PROCEDURE — 74011250636 HC RX REV CODE- 250/636: Performed by: INTERNAL MEDICINE

## 2019-05-18 PROCEDURE — 80053 COMPREHEN METABOLIC PANEL: CPT

## 2019-05-18 PROCEDURE — 74011636637 HC RX REV CODE- 636/637: Performed by: INTERNAL MEDICINE

## 2019-05-18 PROCEDURE — 74011250637 HC RX REV CODE- 250/637: Performed by: INTERNAL MEDICINE

## 2019-05-18 PROCEDURE — 82962 GLUCOSE BLOOD TEST: CPT

## 2019-05-18 RX ORDER — MORPHINE SULFATE 2 MG/ML
0.5 INJECTION, SOLUTION INTRAMUSCULAR; INTRAVENOUS
Status: DISCONTINUED | OUTPATIENT
Start: 2019-05-18 | End: 2019-05-20 | Stop reason: HOSPADM

## 2019-05-18 RX ADMIN — MORPHINE SULFATE 1 MG: 2 INJECTION, SOLUTION INTRAMUSCULAR; INTRAVENOUS at 21:36

## 2019-05-18 RX ADMIN — MORPHINE SULFATE 1 MG: 2 INJECTION, SOLUTION INTRAMUSCULAR; INTRAVENOUS at 04:04

## 2019-05-18 RX ADMIN — INSULIN LISPRO 3 UNITS: 100 INJECTION, SOLUTION INTRAVENOUS; SUBCUTANEOUS at 15:59

## 2019-05-18 RX ADMIN — INSULIN LISPRO 2 UNITS: 100 INJECTION, SOLUTION INTRAVENOUS; SUBCUTANEOUS at 21:27

## 2019-05-18 RX ADMIN — SODIUM CHLORIDE, SODIUM LACTATE, POTASSIUM CHLORIDE, AND CALCIUM CHLORIDE 125 ML/HR: 600; 310; 30; 20 INJECTION, SOLUTION INTRAVENOUS at 19:01

## 2019-05-18 RX ADMIN — Medication 10 ML: at 15:59

## 2019-05-18 RX ADMIN — SODIUM CHLORIDE, SODIUM LACTATE, POTASSIUM CHLORIDE, AND CALCIUM CHLORIDE 250 ML/HR: 600; 310; 30; 20 INJECTION, SOLUTION INTRAVENOUS at 05:47

## 2019-05-18 RX ADMIN — INSULIN LISPRO 2 UNITS: 100 INJECTION, SOLUTION INTRAVENOUS; SUBCUTANEOUS at 04:04

## 2019-05-18 RX ADMIN — LEVOFLOXACIN 500 MG: 5 INJECTION, SOLUTION INTRAVENOUS at 09:20

## 2019-05-18 RX ADMIN — SODIUM CHLORIDE, SODIUM LACTATE, POTASSIUM CHLORIDE, AND CALCIUM CHLORIDE 125 ML/HR: 600; 310; 30; 20 INJECTION, SOLUTION INTRAVENOUS at 21:27

## 2019-05-18 RX ADMIN — INSULIN LISPRO 2 UNITS: 100 INJECTION, SOLUTION INTRAVENOUS; SUBCUTANEOUS at 09:31

## 2019-05-18 RX ADMIN — ANASTROZOLE 1 MG: 1 TABLET ORAL at 11:20

## 2019-05-18 RX ADMIN — Medication 10 ML: at 21:27

## 2019-05-18 RX ADMIN — SODIUM CHLORIDE, SODIUM LACTATE, POTASSIUM CHLORIDE, AND CALCIUM CHLORIDE 125 ML/HR: 600; 310; 30; 20 INJECTION, SOLUTION INTRAVENOUS at 09:38

## 2019-05-18 NOTE — PROGRESS NOTES
Hospitalist Progress Note NAME: Darin Butler :  1950 MRN:  [id number] Assessment / Plan: 
Obstructive Jaundice with Elevated Bilirubin POA Likely secondary to pancreatic malignancy POA- s/p ERCP with CBD stenting with Crawford biopsy  by Dr Negrito Burch Cont aggressive IVF- will decrease rate to 125ml/hr today Cont Nausea control GI consult noted - followup Brush biopsies done today during ERCP & CBD stenting Empiric IV Levaquin for now Clear diet per GI 
IP Surgery consulted- Dr Conteh Para- following IP oncology consulted- Dr Ying Anthony. ([facility]) Pain management as needed 
  
 
Hx HTN: Controlled, continue current meds, adjust as needed Hx Breast Cancer R side  Chemo/xrt, again in 2017 underwent bilateral mastectomy.  
 
  
  
Code Status: full Surrogate Decision Maker:  Recommended Disposition: Home w/Family once cleared by GI/Oncology/Surgery Subjective: Chief Complaint / Reason for Physician Visit: Painless Jaundice, elevated Bilirubin \"I am fine\". Discussed with RN events overnight. Review of Systems: 
Symptom Y/N Comments  Symptom Y/N Comments Fever/Chills n   Chest Pain n   
Poor Appetite y   Edema n   
Cough n   Abdominal Pain n   
Sputum    Joint Pain n   
SOB/SAAB n   Pruritis/Rash Nausea/vomit n   Tolerating PT/OT y Diarrhea n   Tolerating Diet y Constipation    Other Could NOT obtain due to:   
 
Objective: VITALS:  
Last 24hrs VS reviewed since prior progress note. Most recent are: 
Patient Vitals for the past 24 hrs: 
 Temp Pulse Resp BP SpO2  
19 2356 98.9 °F (37.2 °C) 85 16 142/70 98 % 19 1931 98.3 °F (36.8 °C) 81 18 148/72 100 % 19 1546 98.6 °F (37 °C) 96 18 149/72 100 % 19 1332 98.4 °F (36.9 °C) 75 17 149/62 100 % 19 1310 97.9 °F (36.6 °C) 98 22 145/73 99 % 19 1300  85 17 143/70 99 % 19 1245  91 15 145/67 100 % 19 1230  87 18 148/71 100 % 05/17/19 1215  83 15 152/74 100 % 05/17/19 1200  81 15 156/67 100 % 05/17/19 1145  94 17 149/63 100 % 05/17/19 1140  96 19 152/72 100 % 05/17/19 1138 97.7 °F (36.5 °C) 97 16 151/70 100 % 05/17/19 1136 97.9 °F (36.6 °C) 97 16 151/70 100 % 05/17/19 0952 98.5 °F (36.9 °C) 91 13 152/75 97 % 05/17/19 0815 98.9 °F (37.2 °C) 99 18 152/67 98 % Intake/Output Summary (Last 24 hours) at 5/18/2019 6766 Last data filed at 5/17/2019 1310 Gross per 24 hour Intake 1241.67 ml Output  Net 1241.67 ml PHYSICAL EXAM: 
General: WD, WN. Alert, cooperative, no acute distress   
EENT:  EOMI. icteric sclerae noted +. MMM Resp:  CTA bilaterally, no wheezing or rales. No accessory muscle use CV:  Regular  rhythm,  No edema GI:  Soft, Non distended, Non tender.  +Bowel sounds Neurologic:  Alert and oriented X 3, normal speech, Psych:   Good insight. Not anxious nor agitated Skin:  No rashes. No jaundice Reviewed most current lab test results and cultures  YES Reviewed most current radiology test results   YES Review and summation of old records today    NO Reviewed patient's current orders and MAR    YES 
PMH/SH reviewed - no change compared to H&P 
________________________________________________________________________ Care Plan discussed with: 
  Comments Patient x Family  x At bedside RN x Care Manager Consultant  x Dr Ludwig Link (GI) yesterday Multidiciplinary team rounds were held today with , nursing, pharmacist and clinical coordinator. Patient's plan of care was discussed; medications were reviewed and discharge planning was addressed. ________________________________________________________________________ Total NON critical care TIME:  26   Minutes Total CRITICAL CARE TIME Spent:   Minutes non procedure based Comments >50% of visit spent in counseling and coordination of care ________________________________________________________________________ Mely Garcia MD  
 
Procedures: see electronic medical records for all procedures/Xrays and details which were not copied into this note but were reviewed prior to creation of Plan. LABS: 
I reviewed today's most current labs and imaging studies. Pertinent labs include: 
Recent Labs 05/18/19 
3392 05/17/19 
0421 05/16/19 
1629 WBC 6.3 4.4 4.5 HGB 11.1* 11.3* 12.7 HCT 31.6* 32.2* 35.9  213 213 Recent Labs 05/18/19 
5072 05/17/19 
0421 05/16/19 
1856 05/15/19 
1422  136 133* 134  
K 3.5 4.2 3.5 4.7  104 102 94* CO2 26 23 24 16* * 167* 150* 202* BUN 8 14 16 16 CREA 0.65 0.76 0.87 0.80 CA 8.8 8.9 8.4* 10.5* MG  --   --   --  1.5* ALB 2.6* 2.8* 2.7* 4.4 TBILI 8.9* 13.2* 12.4* 14.3* SGOT 133* 199* 200* 311* * 441* 450* 617* INR  --   --   --  CANCELED Signed: Mely Garcia MD

## 2019-05-18 NOTE — PROGRESS NOTES
Oncology End of Shift Note Bedside shift change report given to Jayleen Pate RN (incoming nurse) by Karla Fuller RN (outgoing nurse) on [facility]. Report included the following information SBAR. Shift Summary: Pain control,safety,IV Issues for Physician to Address:  
 
Patient on Cardiac Monitoring?    Yes 
[x] No 
 
Rhythm:   
 
 
 
 
 
Christoph Rothman RN

## 2019-05-18 NOTE — PROGRESS NOTES
Please note: will hold on ordering CT chest until pathology results return. Oncology Progress Note Patient Name:  Moshe Colon Date of Service: 5/18/2019 Admit Date: 5/16/2019 Interval History Mrs. Atilio Vang is a 70y/o lady with PMH significant for breast cancer (hisotry below), who presented with scleral icterus and bilirubin of 12. Patient received an MRCP on 5/16/19 which revealed biliary dilatation, inflammatory changes, and possible mass of the pancreatic head with partial occlusion of the portal vein, SMV, and possibly the SMA. Patient underwent ERCP with biliary stent placement on 5/17/19. Biopsies taken with results currently pending. Today, total bilirubin decreased to 8.9. Currently, patient states that she feels well. She denies any fevers, chills, nausea, vomiting, SOB, chest pain or abdominal pain. Regarding her breast cancer history, patient was last seen by by partner, Dr. Preston Muro in 3/2019. Patient's oncologic history is as follows: 
 
Primary Diagnosis: RUO Breast Adenocarcinoma with micropapillary features Date of Diagnosis: 11/04/2003 Date of Recurrence: 1/11/2017 Stage at initial dx: T1N0M0, Stage I Stage at recurrence: [id number]? [id number] Tumor markers: 
1)1st breast cancer, triple negative, reportedly 2)Second breast cancer: ER 98%, SC 83%, Her2 + by CISH, Ki67 
19% (borderline) Treatment: 
1) 11/2003, Rt Lumpectomy at [facility] 2) Adrimaycin / cyclosphosphamide Dose Dense x 4 cycles, 1/2004 3/ 
2004 3) Adjuvant Xrt concluded May 2004 1) Neoadjuvant TCHP x 6 cycles from 2/13/17 5/ 30/17. Taxotere, Carboplatin, Herceptin, Perjeta. 2) Herceptin Mainteance continued 6/19/17 and ongoing 3) 7/6/17: B/L Masectomies and SLN resection on Rt breast. Pathologic CR on Rt breast. 
4)Herceptin Maintenance concluded 2/2018 Current Treatment: 
1) Arimidex, started 10/2017 
2) Prolia, started 1/2018 Subjective: Current Facility-Administered Medications Medication Dose Route Frequency  morphine injection 0.5 mg  0.5 mg IntraVENous Q2H PRN  
 insulin lispro (HUMALOG) injection   SubCUTAneous Q6H  
 glucose chewable tablet 16 g  4 Tab Oral PRN  
 dextrose (D50W) injection syrg 12.5-25 g  12.5-25 g IntraVENous PRN  
 glucagon (GLUCAGEN) injection 1 mg  1 mg IntraMUSCular PRN  
 morphine injection 1 mg  1 mg IntraVENous Q3H PRN  
 sodium chloride (NS) flush 5-40 mL  5-40 mL IntraVENous Q8H  
 sodium chloride (NS) flush 5-40 mL  5-40 mL IntraVENous PRN  
 levoFLOXacin (LEVAQUIN) 500 mg in D5W IVPB  500 mg IntraVENous Q24H  
 lactated Ringers infusion  125 mL/hr IntraVENous CONTINUOUS  
 anastrozole (ARIMIDEX) tablet 1 mg  1 mg Oral DAILY  [Held by provider] aspirin delayed-release tablet 81 mg  81 mg Oral DAILY  ondansetron (ZOFRAN) injection 4 mg  4 mg IntraVENous Q4H PRN  
 [Held by provider] heparin (porcine) injection 5,000 Units  5,000 Units SubCUTAneous Q8H Review of Systems: 
10 point ROS conducted and otherwise negative. Objective:  
 
Patient Vitals for the past 8 hrs: 
 BP Temp Pulse Resp SpO2  
19 1124 146/82 98.7 °F (37.1 °C) 87 16 98 % 19 0747 154/79 98.9 °F (37.2 °C) 86 16 100 % Temp (24hrs), Av.7 °F (37.1 °C), Min:98.3 °F (36.8 °C), Max:98.9 °F (37.2 °C) 
 
 
 0701 -  1900 In: 120 [P.O.:120] Out: - Physical Exam: 
GEN: elderly  lady in NAD HEENT: NCAT, EOMI, jaundice. Neck: soft, supple, trachea midline PULM: reduces air entry at bases BL. No wheezes or rales CARDS: RRR, S1S2+, no MRG 
ABD: soft, NT/ND, BS+ EXT: no LE edema NEURO: A/Ox3, CNII-XII grossly intact PSYCH: appropriate mood/affect. Labs:   
Recent Results (from the past 24 hour(s)) GLUCOSE, POC Collection Time: 19  3:15 PM  
Result Value Ref Range Glucose (POC) 216 (H) 65 - 100 mg/dL Performed by Ezio Blackmon GLUCOSE, POC  
 Collection Time: 05/17/19  9:14 PM  
Result Value Ref Range Glucose (POC) 178 (H) 65 - 100 mg/dL Performed by Beth Best (RN)   
GLUCOSE, POC Collection Time: 05/18/19  3:04 AM  
Result Value Ref Range Glucose (POC) 147 (H) 65 - 100 mg/dL Performed by Gogo Carlton (PCT) CBC W/O DIFF Collection Time: 05/18/19  5:48 AM  
Result Value Ref Range WBC 6.3 3.6 - 11.0 K/uL  
 RBC 3.96 3.80 - 5.20 M/uL  
 HGB 11.1 (L) 11.5 - 16.0 g/dL HCT 31.6 (L) 35.0 - 47.0 % MCV 79.8 (L) 80.0 - 99.0 FL  
 MCH 28.0 26.0 - 34.0 PG  
 MCHC 35.1 30.0 - 36.5 g/dL  
 RDW 19.7 (H) 11.5 - 14.5 % PLATELET 813 534 - 392 K/uL MPV 11.8 8.9 - 12.9 FL  
 NRBC 0.0 0  WBC ABSOLUTE NRBC 0.00 0.00 - 0.01 K/uL METABOLIC PANEL, COMPREHENSIVE Collection Time: 05/18/19  5:48 AM  
Result Value Ref Range Sodium 138 136 - 145 mmol/L Potassium 3.5 3.5 - 5.1 mmol/L Chloride 103 97 - 108 mmol/L  
 CO2 26 21 - 32 mmol/L Anion gap 9 5 - 15 mmol/L Glucose 148 (H) 65 - 100 mg/dL BUN 8 6 - 20 MG/DL Creatinine 0.65 0.55 - 1.02 MG/DL  
 BUN/Creatinine ratio 12 12 - 20 GFR est AA >60 >60 ml/min/1.73m2 GFR est non-AA >60 >60 ml/min/1.73m2 Calcium 8.8 8.5 - 10.1 MG/DL Bilirubin, total 8.9 (H) 0.2 - 1.0 MG/DL  
 ALT (SGPT) 329 (H) 12 - 78 U/L  
 AST (SGOT) 133 (H) 15 - 37 U/L Alk. phosphatase 182 (H) 45 - 117 U/L Protein, total 6.2 (L) 6.4 - 8.2 g/dL Albumin 2.6 (L) 3.5 - 5.0 g/dL Globulin 3.6 2.0 - 4.0 g/dL A-G Ratio 0.7 (L) 1.1 - 2.2 GLUCOSE, POC Collection Time: 05/18/19  9:19 AM  
Result Value Ref Range Glucose (POC) 189 (H) 65 - 100 mg/dL Performed by Puja Rizzo (PCT) Assessment:  
 
Active Problems: 
  HX: breast cancer (5/16/2019) Pancreatic mass (5/16/2019) Elevated bilirubin (5/16/2019) Plan:  
 
Mrs. Evans Mata is a 72y/o lady with history of breast cancer, now presenting with hyperbilirubinemia and imaging concerning for pancreatic cancer. Pancreatic Mass: 
-patient now s/p ERCP with biopsies collected 
-pathology results currently pending.  
-patient seen by surgery during this admission and given the likely local vessel involvement, patient's presumed pancreatic cancer appears to be unresectable.  
-patient needs to have complete staging with imaging of the chest as well 
-I will order this now. 
-additionally, I will order a CA 19-9 level. 
-patient and I spoke at length about my concerns for pancreatic cancer and the likely treatment plan moving forward.  
-I will alert Dr. Meek Farris and our office will reach out to schedule outpatient follow up. Biliary Obstruction: 
-now s/p ERCP with stent placement 
-will monitor labs as outpatient. I will continue to follow along. Please don't hesitate to call with any questions or concerns. Karon Asher MD 
5/18/2019

## 2019-05-18 NOTE — PROGRESS NOTES
Problem: Falls - Risk of 
Goal: *Absence of Falls 5/18/2019 1245 by Kourtney Silva RN Outcome: Progressing Towards Goal 
5/18/2019 0842 by Kourtney Silva RN Outcome: Progressing Towards Goal

## 2019-05-19 ENCOUNTER — APPOINTMENT (OUTPATIENT)
Dept: CT IMAGING | Age: 69
DRG: 435 | End: 2019-05-19
Attending: INTERNAL MEDICINE
Payer: MEDICARE

## 2019-05-19 LAB
ALBUMIN SERPL-MCNC: 2.6 G/DL (ref 3.5–5)
ALBUMIN/GLOB SERPL: 0.7 {RATIO} (ref 1.1–2.2)
ALP SERPL-CCNC: 190 U/L (ref 45–117)
ALT SERPL-CCNC: 274 U/L (ref 12–78)
AST SERPL-CCNC: 97 U/L (ref 15–37)
BILIRUB DIRECT SERPL-MCNC: 4.9 MG/DL (ref 0–0.2)
BILIRUB SERPL-MCNC: 6.6 MG/DL (ref 0.2–1)
GLOBULIN SER CALC-MCNC: 3.8 G/DL (ref 2–4)
GLUCOSE BLD STRIP.AUTO-MCNC: 135 MG/DL (ref 65–100)
GLUCOSE BLD STRIP.AUTO-MCNC: 164 MG/DL (ref 65–100)
GLUCOSE BLD STRIP.AUTO-MCNC: 177 MG/DL (ref 65–100)
GLUCOSE BLD STRIP.AUTO-MCNC: 188 MG/DL (ref 65–100)
PROT SERPL-MCNC: 6.4 G/DL (ref 6.4–8.2)
SERVICE CMNT-IMP: ABNORMAL

## 2019-05-19 PROCEDURE — 71260 CT THORAX DX C+: CPT

## 2019-05-19 PROCEDURE — 36415 COLL VENOUS BLD VENIPUNCTURE: CPT

## 2019-05-19 PROCEDURE — 74011636637 HC RX REV CODE- 636/637: Performed by: INTERNAL MEDICINE

## 2019-05-19 PROCEDURE — 65270000015 HC RM PRIVATE ONCOLOGY

## 2019-05-19 PROCEDURE — 74011250636 HC RX REV CODE- 250/636: Performed by: INTERNAL MEDICINE

## 2019-05-19 PROCEDURE — 74011636320 HC RX REV CODE- 636/320: Performed by: INTERNAL MEDICINE

## 2019-05-19 PROCEDURE — 86301 IMMUNOASSAY TUMOR CA 19-9: CPT

## 2019-05-19 PROCEDURE — 80076 HEPATIC FUNCTION PANEL: CPT

## 2019-05-19 PROCEDURE — 74011250637 HC RX REV CODE- 250/637: Performed by: INTERNAL MEDICINE

## 2019-05-19 PROCEDURE — 82962 GLUCOSE BLOOD TEST: CPT

## 2019-05-19 RX ORDER — SODIUM CHLORIDE 0.9 % (FLUSH) 0.9 %
10 SYRINGE (ML) INJECTION
Status: COMPLETED | OUTPATIENT
Start: 2019-05-19 | End: 2019-05-19

## 2019-05-19 RX ADMIN — ANASTROZOLE 1 MG: 1 TABLET ORAL at 09:19

## 2019-05-19 RX ADMIN — SODIUM CHLORIDE, SODIUM LACTATE, POTASSIUM CHLORIDE, AND CALCIUM CHLORIDE 125 ML/HR: 600; 310; 30; 20 INJECTION, SOLUTION INTRAVENOUS at 14:22

## 2019-05-19 RX ADMIN — Medication 10 ML: at 13:35

## 2019-05-19 RX ADMIN — LEVOFLOXACIN 500 MG: 5 INJECTION, SOLUTION INTRAVENOUS at 09:05

## 2019-05-19 RX ADMIN — Medication 10 ML: at 21:44

## 2019-05-19 RX ADMIN — IOPAMIDOL 100 ML: 755 INJECTION, SOLUTION INTRAVENOUS at 13:35

## 2019-05-19 RX ADMIN — INSULIN LISPRO 2 UNITS: 100 INJECTION, SOLUTION INTRAVENOUS; SUBCUTANEOUS at 09:13

## 2019-05-19 RX ADMIN — Medication 10 ML: at 06:27

## 2019-05-19 RX ADMIN — Medication 10 ML: at 14:22

## 2019-05-19 RX ADMIN — SODIUM CHLORIDE, SODIUM LACTATE, POTASSIUM CHLORIDE, AND CALCIUM CHLORIDE 125 ML/HR: 600; 310; 30; 20 INJECTION, SOLUTION INTRAVENOUS at 21:43

## 2019-05-19 RX ADMIN — INSULIN LISPRO 2 UNITS: 100 INJECTION, SOLUTION INTRAVENOUS; SUBCUTANEOUS at 16:35

## 2019-05-19 NOTE — PROGRESS NOTES
Oncology End of Shift Note Bedside shift change report given to DEMETRA Carter (incoming nurse) by Randee Pizano RN (outgoing nurse) on Erle Halo. Report included the following information SBAR. Shift Summary: IV,CT scan,reg diet, 
 
 
Issues for Physician to Address:   
 
Patient on Cardiac Monitoring? [] Yes 
[x] No 
 
Rhythm:   
 
 
 
 
 
Renu Rothman RN'

## 2019-05-19 NOTE — PROGRESS NOTES
[address] 
[address] [place], [address]  
[phone] GI PROGRESS NOTE 
NAME: Urvashi Uriostegui :  1950 MRN:  [id number] Subjective:  
Discussed with RN events overnight. Complaint Y/N Description Abdominal Pain Hematemesis Hematochezia Melena Constipation Diarrhea Dyspepsia Dysphagia Jaundiced Review of Systems: 
 
Symptom Y/N Comments  Symptom Y/N Comments Fever/Chills    Chest Pain Cough    Abdominal Pain Sputum    Joint Pain SOB/SAAB    Pruritis/Rash Nausea/vomit    Tolerating PT/OT Diarrhea    Tolerating Diet Constipation    Other Could not obtain due to AMS vs intubation Objective: VITALS:  
Last 24hrs VS reviewed since prior progress note. Most recent are: 
Visit Vitals /75 (BP 1 Location: Left arm, BP Patient Position: At rest) Pulse 72 Temp 98.5 °F (36.9 °C) Resp 16 Ht 5' 5\" (1.651 m) Wt 80.6 kg (177 lb 11.1 oz) SpO2 100% BMI 29.57 kg/m² Intake/Output Summary (Last 24 hours) at 20194 Last data filed at 2019 1102 Gross per 24 hour Intake 120 ml Output  Net 120 ml PHYSICAL EXAM: 
General: WD, WN. Alert, cooperative, no acute distress   
HEENT: NC, Atraumatic. PERRLA, EOMI. Anicteric sclerae. Lungs:  CTA Bilaterally. No Wheezing/Rhonchi/Rales. Heart:  Regular  rhythm,  No murmur (), No Rubs, No Gallops Abdomen: Soft, Non distended, Non tender.  +Bowel sounds, no HSM Extremities: No c/c/e Neurologic:  CN 2-12 gi, Alert and oriented X 3. No acute neurological distress Psych:   Good insight. Not anxious nor agitated. Lab Data Reviewed: (see below) Medications Reviewed: (see below) PMH/SH reviewed - no change compared to H&P 
________________________________________________________________________ Total time spent with patient: 20 minutes Critical Care Provided     Minutes non procedure based Care Plan discussed with: 
Patient Family RN Care Manager Consultant/Specialist:    
 
>50% of visit spent in counseling and coordination of care Recommended Disposition:  
Home with Family HH/PT/OT/RN   
SNF/LTC   
[facility] Code Status: 
Full Code DNR/DNI   
 
________________________________________________________________________ 
________________________________________________________________________________________________________________________________________________ Procedures: see electronic medical records for all procedures/Xrays and details which 
were not copied into this note but were reviewed prior to creation of Plan. LABS: 
Recent Labs 05/18/19 
6781 05/17/19 0421 WBC 6.3 4.4 HGB 11.1* 11.3* HCT 31.6* 32.2*  
 213 Recent Labs 05/18/19 
0519 05/17/19 
0421 05/16/19 
1856  136 133* K 3.5 4.2 3.5  104 102 CO2 26 23 24 BUN 8 14 16 CREA 0.65 0.76 0.87 * 167* 150* CA 8.8 8.9 8.4* Recent Labs 05/18/19 
1640 05/17/19 
0421 05/16/19 
1856 SGOT 133* 199* 200* * 194* 196* TP 6.2* 6.7 6.5 ALB 2.6* 2.8* 2.7*  
GLOB 3.6 3.9 3.8 LPSE  --   --  267 No results for input(s): INR, PTP, APTT in the last 72 hours. No lab exists for component: INREXT No results for input(s): FE, TIBC, PSAT, FERR in the last 72 hours. No results found for: FOL, RBCF No results for input(s): PH, PCO2, PO2 in the last 72 hours. No results for input(s): CPK, CKMB in the last 72 hours. No lab exists for component: TROPONINI Lab Results Component Value Date/Time  Color YELLOW/STRAW 06/23/2017 10:18 AM  
 Appearance CLEAR 06/23/2017 10:18 AM  
 Specific gravity 1.017 06/23/2017 10:18 AM  
 pH (UA) 5.0 06/23/2017 10:18 AM  
 Protein NEGATIVE  06/23/2017 10:18 AM  
 Glucose NEGATIVE  06/23/2017 10:18 AM  
 Ketone NEGATIVE  06/23/2017 10:18 AM  
 Bilirubin NEGATIVE  06/23/2017 10:18 AM  
 Urobilinogen 0.2 06/23/2017 10:18 AM  
 Nitrites NEGATIVE  06/23/2017 10:18 AM  
 Leukocyte Esterase NEGATIVE  06/23/2017 10:18 AM  
 Epithelial cells FEW 06/23/2017 10:18 AM  
 Bacteria NEGATIVE  06/23/2017 10:18 AM  
 WBC 5-10 06/23/2017 10:18 AM  
 RBC 0-5 06/23/2017 10:18 AM  
 
 
MEDICATIONS: 
Current Facility-Administered Medications Medication Dose Route Frequency  morphine injection 0.5 mg  0.5 mg IntraVENous Q2H PRN  
 insulin lispro (HUMALOG) injection   SubCUTAneous Q6H  
 glucose chewable tablet 16 g  4 Tab Oral PRN  
 dextrose (D50W) injection syrg 12.5-25 g  12.5-25 g IntraVENous PRN  
 glucagon (GLUCAGEN) injection 1 mg  1 mg IntraMUSCular PRN  
 morphine injection 1 mg  1 mg IntraVENous Q3H PRN  
 sodium chloride (NS) flush 5-40 mL  5-40 mL IntraVENous Q8H  
 sodium chloride (NS) flush 5-40 mL  5-40 mL IntraVENous PRN  
 levoFLOXacin (LEVAQUIN) 500 mg in D5W IVPB  500 mg IntraVENous Q24H  
 lactated Ringers infusion  125 mL/hr IntraVENous CONTINUOUS  
 anastrozole (ARIMIDEX) tablet 1 mg  1 mg Oral DAILY  [Held by provider] aspirin delayed-release tablet 81 mg  81 mg Oral DAILY  ondansetron (ZOFRAN) injection 4 mg  4 mg IntraVENous Q4H PRN  
 [Held by provider] heparin (porcine) injection 5,000 Units  5,000 Units SubCUTAneous Q8H Assessment:  
· Bilirubin coming down - stent must be working · Await brushings · Will probbly need EUS for definitive tissue dx Plan:  
 
Advance diet She could go home from our perspective

## 2019-05-19 NOTE — PROGRESS NOTES
Hospitalist Progress Note NAME: Jeri Gr :  1950 MRN:  [id number] Assessment / Plan: 
Obstructive Jaundice with Elevated Bilirubin POA Likely secondary to pancreatic malignancy POA- s/p ERCP with CBD stenting with Fielding biopsy  by Dr Kasia Herr Cont aggressive IVF- cont 125ml/hr now Cont Nausea control GI consult noted - followup Brush biopsies done today during ERCP & CBD stenting- if unsatisfactory, may need EUS/Bx Empiric IV Levaquin for now Clear diet per GI 
IP Surgery consulted- Dr Nisha Davis- following- can put eileen cath in AM if required by Oncology IP oncology consulted- Dr Latasha Lewis. ([facility])- on call coverage noted- recommends Ca19/9, CT Chest for staging- ordered today Pain management as needed 
  
 
Hx HTN: Controlled, continue current meds, adjust as needed Hx Breast Cancer R side  Chemo/xrt, again in 2017 underwent bilateral mastectomy.  
 
  
  
Code Status: full Surrogate Decision Maker:  Recommended Disposition: Home w/Family once cleared by GI/Oncology/Surgery Subjective: Chief Complaint / Reason for Physician Visit: Painless Jaundice, elevated Bilirubin \"I am fine\". Discussed with RN events overnight. Review of Systems: 
Symptom Y/N Comments  Symptom Y/N Comments Fever/Chills n   Chest Pain n   
Poor Appetite y   Edema n   
Cough n   Abdominal Pain n   
Sputum    Joint Pain n   
SOB/SAAB n   Pruritis/Rash Nausea/vomit n   Tolerating PT/OT y Diarrhea n   Tolerating Diet y Constipation    Other Could NOT obtain due to:   
 
Objective: VITALS:  
Last 24hrs VS reviewed since prior progress note. Most recent are: 
Patient Vitals for the past 24 hrs: 
 Temp Pulse Resp BP SpO2  
19 0803 98.5 °F (36.9 °C) 89 16 136/76 100 % 19 2220 98.5 °F (36.9 °C) 72 16 133/75 100 % 19 1937 99.5 °F (37.5 °C) 88 16 149/82 98 % 19 1421 99 °F (37.2 °C) 85 16 148/81 98 % 05/18/19 1124 98.7 °F (37.1 °C) 87 16 146/82 98 % Intake/Output Summary (Last 24 hours) at 5/19/2019 8620 Last data filed at 5/19/2019 0400 Gross per 24 hour Intake 7530.42 ml Output  Net 7530.42 ml PHYSICAL EXAM: 
General: WD, WN. Alert, cooperative, no acute distress   
EENT:  EOMI. icteric sclerae noted +. MMM Resp:  CTA bilaterally, no wheezing or rales. No accessory muscle use CV:  Regular  rhythm,  No edema GI:  Soft, Non distended, Non tender.  +Bowel sounds Neurologic:  Alert and oriented X 3, normal speech, Psych:   Good insight. Not anxious nor agitated Skin:  No rashes. No jaundice Reviewed most current lab test results and cultures  YES Reviewed most current radiology test results   YES Review and summation of old records today    NO Reviewed patient's current orders and MAR    YES 
PMH/SH reviewed - no change compared to H&P 
________________________________________________________________________ Care Plan discussed with: 
  Comments Patient x Family  x At bedside RN x Care Manager Consultant Multidiciplinary team rounds were held today with , nursing, pharmacist and clinical coordinator. Patient's plan of care was discussed; medications were reviewed and discharge planning was addressed. ________________________________________________________________________ Total NON critical care TIME:  26   Minutes Total CRITICAL CARE TIME Spent:   Minutes non procedure based Comments >50% of visit spent in counseling and coordination of care    
________________________________________________________________________ Edin Judd MD  
 
Procedures: see electronic medical records for all procedures/Xrays and details which were not copied into this note but were reviewed prior to creation of Plan. LABS: 
I reviewed today's most current labs and imaging studies. Pertinent labs include: 
Recent Labs 05/18/19 
8646 05/17/19 
0421 05/16/19 
1629 WBC 6.3 4.4 4.5 HGB 11.1* 11.3* 12.7 HCT 31.6* 32.2* 35.9  213 213 Recent Labs 05/19/19 
3128 05/18/19 
9716 05/17/19 
0421 05/16/19 
1856 NA  --  138 136 133* K  --  3.5 4.2 3.5 CL  --  103 104 102 CO2  --  26 23 24 GLU  --  148* 167* 150* BUN  --  8 14 16 CREA  --  0.65 0.76 0.87 CA  --  8.8 8.9 8.4* ALB 2.6* 2.6* 2.8* 2.7* TBILI 6.6* 8.9* 13.2* 12.4* SGOT 97* 133* 199* 200* * 329* 441* 450* Signed: Edin Judd MD

## 2019-05-19 NOTE — PROGRESS NOTES
[address] [place] [address] 
[phone] GI PROGRESS NOTE 
NAME: Joey Burns :  1950 MRN:  [id number] Subjective:  
Discussed with RN events overnight. Complaint Y/N Description Abdominal Pain Hematemesis Hematochezia Melena Constipation Diarrhea Dyspepsia Dysphagia Jaundiced Review of Systems: 
 
Symptom Y/N Comments  Symptom Y/N Comments Fever/Chills    Chest Pain Cough    Abdominal Pain Sputum    Joint Pain SOB/SAAB    Pruritis/Rash Nausea/vomit    Tolerating PT/OT Diarrhea    Tolerating Diet Constipation    Other Could not obtain due to AMS vs intubation Objective: VITALS:  
Last 24hrs VS reviewed since prior progress note. Most recent are: 
Visit Vitals /76 (BP 1 Location: Left arm, BP Patient Position: At rest) Pulse 89 Temp 98.5 °F (36.9 °C) Resp 16 Ht 5' 5\" (1.651 m) Wt 80.6 kg (177 lb 11.1 oz) SpO2 100% BMI 29.57 kg/m² Intake/Output Summary (Last 24 hours) at 2019 1243 Last data filed at 2019 0400 Gross per 24 hour Intake 7410.42 ml Output  Net 7410.42 ml PHYSICAL EXAM: 
General: WD, WN. Alert, cooperative, no acute distress   
HEENT: NC, Atraumatic. PERRLA, EOMI. Anicteric sclerae. Lungs:  CTA Bilaterally. No Wheezing/Rhonchi/Rales. Heart:  Regular  rhythm,  No murmur (), No Rubs, No Gallops Abdomen: Soft, Non distended, Non tender.  +Bowel sounds, no HSM Extremities: No c/c/e Neurologic:  CN 2-12 gi, Alert and oriented X 3. No acute neurological distress Psych:   Good insight. Not anxious nor agitated. Lab Data Reviewed: (see below) Medications Reviewed: (see below) PMH/SH reviewed - no change compared to H&P 
________________________________________________________________________ Total time spent with patient: 20 minutes Critical Care Provided     Minutes non procedure based Care Plan discussed with: 
Patient Family RN Care Manager Consultant/Specialist:    
 
>50% of visit spent in counseling and coordination of care Recommended Disposition:  
Home with Family HH/PT/OT/RN   
SNF/LTC   
[facility] Code Status: 
Full Code DNR/DNI   
 
________________________________________________________________________ 
________________________________________________________________________________________________________________________________________________ Procedures: see electronic medical records for all procedures/Xrays and details which 
were not copied into this note but were reviewed prior to creation of Plan. LABS: 
Recent Labs 05/18/19 
1087 05/17/19 
0421 WBC 6.3 4.4 HGB 11.1* 11.3* HCT 31.6* 32.2*  
 213 Recent Labs 05/18/19 
0023 05/17/19 
0421 05/16/19 
1856  136 133* K 3.5 4.2 3.5  104 102 CO2 26 23 24 BUN 8 14 16 CREA 0.65 0.76 0.87 * 167* 150* CA 8.8 8.9 8.4* Recent Labs 05/19/19 
5026 05/18/19 
7605 05/17/19 
0421 05/16/19 
1856 SGOT 97* 133* 199* 200* * 182* 194* 196* TP 6.4 6.2* 6.7 6.5 ALB 2.6* 2.6* 2.8* 2.7*  
GLOB 3.8 3.6 3.9 3.8 LPSE  --   --   --  267 No results for input(s): INR, PTP, APTT in the last 72 hours. No lab exists for component: INREXT, INREXT No results for input(s): FE, TIBC, PSAT, FERR in the last 72 hours. No results found for: FOL, RBCF No results for input(s): PH, PCO2, PO2 in the last 72 hours. No results for input(s): CPK, CKMB in the last 72 hours. No lab exists for component: TROPONINI Lab Results Component Value Date/Time  Color YELLOW/STRAW 06/23/2017 10:18 AM  
 Appearance CLEAR 06/23/2017 10:18 AM  
 Specific gravity 1.017 06/23/2017 10:18 AM  
 pH (UA) 5.0 06/23/2017 10:18 AM  
 Protein NEGATIVE  06/23/2017 10:18 AM  
 Glucose NEGATIVE  06/23/2017 10:18 AM  
 Ketone NEGATIVE  06/23/2017 10:18 AM  
 Bilirubin NEGATIVE  06/23/2017 10:18 AM  
 Urobilinogen 0.2 06/23/2017 10:18 AM  
 Nitrites NEGATIVE  06/23/2017 10:18 AM  
 Leukocyte Esterase NEGATIVE  06/23/2017 10:18 AM  
 Epithelial cells FEW 06/23/2017 10:18 AM  
 Bacteria NEGATIVE  06/23/2017 10:18 AM  
 WBC 5-10 06/23/2017 10:18 AM  
 RBC 0-5 06/23/2017 10:18 AM  
 
 
MEDICATIONS: 
Current Facility-Administered Medications Medication Dose Route Frequency  sodium chloride (NS) flush 10 mL  10 mL IntraVENous RAD ONCE  
 iopamidol (ISOVUE-370) 76 % injection 100 mL  100 mL IntraVENous RAD ONCE  
 morphine injection 0.5 mg  0.5 mg IntraVENous Q2H PRN  
 insulin lispro (HUMALOG) injection   SubCUTAneous Q6H  
 glucose chewable tablet 16 g  4 Tab Oral PRN  
 dextrose (D50W) injection syrg 12.5-25 g  12.5-25 g IntraVENous PRN  
 glucagon (GLUCAGEN) injection 1 mg  1 mg IntraMUSCular PRN  
 morphine injection 1 mg  1 mg IntraVENous Q3H PRN  
 sodium chloride (NS) flush 5-40 mL  5-40 mL IntraVENous Q8H  
 sodium chloride (NS) flush 5-40 mL  5-40 mL IntraVENous PRN  
 levoFLOXacin (LEVAQUIN) 500 mg in D5W IVPB  500 mg IntraVENous Q24H  
 lactated Ringers infusion  125 mL/hr IntraVENous CONTINUOUS  
 anastrozole (ARIMIDEX) tablet 1 mg  1 mg Oral DAILY  [Held by provider] aspirin delayed-release tablet 81 mg  81 mg Oral DAILY  ondansetron (ZOFRAN) injection 4 mg  4 mg IntraVENous Q4H PRN  
 [Held by provider] heparin (porcine) injection 5,000 Units  5,000 Units SubCUTAneous Q8H Assessment:  
· Bilirubin continues to come down - stent must be working · Await brushings · Will probbly need EUS for definitive tissue dx Plan:  
 
Advancing diet She could go home from our perspective

## 2019-05-19 NOTE — PROGRESS NOTES
Subjective Pt with no complaints,  Oncology and IM notes reviewed, I believe plan is to cont. Staging [place] and Then FU Oncology and then determine about plan and whether PORT needed or not. Covering for Dr Hussein Wantise:  [97.7 °F (36.5 °C)-99.5 °F (37.5 °C)] Pulse (Heart Rate):  [] BP: (120-162)/(62-93) Resp Rate:  [12-22] O2 Sat (%):  [95 %-100 %] Weight:  [80.6 kg (177 lb 11.1 oz)] No intake/output data recorded. 05/17 1901 - 05/19 0700 In: 7530.4 [P.O.:120; I.V.:7410.4] Out: -  
 
 
Objective: 
General Appearance:  Comfortable, well-appearing and in no acute distress. Vital signs: (most recent): Blood pressure 136/76, pulse 89, temperature 98.5 °F (36.9 °C), resp. rate 16, height 5' 5\" (1.651 m), weight 80.6 kg (177 lb 11.1 oz), SpO2 100 %. Lungs:  Normal effort. Breath sounds clear to auscultation. Heart: Normal rate. Abdomen: Abdomen is soft. There is no abdominal tenderness. Active Problems: 
  HX: breast cancer (5/16/2019) Pancreatic mass (5/16/2019) Elevated bilirubin (5/16/2019) Assessment & Plan Pt with no complaints,  Oncology and IM notes reviewed, I believe plan is to cont. Staging [place] and Then FU Oncology and then determine about plan and whether PORT needed or not.   Covering for Dr Adrian Padilla  
 
Pathology pnd

## 2019-05-20 VITALS
SYSTOLIC BLOOD PRESSURE: 136 MMHG | RESPIRATION RATE: 16 BRPM | HEART RATE: 100 BPM | HEIGHT: 65 IN | DIASTOLIC BLOOD PRESSURE: 80 MMHG | WEIGHT: 177.69 LBS | OXYGEN SATURATION: 98 % | TEMPERATURE: 98.3 F | BODY MASS INDEX: 29.61 KG/M2

## 2019-05-20 LAB
ALBUMIN SERPL-MCNC: 2.5 G/DL (ref 3.5–5)
ALBUMIN/GLOB SERPL: 0.6 {RATIO} (ref 1.1–2.2)
ALP SERPL-CCNC: 185 U/L (ref 45–117)
ALT SERPL-CCNC: 229 U/L (ref 12–78)
AST SERPL-CCNC: 135 U/L (ref 15–37)
BILIRUB DIRECT SERPL-MCNC: 2.6 MG/DL (ref 0–0.2)
BILIRUB SERPL-MCNC: 5.6 MG/DL (ref 0.2–1)
CANCER AG19-9 SERPL-ACNC: 1 U/ML (ref 0–35)
GLOBULIN SER CALC-MCNC: 4.3 G/DL (ref 2–4)
GLUCOSE BLD STRIP.AUTO-MCNC: 171 MG/DL (ref 65–100)
GLUCOSE BLD STRIP.AUTO-MCNC: 178 MG/DL (ref 65–100)
GLUCOSE BLD STRIP.AUTO-MCNC: 201 MG/DL (ref 65–100)
PROT SERPL-MCNC: 6.8 G/DL (ref 6.4–8.2)
SERVICE CMNT-IMP: ABNORMAL

## 2019-05-20 PROCEDURE — 80076 HEPATIC FUNCTION PANEL: CPT

## 2019-05-20 PROCEDURE — 74011250636 HC RX REV CODE- 250/636: Performed by: INTERNAL MEDICINE

## 2019-05-20 PROCEDURE — 74011636637 HC RX REV CODE- 636/637: Performed by: HOSPITALIST

## 2019-05-20 PROCEDURE — 82962 GLUCOSE BLOOD TEST: CPT

## 2019-05-20 PROCEDURE — 36415 COLL VENOUS BLD VENIPUNCTURE: CPT

## 2019-05-20 PROCEDURE — 74011250637 HC RX REV CODE- 250/637: Performed by: INTERNAL MEDICINE

## 2019-05-20 RX ORDER — METFORMIN HYDROCHLORIDE 500 MG/1
500 TABLET ORAL 2 TIMES DAILY WITH MEALS
Qty: 60 TAB | Refills: 0 | Status: SHIPPED
Start: 2019-05-21 | End: 2019-05-29

## 2019-05-20 RX ORDER — INSULIN LISPRO 100 [IU]/ML
INJECTION, SOLUTION INTRAVENOUS; SUBCUTANEOUS
Status: DISCONTINUED | OUTPATIENT
Start: 2019-05-20 | End: 2019-05-20 | Stop reason: HOSPADM

## 2019-05-20 RX ADMIN — LEVOFLOXACIN 500 MG: 5 INJECTION, SOLUTION INTRAVENOUS at 09:57

## 2019-05-20 RX ADMIN — SODIUM CHLORIDE, SODIUM LACTATE, POTASSIUM CHLORIDE, AND CALCIUM CHLORIDE 125 ML/HR: 600; 310; 30; 20 INJECTION, SOLUTION INTRAVENOUS at 05:19

## 2019-05-20 RX ADMIN — INSULIN LISPRO 3 UNITS: 100 INJECTION, SOLUTION INTRAVENOUS; SUBCUTANEOUS at 08:31

## 2019-05-20 RX ADMIN — ANASTROZOLE 1 MG: 1 TABLET ORAL at 08:32

## 2019-05-20 RX ADMIN — Medication 10 ML: at 05:20

## 2019-05-20 NOTE — DISCHARGE SUMMARY
Hospitalist Discharge Summary Patient ID: Anny Azul [id number] 
80 y.o. 
1950 5/16/2019 PCP on record: Maverick Raymond NP Admit date: 5/16/2019 Discharge date and time: 5/20/2019 DISCHARGE DIAGNOSIS: 
 
Obstructive Jaundice with Elevated Bilirubin POA Likely secondary to pancreatic malignancy POA- s/p ERCP with CBD stenting with Martha biopsy 5/17 by Dr Win Sugar- follow up results with him Outpatient & further plan Hx HTN: ControlledHx Breast Cancer R side 2002 Chemo/xrt, again in 2017 underwent bilateral mastectomy.  
 
 
CONSULTATIONS: 
IP CONSULT TO GASTROENTEROLOGY 
IP CONSULT TO ONCOLOGY 
IP CONSULT TO GENERAL SURGERY Excerpted HPI from H&P of Bernardino Parekh MD: 
\" 80 y.o. Female PMH Breast Ca,HTN, who presents to the ED because her PCP called her and told her to come here after he gor results back from blood work done yesterday. Patient started noticing \"yellow color\" in her eyes and face about 1 1/2 week ago. She went to [facility] ER 1 week ago but no labs were done and she was sent home, she continue to have the yellow color , no abdominal pain, so she went to see her PCP yesterday who ordered blood work and [address]. PCP then called her today with results and asked her to come to the ED for further eval and management.  
  
We were asked to admit for work up and evaluation of the above problems. \" 
 
______________________________________________________________________ DISCHARGE SUMMARY/HOSPITAL COURSE:  for full details see H&P, daily progress notes, labs, consult notes. Obstructive Jaundice with Elevated Bilirubin POA Likely secondary to pancreatic malignancy POA- s/p ERCP with CBD stenting with Martha biopsy 5/17 by Dr Guadalupe Casiano aggressive IVF- cont 125ml/hr now Cont Nausea control GI consult noted - followup Brush biopsies done today during ERCP & CBD stenting- if unsatisfactory, may need EUS/Bx Empiric IV Levaquin for now Clear diet per GI 
IP Surgery consulted- Dr Janice Reyes- following- can put eileen cath in AM if required by Oncology IP oncology consulted- Dr Manual Skiff. ([facility])- on call coverage noted- recommends Ca19/9, CT Chest for staging- ordered today Pain management as needed 
  
  
Hx HTN: Controlled, continue current meds, adjust as needed 
  Hx Breast Cancer R side 2002 Chemo/xrt, again in 2017 underwent bilateral mastectomy.  
  
 
 
 
_______________________________________________________________________ Patient seen and examined by me on discharge day. Pertinent Findings: 
Gen:    Not in distress Chest: Clear lungs CVS:   Regular rhythm. No edema Abd:  Soft, not distended, not tender Neuro:  Alert, oriented x 3 
_______________________________________________________________________ DISCHARGE MEDICATIONS:  
Current Discharge Medication List  
  
CONTINUE these medications which have CHANGED Details  
metFORMIN (GLUCOPHAGE) 500 mg tablet Take 1 Tab by mouth two (2) times daily (with meals). Qty: 60 Tab, Refills: 0 CONTINUE these medications which have NOT CHANGED Details  
ibuprofen (ADVIL) 200 mg tablet Take 200 mg by mouth every four (4) hours as needed for Pain. lisinopril (PRINIVIL, ZESTRIL) 20 mg tablet Take 20 mg by mouth daily. magnesium 250 mg tab Take 1 Tab by mouth daily. anastrozole (ARIMIDEX) 1 mg tablet Take 1 mg by mouth daily. denosumab (PROLIA) 60 mg/mL injection 60 mg by SubCUTAneous route every 6 months. At [facility]  
  
multivitamin (ONE A DAY) tablet Take 1 Tab by mouth daily (with lunch). aspirin delayed-release 81 mg tablet Take 81 mg by mouth daily. omega-3 fatty acids-vitamin e (FISH OIL) 1,000 mg cap Take 2 Caps by mouth daily (with lunch). CALCIUM CARBONATE/VITAMIN D3 (CALTRATE 600 + D PO) Take 2 Tabs by mouth daily (with lunch). STOP taking these medications potassium chloride SR (KLOR-CON 8) 8 mEq tablet Comments:  
Reason for Stopping:   
   
  
 
 
 
Patient Follow Up Instructions: Activity: Activity as tolerated Diet: Cardiac Diet and Diabetic Diet Follow-up with Dr Felipe Lanier (GI) in 1 week. Follow-up tests/labs - Brush biopsy results with Dr Dinorah Lanier Follow-up Information Follow up With Specialties Details Why Contact Nicole Lucia, NP Pediatrics   [address] [place] [address] [phone] 
  
  
 
________________________________________________________________ Risk of deterioration: Low 
 
Condition at Discharge:  Stable 
__________________________________________________________________ Disposition Home with family, no needs 
 
____________________________________________________________________ Code Status: Full Code 
___________________________________________________________________ Total time in minutes spent coordinating this discharge (includes going over instructions, follow-up, prescriptions, and preparing report for sign off to her PCP) :  36 minutes Signed: 
Don Montague MD

## 2019-05-20 NOTE — PROGRESS NOTES
Oncology Progress Note Patient Name:  Cassandra Grace Date of Service: 5/20/2019 Admit Date: 5/16/2019 Followup for pancreatic mass Currently, patient states that she feels well but has continued diffuse abdominal discomfort not severe enough to ask for pain medication. .  She denies any fevers, chills, nausea, vomiting, SOB, chest pain or abdominal pain. Interval History Mrs. Aide Cabezas is a 70y/o lady with PMHx significant for breast cancer (history below), who presented with scleral icterus and bilirubin of 12. Patient received an MRCP on 5/16/19 which revealed biliary dilatation, inflammatory changes, and possible mass of the pancreatic head with partial occlusion of the portal vein, SMV, and possibly the SMA. Patient underwent ERCP with biliary stent placement on 5/17/19. Biopsies taken with results currently pending. As of 5/19/19  total bilirubin decreased to 6.6 Regarding her breast cancer history, patient was last seen by my partner, Dr. Nguyen Comes in 3/2019. Patient's oncologic history is as follows: 
 
Primary Diagnosis: RUO Breast Adenocarcinoma with micropapillary features Date of Diagnosis: 11/04/2003 Date of Recurrence: 1/11/2017 Stage at initial dx: T1N0M0, Stage I Stage at recurrence: kT1VgAd? [id number] Tumor markers: 
1)1st breast cancer, triple negative, reportedly 2)Second breast cancer: ER 98%, [place] 83%, Her2 + by CISH, Ki67 
19% (borderline) Treatment: 
1) 11/2003, Rt Lumpectomy at [address] [place] [address] 2) Adrimaycin / cyclosphosphamide Dose Dense x 4 cycles, 1/2004 3/ 
2004 3) Adjuvant Xrt concluded May 2004 1) Neoadjuvant TCHP x 6 cycles from 2/13/17 5/ 30/17. Taxotere, Carboplatin, Herceptin, Perjeta. 2) Herceptin Mainteance continued 6/19/17 and ongoing 3) 7/6/17: B/L Masectomies and SLN resection on Rt breast. Pathologic CR on Rt breast. 
4)Herceptin Maintenance concluded 2/2018 Current Treatment: 
1) Arimidex, started 10/2017 2) Prolia, started 2018 Subjective:  
 
 
 
Current Facility-Administered Medications Medication Dose Route Frequency  insulin lispro (HUMALOG) injection   SubCUTAneous AC&HS  morphine injection 0.5 mg  0.5 mg IntraVENous Q2H PRN  
 glucose chewable tablet 16 g  4 Tab Oral PRN  
 dextrose (D50W) injection syrg 12.5-25 g  12.5-25 g IntraVENous PRN  
 glucagon (GLUCAGEN) injection 1 mg  1 mg IntraMUSCular PRN  
 morphine injection 1 mg  1 mg IntraVENous Q3H PRN  
 sodium chloride (NS) flush 5-40 mL  5-40 mL IntraVENous Q8H  
 sodium chloride (NS) flush 5-40 mL  5-40 mL IntraVENous PRN  
 levoFLOXacin (LEVAQUIN) 500 mg in D5W IVPB  500 mg IntraVENous Q24H  
 lactated Ringers infusion  125 mL/hr IntraVENous CONTINUOUS  
 anastrozole (ARIMIDEX) tablet 1 mg  1 mg Oral DAILY  [Held by provider] aspirin delayed-release tablet 81 mg  81 mg Oral DAILY  ondansetron (ZOFRAN) injection 4 mg  4 mg IntraVENous Q4H PRN  
 [Held by provider] heparin (porcine) injection 5,000 Units  5,000 Units SubCUTAneous Q8H Review of Systems: 
10 point ROS conducted and otherwise negative. Objective:  
 
Patient Vitals for the past 8 hrs: 
 BP Temp Pulse Resp SpO2  
19 0731 136/80 98.3 °F (36.8 °C) 100 16 98 % 19 0355 126/74 98.9 °F (37.2 °C) 81 16 100 % Temp (24hrs), Av.3 °F (36.8 °C), Min:97.8 °F (36.6 °C), Max:98.9 °F (37.2 °C) No intake/output data recorded. Physical Exam: 
GEN: elderly  lady in NAD HEENT: NCAT, EOMI, mild scleral icterus Neck: soft, supple, trachea midline PULM: reduces air entry at bases BL. No wheezes or rales CV: RRR, S1S2+, no MRG 
ABD: soft, NT/ND, BS+, no organomegaly appreciated EXT: no LE edema NEURO: A/Ox3, no focal motor or sensory deficits noted PSYCH: appropriate mood/affect. Answers very clearly and displays understanding of conversation based on her questions SKIN: no suspicious lesions noted. Labs: Recent Results (from the past 24 hour(s)) GLUCOSE, POC Collection Time: 05/19/19  3:58 PM  
Result Value Ref Range Glucose (POC) 177 (H) 65 - 100 mg/dL Performed by Levell Sandy (PCT) GLUCOSE, POC Collection Time: 05/19/19  8:47 PM  
Result Value Ref Range Glucose (POC) 188 (H) 65 - 100 mg/dL Performed by Levell Sandy (PCT) GLUCOSE, POC Collection Time: 05/20/19  3:56 AM  
Result Value Ref Range Glucose (POC) 171 (H) 65 - 100 mg/dL Performed by Levell Sandy (PCT) GLUCOSE, POC Collection Time: 05/20/19  7:35 AM  
Result Value Ref Range Glucose (POC) 201 (H) 65 - 100 mg/dL Performed by Carolina Mohs (PCT) Assessment:  
 
Active Problems: 
  HX: breast cancer (5/16/2019) Pancreatic mass (5/16/2019) Elevated bilirubin (5/16/2019) Plan:  
 
Mrs. Sekou Mratinez is a 70y/o lady with history of breast cancer, now presenting with hyperbilirubinemia and imaging concerning for pancreatic cancer. Pancreatic Mass: 
-patient now s/p ERCP with biopsies collected 
-pathology results currently pending as of 9 AM.  
-patient seen by surgery during this admission and given the likely local vessel involvement, patient's presumed pancreatic cancer has been deemed unresectable.  
-patient needs to have complete staging with imaging of the chest as well - CA 19-9 level pending. 
-patient and I spoke at length about my concerns for pancreatic cancer and the likely treatment plan moving forward.  
-I will alert Dr. Alejandro Bartlett and our office will reach out to schedule outpatient follow up. Biliary Obstruction: 
-now s/p ERCP with stent placement 
-will monitor labs as outpatient. GI has cleared for discharge - home when medically stable. followup with Dr. Seymour Gonzales, Dr. Alejandro Bartlett and Dr. Camilo Sanchez as outpatient Varsha Kilgore MD 
5/20/2019

## 2019-05-20 NOTE — PROGRESS NOTES
Reason for Admission:   Elevated bilirubin, Pancreatic Mass; and Hx of Breast Cancer. RRAT Score:          11 Plan for utilizing home health:      No prior HH or SNF in the past.   
                 
Current Advanced Directive/Advance Care Plan:  FULL Code. No Advanced Medical Directive. Pt voiced spouse is her decision maker. Likelihood of Readmission:  Low Transition of Care Plan:   Pt to discharge home w/family. Pt to follow up w/PCP; Dr. Chester Flaherty; Dr. Vandana Cortez; and Dr. Popeye Tao. SW to assist w/apprere'. SW to continue to assist. 
 
Care Management Interventions PCP Verified by CM: Yes(Last seen by PCP 5/15/2019.) Mode of Transport at Discharge: Other (see comment)(Family.) Transition of Care Consult (CM Consult): Discharge Planning Discharge Durable Medical Equipment: (No DME or O2.  ) Current Support Network: Own Home, Lives with Spouse(Lives in ranch style home w/two steps to enter the front door.  ) Confirm Follow Up Transport: Family Plan discussed with Pt/Family/Caregiver: Yes Discharge Location Discharge Placement: (Home ) Jose Alfredo Salter MSW 
[phone]

## 2019-05-20 NOTE — DIABETES MGMT
DTC Progress Note Recommendations/ Comments: Chart reviewed due to hyperglycemia. If appropriate, please consider changing diet to carb consistent. Current hospital DM medication: correction scale Humalog with normal sensitivity. Chart reviewed on [facility] Gibbs. Patient is a 80 y.o. female with known diabetes on Metformin 500 mg BID at home. A1c:  
No results found for: HBA1C, HGBE8, GBU4HONV Recent Glucose Results:  
Lab Results Component Value Date/Time GLUCPOC 201 (H) 05/20/2019 07:35 AM  
 GLUCPOC 171 (H) 05/20/2019 03:56 AM  
 GLUCPOC 188 (H) 05/19/2019 08:47 PM  
  
 
Lab Results Component Value Date/Time Creatinine 0.65 05/18/2019 05:48 AM  
 
Estimated Creatinine Clearance: 88 mL/min (based on SCr of 0.65 mg/dL). Active Orders Diet DIET REGULAR  
  
 
PO intake:  
Patient Vitals for the past 72 hrs: 
 % Diet Eaten 05/18/19 1102 100 % Will continue to follow as needed. Thank you Soha Ferris RD, CDE Time spent: 5 minutes

## 2019-05-20 NOTE — PROGRESS NOTES
GI Progress Note Jef Boo Joyce Barnett :1950 PJP:[id number] ATTG: [unfilled]  PCP: Redd Davenport NP Date/Time:  2019 8:16 AM  
Assessment: · Obstructive jaundice · Likely unresectable pancreatic cancer as cause of #1 · S/p ERCP on  w/ brushings and treatment of malignant stricture with sphincterotomy and FC-SEMS Plan: · Await final cytology; if negative will need EUS-FNB · Last day of Antibiotics today · Can be d/c'ed from GI standpoint. I will call patient with results of brushings and will schedule outpatient EUS if needed · Dr. Ny Rose input very much appreciated · To f/u with [facility]/Dr. Veronica Gracia 
 
GI will sign off. Feel free to call w/ any questions Subjective:  
Pt feels well w/o AP/n/v. Tolerating Gen diet without problems Complaint Y/N Description Abdominal Pain Hematemesis Hematochezia Melena Constipation Diarrhea Dyspepsia Dysphagia Jaundiced Nausea/vomiting Review of Systems: 
Symptom Y/N Comments  Symptom Y/N Comments Fever/Chills    Chest Pain Cough    Headaches Sputum    Joint Pain SOB/SAAB    Pruritis/Rash Tolerating Diet    Other Could NOT obtain due to:   
 
Objective: VITALS:  
Last 24hrs VS reviewed since prior progress note. Most recent are: 
Visit Vitals /80 (BP 1 Location: Left arm, BP Patient Position: Sitting) Pulse 100 Temp 98.3 °F (36.8 °C) Resp 16 Ht 5' 5\" (1.651 m) Wt 80.6 kg (177 lb 11.1 oz) SpO2 98% BMI 29.57 kg/m² Intake/Output Summary (Last 24 hours) at 2019 1407 Last data filed at 2019 7028 Gross per 24 hour Intake 3170.83 ml Output  Net 3170.83 ml PHYSICAL EXAM: 
General: WD, WN. Alert, cooperative, no acute distress   
HEENT: NC, Atraumatic. PERRLA, EOMI. Anicteric sclerae. Lungs:  CTA Bilaterally. No Wheezing/Rhonchi/Rales. Heart:  Regular  rhythm,  No murmur (), No Rubs, No Gallops Abdomen: Soft, Non distended, Non tender.  +Bowel sounds, no HSM Extremities: No c/c/e Neurologic:  No acute neurological distress . Lab and Radiology Data Reviewed: (see below) Medications Reviewed: (see below) PMH/SH reviewed - no change compared to H&P 
________________________________________________________________________ Care Plan discussed with: 
Patient x Family  x  
RN Consultant:  jassi Murillo MD  
 
Procedures: see electronic medical records for all procedures/Xrays and details which were not copied into this note but were reviewed prior to creation of Plan. LABS: 
Recent Labs 05/18/19 
6516 WBC 6.3 HGB 11.1*  
HCT 31.6*  
 Recent Labs 05/18/19 
3800   
K 3.5  CO2 26 BUN 8  
CREA 0.65 * CA 8.8 Recent Labs 05/19/19 
3478 05/18/19 
1022 SGOT 97* 133* * 182* TP 6.4 6.2* ALB 2.6* 2.6*  
GLOB 3.8 3.6 No results for input(s): INR, PTP, APTT in the last 72 hours. No lab exists for component: INREXT No results for input(s): FE, TIBC, PSAT, FERR in the last 72 hours. No results found for: FOL, RBCF No results for input(s): PH, PCO2, PO2 in the last 72 hours. No results for input(s): CPK, CKMB in the last 72 hours. No lab exists for component: TROPONINI Lab Results Component Value Date/Time  Color YELLOW/STRAW 06/23/2017 10:18 AM  
 Appearance CLEAR 06/23/2017 10:18 AM  
 Specific gravity 1.017 06/23/2017 10:18 AM  
 pH (UA) 5.0 06/23/2017 10:18 AM  
 Protein NEGATIVE  06/23/2017 10:18 AM  
 Glucose NEGATIVE  06/23/2017 10:18 AM  
 Ketone NEGATIVE  06/23/2017 10:18 AM  
 Bilirubin NEGATIVE  06/23/2017 10:18 AM  
 Urobilinogen 0.2 06/23/2017 10:18 AM  
 Nitrites NEGATIVE  06/23/2017 10:18 AM  
 Leukocyte Esterase NEGATIVE  06/23/2017 10:18 AM  
 Epithelial cells FEW 06/23/2017 10:18 AM  
 Bacteria NEGATIVE  06/23/2017 10:18 AM  
 WBC 5-10 06/23/2017 10:18 AM  
 RBC 0-5 06/23/2017 10:18 AM  
 
 
MEDICATIONS: 
Current Facility-Administered Medications Medication Dose Route Frequency  insulin lispro (HUMALOG) injection   SubCUTAneous AC&HS  morphine injection 0.5 mg  0.5 mg IntraVENous Q2H PRN  
 glucose chewable tablet 16 g  4 Tab Oral PRN  
 dextrose (D50W) injection syrg 12.5-25 g  12.5-25 g IntraVENous PRN  
 glucagon (GLUCAGEN) injection 1 mg  1 mg IntraMUSCular PRN  
 morphine injection 1 mg  1 mg IntraVENous Q3H PRN  
 sodium chloride (NS) flush 5-40 mL  5-40 mL IntraVENous Q8H  
 sodium chloride (NS) flush 5-40 mL  5-40 mL IntraVENous PRN  
 levoFLOXacin (LEVAQUIN) 500 mg in D5W IVPB  500 mg IntraVENous Q24H  
 lactated Ringers infusion  125 mL/hr IntraVENous CONTINUOUS  
 anastrozole (ARIMIDEX) tablet 1 mg  1 mg Oral DAILY  [Held by provider] aspirin delayed-release tablet 81 mg  81 mg Oral DAILY  ondansetron (ZOFRAN) injection 4 mg  4 mg IntraVENous Q4H PRN  
 [Held by provider] heparin (porcine) injection 5,000 Units  5,000 Units SubCUTAneous Q8H

## 2019-05-20 NOTE — PROGRESS NOTES
Pt to discharge home today w/family. PCP Follow up appt on May 29th, 2019 @ 10:00 am.   
 
Pt to also follow up w/Dr. Shira Addison () on 5/31/2019 @ 15:45 pm.   
 
Follow up appt w/Dr. Kaleigh Alegria on 5/29/2019 @ 15:20 pm.   
 
Medicare pt has received, reviewed, and signed 2nd IM letter informing them of their right to appeal the discharge. Signed copy has been placed on pt bedside chart. Tiki Arrington, MSW 
[phone]

## 2019-05-20 NOTE — PROGRESS NOTES
I have reviewed discharge instructions with the patient and caregiver. The patient and caregiver verbalized understanding. AVS reviewed in completion with opportunity for questions and clarification provided. Patient IV removed and documented. All patient belongings left with the patient via wheel chair. Patient provided information regarding follow up appointment. Patient discharged to home with family.

## 2019-05-20 NOTE — DISCHARGE INSTRUCTIONS
HOSPITALIST DISCHARGE INSTRUCTIONS    NAME: Nico Lloyd   :  1950   MRN:  [id number]     Date/Time:  2019 10:02 AM    ADMIT DATE: 2019     DISCHARGE DATE: 2019     DISCHARGE DIAGNOSIS:  Obstructive Jaundice with Elevated Bilirubin POA  Likely secondary to pancreatic malignancy POA- s/p ERCP with CBD stenting with [place] biopsy  by Dr Pedrito Martins- follow up results with him Outpatient & further plan  Hx HTN: Controlled  Hx Breast Cancer R side 2002 Chemo/xrt, again in 2017 underwent bilateral mastectomy.          Active Problems:    HX: breast cancer (2019)      Pancreatic mass (2019)      Elevated bilirubin (2019)         MEDICATIONS:  As per medication reconciliation  list  · It is important that you take the medication exactly as they are prescribed. · Keep your medication in the bottles provided by the pharmacist and keep a list of the medication names, dosages, and times to be taken in your wallet. · Do not take other medications without consulting your doctor. Pain Management: per above medications    What to do at Home    Recommended diet:  Cardiac Diet and Diabetic Diet    Recommended activity: Activity as tolerated    If you have questions regarding the hospital related prescriptions or hospital related issues please call [facility] migue Pillai at [phone]. If you experience any of the following symptoms then please call your primary care physician or return to the emergency room if you cannot get hold of your doctor:  Fever, chills, nausea, vomiting, diarrhea, change in mentation, falling, bleeding, shortness of breath,     Follow Up:  Dr. Nohelia Quiros, NP  you are to call and set up an appointment to see them in 7-10 days. Dr Eve Brunson [facility]) in 1 week for further plans  Dr Pedrito Martins (GI) for biopsy results  Dr Richie Anderson for possible port placement if recommended.     Information obtained by :  I understand that if any problems occur once I am at home I am to contact my physician. I understand and acknowledge receipt of the instructions indicated above.                                                                                                                                            Physician's or R.N.'s Signature                                                                  Date/Time                                                                                                                                              Patient or Representative Signature                                                          Date/Time

## 2019-05-20 NOTE — PROGRESS NOTES
PCP TRISTAN appt scheduled with Dr. Elizabeth Garcia on 5/29/2019 at 10:00am. Appt added to AVS. Graciela Olea CM Specialist

## 2019-05-20 NOTE — PROGRESS NOTES
Admit Date: 2019 POD 3 Days Post-Op Procedure:  Procedure(s): ENDOSCOPIC RETROGRADE CHOLANGIOPANCREATOGRAPHY (ERCP) SPINTEROTOMY, BRUSHING , STENTING AND BIOPSIES Assessment:  
Active Problems: 
  HX: breast cancer (2019) Pancreatic mass (2019) Elevated bilirubin (2019) 1. pancreatic head mass. bx results pending. As per my consult note, I feel that this is unresectable. If brushings are non diagnostic she will need an EUS and bx. S/p ERCP and stenting with good results Plan/Recommendations/Medical Decision Makin. Await bx results and next steps per Dr Kady Chandler and Dr Chyna Caballero Subjective:  
 
Patient has complaints of pain. Objective:  
 
Blood pressure 136/80, pulse 100, temperature 98.3 °F (36.8 °C), resp. rate 16, height 5' 5\" (1.651 m), weight 80.6 kg (177 lb 11.1 oz), SpO2 98 %. Temp (24hrs), Av.3 °F (36.8 °C), Min:97.8 °F (36.6 °C), Max:98.9 °F (37.2 °C) Physical Exam:  LUNG: clear to auscultation bilaterally, HEART: regular rate and rhythm, S1, S2 normal, no murmur, click, rub or gallop, ABDOMEN: soft, non-distended min epigastric tenderness. Bowel sounds normal. No masses,  no organomegaly Labs:  
Recent Results (from the past 48 hour(s)) GLUCOSE, POC Collection Time: 19  9:19 AM  
Result Value Ref Range Glucose (POC) 189 (H) 65 - 100 mg/dL Performed by Cori Linares (PCT) GLUCOSE, POC Collection Time: 19  3:10 PM  
Result Value Ref Range Glucose (POC) 214 (H) 65 - 100 mg/dL Performed by Cori Linares (PCT) GLUCOSE, POC Collection Time: 19  8:41 PM  
Result Value Ref Range Glucose (POC) 207 (H) 65 - 100 mg/dL Performed by Guerita Christian GLUCOSE, POC Collection Time: 19  3:09 AM  
Result Value Ref Range Glucose (POC) 135 (H) 65 - 100 mg/dL Performed by Guerita Christian HEPATIC FUNCTION PANEL  Collection Time: 19  4:17 AM  
 Result Value Ref Range Protein, total 6.4 6.4 - 8.2 g/dL Albumin 2.6 (L) 3.5 - 5.0 g/dL Globulin 3.8 2.0 - 4.0 g/dL A-G Ratio 0.7 (L) 1.1 - 2.2 Bilirubin, total 6.6 (H) 0.2 - 1.0 MG/DL Bilirubin, direct 4.9 (H) 0.0 - 0.2 MG/DL Alk. phosphatase 190 (H) 45 - 117 U/L  
 AST (SGOT) 97 (H) 15 - 37 U/L  
 ALT (SGPT) 274 (H) 12 - 78 U/L  
GLUCOSE, POC Collection Time: 05/19/19  9:05 AM  
Result Value Ref Range Glucose (POC) 164 (H) 65 - 100 mg/dL Performed by Yolis Farris GLUCOSE, POC Collection Time: 05/19/19  3:58 PM  
Result Value Ref Range Glucose (POC) 177 (H) 65 - 100 mg/dL Performed by Mimi Miles ([facility]) GLUCOSE, POC Collection Time: 05/19/19  8:47 PM  
Result Value Ref Range Glucose (POC) 188 (H) 65 - 100 mg/dL Performed by Mimi Miles ([facility]) GLUCOSE, POC Collection Time: 05/20/19  3:56 AM  
Result Value Ref Range Glucose (POC) 171 (H) 65 - 100 mg/dL Performed by Mimi Miles ([facility]) GLUCOSE, POC Collection Time: 05/20/19  7:35 AM  
Result Value Ref Range Glucose (POC) 201 (H) 65 - 100 mg/dL Performed by Lorena Farooq (PCT) Data Review images and reports reviewed

## 2019-05-22 ENCOUNTER — PATIENT OUTREACH (OUTPATIENT)
Dept: FAMILY MEDICINE CLINIC | Age: 69
End: 2019-05-22

## 2019-05-22 NOTE — PROGRESS NOTES
Hospital Discharge Follow-Up Date/Time:  5/22/2019 3:06 PM 
 
Patient was admitted to [facility] on 5-16-19 and discharged on 5-20-19 for: 
 
DISCHARGE DIAGNOSIS: 
Obstructive Jaundice with Elevated Bilirubin POA Likely secondary to pancreatic malignancy POA- s/p ERCP with CBD stenting with [place] biopsy 5/17 by Dr Jerry Ratliff The physician discharge summary was available at the time of outreach. Patient was contacted within two business days of discharge. Challenges reviewed with the provider:  
- Patient reports diarrhea yesterday and today, slowly improving today. - Patient reports nausea that \"comes and goes. \" Reports decreased appetite and states, \"I seem to fill up quickly. \"  
- Checks blood glucose at home 3 times per day, ranging between 130-206 since discharge. - Discomfort in \"lower stomach now and then. \" 
- Patient would benefit from reinforced education about advance care planning. Component Latest Ref Rng & Units 5/20/2019 5/19/2019 5/15/2019 5/15/2019  
 
     10:12 AM  4:17 AM  2:35 PM  2:22 PM  
Albumin 3.5 - 5.0 g/dL 2.5 (L) 2.6 (L)  4.4 Globulin 2.0 - 4.0 g/dL 4.3 (H) 3.8 A-G Ratio 1.1 - 2.2   0.6 (L) 0.7 (L) Bilirubin, total 
    0.2 - 1.0 MG/DL 5.6 (H) 6.6 (H)  14.3 (HH) Bilirubin, direct 
    0.0 - 0.2 MG/DL 2.6 (H) 4.9 (H)  11.99 (HH) Alk. phosphatase 45 - 117 U/L 185 (H) 190 (H)  237 (H) AST 
    15 - 37 U/L 135 (H) 97 (H)  311 (H) ALT (SGPT) 12 - 78 U/L 229 (H) 274 (H)  617 (HH) Component Latest Ref Rng & Units 5/18/2019 5/17/2019 5/16/2019 5/15/2019  
 
      5:48 AM  4:21 AM  6:56 PM  2:22 PM  
Bilirubin, total 
    0.2 - 1.0 MG/DL 8.9 (H) 13.2 (H) 12.4 (H) ALT (SGPT) 12 - 78 U/L 329 (H) 441 (H) 450 (H) AST 
    15 - 37 U/L 133 (H) 199 (H) 200 (H) Alk. phosphatase 45 - 117 U/L 182 (H) 194 (H) 196 (H) Protein, total 
    6.4 - 8.2 g/dL 6.2 (L) 6.7 6.5 Albumin 3.5 - 5.0 g/dL 2.6 (L) 2.8 (L) 2.7 (L) Globulin 2.0 - 4.0 g/dL 3.6 3.9 3.8 A-G Ratio 1.1 - 2.2   0.7 (L) 0.7 (L) 0.7 (L) Component Latest Ref Rng & Units 2019 2019 2019 5/15/2019  
 
      5:48 AM  4:21 AM  4:29 PM  2:22 PM  
HGB 
    11.5 - 16.0 g/dL 11.1 (L) 11.3 (L) 12.7 13.5 HCT 
    35.0 - 47.0 % 31.6 (L) 32.2 (L) 35.9 41.9 MCV 80.0 - 99.0 FL 79.8 (L) 80.3 80.0 86 Component Latest Ref Rng & Units 2019 2019 2019 5/15/2019  
 
      5:48 AM  4:21 AM  4:29 PM  2:22 PM  
RDW 
    11.5 - 14.5 % 19.7 (H) 19.5 (H) 19.5 (H) 18.8 (H) Component Latest Ref Rng & Units 5/15/2019  
 
      2:22 PM  
CEA 
    0.0 - 4.7 ng/mL 8.5 (H) Method of communication with provider :chart routing Inpatient RRAT score: 11 on 19. Was this a readmission? no  
Patient stated reason for the readmission: n/a Nurse Navigator (NN) contacted the patient by telephone to perform post hospital discharge assessment. Verified name and  with patient as identifiers. Provided introduction to self, and explanation of the Nurse Navigator role. Reviewed discharge instructions and red flags with patient who verbalized understanding. Patient given an opportunity to ask questions and does not have any further questions or concerns at this time. The patient agrees to contact the PCP office for questions related to their healthcare. NN provided contact information for future reference. Disease Specific:   N/A Summary of patient's problems: 1. Patient reports diarrhea yesterday and today, slowly improving today. 2. Patient reports nausea that \"comes and goes. \" Reports decreased appetite and states, \"I seem to fill up quickly. \"  
3. Checks blood glucose at home 3 times per day, ranging between 130-206 since discharge. 4. Discomfort in \"lower stomach now and then. \" 
5. Patient would benefit from reinforced education about advance care planning. Home Health orders at discharge: none [address]: n/a Date of initial visit: n/a Durable Medical Equipment ordered/company: none Durable Medical Equipment received: n/a Barriers to care? lack of knowledge about disease Advance Care Planning:  
Does patient have an Advance Directive:  not on file; education provided Medications per ED [facility] After Visit Summary dated 5-20-19:  
New Medications at Discharge: none Changed Medications at Discharge: CONTINUE these medications which have CHANGED  
  Details  
metFORMIN (GLUCOPHAGE) 500 mg tablet Take 1 Tab by mouth two (2) times daily (with meals). Qty: 60 Tab, Refills: 0 Discontinued Medications at Discharge: STOP taking these medications  
   
  potassium chloride SR (KLOR-CON 8) 8 mEq tablet Comments:  
Reason for Stopping Medication reconciliation was performed with patient, who verbalizes understanding of administration of home medications. There were no barriers to obtaining medications identified at this time and patient states her medications are affordable. Referral to Pharm D needed: no  
 
Current Outpatient Medications Medication Sig  
 metFORMIN (GLUCOPHAGE) 500 mg tablet Take 1 Tab by mouth two (2) times daily (with meals).  ibuprofen (ADVIL) 200 mg tablet Take 200 mg by mouth every four (4) hours as needed for Pain.  lisinopril (PRINIVIL, ZESTRIL) 20 mg tablet Take 20 mg by mouth daily.  magnesium 250 mg tab Take 1 Tab by mouth daily.  anastrozole (ARIMIDEX) 1 mg tablet Take 1 mg by mouth daily.  denosumab (PROLIA) 60 mg/mL injection 60 mg by SubCUTAneous route every 6 months. At [facility]  multivitamin (ONE A DAY) tablet Take 1 Tab by mouth daily (with lunch).  aspirin delayed-release 81 mg tablet Take 81 mg by mouth daily.  omega-3 fatty acids-vitamin e (FISH OIL) 1,000 mg cap Take 2 Caps by mouth daily (with lunch).  CALCIUM CARBONATE/VITAMIN D3 (CALTRATE 600 + D PO) Take 2 Tabs by mouth daily (with lunch). No current facility-administered medications for this visit. There are no discontinued medications. [facility] follow up appointment(s):  
Future Appointments Date Time Provider Patience Hawk 5/29/2019 10:00 AM Phill Duong NP [facility] MARTHA ALLEN  
5/29/2019  3:20 PM Lisa Rodríguez MD [address] [place] [address]  
8/19/2019  9:20 AM Lisa Rodríguez MD [address] [place] [address] Non-[facility] follow up appointment(s): Dr. Saida Hogan. [facility]/oncology on 5-28-19, Dr. Naheed Sparks on 5-31-19, Endoscopic ultrasound at [facility] on 5-31-19. [facility]:  out of service area Goals  Attends follow-up appointments as directed. 5-22-19: Patient has TRISTAN visit scheduled with Alesha Mccoy NP on 5-29-19 at 10am, oncology follow-up scheduled with Dr. Lizeth Sosa on 5-28-19, GI follow-up scheduled with Dr. Redd Edwards on 5-31-19 at 3:15pm, general surgery follow-up scheduled with Dr. Viraj Marinelli on 5-29-19, and Endoscopic ultrasound scheduled at [facility] on 5-31-19 at 6:30am. Patient states she drives as needed and her daughters also assist with transportation. Marlin Higuera Understands red flags post discharge. 5-22-19: Red flags of biliary obstruction reviewed with patient today, patient verbalizes an understanding and states she will seek medical attention immediately should red flags present. Will review red flags of biliary obstruction again on next phone conversation, NN will attempt to reach patient within 14-18 days.  Francois Couch

## 2019-05-29 ENCOUNTER — HOSPITAL ENCOUNTER (OUTPATIENT)
Dept: LAB | Age: 69
Discharge: HOME | End: 2019-05-29
Payer: MEDICARE

## 2019-05-29 ENCOUNTER — OFFICE VISIT (OUTPATIENT)
Dept: FAMILY MEDICINE CLINIC | Age: 69
End: 2019-05-29

## 2019-05-29 VITALS
DIASTOLIC BLOOD PRESSURE: 73 MMHG | HEIGHT: 65 IN | SYSTOLIC BLOOD PRESSURE: 116 MMHG | TEMPERATURE: 98.1 F | WEIGHT: 173.4 LBS | HEART RATE: 88 BPM | OXYGEN SATURATION: 97 % | RESPIRATION RATE: 14 BRPM | BODY MASS INDEX: 28.89 KG/M2

## 2019-05-29 DIAGNOSIS — E11.9 DIABETES MELLITUS, NEW ONSET (HCC): ICD-10-CM

## 2019-05-29 DIAGNOSIS — Z09 HOSPITAL DISCHARGE FOLLOW-UP: Primary | ICD-10-CM

## 2019-05-29 DIAGNOSIS — K86.89 PANCREATIC MASS: ICD-10-CM

## 2019-05-29 DIAGNOSIS — R94.5 LIVER FUNCTION ABNORMALITY: ICD-10-CM

## 2019-05-29 PROCEDURE — 80076 HEPATIC FUNCTION PANEL: CPT

## 2019-05-29 PROCEDURE — 85025 COMPLETE CBC W/AUTO DIFF WBC: CPT

## 2019-05-29 PROCEDURE — 80048 BASIC METABOLIC PNL TOTAL CA: CPT

## 2019-05-29 RX ORDER — BLOOD-GLUCOSE METER
KIT MISCELLANEOUS
Refills: 0 | COMMUNITY
Start: 2019-05-11

## 2019-05-29 RX ORDER — INSULIN GLARGINE 100 [IU]/ML
10 INJECTION, SOLUTION SUBCUTANEOUS
Qty: 3 PEN | Refills: 1 | Status: SHIPPED | OUTPATIENT
Start: 2019-05-29 | End: 2019-06-12 | Stop reason: SDUPTHER

## 2019-05-29 RX ORDER — METFORMIN HYDROCHLORIDE 500 MG/1
500 TABLET ORAL
Qty: 90 TAB | Refills: 0 | Status: SHIPPED | OUTPATIENT
Start: 2019-05-29 | End: 2019-09-04 | Stop reason: ALTCHOICE

## 2019-05-29 RX ORDER — LANCETS 33 GAUGE
EACH MISCELLANEOUS
Refills: 0 | COMMUNITY
Start: 2019-05-11 | End: 2019-07-08 | Stop reason: SDUPTHER

## 2019-05-29 RX ORDER — LIDOCAINE HYDROCHLORIDE AND EPINEPHRINE 10; 10 MG/ML; UG/ML
INJECTION, SOLUTION INFILTRATION; PERINEURAL
Refills: 0 | COMMUNITY
Start: 2019-05-01 | End: 2019-06-17

## 2019-05-29 NOTE — PATIENT INSTRUCTIONS
Learning About Diabetes Food Guidelines Your Care Instructions Meal planning is important to manage diabetes. It helps keep your blood sugar at a target level (which you set with your doctor). You don't have to eat special foods. You can eat what your family eats, including sweets once in a while. But you do have to pay attention to how often you eat and how much you eat of certain foods. You may want to work with a dietitian or a certified diabetes educator (CDE) to help you plan meals and snacks. A dietitian or CDE can also help you lose weight if that is one of your goals. What should you know about eating carbs? Managing the amount of carbohydrate (carbs) you eat is an important part of healthy meals when you have diabetes. Carbohydrate is found in many foods. · Learn which foods have carbs. And learn the amounts of carbs in different foods. ? Bread, cereal, pasta, and rice have about 15 grams of carbs in a serving. A serving is 1 slice of bread (1 ounce), ½ cup of cooked cereal, or 1/3 cup of cooked pasta or rice. ? Fruits have 15 grams of carbs in a serving. A serving is 1 small fresh fruit, such as an apple or orange; ½ of a banana; ½ cup of cooked or canned fruit; ½ cup of fruit juice; 1 cup of melon or raspberries; or 2 tablespoons of dried fruit. ? Milk and no-sugar-added yogurt have 15 grams of carbs in a serving. A serving is 1 cup of milk or 2/3 cup of no-sugar-added yogurt. ? Starchy vegetables have 15 grams of carbs in a serving. A serving is ½ cup of mashed potatoes or sweet potato; 1 cup winter squash; ½ of a small baked potato; ½ cup of cooked beans; or ½ cup cooked corn or green peas. · Learn how much carbs to eat each day and at each meal. A dietitian or CDE can teach you how to keep track of the amount of carbs you eat. This is called carbohydrate counting.  
· If you are not sure how to count carbohydrate grams, use the Plate Method to plan meals. It is a good, quick way to make sure that you have a balanced meal. It also helps you spread carbs throughout the day. ? Divide your plate by types of foods. Put non-starchy vegetables on half the plate, meat or other protein food on one-quarter of the plate, and a grain or starchy vegetable in the final quarter of the plate. To this you can add a small piece of fruit and 1 cup of milk or yogurt, depending on how many carbs you are supposed to eat at a meal. 
· Try to eat about the same amount of carbs at each meal. Do not \"save up\" your daily allowance of carbs to eat at one meal. 
· Proteins have very little or no carbs per serving. Examples of proteins are beef, chicken, turkey, fish, eggs, tofu, cheese, cottage cheese, and peanut butter. A serving size of meat is 3 ounces, which is about the size of a deck of cards. Examples of meat substitute serving sizes (equal to 1 ounce of meat) are 1/4 cup of cottage cheese, 1 egg, 1 tablespoon of peanut butter, and ½ cup of tofu. How can you eat out and still eat healthy? · Learn to estimate the serving sizes of foods that have carbohydrate. If you measure food at home, it will be easier to estimate the amount in a serving of restaurant food. · If the meal you order has too much carbohydrate (such as potatoes, corn, or baked beans), ask to have a low-carbohydrate food instead. Ask for a salad or green vegetables. · If you use insulin, check your blood sugar before and after eating out to help you plan how much to eat in the future. · If you eat more carbohydrate at a meal than you had planned, take a walk or do other exercise. This will help lower your blood sugar. What else should you know? · Limit saturated fat, such as the fat from meat and dairy products. This is a healthy choice because people who have diabetes are at higher risk of heart disease.  So choose lean cuts of meat and nonfat or low-fat dairy products. Use olive or canola oil instead of butter or shortening when cooking. · Don't skip meals. Your blood sugar may drop too low if you skip meals and take insulin or certain medicines for diabetes. · Check with your doctor before you drink alcohol. Alcohol can cause your blood sugar to drop too low. Alcohol can also cause a bad reaction if you take certain diabetes medicines. Follow-up care is a key part of your treatment and safety. Be sure to make and go to all appointments, and call your doctor if you are having problems. It's also a good idea to know your test results and keep a list of the medicines you take. Where can you learn more? Go to http://mehnazlocalstay.comxochilt.info/. Enter Y793 in the search box to learn more about \"Learning About Diabetes Food Guidelines. \" Current as of: July 25, 2018 Content Version: 11.9 © 0710-2842 [employer]. Care instructions adapted under license by [employer] (which disclaims liability or warranty for this information). If you have questions about a medical condition or this instruction, always ask your healthcare professional. [address] any warranty or liability for your use of this information. Learning About Insulin Pens What is an insulin pen? An insulin pen is a device for giving insulin shots. It looks like a pen. Inside the pen is a needle and a cartridge filled with insulin. You can set the dose of insulin with a dial on the outside of the pen. You use the pen to give the insulin shot (injection). Both disposable and reusable insulin pens are available. With a disposable pen, a set amount of insulin comes in the pen ready to use. When the insulin is used up, you throw the pen away. You use a new pen the next time you need insulin. With a reusable pen, you don't throw the pen away. Instead, you reload the pen with a pre-measured cartridge of insulin.  When the insulin is used up, you insert a new cartridge into the pen. Disposable and reusable pens both need a new needle with each shot. The needles come in different lengths and widths. [place] needles will prevent injecting into the muscle, especially in children or people who are lean. Thinner-width needles reduce the pricking sensation. Width is measured by gauge. The higher the number, the thinner the needle. Why do some people prefer pens? · Most people find that insulin pens are easier to use than a bottle and syringe. · Many people feel less pain (or no pain) with the smaller insulin pen needle, compared to a syringe needle. · Insulin pens may help you give yourself more accurate doses. When you draw insulin into a syringe, you must carefully measure so that you don't get too much or too little. But with a pen, you set a dial for the amount of insulin you want, and then you push the button. · Insulin pens may work better than syringes for people who don't see well or who have problems like arthritis that make it harder to use a syringe. · Using an insulin pen draws less attention from others. You can give yourself insulin with fewer people noticing. · You don't need to carry insulin bottles and syringes everywhere you go. An insulin pen fits into a pocket or purse. What should you know about insulin pens? Each pen delivers a different brand and type (or types) of insulin. Some deliver rapid-acting insulin. Others deliver long-acting insulin. And some pens deliver a mixture of both in one shot. Pens have different colored labels, cartridge holders, or dosing knobs. Many pens have special features. For example, some pens have springs so that it takes less force to deliver a dose of insulin. Other pens have signals you can hear that let you know the insulin has been delivered. Some have memory to show the amount and time of the last dose. How do you use an insulin pen? 1. For a reusable pen, put the insulin cartridge into the pen. Disposable pens already have an insulin cartridge. Follow the directions for how to screw a new needle onto your pen. 2. Remove the outer cap from the needle. Keep this cap to use later. 3. Remove the inner cover from the needle. Be careful not to prick yourself. 4. Before each shot, prime the needle. Priming removes air from the needle. Turn the dose knob to 2 units. Hold your pen with the needle pointing up. Tap the cartridge flores gently to move any air bubbles to the top. Push the injection button all the way in. Watch for a stream or drop of insulin to come out of the needle. If it does not, repeat this step again. 5. Clean the area of skin where you will give the shot. If you use alcohol to clean the skin, let it dry. Use a different spot each time you inject insulin. That's because using the same spot every time can cause bumps or pits to form in the skin. For example, inject your insulin above your belly button, then the next time use your upper thigh, and then the next time inject below your belly button. 6. Turn the dose knob to the number of units of insulin you need to inject. Push the needle into your skin. Most people can inject using a 90-degree angle and without pinching the skin. Adults and children who are very lean and people who use longer needles may need to pinch the skin to avoid injecting into muscle. 7. Put your thumb on the injection button and push it in until it stops. Keep the pen in your skin. Hold the dose knob in for 10 seconds (or to the number that the  recommends). Then pull the needle out of your skin. Do not rub the area. 8. Put only the outer cap back over the needle. The thin inner cover is harder to put back on, and you could stick yourself. 9. After covering the needle with the outer cap, unscrew the needle and throw it away in a sharps container or other solid plastic container.  You can get a sharps container at your drugstore. 10. Always read the insulin package information that tells the best way to store your insulin pen and insulin cartridges. In general, unopened insulin for pens will last longer if it is kept in the refrigerator. After insulin is opened, most manufacturers say to store it at room temperature. Don't share insulin pens with anyone else who uses insulin. Even when the needle is changed, an insulin pen can carry bacteria or blood that can make another person sick. Where can you learn more? Go to http://mehnaz-xochilt.info/. Enter M910 in the search box to learn more about \"Learning About Insulin Pens. \" Current as of: July 25, 2018 Content Version: 11.9 © 6956-8479 [employer]. Care instructions adapted under license by [employer] (which disclaims liability or warranty for this information). If you have questions about a medical condition or this instruction, always ask your healthcare professional. [place] [address] any warranty or liability for your use of this information. Nutrition Tips for Diabetes: After Your Visit Your Care Instructions A healthy diet is important to manage diabetes. It helps you lose weight (if you need to) and keep it off. It gives you the nutrition and energy your body needs and helps prevent heart disease. But a diet for diabetes does not mean that you have to eat special foods. You can eat what your family eats, including occasional sweets and other favorites. But you do have to pay attention to how often you eat and how much you eat of certain foods. The right plan for you will give you meals that help you keep your blood sugar at healthy levels. Try to eat a variety of foods and to spread carbohydrate throughout the day. Carbohydrate raises blood sugar higher and more quickly than any other nutrient does.  Carbohydrate is found in sugar, breads and cereals, fruit, starchy vegetables such as potatoes and corn, and milk and yogurt. You may want to work with a dietitian or diabetes educator to help you plan meals and snacks. A dietitian or diabetes educator also can help you lose weight if that is one of your goals. The following tips can help you enjoy your meals and stay healthy. Follow-up care is a key part of your treatment and safety. Be sure to make and go to all appointments, and call your doctor if you are having problems. Its also a good idea to know your test results and keep a list of the medicines you take. How can you care for yourself at home? · Learn which foods have carbohydrate and how much carbohydrate to eat. A dietitian or diabetes educator can help you learn to keep track of how much carbohydrate you eat. · Spread carbohydrate throughout the day. Eat some carbohydrate at all meals, but do not eat too much at any one time. · Plan meals to include food from all the food groups. These are the food groups and some example portion sizes: ¨ Grains: 1 slice of bread (1 ounce), ½ cup of cooked cereal, and 1/3 cup of cooked pasta or rice. These have about 15 grams of carbohydrate in a serving. Choose whole grains such as whole wheat bread or crackers, oatmeal, and brown rice more often than refined grains. ¨ Fruit: 1 small fresh fruit, such as an apple or orange; ½ of a banana; ½ cup of chopped, cooked, or canned fruit; ½ cup of fruit juice; 1 cup of melon or raspberries; and 2 tablespoons of dried fruit. These have about 15 grams of carbohydrate in a serving. ¨ Dairy: 1 cup of nonfat or low-fat milk and 2/3 cup of plain yogurt. These have about 15 grams of carbohydrate in a serving. ¨ Protein foods: Beef, chicken, turkey, fish, eggs, tofu, cheese, cottage cheese, and peanut butter. A serving size of meat is 3 ounces, which is about the size of a deck of cards.  Examples of meat substitute serving sizes (equal to 1 ounce of meat) are 1/4 cup of cottage cheese, 1 egg, 1 tablespoon of peanut butter, and ½ cup of tofu. These have very little or no carbohydrate per serving. ¨ Vegetables: Starchy vegetables such as ½ cup of cooked dried beans, peas, potatoes, or corn have about 15 grams of carbohydrate. Nonstarchy vegetables have very little carbohydrate, such as 1 cup of raw leafy vegetables (such as spinach), ½ cup of other vegetables (cooked or chopped), and 3/4 cup of vegetable juice. · Use the plate format to plan meals. It is a good, quick way to make sure that you have a balanced meal. It also helps you spread carbohydrate throughout the day. You divide your plate by types of foods. Put vegetables on half the plate, meat or meat substitutes on one-quarter of the plate, and a grain or starchy vegetable (such as brown rice or a potato) in the final quarter of the plate. To this you can add a small piece of fruit and 1 cup of milk or yogurt, depending on how much carbohydrate you are supposed to eat at a meal. 
· Talk to your dietitian or diabetes educator about ways to add limited amounts of sweets into your meal plan. You can eat these foods now and then, as long as you include the amount of carbohydrate they have in your daily carbohydrate allowance. · If you drink alcohol, limit it to no more than 1 drink a day for women and 2 drinks a day for men. If you are pregnant, no amount of alcohol is known to be safe. · Protein, fat, and fiber do not raise blood sugar as much as carbohydrate does. If you eat a lot of these nutrients in a meal, your blood sugar will rise more slowly than it would otherwise. · Limit saturated fats, such as those from meat and dairy products. Try to replace it with monounsaturated fat, such as olive oil. This is a healthier choice because people who have diabetes are at higher-than-average risk of heart disease.  But use a modest amount of olive oil. A tablespoon of olive oil has 14 grams of fat and 120 calories. · Exercise lowers blood sugar. If you take insulin by shots or pump, you can use less than you would if you were not exercising. Keep in mind that timing matters. If you exercise within 1 hour after a meal, your body may need less insulin for that meal than it would if you exercised 3 hours after the meal. Test your blood sugar to find out how exercise affects your need for insulin. · Exercise on most days of the week. Aim for at least 30 minutes. Exercise helps you stay at a healthy weight and helps your body use insulin. Walking is an easy way to get exercise. Gradually increase the amount you walk every day. You also may want to swim, bike, or do other activities. When you eat out · Learn to estimate the serving sizes of foods that have carbohydrate. If you measure food at home, it will be easier to estimate the amount in a serving of restaurant food. · If the meal you order has too much carbohydrate (such as potatoes, corn, or baked beans), ask to have a low-carbohydrate food instead. Ask for a salad or green vegetables. · If you use insulin, check your blood sugar before and after eating out to help you plan how much to eat in the future. · If you eat more carbohydrate at a meal than you had planned, take a walk or do other exercise. This will help lower your blood sugar. Where can you learn more? Go to iCrumz.be Enter E860 in the search box to learn more about \"Nutrition Tips for Diabetes: After Your Visit. \"  
© 8414-4683 [employer]. Care instructions adapted under license by [facility] (which disclaims liability or warranty for this information).  This care instruction is for use with your licensed healthcare professional. If you have questions about a medical condition or this instruction, always ask your healthcare professional. [employer] disclaims any warranty or liability for your use of this information. Content Version: 03.0.788569; Current as of: June 4, 2014

## 2019-05-29 NOTE — PROGRESS NOTES
Subjective: Chief Complaint Patient presents with  Transitions Of Care D/C FROM [facility] ADMITTED FOR JAUNDICE, NAUSEA, ELEVATED BILIRUBIN  
  
 
HPI: 
Moshe Colon is a 80 y.o. female presents for follow up appointment, transition of care. Admitted to [address] [place] [address] 5/16/19 and discharged on 5/20/19. Our office Nurse Navigator performed an outreach to Ms. Atilio Vang on 5/22/19 (within 2 business days of discharge) to complete medication reconciliation and a telephonic assessment of her condition. Discharge Diagnosis: 
Obstructive Jaundice with Elevated Bilirubin POA Likely secondary to pancreatic malignancy POA- s/p ERCP with CBD stenting with [place] biopsy 5/17 by Dr Enrico Small- follow up results with him Outpatient & further plan She says that first brush biopsy done in hospital  was inconclusive and needs EUS bx. Has Appointment this Friday at [facility] to have [address] with biopsy of the pancreatic head via brush biopsy with Dr Enrico Small. Has not followed up with oncologist Dr Julito Corado. Since discharge, will have follow up after the biopsy. Blood sugars at home \"up and down\", ranging 150-200. Taking metformin 500 mg BID. Metformin is causing some GI side effects at times, some diarrhea and some nausea, mostly in evening. No vomiting. Eating small meals and drinking plenty of water. Trying to follow diabetic diet as much as possible. Feeling better overall, no jaundice eyes and urine is not dark anymore. No hospital, ER or specialist visits since last primary care visit except as noted above. Past Medical History:  
Diagnosis Date  Cancer [facility]) 2003  
 right breast cancer  Psychiatric disorder   
 anxiety Social History Tobacco Use  Smoking status: Never Smoker  Smokeless tobacco: Never Used Substance Use Topics  Alcohol use: No  
 Drug use: No  
 
 
Outpatient Medications Marked as Taking for the 5/29/19 encounter (Office Visit) with Roberta Pierce NP Medication Sig Dispense Refill  ONETOUCH ULTRAMINI monitoring kit use as directed TO CHECK BLOOD SUGAR THREE TIMES DAILY  0  
 ONE TOUCH DELICA 33 gauge misc use three times a day  0  
 metFORMIN (GLUCOPHAGE) 500 mg tablet Take 1 Tab by mouth two (2) times daily (with meals). 60 Tab 0  ibuprofen (ADVIL) 200 mg tablet Take 200 mg by mouth every four (4) hours as needed for Pain.  lisinopril (PRINIVIL, ZESTRIL) 20 mg tablet Take 20 mg by mouth daily.  magnesium 250 mg tab Take 1 Tab by mouth daily.  anastrozole (ARIMIDEX) 1 mg tablet Take 1 mg by mouth daily.  denosumab (PROLIA) 60 mg/mL injection 60 mg by SubCUTAneous route every 6 months. At [facility]  multivitamin (ONE A DAY) tablet Take 1 Tab by mouth daily (with lunch).  aspirin delayed-release 81 mg tablet Take 81 mg by mouth daily.  omega-3 fatty acids-vitamin e (FISH OIL) 1,000 mg cap Take 2 Caps by mouth daily (with lunch).  CALCIUM CARBONATE/VITAMIN D3 (CALTRATE 600 + D PO) Take 2 Tabs by mouth daily (with lunch). No Known Allergies Health Maintenance reviewed ROS: 
Gen: no fatigue, no fever, no chills, no unexplained weight loss or weight gain Eyes: no excessive tearing, itching, or discharge Nose: no rhinorrhea, no sinus pain Mouth: no oral lesions, no sore throat, no difficulty swallowing Resp: no shortness of breath, no wheezing, no cough CV: no chest pain, no orthopnea, no paroxysmal nocturnal dyspnea, no lower extremity edema, no palpitations Abd: intermittent nausea, no heartburn, intermittent diarrhea, no constipation, occasional abdominal pain described as cramps. Neuro: no headaches, no syncope or presyncopal episodes Endo: no polyuria, no polydipsia. : no hematuria, no dysuria, no frequency, no incontinence Heme: no lymphadenopathy, no easy bruising or bleeding, no night sweats MSK: no joint pain or swelling PE: 
 Visit Vitals /73 (BP 1 Location: Left arm, BP Patient Position: Sitting) Pulse 88 Temp 98.1 °F (36.7 °C) (Oral) Resp 14 Ht 5' 5\" (1.651 m) Wt 173 lb 6.4 oz (78.7 kg) LMP 05/29/1997 (Approximate) SpO2 97% BMI 28.86 kg/m² Gen: alert, oriented, no acute distress Head: normocephalic, atraumatic Ears: external auditory canals clear, TMs without erythema or effusion Eyes: pupils equal round reactive to light, sclera clear, conjunctiva clear Oral: moist mucus membranes, no oral lesions, no pharyngeal inflammation or exudate Neck: symmetric normal sized thyroid, no carotid bruits, no jugular vein distention Resp: no increase work of breathing, lungs clear to ausculation bilaterally, no wheezing, rales or rhonchi CV: S1, S2 normal.  No murmurs, rubs, or gallops. Abd: soft, not tender, not distended. No hepatosplenomegaly. Normal bowel sounds. No obvious  hernias. Neuro: cranial nerves intact, normal strength and movement in all extremities, reflexes and sensation intact and symmetric. Skin: no lesion or rash Extremities: no cyanosis or edema Assessment/Plan: 
Differential diagnosis and treatment options reviewed with patient who is in agreement with treatment plan as outlined below. ICD-10-CM ICD-9-CM 1. Hospital discharge follow-up Z09 V67.59 2. Liver function abnormality O01.1 259.6 METABOLIC PANEL, BASIC  
   HEPATIC FUNCTION PANEL  
   VT HANDLG&/OR CONVEY OF SPEC FOR TR OFFICE TO LAB  
   COLLECTION VENOUS BLOOD,VENIPUNCTURE 3. Diabetes mellitus, new onset ([facility] [place] [address] W08.5 801.09 METABOLIC PANEL, BASIC  
   insulin glargine (LANTUS,BASAGLAR) 100 unit/mL (3 mL) inpn Insulin Needles, Disposable, 30 gauge x 1/3\" glucose blood VI test strips (ASCENSIA AUTODISC VI, ONE TOUCH ULTRA TEST VI) strip  
   metFORMIN (GLUCOPHAGE) 500 mg tablet 4.  Pancreatic mass K86.9 577.9 CBC WITH AUTOMATED DIFF  
 labs today. Will call with results. She will go to have her procedure done Friday as scheduled. DM not controlled on current Metformin 500mg BID treatment and having GI side effects from second metformin dose. Discussed treatment options for DM to help with blood sugar control and less risk for hypoglycemia and nausea. Will start lantus insulin every tigist but do not start until after biopsy. Start either Saturday or Sunday (after feeling better from anesthesia) or if she has questions on how to use she will wait until Monday to start after discussing with me. Start with 10 units QHS and will take metformin once in AM, seems to tolerate AM dose of metformin. Follow up 1-2 weeks after starting insulin. She may need to return as nurse visit for insulin teaching if needed. Discussed BMI and healthy weight. Encouraged patient to work to implement changes including diet high in raw fruits and vegetables, lean protein and good fats. Limit refined, processed carbohydrates and sugar. I have discussed the diagnosis with the patient and the intended plan as seen in the above orders. The patient has received an after-visit summary and questions were answered concerning future plans. I have discussed medication side effects and warnings with the patient as well. The patient verbalizes understanding and agreement with the plan.

## 2019-05-29 NOTE — PROGRESS NOTES
Chief Complaint Patient presents with  Transitions Of Care D/C FROM [facility] ADMITTED FOR JAUNDICE, NAUSEA, ELEVATED BILIRUBIN  
 
1. Have you been to the ER, urgent care clinic since your last visit? Hospitalized since your last visit? Yes Yes, [facility] on file 2. Have you seen or consulted any other health care providers outside of the [address] [place] [address] since your last visit? Include any pap smears or colon screening. No  
Health Maintenance Due Topic Date Due  
 DTaP/Tdap/Td series (1 - Tdap) 07/17/1972  Shingrix Vaccine Age 50> (1 of 2) 07/17/2001  GLAUCOMA SCREENING Q2Y  07/17/2016  Bone Densitometry (Dexa) Screening  07/17/2016  Pneumococcal 65+ years (1 of 2 - PCV13) 07/17/2016  MEDICARE YEARLY EXAM  03/14/2018  BREAST CANCER SCRN MAMMOGRAM  01/03/2019 Patent had a double mastectomy. Do you have an [address] in place in the event that you have a healthcare crisis that could impact your decision making as it pertains to your health? NO Would you like information about Advance Care Planning? YES Information given.  YES

## 2019-05-30 ENCOUNTER — TELEPHONE (OUTPATIENT)
Dept: FAMILY MEDICINE CLINIC | Age: 69
End: 2019-05-30

## 2019-05-30 LAB
ALBUMIN SERPL-MCNC: 4.5 G/DL (ref 3.6–4.8)
ALP SERPL-CCNC: 84 IU/L (ref 39–117)
ALT SERPL-CCNC: 12 IU/L (ref 0–32)
AST SERPL-CCNC: 17 IU/L (ref 0–40)
BASOPHILS # BLD AUTO: 0 X10E3/UL (ref 0–0.2)
BASOPHILS NFR BLD AUTO: 1 %
BILIRUB DIRECT SERPL-MCNC: 0.13 MG/DL (ref 0–0.4)
BILIRUB SERPL-MCNC: 0.4 MG/DL (ref 0–1.2)
BUN SERPL-MCNC: 27 MG/DL (ref 8–27)
BUN/CREAT SERPL: 23 (ref 12–28)
CALCIUM SERPL-MCNC: 9.8 MG/DL (ref 8.7–10.3)
CHLORIDE SERPL-SCNC: 109 MMOL/L (ref 96–106)
CO2 SERPL-SCNC: 18 MMOL/L (ref 20–29)
CREAT SERPL-MCNC: 1.19 MG/DL (ref 0.57–1)
EOSINOPHIL # BLD AUTO: 0.1 X10E3/UL (ref 0–0.4)
EOSINOPHIL NFR BLD AUTO: 2 %
ERYTHROCYTE [DISTWIDTH] IN BLOOD BY AUTOMATED COUNT: 17.7 % (ref 12.3–15.4)
GLUCOSE SERPL-MCNC: 88 MG/DL (ref 65–99)
HCT VFR BLD AUTO: 38.2 % (ref 34–46.6)
HGB BLD-MCNC: 12 G/DL (ref 11.1–15.9)
IMM GRANULOCYTES # BLD AUTO: 0 X10E3/UL (ref 0–0.1)
IMM GRANULOCYTES NFR BLD AUTO: 0 %
INTERPRETATION: NORMAL
LYMPHOCYTES # BLD AUTO: 2.3 X10E3/UL (ref 0.7–3.1)
LYMPHOCYTES NFR BLD AUTO: 43 %
Lab: NORMAL
MCH RBC QN AUTO: 27.8 PG (ref 26.6–33)
MCHC RBC AUTO-ENTMCNC: 31.4 G/DL (ref 31.5–35.7)
MCV RBC AUTO: 88 FL (ref 79–97)
MONOCYTES # BLD AUTO: 0.5 X10E3/UL (ref 0.1–0.9)
MONOCYTES NFR BLD AUTO: 10 %
NEUTROPHILS # BLD AUTO: 2.3 X10E3/UL (ref 1.4–7)
NEUTROPHILS NFR BLD AUTO: 44 %
PLATELET # BLD AUTO: 332 X10E3/UL (ref 150–450)
POTASSIUM SERPL-SCNC: 6.2 MMOL/L (ref 3.5–5.2)
PROT SERPL-MCNC: 7.1 G/DL (ref 6–8.5)
RBC # BLD AUTO: 4.32 X10E6/UL (ref 3.77–5.28)
SODIUM SERPL-SCNC: 142 MMOL/L (ref 134–144)
WBC # BLD AUTO: 5.2 X10E3/UL (ref 3.4–10.8)

## 2019-05-30 NOTE — TELEPHONE ENCOUNTER
Talked to [facility] and they do not rosmery the insulin pen needles that were ordered. Gave GILLES from [facility] NP to use the brand they rosmery.

## 2019-06-03 NOTE — PROGRESS NOTES
Appeared to be a little dehydrated on her labs but her liver enzymes were so much better/normal now. Was she able to start the insulin yet?     Her blood sugar on her labs was 88 so normal.

## 2019-06-12 ENCOUNTER — OFFICE VISIT (OUTPATIENT)
Dept: FAMILY MEDICINE CLINIC | Age: 69
End: 2019-06-12

## 2019-06-12 VITALS
HEART RATE: 101 BPM | BODY MASS INDEX: 28.82 KG/M2 | TEMPERATURE: 98.4 F | WEIGHT: 173 LBS | SYSTOLIC BLOOD PRESSURE: 110 MMHG | HEIGHT: 65 IN | OXYGEN SATURATION: 98 % | DIASTOLIC BLOOD PRESSURE: 71 MMHG | RESPIRATION RATE: 17 BRPM

## 2019-06-12 DIAGNOSIS — C16.9 MALIGNANT NEOPLASM OF STOMACH, UNSPECIFIED LOCATION (HCC): ICD-10-CM

## 2019-06-12 DIAGNOSIS — E11.9 DIABETES MELLITUS, NEW ONSET (HCC): ICD-10-CM

## 2019-06-12 DIAGNOSIS — K86.89 PANCREATIC MASS: Primary | ICD-10-CM

## 2019-06-12 RX ORDER — ZINC GLUCONATE 10 MG
LOZENGE ORAL DAILY
COMMUNITY
End: 2019-07-08 | Stop reason: SDUPTHER

## 2019-06-12 RX ORDER — LISINOPRIL 20 MG/1
20 TABLET ORAL DAILY
COMMUNITY
Start: 2019-05-09 | End: 2019-07-08 | Stop reason: SDUPTHER

## 2019-06-12 RX ORDER — INSULIN GLARGINE 100 [IU]/ML
14 INJECTION, SOLUTION SUBCUTANEOUS
Qty: 3 PEN | Refills: 1
Start: 2019-06-12 | End: 2019-06-26

## 2019-06-12 NOTE — PATIENT INSTRUCTIONS

## 2019-06-12 NOTE — PROGRESS NOTES
Chief Complaint   Patient presents with    Diabetes     2 week f/u        S: Nico Lloyd is a 80 y.o. yo female who presents for DM follow up. Started lantus 10 units QHS on 6/3/19, no problems giving injections. Blood sugars in AM have been pretty good, ranging 130-170 (usually 140s) and PM (2 hours after dinner) blood sugars are little higher ranging 170-250). No low blood sugars   Taking metformin 500 mg in AM with breakfast.  Still having a little GI upset but thinks that is related to her cancer diagnosis, usually in AM, no diarrhea and no vomiting and symptoms do no not usually occur after taking metformin. Diet- trying to eat small, healthy meals. Drinking some water 16 ounce bottles x3-4 per day. Heme/Onc:  Had biopsy of stomach done at [facility] on 5/31/19 (couldn't do pancreatic head biopsy because \"wrapped up in a bunch of blood vessels and he was afraid he would hit one\". Patient and daughter says that they were notified that the biopsy was positive for cancer. She has appointment with oncologist on 6/18/19, Dr Vandana Cortez.  They do not know current plan of care because they have not seen oncologist yet. Has appointment with surgery this Friday, may need port put back in. Health Maintenance Reviewed    Denies cardiac complaints including chest pain or discomfort, elevated heart rate, or palpitations. Denies any headache, vision changes, numbness and tingling or weakness in her extremities. Denies respiratory complaints including SOB, difficulty or pain with breathing, wheezes, and cough. Feels well and ROS is otherwise negative.      Past Medical History:   Diagnosis Date    Cancer [facility]) 2003    right breast cancer    Psychiatric disorder     anxiety      Past Surgical History:   Procedure Laterality Date    BREAST SURGERY PROCEDURE UNLISTED  2003    right breast lumpectomy    COLONOSCOPY N/A 4/11/2019    COLONOSCOPY performed by Dante Lundberg MD at [facility] ENDOSCOPY    HX MASTECTOMY Bilateral 6/29/2017    RIGHT AND LEFT MASTECTOMY, RIGHT AXILLARY SENTINAL LYMPH NODE BIOPSY performed by Etta Nicole MD at [facility] MAIN OR    HX OTHER SURGICAL  04/26/2019    port removal     HX VASCULAR ACCESS      left chest portacath     Social History     Socioeconomic History    Marital status:      Spouse name: Not on file    Number of children: Not on file    Years of education: Not on file    Highest education level: Not on file   Occupational History    Not on file   Social Needs    Financial resource strain: Not on file    Food insecurity:     Worry: Not on file     Inability: Not on file    Transportation needs:     Medical: Not on file     Non-medical: Not on file   Tobacco Use    Smoking status: Never Smoker    Smokeless tobacco: Never Used   Substance and Sexual Activity    Alcohol use: No    Drug use: No    Sexual activity: Yes   Lifestyle    Physical activity:     Days per week: Not on file     Minutes per session: Not on file    Stress: Not on file   Relationships    Social connections:     Talks on phone: Not on file     Gets together: Not on file     Attends Religion service: Not on file     Active member of club or organization: Not on file     Attends meetings of clubs or organizations: Not on file     Relationship status: Not on file    Intimate partner violence:     Fear of current or ex partner: Not on file     Emotionally abused: Not on file     Physically abused: Not on file     Forced sexual activity: Not on file   Other Topics Concern    Not on file   Social History Narrative    Not on file     Current Outpatient Medications   Medication Sig Dispense Refill    magnesium 250 mg tab Take  by mouth.  lisinopril (PRINIVIL, ZESTRIL) 20 mg tablet Take 20 mg by mouth.       ONETOUCH ULTRAMINI monitoring kit use as directed TO CHECK BLOOD SUGAR THREE TIMES DAILY  0    ONE TOUCH DELICA 33 gauge misc use three times a day  0    lidocaine-EPINEPHrine (XYLOCAINE) 1 %-1:100,000 injection   0    insulin glargine (LANTUS,BASAGLAR) 100 unit/mL (3 mL) inpn 10 Units by SubCUTAneous route nightly. 3 Pen 1    Insulin Needles, Disposable, 30 gauge x 1/3\" To use daily with lantus 100 Package 11    glucose blood VI test strips (ASCENSIA AUTODISC VI, ONE TOUCH ULTRA TEST VI) strip Check blood sugars 3 times per day 100 Strip 11    metFORMIN (GLUCOPHAGE) 500 mg tablet Take 1 Tab by mouth daily (with breakfast). 90 Tab 0    ibuprofen (ADVIL) 200 mg tablet Take 200 mg by mouth every four (4) hours as needed for Pain.  anastrozole (ARIMIDEX) 1 mg tablet Take 1 mg by mouth daily.  denosumab (PROLIA) 60 mg/mL injection 60 mg by SubCUTAneous route every 6 months. At [facility]      multivitamin (ONE A DAY) tablet Take 1 Tab by mouth daily (with lunch).  aspirin delayed-release 81 mg tablet Take 81 mg by mouth daily.  omega-3 fatty acids-vitamin e (FISH OIL) 1,000 mg cap Take 2 Caps by mouth daily (with lunch).  CALCIUM CARBONATE/VITAMIN D3 (CALTRATE 600 + D PO) Take 2 Tabs by mouth daily (with lunch). Pt is taking all medications as prescribed without any side effects or difficulty. No Known Allergies      Agree with nurses note. O:   Visit Vitals  /71 (BP 1 Location: Left arm, BP Patient Position: Sitting)   Pulse (!) 101   Temp 98.4 °F (36.9 °C) (Oral)   Resp 17   Ht 5' 5\" (1.651 m)   Wt 173 lb (78.5 kg)   LMP 05/29/1997 (Approximate)   SpO2 98%   BMI 28.79 kg/m²      PAIN: No complaints of pain today. GENERAL: Cary Carbajal  is sitting in the chair in NAD.   EYE: PERRLA. EOMs intact. Sclera anicteric without injection. RESP: Unlabored without SOB. Speaking in full sentences. Breath sounds are symmetrical bilaterally. Clear to auscultation to all fields. No wheezes. No rales or rhonchi. CV: normal rate. Regular rhythm. S1, S2 audible. No murmur noted.   No rubs, clicks or gallops noted.  NEURO:  awake, alert and oriented to person, place, and time and event. Clear speech. Muscle strength is +5/5 x 4 extremities. Steady gait. HEME/LYMPH: peripheral pulses palpable 2+ x 4 extremities. No peripheral edema is noted. FEET: Intact no wounds or abrasions. Denies numbness or tingling. Sensation intact    Results for orders placed or performed in visit on 14/62/26   METABOLIC PANEL, BASIC   Result Value Ref Range    Glucose 88 65 - 99 mg/dL    BUN 27 8 - 27 mg/dL    Creatinine 1.19 (H) 0.57 - 1.00 mg/dL    GFR est non-AA 47 (L) >59 mL/min/1.73    GFR est AA 55 (L) >59 mL/min/1.73    BUN/Creatinine ratio 23 12 - 28    Sodium 142 134 - 144 mmol/L    Potassium 6.2 (H) 3.5 - 5.2 mmol/L    Chloride 109 (H) 96 - 106 mmol/L    CO2 18 (L) 20 - 29 mmol/L    Calcium 9.8 8.7 - 10.3 mg/dL   HEPATIC FUNCTION PANEL   Result Value Ref Range    Protein, total 7.1 6.0 - 8.5 g/dL    Albumin 4.5 3.6 - 4.8 g/dL    Bilirubin, total 0.4 0.0 - 1.2 mg/dL    Bilirubin, direct 0.13 0.00 - 0.40 mg/dL    Alk. phosphatase 84 39 - 117 IU/L    AST (SGOT) 17 0 - 40 IU/L    ALT (SGPT) 12 0 - 32 IU/L   CBC WITH AUTOMATED DIFF   Result Value Ref Range    WBC 5.2 3.4 - 10.8 x10E3/uL    RBC 4.32 3.77 - 5.28 x10E6/uL    HGB 12.0 11.1 - 15.9 g/dL    HCT 38.2 34.0 - 46.6 %    MCV 88 79 - 97 fL    MCH 27.8 26.6 - 33.0 pg    MCHC 31.4 (L) 31.5 - 35.7 g/dL    RDW 17.7 (H) 12.3 - 15.4 %    PLATELET 021 301 - 881 x10E3/uL    NEUTROPHILS 44 Not Estab. %    Lymphocytes 43 Not Estab. %    MONOCYTES 10 Not Estab. %    EOSINOPHILS 2 Not Estab. %    BASOPHILS 1 Not Estab. %    ABS. NEUTROPHILS 2.3 1.4 - 7.0 x10E3/uL    Abs Lymphocytes 2.3 0.7 - 3.1 x10E3/uL    ABS. MONOCYTES 0.5 0.1 - 0.9 x10E3/uL    ABS. EOSINOPHILS 0.1 0.0 - 0.4 x10E3/uL    ABS. BASOPHILS 0.0 0.0 - 0.2 x10E3/uL    IMMATURE GRANULOCYTES 0 Not Estab. %    ABS. IMM.  GRANS. 0.0 0.0 - 0.1 x10E3/uL   CKD REPORT   Result Value Ref Range    Interpretation Note    DIABETES PATIENT EDUCATION   Result Value Ref Range    PDF Image Not applicable          A/P:  Differential diagnosis and treatment options reviewed with patient who is in agreement with treatment plan as outlined below. ICD-10-CM ICD-9-CM    1. Pancreatic mass K86.9 577.9    2. Diabetes mellitus, new onset (HCC) E11.9 250.00 insulin glargine (LANTUS,BASAGLAR) 100 unit/mL (3 mL) inpn      glucose blood VI test strips (ASCENSIA AUTODISC VI, ONE TOUCH ULTRA TEST VI) strip   3. Malignant neoplasm of stomach, unspecified location ([address] C16.9 151.9      I have requested notes and results from heme/onc. She will continue to follow up as planned with them, unsure what her treatment plan consists of at this time. DM is much better, night time still little higher than I would like. Will increase lantus to 14 units QHS and see her back in couple weeks. Will check labs then. She will call if low blood sugars or any other complaints. Discussed BMI and healthy weight. Encouraged patient to work to implement changes including diet high in raw fruits and vegetables, lean protein and good fats. Limit refined, processed carbohydrates and sugar. Encouraged regular exercise. Advised of frequent feet checks  Advised yearly eye exam  Reviewed warning signs of diabetic emergency, hypertension, stroke and heart attack     Verbal and written instructions (see AVS) provided. Patient expresses understanding and agreement of diagnosis and treatment plan.

## 2019-06-14 ENCOUNTER — OFFICE VISIT (OUTPATIENT)
Dept: SURGERY | Age: 69
End: 2019-06-14

## 2019-06-14 VITALS
HEART RATE: 110 BPM | BODY MASS INDEX: 28.19 KG/M2 | OXYGEN SATURATION: 99 % | RESPIRATION RATE: 20 BRPM | DIASTOLIC BLOOD PRESSURE: 73 MMHG | WEIGHT: 169.2 LBS | SYSTOLIC BLOOD PRESSURE: 131 MMHG | TEMPERATURE: 97.9 F | HEIGHT: 65 IN

## 2019-06-14 DIAGNOSIS — K86.89 PANCREATIC MASS: Primary | ICD-10-CM

## 2019-06-14 DIAGNOSIS — C16.9 GASTRIC ADENOCARCINOMA (HCC): ICD-10-CM

## 2019-06-14 NOTE — H&P (VIEW-ONLY)
HISTORY OF PRESENT ILLNESS Presley Crawley is a 80 y.o. female who comes in for evaluation after recent hospitalization for obstructive jaundice Breast Cancer Pertinent negatives include no chest pain, no abdominal pain, no headaches and no shortness of breath. Breast Problem Pertinent negatives include no chest pain, no abdominal pain, no headaches and no shortness of breath. She presented with obstructive jaundice 5/16/2019 and was found to have a pancreatic mass. Dr Brenda Ayers did an ERCP and brushings with stent placement and the jaundice cleared. The brushings were negative. 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 There were too many vessels near the pancreatic mass to do a biopsy. She is to see Dr Pancho Murray next week to discuss options. She went for routine screening mammogram in late Dec 2016 and was noted to have a developing density in the outer right breast.  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 She had a breast MRI suggesting the area to be 2.3 cm or two separate lesions. She was also noted to have two foci in the left breast for which MRI biopsy was recommended. She denies feeling any masses, skin changes, nipple changes, unexplained weight loss or bone pain. She previously underwent a right lumpectomy and ALND followed by chemotherapy Dortha Pea) and WBRT in 2003 for a ??stage 1-2 triple negative breast cancer. She has had other bilateral breast biopsies as well. She had menarche at 16, first of two pregnancies at 35, menopause at 51 and did not take HRT. She denies family hx of breast or ovarian cancer but her mother had kidney cancer and her brother had lung cancer.   She has been received neoadjuvant chemotherapy by Dr Zaragoza Mom. She is having ongoing herceptin. She underwent a right mastectomy/SLNB and left mastectomy 6/2017 with pCR. She completed adjuvant herceptin as well. She has switched to Dr Collette Lucero as Dr Zaragoza Mom retired. Past Medical History:  
Diagnosis Date  Cancer [facility]) 2003  
 right breast cancer  Psychiatric disorder   
 anxiety Past Surgical History:  
Procedure Laterality Date  BREAST SURGERY PROCEDURE UNLISTED  2003  
 right breast lumpectomy  COLONOSCOPY N/A 4/11/2019 COLONOSCOPY performed by Christine Lowe MD at [facility] ENDOSCOPY  
 HX MASTECTOMY Bilateral 6/29/2017 RIGHT AND LEFT MASTECTOMY, RIGHT AXILLARY SENTINAL LYMPH NODE BIOPSY performed by Alison Gonzalez MD at [facility] MAIN OR  
 HX OTHER SURGICAL  04/26/2019  
 port removal   
 HX VASCULAR ACCESS    
 left chest portacath Family History Problem Relation Age of Onset  Cancer Mother   
     kidney  Cancer Brother   
     lung  Heart Disease Father  Diabetes Sister  Diabetes Brother Social History Tobacco Use  Smoking status: Never Smoker  Smokeless tobacco: Never Used Substance Use Topics  Alcohol use: No  
 Drug use: No  
 
Current Outpatient Medications Medication Sig  
 magnesium 250 mg tab Take  by mouth.  lisinopril (PRINIVIL, ZESTRIL) 20 mg tablet Take 20 mg by mouth.  insulin glargine (LANTUS,BASAGLAR) 100 unit/mL (3 mL) inpn 14 Units by SubCUTAneous route nightly.  glucose blood VI test strips (ASCENSIA AUTODISC VI, ONE TOUCH ULTRA TEST VI) strip Check blood sugars 3 times per day. DX code E11.9  
 ONETOUCH ULTRAMINI monitoring kit use as directed TO CHECK BLOOD SUGAR THREE TIMES DAILY  ONE TOUCH DELICA 33 gauge misc use three times a day  lidocaine-EPINEPHrine (XYLOCAINE) 1 %-1:100,000 injection  Insulin Needles, Disposable, 30 gauge x 1/3\" To use daily with lantus  metFORMIN (GLUCOPHAGE) 500 mg tablet Take 1 Tab by mouth daily (with breakfast).  ibuprofen (ADVIL) 200 mg tablet Take 200 mg by mouth every four (4) hours as needed for Pain.  anastrozole (ARIMIDEX) 1 mg tablet Take 1 mg by mouth daily.  denosumab (PROLIA) 60 mg/mL injection 60 mg by SubCUTAneous route every 6 months. At [facility]  multivitamin (ONE A DAY) tablet Take 1 Tab by mouth daily (with lunch).  aspirin delayed-release 81 mg tablet Take 81 mg by mouth daily.  omega-3 fatty acids-vitamin e (FISH OIL) 1,000 mg cap Take 2 Caps by mouth daily (with lunch).  CALCIUM CARBONATE/VITAMIN D3 (CALTRATE 600 + D PO) Take 2 Tabs by mouth daily (with lunch). No current facility-administered medications for this visit. No Known Allergies Review of Systems Constitutional: Negative for chills, diaphoresis, fever, malaise/fatigue and weight loss. HENT: Negative for congestion, ear pain and sore throat. Eyes: Negative for blurred vision and pain. Respiratory: Negative for cough, hemoptysis, sputum production, shortness of breath, wheezing and stridor. Cardiovascular: Negative for chest pain, palpitations, orthopnea, claudication, leg swelling and PND. Gastrointestinal: Negative for abdominal pain, blood in stool, constipation, diarrhea, heartburn, melena, nausea and vomiting. Genitourinary: Negative for dysuria, flank pain, frequency, hematuria and urgency. Musculoskeletal: Negative for back pain, joint pain, myalgias and neck pain. Skin: Negative for itching and rash. Neurological: Negative for dizziness, tremors, focal weakness, seizures, weakness and headaches. Endo/Heme/Allergies: Negative for polydipsia. Psychiatric/Behavioral: Negative for depression and memory loss. The patient is nervous/anxious. Blood pressure 131/73, pulse (!) 110, temperature 97.9 °F (36.6 °C), resp.  rate 20, height 5' 5\" (1.651 m), weight 76.7 kg (169 lb 3.2 oz), last menstrual period 05/29/1997, SpO2 99 %. Physical Exam  
Constitutional: She is oriented to person, place, and time. She appears well-developed and well-nourished. No distress. HENT:  
Head: Normocephalic and atraumatic. Mouth/Throat: Oropharynx is clear and moist. No oropharyngeal exudate. Eyes: Pupils are equal, round, and reactive to light. Conjunctivae and EOM are normal. No scleral icterus. Neck: Normal range of motion. Neck supple. No JVD present. No tracheal deviation present. No thyromegaly present. Cardiovascular: Normal rate and regular rhythm. Exam reveals no gallop and no friction rub. No murmur heard. Pulmonary/Chest: Effort normal and breath sounds normal. No respiratory distress. She has no wheezes. She has no rales. Right breast exhibits no mass, no skin change and no tenderness. Left breast exhibits no mass, no skin change and no tenderness. Breasts are symmetrical (bilateral amastia). Abdominal: Soft. Bowel sounds are normal. She exhibits no distension and no mass. There is no hepatosplenomegaly. There is no tenderness. There is no rebound, no guarding and no CVA tenderness. No hernia. Hernia confirmed negative in the ventral area. Musculoskeletal: Normal range of motion. She exhibits no edema. Lymphadenopathy:  
  She has no cervical adenopathy. She has no axillary adenopathy. Right: No supraclavicular adenopathy present. Left: No supraclavicular adenopathy present. Neurological: She is alert and oriented to person, place, and time. No cranial nerve deficit. Skin: Skin is warm and dry. No rash noted. She is not diaphoretic. No erythema. No pallor. Psychiatric: She has a normal mood and affect. Her behavior is normal. Judgment and thought content normal.  
 
 
ASSESSMENT and PLAN 1. Right breast cancer early stage, Clinically stage 2 (T2NxM0)  ER 98% IA 83% and her2/wil amplified dx 1/2017. SELENE 2 year and 1  months Continue arimidex Keep f/u with Dr Jules Joe 
2. Hx right breast cancer in 2003 triple negative (treated by Dr Jessica Reaves). This is unrelated to the new cancer 3. Right axillary hematoma/seroma   Minimal residual 
4. High risk for genetic mutation due to triple negative breast ca at age 49 and now a metachronous breast cancer. My Risk genetic testing negative 5. Gastric adenocarcinoma and Pancreatic mass with biliary obstruction but this is due to gastric cancer-adenocarcinoma with signet ring cells and possible pancreatic ca. Very unusual to have two separate synchronous malignancies. Will let Dr Jules Joe discuss chemotherapy. I will place a left sided port a cath under MAC next week if she desires. Risks and benefits were discussed including, but not limited to, bleeding, infection, pneumothorax, hemothorax, DVT, port malfunction, etc. 
 
6.  Offered lymphedema appt but she declined. She is currently at higher risk but asymptomatic 7. Colon screening. She wishes to go to GI 
DME surgical bras (she does not want new prosthetics at this time) She desires a left sided port a cath insertion with fluoroscopy under MAC as an outpatient RTC  Post op Asad Wagoner MD FACS

## 2019-06-14 NOTE — PROGRESS NOTES
HISTORY OF PRESENT ILLNESS  Aleah Hernandez is a 80 y.o. female who comes in for evaluation after recent hospitalization for obstructive jaundice  Breast Cancer   Pertinent negatives include no chest pain, no abdominal pain, no headaches and no shortness of breath. Breast Problem   Pertinent negatives include no chest pain, no abdominal pain, no headaches and no shortness of breath. She presented with obstructive jaundice 5/16/2019 and was found to have a pancreatic mass. Dr Uziel Zambrano did an ERCP and brushings with stent placement and the jaundice cleared. The brushings were negative. 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 There were too many vessels near the pancreatic mass to do a biopsy. She is to see Dr Joel Barrientos next week to discuss options. She went for routine screening mammogram in late Dec 2016 and was noted to have a developing density in the outer right breast.  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 She had a breast MRI suggesting the area to be 2.3 cm or two separate lesions. She was also noted to have two foci in the left breast for which MRI biopsy was recommended. She denies feeling any masses, skin changes, nipple changes, unexplained weight loss or bone pain. She previously underwent a right lumpectomy and ALND followed by chemotherapy Krishna Mills) and WBRT in 2003 for a ??stage 1-2 triple negative breast cancer. She has had other bilateral breast biopsies as well. She had menarche at 16, first of two pregnancies at 35, menopause at 51 and did not take HRT. She denies family hx of breast or ovarian cancer but her mother had kidney cancer and her brother had lung cancer. She has been received neoadjuvant chemotherapy by Dr Francisco Clark.   She is having ongoing herceptin. She underwent a right mastectomy/SLNB and left mastectomy 6/2017 with pCR. She completed adjuvant herceptin as well. She has switched to Dr Audra Ordoñez as Dr Huey Olszewski retired. Past Medical History:   Diagnosis Date    Cancer [facility]) 2003    right breast cancer    Psychiatric disorder     anxiety     Past Surgical History:   Procedure Laterality Date    BREAST SURGERY PROCEDURE UNLISTED  2003    right breast lumpectomy    COLONOSCOPY N/A 4/11/2019    COLONOSCOPY performed by Jenn Perales MD [facility] ENDOSCOPY    HX MASTECTOMY Bilateral 6/29/2017    RIGHT AND LEFT MASTECTOMY, RIGHT AXILLARY SENTINAL LYMPH NODE BIOPSY performed by Severo Sloan, MD at [facility] MAIN OR    HX OTHER SURGICAL  04/26/2019    port removal     HX VASCULAR ACCESS      left chest portacath     Family History   Problem Relation Age of Onset    Cancer Mother         kidney    Cancer Brother         lung    Heart Disease Father     Diabetes Sister     Diabetes Brother      Social History     Tobacco Use    Smoking status: Never Smoker    Smokeless tobacco: Never Used   Substance Use Topics    Alcohol use: No    Drug use: No     Current Outpatient Medications   Medication Sig    magnesium 250 mg tab Take  by mouth.  lisinopril (PRINIVIL, ZESTRIL) 20 mg tablet Take 20 mg by mouth.  insulin glargine (LANTUS,BASAGLAR) 100 unit/mL (3 mL) inpn 14 Units by SubCUTAneous route nightly.  glucose blood VI test strips (ASCENSIA AUTODISC VI, ONE TOUCH ULTRA TEST VI) strip Check blood sugars 3 times per day. DX code E11.9    ONETOUCH ULTRAMINI monitoring kit use as directed TO CHECK BLOOD SUGAR THREE TIMES DAILY    ONE TOUCH DELICA 33 gauge misc use three times a day    lidocaine-EPINEPHrine (XYLOCAINE) 1 %-1:100,000 injection     Insulin Needles, Disposable, 30 gauge x 1/3\" To use daily with lantus    metFORMIN (GLUCOPHAGE) 500 mg tablet Take 1 Tab by mouth daily (with breakfast).     ibuprofen (ADVIL) 200 mg tablet Take 200 mg by mouth every four (4) hours as needed for Pain.  anastrozole (ARIMIDEX) 1 mg tablet Take 1 mg by mouth daily.  denosumab (PROLIA) 60 mg/mL injection 60 mg by SubCUTAneous route every 6 months. At [facility]    multivitamin (ONE A DAY) tablet Take 1 Tab by mouth daily (with lunch).  aspirin delayed-release 81 mg tablet Take 81 mg by mouth daily.  omega-3 fatty acids-vitamin e (FISH OIL) 1,000 mg cap Take 2 Caps by mouth daily (with lunch).  CALCIUM CARBONATE/VITAMIN D3 (CALTRATE 600 + D PO) Take 2 Tabs by mouth daily (with lunch). No current facility-administered medications for this visit. No Known Allergies    Review of Systems   Constitutional: Negative for chills, diaphoresis, fever, malaise/fatigue and weight loss. HENT: Negative for congestion, ear pain and sore throat. Eyes: Negative for blurred vision and pain. Respiratory: Negative for cough, hemoptysis, sputum production, shortness of breath, wheezing and stridor. Cardiovascular: Negative for chest pain, palpitations, orthopnea, claudication, leg swelling and PND. Gastrointestinal: Negative for abdominal pain, blood in stool, constipation, diarrhea, heartburn, melena, nausea and vomiting. Genitourinary: Negative for dysuria, flank pain, frequency, hematuria and urgency. Musculoskeletal: Negative for back pain, joint pain, myalgias and neck pain. Skin: Negative for itching and rash. Neurological: Negative for dizziness, tremors, focal weakness, seizures, weakness and headaches. Endo/Heme/Allergies: Negative for polydipsia. Psychiatric/Behavioral: Negative for depression and memory loss. The patient is nervous/anxious. Blood pressure 131/73, pulse (!) 110, temperature 97.9 °F (36.6 °C), resp. rate 20, height 5' 5\" (1.651 m), weight 76.7 kg (169 lb 3.2 oz), last menstrual period 05/29/1997, SpO2 99 %.       Physical Exam   Constitutional: She is oriented to person, place, and time. She appears well-developed and well-nourished. No distress. HENT:   Head: Normocephalic and atraumatic. Mouth/Throat: Oropharynx is clear and moist. No oropharyngeal exudate. Eyes: Pupils are equal, round, and reactive to light. Conjunctivae and EOM are normal. No scleral icterus. Neck: Normal range of motion. Neck supple. No JVD present. No tracheal deviation present. No thyromegaly present. Cardiovascular: Normal rate and regular rhythm. Exam reveals no gallop and no friction rub. No murmur heard. Pulmonary/Chest: Effort normal and breath sounds normal. No respiratory distress. She has no wheezes. She has no rales. Right breast exhibits no mass, no skin change and no tenderness. Left breast exhibits no mass, no skin change and no tenderness. Breasts are symmetrical (bilateral amastia). Abdominal: Soft. Bowel sounds are normal. She exhibits no distension and no mass. There is no hepatosplenomegaly. There is no tenderness. There is no rebound, no guarding and no CVA tenderness. No hernia. Hernia confirmed negative in the ventral area. Musculoskeletal: Normal range of motion. She exhibits no edema. Lymphadenopathy:     She has no cervical adenopathy. She has no axillary adenopathy. Right: No supraclavicular adenopathy present. Left: No supraclavicular adenopathy present. Neurological: She is alert and oriented to person, place, and time. No cranial nerve deficit. Skin: Skin is warm and dry. No rash noted. She is not diaphoretic. No erythema. No pallor. Psychiatric: She has a normal mood and affect. Her behavior is normal. Judgment and thought content normal.       ASSESSMENT and PLAN  1. Right breast cancer early stage, Clinically stage 2 (T2NxM0)  ER 98% [place] 83% and her2/wil amplified dx 1/2017. SELENE 2 year and 1  months    Continue arimidex  Keep f/u with Dr Meek Farris  2.   Hx right breast cancer in 2003 triple negative (treated by  Bobbi Adam). This is unrelated to the new cancer  3. Right axillary hematoma/seroma   Minimal residual  4. High risk for genetic mutation due to triple negative breast ca at age 49 and now a metachronous breast cancer. My Risk genetic testing negative    5. Gastric adenocarcinoma and Pancreatic mass with biliary obstruction but this is due to gastric cancer-adenocarcinoma with signet ring cells and possible pancreatic ca. Very unusual to have two separate synchronous malignancies. Will let Dr Marivel Garcia discuss chemotherapy. I will place a left sided port a cath under MAC next week if she desires. Risks and benefits were discussed including, but not limited to, bleeding, infection, pneumothorax, hemothorax, DVT, port malfunction, etc.    6.  Offered lymphedema appt but she declined. She is currently at higher risk but asymptomatic  7. Colon screening.   She wishes to go to GI  DME surgical bras (she does not want new prosthetics at this time)    She desires a left sided port a cath insertion with fluoroscopy under MAC as an outpatient    RTC  Post op  Yoel Cohen MD FACS

## 2019-06-14 NOTE — PATIENT INSTRUCTIONS
Surgery Instruction Sheet    You have been scheduled for surgery on 06/20/2019 at 7:30am at [facility]. Please report to the Surgery Center at 5:45am, this is approximately 2 hours prior to your surgery time. The Surgery Center is located on the [address] [place] [address] Street side of the hospital, just next to the Emergency Room. Reserved parking is available and  parking if lot is full. You will not need to have a pre-op visit before surgery, but the Pre-op Nurse will call you before surgery. The Pre-op nurse will review your medical history, medications and give you additional instructions. They will also confirm your arrival time. Call your physician immediately if you notice a change in your health between the time you saw your physician and the day of surgery. If you take a blood thinner, please let us know. Call your ordering Doctor to make sure you can stop taking it prior to your surgery. STOP YOUR ASPIRIN 10 DAYS PRIOR TO SURGERY. DO NOT TAKE  IBUPROFEN, ADVIL, MOTRIN, ALEVE, EXCEDRIN, BC POWDER, GOODIES, FISH OIL OR ANY MEDICATION CONTAINING ASPIRIN 10 DAYS PRIOR TO YOUR SURGERY. MAY TAKE TYLENOL. Eat a light dinner the evening before your surgery. DO NOT EAT OR DRINK ANYTHING AFTER MIDNIGHT THE NIGHT BEFORE YOUR SURGERY. This includes water, chewing gum, lifesavers, etc.  The Pre op nurse will check with you about any medication that you may need to take the morning of surgery. Shower with a new bar of anti-bacterial soap (Dial, Safeguard) or solution given to you by Pre-op, the night before surgery. Do not use lotion, powder, deodorant on the skin after showering.   Wear loose, comfortable clothing the day of surgery and bring a container to store your contacts, eyeglasses, dentures, hearing aid, etc.  Do not bring money, valuables, jewelry, etc. to the hospital.      If you are having outpatient surgery, someone must come with you the morning of surgery to drive you home. You can not drive for 24 hours after any anesthesia. Sometimes it is necessary to stay overnight and leave the next morning. This is still considered outpatient for most insurance deductibles. Someone will still need to drive you home. If you have questions or concerns, please feel free to call Dr Dickson Obrien at [phone]. If you need to cancel your surgery, please call as soon as possible.

## 2019-06-17 NOTE — PERIOP NOTES
[facility] Preoperative Instructions Surgery Date 6/20/19          Time of Arrival 0545 am   Contact # [phone] 1. On the day of your surgery, please report to the Surgical Services Registration Desk and sign in at your designated time. The Surgery Center is located to the right of the Emergency Room. 2. You must have someone with you to drive you home. You should not drive a car for 24 hours following surgery. Please make arrangements for a friend or family member to stay with you for the first 24 hours after your surgery. 3. Do not have anything to eat or drink (including water, gum, mints, coffee, juice) after midnight ? 6/19/19? Kristofer Lopez ? This may not apply to medications prescribed by your physician. ?(Please note below the special instructions with medications to take the morning of your procedure.) 4. We recommend you do not drink any alcoholic beverages for 24 hours before and after your surgery. 5. Contact your surgeons office for instructions on the following medications: non-steroidal anti-inflammatory drugs (i.e. Advil, Aleve), vitamins, and supplements. (Some surgeons will want you to stop these medications prior to surgery and others may allow you to take them) **If you are currently taking Plavix, Coumadin, Aspirin and/or other blood-thinning agents, contact your surgeon for instructions. ** Your surgeon will partner with the physician prescribing these medications to determine if it is safe to stop or if you need to continue taking. Please do not stop taking these medications without instructions from your surgeon 6. Wear comfortable clothes. Wear glasses instead of contacts. Do not bring any money or jewelry. Please bring picture ID, insurance card, and any prearranged co-payment or hospital payment. Do not wear make-up, particularly mascara the morning of your surgery.   Do not wear nail polish, particularly if you are having foot /hand surgery. Wear your hair loose or down, no ponytails, buns, elena pins or clips. All body piercings must be removed. Please shower with antibacterial soap for three consecutive days before and on the morning of surgery, but do not apply any lotions, powders or deodorants after the shower on the day of surgery. Please use a fresh towels after each shower. Please sleep in clean clothes and change bed linens the night before surgery. Please do not shave for 48 hours prior to surgery. Shaving of the face is acceptable. 7. You should understand that if you do not follow these instructions your surgery may be cancelled. If your physical condition changes (I.e. fever, cold or flu) please contact your surgeon as soon as possible. 8. It is important that you be on time. If a situation occurs where you may be late, please call [phone] (OR Holding Area). 9. If you have any questions and or problems, please call [phone] (Pre-admission Testing). 10. Your surgery time may be subject to change. You will receive a phone call the evening prior if your time changes. 11.  If having outpatient surgery, you must have someone to drive you here, stay with you during the duration of your stay, and to drive you home at time of discharge. 12.   In an effort to improve the efficiency, privacy, and safety for all of our Pre-op patients visitors are not allowed in the Holding area. Once you arrive and are registered your family/visitors will be asked to remain in the waiting room. The Pre-op staff will get you from the Surgical Waiting Area and will explain to you and your family/visitors that the Pre-op phase is beginning. The staff will answer any questions and provide instructions for tracking of the patient, by use of the existing tracking number and color-coded status board in the waiting room.   At this time the staff will also ask for your designated spokesperson information in the event that the physician or staff need to provide an update or obtain any pertinent information. The designated spokesperson will be notified if the physician needs to speak to family during the pre-operative phase. If at any time your family/visitors has questions or concerns they may approach the volunteer desk in the waiting area for assistance. Special Instructions: MEDICATIONS TO TAKE THE MORNING OF SURGERY WITH A SIP OF WATER: Arimidex I understand a pre-operative phone call will be made to verify my surgery time. In the event that I am not available, I give permission for a message to be left on my answering service and/or with another person? yes 
 
 
 
 ___________________      __________   _________ 
  (Signature of Patient)             (Witness)                (Date and Time)

## 2019-06-20 ENCOUNTER — APPOINTMENT (OUTPATIENT)
Dept: GENERAL RADIOLOGY | Age: 69
End: 2019-06-20
Attending: SURGERY
Payer: MEDICARE

## 2019-06-20 ENCOUNTER — ANESTHESIA (OUTPATIENT)
Dept: SURGERY | Age: 69
End: 2019-06-20
Payer: MEDICARE

## 2019-06-20 ENCOUNTER — HOSPITAL ENCOUNTER (OUTPATIENT)
Age: 69
Setting detail: OUTPATIENT SURGERY
Discharge: HOME | End: 2019-06-20
Attending: SURGERY | Admitting: SURGERY
Payer: MEDICARE

## 2019-06-20 ENCOUNTER — ANESTHESIA EVENT (OUTPATIENT)
Dept: SURGERY | Age: 69
End: 2019-06-20
Payer: MEDICARE

## 2019-06-20 VITALS
TEMPERATURE: 98 F | BODY MASS INDEX: 27.55 KG/M2 | RESPIRATION RATE: 16 BRPM | OXYGEN SATURATION: 100 % | SYSTOLIC BLOOD PRESSURE: 151 MMHG | HEART RATE: 80 BPM | DIASTOLIC BLOOD PRESSURE: 54 MMHG | HEIGHT: 65 IN | WEIGHT: 165.34 LBS

## 2019-06-20 DIAGNOSIS — C16.9 GASTRIC ADENOCARCINOMA (HCC): Primary | ICD-10-CM

## 2019-06-20 LAB
ANION GAP BLD CALC-SCNC: 13 MMOL/L (ref 10–20)
BUN BLD-MCNC: 11 MG/DL (ref 9–20)
CA-I BLD-MCNC: 1.27 MMOL/L (ref 1.12–1.32)
CHLORIDE BLD-SCNC: 102 MMOL/L (ref 98–107)
CO2 BLD-SCNC: 26 MMOL/L (ref 21–32)
CREAT BLD-MCNC: 0.6 MG/DL (ref 0.6–1.3)
GLUCOSE BLD STRIP.AUTO-MCNC: 112 MG/DL (ref 65–100)
GLUCOSE BLD-MCNC: 136 MG/DL (ref 65–100)
HCT VFR BLD CALC: 40 % (ref 35–47)
POTASSIUM BLD-SCNC: 3.9 MMOL/L (ref 3.5–5.1)
SERVICE CMNT-IMP: ABNORMAL
SERVICE CMNT-IMP: ABNORMAL
SODIUM BLD-SCNC: 137 MMOL/L (ref 136–145)

## 2019-06-20 PROCEDURE — 76060000032 HC ANESTHESIA 0.5 TO 1 HR: Performed by: SURGERY

## 2019-06-20 PROCEDURE — 74011250636 HC RX REV CODE- 250/636: Performed by: SURGERY

## 2019-06-20 PROCEDURE — 74011250636 HC RX REV CODE- 250/636

## 2019-06-20 PROCEDURE — 77030002986 HC SUT PROL J&J -A: Performed by: SURGERY

## 2019-06-20 PROCEDURE — 80047 BASIC METABLC PNL IONIZED CA: CPT

## 2019-06-20 PROCEDURE — 77030032490 HC SLV COMPR SCD KNE COVD -B: Performed by: SURGERY

## 2019-06-20 PROCEDURE — 82962 GLUCOSE BLOOD TEST: CPT

## 2019-06-20 PROCEDURE — 76000 FLUOROSCOPY <1 HR PHYS/QHP: CPT

## 2019-06-20 PROCEDURE — 74011250636 HC RX REV CODE- 250/636: Performed by: ANESTHESIOLOGY

## 2019-06-20 PROCEDURE — 71045 X-RAY EXAM CHEST 1 VIEW: CPT

## 2019-06-20 PROCEDURE — 76210000006 HC OR PH I REC 0.5 TO 1 HR: Performed by: SURGERY

## 2019-06-20 PROCEDURE — 77030031139 HC SUT VCRL2 J&J -A: Performed by: SURGERY

## 2019-06-20 PROCEDURE — 74011000250 HC RX REV CODE- 250: Performed by: SURGERY

## 2019-06-20 PROCEDURE — 77030020782 HC GWN BAIR PAWS FLX 3M -B

## 2019-06-20 PROCEDURE — 76210000021 HC REC RM PH II 0.5 TO 1 HR: Performed by: SURGERY

## 2019-06-20 PROCEDURE — 77030011640 HC PAD GRND REM COVD -A: Performed by: SURGERY

## 2019-06-20 PROCEDURE — C1788 PORT, INDWELLING, IMP: HCPCS | Performed by: SURGERY

## 2019-06-20 PROCEDURE — 76010000138 HC OR TIME 0.5 TO 1 HR: Performed by: SURGERY

## 2019-06-20 PROCEDURE — 77030020256 HC SOL INJ NACL 0.9%  500ML: Performed by: SURGERY

## 2019-06-20 PROCEDURE — 77030039266 HC ADH SKN EXOFIN S2SG -A: Performed by: SURGERY

## 2019-06-20 DEVICE — SYSTEM INFUS PRT CATH 8FR L66CM INTRO 8FR CHST TI SGL LUMN: Type: IMPLANTABLE DEVICE | Site: CHEST | Status: FUNCTIONAL

## 2019-06-20 RX ORDER — SODIUM CHLORIDE 0.9 % (FLUSH) 0.9 %
5-40 SYRINGE (ML) INJECTION AS NEEDED
Status: DISCONTINUED | OUTPATIENT
Start: 2019-06-20 | End: 2019-06-20 | Stop reason: HOSPADM

## 2019-06-20 RX ORDER — SODIUM CHLORIDE 0.9 % (FLUSH) 0.9 %
5-40 SYRINGE (ML) INJECTION EVERY 8 HOURS
Status: DISCONTINUED | OUTPATIENT
Start: 2019-06-20 | End: 2019-06-20 | Stop reason: HOSPADM

## 2019-06-20 RX ORDER — CEFAZOLIN SODIUM/WATER 2 G/20 ML
2 SYRINGE (ML) INTRAVENOUS ONCE
Status: COMPLETED | OUTPATIENT
Start: 2019-06-20 | End: 2019-06-20

## 2019-06-20 RX ORDER — ONDANSETRON 2 MG/ML
INJECTION INTRAMUSCULAR; INTRAVENOUS AS NEEDED
Status: DISCONTINUED | OUTPATIENT
Start: 2019-06-20 | End: 2019-06-20 | Stop reason: HOSPADM

## 2019-06-20 RX ORDER — ONDANSETRON 2 MG/ML
4 INJECTION INTRAMUSCULAR; INTRAVENOUS AS NEEDED
Status: DISCONTINUED | OUTPATIENT
Start: 2019-06-20 | End: 2019-06-20 | Stop reason: HOSPADM

## 2019-06-20 RX ORDER — LIDOCAINE HYDROCHLORIDE 20 MG/ML
INJECTION, SOLUTION EPIDURAL; INFILTRATION; INTRACAUDAL; PERINEURAL AS NEEDED
Status: DISCONTINUED | OUTPATIENT
Start: 2019-06-20 | End: 2019-06-20 | Stop reason: HOSPADM

## 2019-06-20 RX ORDER — FENTANYL CITRATE 50 UG/ML
25 INJECTION, SOLUTION INTRAMUSCULAR; INTRAVENOUS
Status: DISCONTINUED | OUTPATIENT
Start: 2019-06-20 | End: 2019-06-20 | Stop reason: HOSPADM

## 2019-06-20 RX ORDER — FENTANYL CITRATE 50 UG/ML
INJECTION, SOLUTION INTRAMUSCULAR; INTRAVENOUS AS NEEDED
Status: DISCONTINUED | OUTPATIENT
Start: 2019-06-20 | End: 2019-06-20 | Stop reason: HOSPADM

## 2019-06-20 RX ORDER — MIDAZOLAM HYDROCHLORIDE 1 MG/ML
INJECTION, SOLUTION INTRAMUSCULAR; INTRAVENOUS AS NEEDED
Status: DISCONTINUED | OUTPATIENT
Start: 2019-06-20 | End: 2019-06-20 | Stop reason: HOSPADM

## 2019-06-20 RX ORDER — DIPHENHYDRAMINE HYDROCHLORIDE 50 MG/ML
12.5 INJECTION, SOLUTION INTRAMUSCULAR; INTRAVENOUS AS NEEDED
Status: DISCONTINUED | OUTPATIENT
Start: 2019-06-20 | End: 2019-06-20 | Stop reason: HOSPADM

## 2019-06-20 RX ORDER — LIDOCAINE HYDROCHLORIDE AND EPINEPHRINE 10; 10 MG/ML; UG/ML
INJECTION, SOLUTION INFILTRATION; PERINEURAL AS NEEDED
Status: DISCONTINUED | OUTPATIENT
Start: 2019-06-20 | End: 2019-06-20 | Stop reason: HOSPADM

## 2019-06-20 RX ORDER — SODIUM CHLORIDE, SODIUM LACTATE, POTASSIUM CHLORIDE, CALCIUM CHLORIDE 600; 310; 30; 20 MG/100ML; MG/100ML; MG/100ML; MG/100ML
25 INJECTION, SOLUTION INTRAVENOUS CONTINUOUS
Status: DISCONTINUED | OUTPATIENT
Start: 2019-06-20 | End: 2019-06-20 | Stop reason: HOSPADM

## 2019-06-20 RX ORDER — PROPOFOL 10 MG/ML
INJECTION, EMULSION INTRAVENOUS
Status: DISCONTINUED | OUTPATIENT
Start: 2019-06-20 | End: 2019-06-20 | Stop reason: HOSPADM

## 2019-06-20 RX ORDER — OXYCODONE AND ACETAMINOPHEN 5; 325 MG/1; MG/1
1 TABLET ORAL
Qty: 10 TAB | Refills: 0 | Status: SHIPPED | OUTPATIENT
Start: 2019-06-20 | End: 2019-06-23

## 2019-06-20 RX ORDER — HYDROMORPHONE HYDROCHLORIDE 1 MG/ML
.2-.5 INJECTION, SOLUTION INTRAMUSCULAR; INTRAVENOUS; SUBCUTANEOUS
Status: DISCONTINUED | OUTPATIENT
Start: 2019-06-20 | End: 2019-06-20 | Stop reason: HOSPADM

## 2019-06-20 RX ORDER — MORPHINE SULFATE 10 MG/ML
2 INJECTION, SOLUTION INTRAMUSCULAR; INTRAVENOUS
Status: DISCONTINUED | OUTPATIENT
Start: 2019-06-20 | End: 2019-06-20 | Stop reason: HOSPADM

## 2019-06-20 RX ORDER — PROPOFOL 10 MG/ML
INJECTION, EMULSION INTRAVENOUS AS NEEDED
Status: DISCONTINUED | OUTPATIENT
Start: 2019-06-20 | End: 2019-06-20 | Stop reason: HOSPADM

## 2019-06-20 RX ADMIN — PROPOFOL 20 MG: 10 INJECTION, EMULSION INTRAVENOUS at 07:43

## 2019-06-20 RX ADMIN — MIDAZOLAM HYDROCHLORIDE 1 MG: 1 INJECTION, SOLUTION INTRAMUSCULAR; INTRAVENOUS at 07:42

## 2019-06-20 RX ADMIN — SODIUM CHLORIDE, SODIUM LACTATE, POTASSIUM CHLORIDE, AND CALCIUM CHLORIDE 25 ML/HR: 600; 310; 30; 20 INJECTION, SOLUTION INTRAVENOUS at 06:58

## 2019-06-20 RX ADMIN — ONDANSETRON 4 MG: 2 INJECTION INTRAMUSCULAR; INTRAVENOUS at 07:52

## 2019-06-20 RX ADMIN — MIDAZOLAM HYDROCHLORIDE 1 MG: 1 INJECTION, SOLUTION INTRAMUSCULAR; INTRAVENOUS at 07:28

## 2019-06-20 RX ADMIN — Medication 2 G: at 07:29

## 2019-06-20 RX ADMIN — LIDOCAINE HYDROCHLORIDE 40 MG: 20 INJECTION, SOLUTION EPIDURAL; INFILTRATION; INTRACAUDAL; PERINEURAL at 07:31

## 2019-06-20 RX ADMIN — SODIUM CHLORIDE, SODIUM LACTATE, POTASSIUM CHLORIDE, AND CALCIUM CHLORIDE: 600; 310; 30; 20 INJECTION, SOLUTION INTRAVENOUS at 07:29

## 2019-06-20 RX ADMIN — FENTANYL CITRATE 50 MCG: 50 INJECTION, SOLUTION INTRAMUSCULAR; INTRAVENOUS at 07:33

## 2019-06-20 RX ADMIN — PROPOFOL 100 MCG/KG/MIN: 10 INJECTION, EMULSION INTRAVENOUS at 07:35

## 2019-06-20 NOTE — ANESTHESIA POSTPROCEDURE EVALUATION
Procedure(s): 
INSERTION LEFT SUBCLAVIAN PORTACATH. MAC Anesthesia Post Evaluation Patient location during evaluation: PACU Note status: Adequate. Level of consciousness: responsive to verbal stimuli and sleepy but conscious Pain management: satisfactory to patient Airway patency: patent Anesthetic complications: no 
Cardiovascular status: acceptable Respiratory status: acceptable Hydration status: acceptable Comments: +Post-Anesthesia Evaluation and Assessment Patient: Rosario Mejia MRN: [id number]  SSN: [id number] YOB: 1950  Age: 80 y.o. Sex: female Cardiovascular Function/Vital Signs /67   Pulse 80   Temp 36.8 °C (98.2 °F)   Resp 11   Ht 5' 5\" (1.651 m)   Wt 75 kg (165 lb 5.5 oz)   SpO2 100%   BMI 27.51 kg/m² Patient is status post Procedure(s): 
INSERTION LEFT SUBCLAVIAN PORTACATH. Nausea/Vomiting: Controlled. Postoperative hydration reviewed and adequate. Pain: 
Pain Scale 1: Numeric (0 - 10) (06/20/19 0830) Pain Intensity 1: 0 (06/20/19 0830) Managed. Neurological Status:  
Neuro (WDL): Exceptions to WDL (06/20/19 0815) At baseline. Mental Status and Level of Consciousness: Arousable. Pulmonary Status:  
O2 Device: Room air (06/20/19 0815) Adequate oxygenation and airway patent. Complications related to anesthesia: None Post-anesthesia assessment completed. No concerns. Signed By: Saroj Antoine MD  
 6/20/2019 Post anesthesia nausea and vomiting:  controlled Vitals Value Taken Time /68 6/20/2019  8:45 AM  
Temp 36.8 °C (98.2 °F) 6/20/2019  8:15 AM  
Pulse 80 6/20/2019  8:48 AM  
Resp 13 6/20/2019  8:48 AM  
SpO2 100 % 6/20/2019  8:48 AM  
Vitals shown include unvalidated device data.

## 2019-06-20 NOTE — BRIEF OP NOTE
BRIEF OPERATIVE NOTE Date of Procedure: 6/20/2019 Preoperative Diagnosis: PANCREATIC MASS, gastric ca Postoperative Diagnosis: PANCREATIC MASS, gastric ca Procedure(s): 
INSERTION LEFT SUBCLAVIAN PORTACATH with supervision/interpretation of radiology Surgeon(s) and Role: Mychal Kirkland MD - Primary Surgical Assistant: none Surgical Staff: 
Circ-1: Kaitlin Smith RN Radiology Technician: Danielle Pathak Scrub Tech-1: Tammy Small Scrub Tech-2: Marya Lab Float Staff: Gian Herrera Event Time In Time Out Incision Start 1183 Incision Close Anesthesia: MAC Estimated Blood Loss: 10 ml Specimens: * No specimens in log * Findings: left subclavian vein Complications: none Implants:  
Implant Name Type Inv. Item Serial No.  Lot No. LRB No. Used Action PORT VASC INFUS SET 8FR TI -- SMART PORT CT - SNA  PORT VASC INFUS SET 8FR TI -- SMART PORT CT NA [employer]GIODYNAM[employer] [id number] N/A 1 Implanted

## 2019-06-20 NOTE — OP NOTES
[address] 
OPERATIVE REPORT Name:  Isaias Dinero 
MR#:  [id number] :  1950 ACCOUNT #:  [de-identified] DATE OF SERVICE:  2019 PREOPERATIVE DIAGNOSIS:  Gastric carcinoma. POSTOPERATIVE DIAGNOSIS:  Gastric carcinoma. PROCEDURE PERFORMED:  Insertion of left subclavian vein 8-Upper sorbian Smart Port with supervision and interpretation of Radiology. SURGEON:  Mary Bowen MD 
 
ASSISTANT:  Staff. ANESTHESIA:  MAC. COMPLICATIONS:  None. SPECIMENS REMOVED:  None. IMPLANTS:  [employer] port vascular infusion set, 8-Upper sorbian titanium [employer]	[place], lot number [id number]. ESTIMATED BLOOD LOSS:  10 mL. BRIEF HISTORY:  The patient is a pleasant 52-year-old female with a history of breast cancer, now with new gastric cancer and possible pancreatic cancer at the same setting. The malignancy was felt to be non-resectable, now presents for Port-A-Cath placement for chemotherapy. She understands the risks and benefits and wishes to proceed. PROCEDURE:  The patient was taken to the operating room, placed on the operating table in supine position, underwent IV sedation, and the arms were tucked and padded at the side and the neck and chest were prepped and draped in the usual sterile fashion. After appropriate time-out and antibiotics were given, 1% lidocaine with epinephrine and sodium bicarbonate were infiltrated into the skin and subcutaneous tissues in the left brachial pectoral groove near the site of her prior Port-A-Cath, and with the patient in Trendelenburg, an 18-gauge needle was inserted in the subclavian vein on the first pass without difficulty. The wire was placed over utilizing Seldinger technique. The needle was removed and a transverse incision was made to include the insertion site of the wire and a pocket created inferiorly for the port to sit in.   Once that was completed, an 8-Upper sorbian dilator and Peel-Away sheath were placed over the guidewire, and the wire and the dilator were removed, and the catheter was then placed through the Peel-Away sheath. Utilizing fluoroscopic guidance, this was pulled back to the right atrial SVC junction. Noted the catheter was amputated off and attached to the port. The port was then sewn down to the chest wall with an interrupted 2-0 Prolene suture. Then the port was accessed with a 21-gauge Estrada needle and had excellent blood return and was flushed with heparinized saline, followed by concentrated heparin flush and the port was de-accessed. Interrupted 4-0 Vicryl was used to reapproximate the deep tissues and deep dermis. A running 4-0 Vicryl was used to close the skin and an Exofin dressing was applied. Upon completion of the operation, the needle, sponge, and instrument counts were correct x2. The patient had tolerated the procedure well and was brought to the recovery room. Yvette Wetzel MD 
 
 
[id number] 
D:  06/20/2019 8:10 
T:  06/20/2019 10:06 JOB #:  [id number] CC:  MD Makenna Arellano MD Cyril Doss, NP

## 2019-06-20 NOTE — ANESTHESIA PREPROCEDURE EVALUATION
Relevant Problems No relevant active problems Anesthetic History No history of anesthetic complications Review of Systems / Medical History Patient summary reviewed, nursing notes reviewed and pertinent labs reviewed Pulmonary Within defined limits Neuro/Psych Psychiatric history Cardiovascular Hypertension Exercise tolerance: >4 METS 
  
GI/Hepatic/Renal 
Within defined limits Endo/Other Diabetes: using insulin Other Findings Comments: Pancreatic mass Physical Exam 
 
Airway Mallampati: I 
TM Distance: 4 - 6 cm Neck ROM: normal range of motion Mouth opening: Normal 
 
 Cardiovascular Regular rate and rhythm,  S1 and S2 normal,  no murmur, click, rub, or gallop Dental 
 
Dentition: Edentulous Pulmonary Breath sounds clear to auscultation Abdominal 
GI exam deferred Other Findings Anesthetic Plan ASA: 3 Anesthesia type: MAC Anesthetic plan and risks discussed with: Patient Recheck chem 8 (K especially) this am.

## 2019-06-20 NOTE — DISCHARGE INSTRUCTIONS
Discharge Instructions:  Port a cath Insertion  Dr. Alma Trimble    Call for appointment for follow up in 2 weeks [phone]    Activity:    Walk regularly. You may resume driving in 24 hours unless still requiring narcotics for pain. It is ok to use the arm on side of surgery but do not over do it. Work:    You may return to work in 3-7 days to light activity. No lifting more than 10 pounds for one week. Diet:    You may resume normal diet after 24 hours. Anesthesia and narcotics may cause nausea and vomiting. If persistent please call the office. Wound Care: You have a special dressing called Dermabond. It is okay to shower and let the water run over the incision but do not scrub the area or soak in a tub. If you have a small amount of drainage you may place a dry bandage over the wound and change it daily. If you experience a lot of drainage, develop redness around the wound, or a fever over 101 F occurs please call the office. Medications:    Resume home medications as indicated on the Medical Reconciliation form. Aspirin, Coumadin, and Plavix can be restarted on post operative day 2 if you were taking them preoperatively. Pain medications:  Non steroidal antiinflammatories seem to work best for post surgical pain. Try these first as prescribed. A narcotic prescription will also be given for breakthrough pain. Over the counter stool softeners and laxatives may be used if needed. Do not hesitate to call with questions or concerns. DISCHARGE SUMMARY from Nurse    PATIENT INSTRUCTIONS:    After general anesthesia or intravenous sedation, for 24 hours or while taking prescription Narcotics:  · Limit your activities  · Do not drive and operate hazardous machinery  · Do not make important personal or business decisions  · Do  not drink alcoholic beverages  · If you have not urinated within 8 hours after discharge, please contact your surgeon on call.     Report the following to your surgeon:  · Excessive pain, swelling, redness or odor of or around the surgical area  · Temperature over 100.5  · Nausea and vomiting lasting longer than 4 hours or if unable to take medications  · Any signs of decreased circulation or nerve impairment to extremity: change in color, persistent  numbness, tingling, coldness or increase pain  · Any questions    What to do at Home:      *  Please give a list of your current medications to your Primary Care Provider. *  Please update this list whenever your medications are discontinued, doses are      changed, or new medications (including over-the-counter products) are added. *  Please carry medication information at all times in case of emergency situations. These are general instructions for a healthy lifestyle:    No smoking/ No tobacco products/ Avoid exposure to second hand smoke  Surgeon General's Warning:  Quitting smoking now greatly reduces serious risk to your health. Obesity, smoking, and sedentary lifestyle greatly increases your risk for illness    A healthy diet, regular physical exercise & weight monitoring are important for maintaining a healthy lifestyle    You may be retaining fluid if you have a history of heart failure or if you experience any of the following symptoms:  Weight gain of 3 pounds or more overnight or 5 pounds in a week, increased swelling in our hands or feet or shortness of breath while lying flat in bed. Please call your doctor as soon as you notice any of these symptoms; do not wait until your next office visit. The discharge information has been reviewed with the patient and caregiver. The patient and caregiver verbalized understanding.   Discharge medications reviewed with the patient and caregiver and appropriate educational materials and side effects teaching were provided. ___________________________________________________________________________________________________________________________________    A common side effect of anesthesia following surgery is nausea and/or vomiting. In order to decrease symptoms, it is wise to avoid foods that are high in fat, greasy foods, milk products, and spicy foods for the first 24 hours. Acceptable foods for the first 24 hours following surgery include but are not limited to:     soup   broth    toast    crackers    applesauce    bananas    mashed potatoes,   soft or scrambled eggs   oatmeal    jello    It is important to eat when taking your pain medication. This will help to prevent nausea. If possible, please try to time your meals with your medications. It is very important to stay hydrated following surgery. Sip fluids frequently while awake. Avoid acidic drinks such as citrus juices and soda for 24 hours. Carbonated beverages may cause bloating and gas. Acceptable fluids include:    - water (flavor packets may add variety)  - coffee or tea (in moderation)  - Gatorade  - Te-aid  - apple juice  - cranberry juice    You are encouraged to cough and deep breathe every hour when awake. This will help to prevent respiratory complications following anesthesia. You may want to hug a pillow when coughing and sneezing to add additional support to the surgical area and to decrease discomfort if you had abdominal or chest surgery. If you are discharged home with support stockings, you may remove them after 24 hours. Support stockings are used to help prevent blood clots in the legs following surgery. Please take time to review all of your Home Care Instructions and Medication Information sheets provided in your discharge packet. If you have any questions, please contact your surgeons office. Thank you.               How to Care for Your Wound After Its Treated With  DERMABOND* Topical Skin Adhesive  DERMABOND* Topical Skin Adhesive (2-octyl cyanoacrylate) is a sterile, liquid skin adhesive  that holds wound edges together. The film will usually remain in place for 5 to 10 days, then  naturally fall off your skin. The following will answer some of your questions and provide instructions for proper care for your  wound while it is healing:    CHECK WOUND APPEARANCE   Some swelling, redness, and pain are common with all wounds and normally will go away as the  wound heals. If swelling, redness, or pain increases or if the wound feels warm to the touch,  contact a doctor. Also contact a doctor if the wound edges reopen or separate. REPLACE BANDAGES   If your wound is bandaged, keep the bandage dry.  Replace the dressing daily until the adhesive film has fallen off or if the  bandage should become wet, unless otherwise instructed by your  physician.  When changing the dressing, do not place tape directly over the  DERMABOND adhesive film, because removing the tape later may also  remove the film. AVOID TOPICAL MEDICATIONS   Do not apply liquid or ointment medications or any other product to your wound while the  DERMABOND adhesive film is in place. These may loosen the film before your wound is healed. KEEP WOUND DRY AND PROTECTED   You may occasionally and briefly wet your wound in the shower or bath. Do not soak or scrub  your wound, do not swim, and avoid periods of heavy perspiration until the DERMABOND  adhesive has naturally fallen off. After showering or bathing, gently blot your wound dry with a  soft towel. If a protective dressing is being used, apply a fresh, dry bandage, being sure to keep  the tape off the DERMABOND adhesive film.  Apply a clean, dry bandage over the wound if necessary to protect it.  Protect your wound from injury until the skin has had sufficient time to heal.   Do not scratch, rub, or pick at the DERMABOND adhesive film. This may loosen the film before  your wound is healed.    Protect the wound from prolonged exposure to sunlight or tanning lamps while the film is in  place. If you have any questions or concerns about this product, please consult your doctor. *Trademark ©[employer] 2002    Patient Education   Narcotic-Analgesic/Acetaminophen (By mouth)   Relieves pain. Brand Name(s): Capital w/Codeine, [place]aca, Echt, [place], Lorcet HD, Lorcet Plus, Lortab 10/325, Lortab 5/325, Lortab 7.5/325, Lortab Elixir, Ira, [place], Butt, Trezix, Tylenol With Codeine No. 4   There may be other brand names for this medicine. When This Medicine Should Not Be Used: You should not use this medicine if you have had an allergic reaction to acetaminophen, codeine, hydrocodone, propoxyphene, or sulfites. You should not use this medicine if you have had an allergic reaction to any other opioid pain medicine. How to Use This Medicine:   Liquid, Tablet, Capsule  · Your doctor will tell you how much medicine to use. Do not use more than directed. · This medicine contains acetaminophen. Read the labels of all other medicines you are using to see if they also contain acetaminophen, or ask your doctor or pharmacist. Ignacio Garza not use more than 4 grams (4,000 milligrams) total of acetaminophen in one day. · Drink plenty of liquids to help avoid constipation. If a dose is missed:   · Some of these medicines need to be used on a fixed schedule. If you miss a dose or forget to use your medicine, call your doctor pharmacist for instructions. Do not use extra medicine to make up for a missed dose. How to Store and Dispose of This Medicine:   · Store the medicine in a closed container at room temperature, away from heat, moisture, and direct light. Do not refrigerate or freeze the medicine. · Ask your pharmacist, doctor, or health caregiver about the best way to dispose of any outdated medicine or medicine no longer needed. · Keep all medicine out of the reach of children.  Never share your medicine with anyone. Drugs and Foods to Avoid:   Ask your doctor or pharmacist before using any other medicine, including over-the-counter medicines, vitamins, and herbal products. · Make sure your doctor knows if you are using a monoamine oxidase inhibitor (MAOI) medicine, such as Eldepryl®, Marplan®, [place], or Parnate®. Make sure your doctor knows if you are also using a medicine to treat depression, such as amitriptyline, doxepin, nortriptyline, Elavil®, Pamelor®, or Sinequan®. Make sure your doctor knows if you are taking an anticholinergic medicine, such as atropine, methscopolamine, or scopolamine. · Tell your doctor if you use anything else that makes you sleepy. Some examples are allergy medicine, narcotic pain medicine, and alcohol. · Do not drink alcohol while you are using this medicine. Warnings While Using This Medicine:   · Make sure your doctor knows if you are pregnant or breast feeding, or if you have a head injury, or other conditions that may cause an increase in intercranial pressure (pressure inside your head). Make sure your doctor knows if you have severe kidney problems or severe liver problems, or if you have hypothyroidism (lack of thyroid function). Make sure your doctor knows if you have [place]'s disease (adrenal gland disease), or if you have enlarged prostate or urethral stricture. Make sure your doctor knows if you have any abdominal problems, or if you have lung disease or asthma. · This medicine may make you dizzy or drowsy. Avoid driving, using machines, or anything else that could be dangerous if you are not alert. · This medicine can be habit-forming. Do not use more than your prescribed dose. Call your doctor if you think your medicine is not working. · Tell any doctor or dentist who treats you that you are using this medicine. This medicine may affect certain medical test results. · This medicine may cause constipation, especially with long-term use.  Ask your doctor if you should use a laxative to prevent and treat constipation. · When a mother is breastfeeding and takes codeine, there is a very small chance that this medicine could cause serious side effects in the baby. This is because codeine works differently in a few women, so their breast milk contains too much medicine. If you take codeine, be alert for these signs of overdose in your nursing baby: sleeping more than usual, trouble breastfeeding, trouble breathing, or being limp and weak. Call the baby's doctor right away if you think there is a problem. If you cannot talk to the doctor, take the baby to the emergency room or call 911. Possible Side Effects While Using This Medicine:   Call your doctor right away if you notice any of these side effects:  · Allergic reaction: Itching or hives, swelling in your face or hands, swelling or tingling in your mouth or throat, chest tightness, trouble breathing  · Dizziness. · Seeing or hearing things that are not there. · Very slow heartbeat. If you notice these less serious side effects, talk with your doctor:   · Change in how much or how often you urinate. · Cold, clammy skin. · Feeling unusually anxious, excited, fearful, or tired. · Nausea, vomiting, constipation, stomach pain or upset, or heartburn. · Skin rash. · Vision changes. If you notice other side effects that you think are caused by this medicine, tell your doctor. Call your doctor for medical advice about side effects. You may report side effects to FDA at 3-165-FDA-7678  © 2017 [facility] Information is for End User's use only and may not be sold, redistributed or otherwise used for commercial purposes. The above information is an  only. It is not intended as medical advice for individual conditions or treatments. Talk to your doctor, nurse or pharmacist before following any medical regimen to see if it is safe and effective for you.

## 2019-06-20 NOTE — PERIOP NOTES
Handoff Report from Operating Room to PACU Report received from charleen Leong RN and Nicole Pinto CRNA regarding Anthony Madison. Surgeon(s): 
Nelia Dexter MD  And Procedure(s) (LRB): 
INSERTION LEFT SUBCLAVIAN PORTACATH (N/A)  confirmed  
with dressings discussed. Anesthesia type, drugs, patient history, complications, estimated blood loss, vital signs, intake and output, and last pain medication, lines and temperature were reviewed.

## 2019-06-20 NOTE — INTERVAL H&P NOTE
H&P Update: 
Yuan Panda was seen and examined. History and physical has been reviewed. The patient has been examined.  There have been no significant clinical changes since the completion of the originally dated History and Physical.

## 2019-06-26 ENCOUNTER — OFFICE VISIT (OUTPATIENT)
Dept: FAMILY MEDICINE CLINIC | Age: 69
End: 2019-06-26

## 2019-06-26 VITALS
RESPIRATION RATE: 16 BRPM | OXYGEN SATURATION: 97 % | WEIGHT: 167 LBS | HEART RATE: 92 BPM | SYSTOLIC BLOOD PRESSURE: 118 MMHG | DIASTOLIC BLOOD PRESSURE: 77 MMHG | HEIGHT: 65 IN | TEMPERATURE: 98.2 F | BODY MASS INDEX: 27.82 KG/M2

## 2019-06-26 DIAGNOSIS — C16.9 MALIGNANT NEOPLASM OF STOMACH, UNSPECIFIED LOCATION (HCC): ICD-10-CM

## 2019-06-26 DIAGNOSIS — Z23 ENCOUNTER FOR IMMUNIZATION: ICD-10-CM

## 2019-06-26 DIAGNOSIS — K86.89 PANCREATIC MASS: ICD-10-CM

## 2019-06-26 DIAGNOSIS — E11.9 DIABETES MELLITUS, NEW ONSET (HCC): Primary | ICD-10-CM

## 2019-06-26 LAB
GLUCOSE POC: 142 MG/DL
HBA1C MFR BLD HPLC: 8 %

## 2019-06-26 RX ORDER — INSULIN GLARGINE 100 [IU]/ML
18 INJECTION, SOLUTION SUBCUTANEOUS
Qty: 3 PEN | Refills: 1 | Status: SHIPPED | OUTPATIENT
Start: 2019-06-26 | End: 2019-09-04

## 2019-06-26 NOTE — PROGRESS NOTES
.  Chief Complaint   Patient presents with    Diabetes     . 1. Have you been to the ER, urgent care clinic since your last visit? Hospitalized since your last visit? no    2. Have you seen or consulted any other health care providers outside of the [address] [place] [address] since your last visit? Include any pap smears or colon screening.   no    .  Health Maintenance Due   Topic Date Due    LIPID PANEL Q1  1950    FOOT EXAM Q1  07/17/1961    EYE EXAM RETINAL OR DILATED  07/17/1961    DTaP/Tdap/Td series (1 - Tdap) 07/17/1972    Shingrix Vaccine Age 50> (1 of 2) 07/17/2001    GLAUCOMA SCREENING Q2Y  07/17/2016    Bone Densitometry (Dexa) Screening  07/17/2016    Pneumococcal 65+ years (1 of 2 - PCV13) 07/17/2016    MEDICARE YEARLY EXAM  03/14/2018    BREAST CANCER SCRN MAMMOGRAM  01/03/2019     . Maria Guadalupe Wills

## 2019-06-26 NOTE — PROGRESS NOTES
Chief Complaint   Patient presents with    Diabetes         S: Ector Hamm is a 80 y.o. yo female who presents for DM follow up. Last DM check hemoglobin A1c on 5/9/19 was 10.6    Taking 14 units Lantus QHS and metformin 500 mg in AM  Blood sugars at home \"doing pretty good, couple times was 205 but pretty good other than that\". Ranging 130-180. Fasting blood sugars 120-150  Diet and Exercise- able to eat a little better, eating small meals at least 3 times per day and occasional snack. Nausea is better. She has a history of right-sided breast cancer (iniital 2003 and recurrent 2017 with bilateral mastectomy) and was recently diagnosed with gastric adenocarcinoma and suspected locally advanced unresectable pancreatic adenocarcinoma. She is followed by oncologist Dr. Xochitl Aguayo, Last follow up on 6/24/19 at Dr Xochitl Aguayo office with NP Christo Ulloa. Scheduled to start chemo on 7/3/19. Has left portacath placed last Thursday. Per oncology notes, they will be treating with a systemic therapy agent that would target both cancers. They discussed with her and her daughters extensively that treatment is palliative in nature and unfortunately they have a low suspicion that she will have long-term survival given the suspected pancreatic adenocarcinoma. Scheduled to have PET scan tomorrow. Health Maintenance Reviewed    Denies cardiac complaints including chest pain or discomfort, elevated heart rate, or palpitations. Denies any headache, vision changes, numbness and tingling or weakness in her extremities. Denies respiratory complaints including SOB, difficulty or pain with breathing, wheezes, and cough. Feels well and ROS is otherwise negative.      Past Medical History:   Diagnosis Date    Cancer [facility]) 2003, 2017    right breast cancer    Diabetes ([facility] [address]     Hypertension     Psychiatric disorder     anxiety      Past Surgical History:   Procedure Laterality Date    BREAST SURGERY PROCEDURE UNLISTED  2003    right breast lumpectomy    COLONOSCOPY N/A 4/11/2019    COLONOSCOPY performed by Rosalina Echavarria MD at [facility] ENDOSCOPY    HX MASTECTOMY Bilateral 6/29/2017    RIGHT AND LEFT MASTECTOMY, RIGHT AXILLARY SENTINAL LYMPH NODE BIOPSY performed by Serena Esparza MD at [facility] MAIN OR    HX VASCULAR ACCESS      left chest portacath - removed 4/2019     Social History     Socioeconomic History    Marital status:      Spouse name: Not on file    Number of children: Not on file    Years of education: Not on file    Highest education level: Not on file   Occupational History    Not on file   Social Needs    Financial resource strain: Not on file    Food insecurity:     Worry: Not on file     Inability: Not on file    Transportation needs:     Medical: Not on file     Non-medical: Not on file   Tobacco Use    Smoking status: Never Smoker    Smokeless tobacco: Never Used   Substance and Sexual Activity    Alcohol use: No    Drug use: No    Sexual activity: Yes   Lifestyle    Physical activity:     Days per week: Not on file     Minutes per session: Not on file    Stress: Not on file   Relationships    Social connections:     Talks on phone: Not on file     Gets together: Not on file     Attends Baptism service: Not on file     Active member of club or organization: Not on file     Attends meetings of clubs or organizations: Not on file     Relationship status: Not on file    Intimate partner violence:     Fear of current or ex partner: Not on file     Emotionally abused: Not on file     Physically abused: Not on file     Forced sexual activity: Not on file   Other Topics Concern    Not on file   Social History Narrative    Not on file     Current Outpatient Medications   Medication Sig Dispense Refill    magnesium 250 mg tab Take  by mouth daily.  lisinopril (PRINIVIL, ZESTRIL) 20 mg tablet Take 20 mg by mouth daily.       insulin glargine (LANTUS,BASAGLAR) 100 unit/mL (3 mL) inpn 14 Units by SubCUTAneous route nightly. 3 Pen 1    glucose blood VI test strips (ASCENSIA AUTODISC VI, ONE TOUCH ULTRA TEST VI) strip Check blood sugars 3 times per day. DX code E11.9 100 Strip 11    ONETOUCH ULTRAMINI monitoring kit use as directed TO CHECK BLOOD SUGAR THREE TIMES DAILY  0    ONE TOUCH DELICA 33 gauge misc use three times a day  0    Insulin Needles, Disposable, 30 gauge x 1/3\" To use daily with lantus 100 Package 11    metFORMIN (GLUCOPHAGE) 500 mg tablet Take 1 Tab by mouth daily (with breakfast). 90 Tab 0    ibuprofen (ADVIL) 200 mg tablet Take 200 mg by mouth every four (4) hours as needed for Pain.  anastrozole (ARIMIDEX) 1 mg tablet Take 1 mg by mouth daily.  denosumab (PROLIA) 60 mg/mL injection 60 mg by SubCUTAneous route every 6 months. At [facility]      multivitamin (ONE A DAY) tablet Take 1 Tab by mouth daily (with lunch).  aspirin delayed-release 81 mg tablet Take 81 mg by mouth daily.  omega-3 fatty acids-vitamin e (FISH OIL) 1,000 mg cap Take 2 Caps by mouth daily (with lunch).  CALCIUM CARBONATE/VITAMIN D3 (CALTRATE 600 + D PO) Take 2 Tabs by mouth daily (with lunch). Pt is taking all medications as prescribed without any side effects or difficulty. No Known Allergies      Agree with nurses note. O:   Visit Vitals  /77 (BP 1 Location: Left arm, BP Patient Position: Sitting)   Pulse 92   Temp 98.2 °F (36.8 °C) (Oral)   Resp 16   Ht 5' 5\" (1.651 m)   Wt 167 lb (75.8 kg)   LMP 05/29/1997 (Approximate)   SpO2 97%   BMI 27.79 kg/m²      PAIN: No complaints of pain today. GENERAL: Ector Hamm  is sitting in the chair in NAD.   EYE: PERRLA. EOMs intact. Sclera anicteric without injection. RESP: Unlabored without SOB. Speaking in full sentences. Breath sounds are symmetrical bilaterally. Clear to auscultation to all fields. No wheezes. No rales or rhonchi. CV: normal rate. Regular rhythm. S1, S2 audible. No murmur noted. No rubs, clicks or gallops noted. NEURO:  awake, alert and oriented to person, place, and time and event. Clear speech. Muscle strength is +5/5 x 4 extremities. Steady gait. HEME/LYMPH: peripheral pulses palpable 2+ x 4 extremities. No peripheral edema is noted. FEET: Intact no wounds or abrasions. Denies numbness or tingling. Sensation intact    Results for orders placed or performed in visit on 06/26/19   AMB POC GLUCOSE BLOOD, BY GLUCOSE MONITORING DEVICE   Result Value Ref Range    Glucose  mg/dL   AMB POC HEMOGLOBIN A1C   Result Value Ref Range    Hemoglobin A1c (POC) 8.0 %         A/P:  Differential diagnosis and treatment options reviewed with patient who is in agreement with treatment plan as outlined below. ICD-10-CM ICD-9-CM    1. [facility] E11.9 250.00 AMB POC GLUCOSE BLOOD, BY GLUCOSE MONITORING DEVICE      AMB POC HEMOGLOBIN A1C   2. Malignant neoplasm of stomach, unspecified location ([facility] C16.9 151.9    3. Pancreatic mass K86.9 577.9        DM is much better on current therapy, still a little higher than goal.  For now, increase lantus to 18 units QHS  Once she starts Chemo with conjunctive therapy of dexamethasone, her blood sugars may elevate and we may need to adjust dose again. Daughter or patient will call and let me know. Will follow up in 6 weeks (or sooner if needed), would prefer to let her focus her treatment on her cancer for now and keep her out of doctors office as much as possible to decrease risk for infection. Discussed BMI and healthy weight. Encouraged patient to work to implement changes including diet high in raw fruits and vegetables, lean protein and good fats. Limit refined, processed carbohydrates and sugar. Encouraged regular exercise.    Advised of frequent feet checks  Advised yearly eye exam  Reviewed warning signs of diabetic emergency, hypertension, stroke and heart attack      Spoke to [facility],  Pardeep's nurse, ok to give Prevnar 15 today. VIS discussed and copy given in AVS      Verbal and written instructions (see AVS) provided. Patient expresses understanding and agreement of diagnosis and treatment plan.

## 2019-06-26 NOTE — PATIENT INSTRUCTIONS
Diabetes Blood Sugar Emergencies: Your Action Plan  How can you prevent a blood sugar emergency? An important part of living with diabetes is keeping your blood sugar in your target range. You'll need to know what to do if it's too high or too low. Managing your blood sugar levels helps you avoid emergencies. This care sheet will teach you about the signs of high and low blood sugar. It will help you make an action plan with your doctor for when these signs occur. Low blood sugar is more likely to happen if you take certain medicines for diabetes. It can also happen if you skip a meal, drink alcohol, or exercise more than usual.  You may get high blood sugar if you eat differently than you normally do. One example is eating more carbohydrate than usual. Having a cold, the flu, or other sudden illness can also cause high blood sugar levels. Levels can also rise if you miss a dose of medicine. Any change in how you take your medicine may affect your blood sugar level. So it's important to work with your doctor before you make any changes. Check your blood sugar  Work with your doctor to fill in the blank spaces below that apply to you. Track your levels, know your target range, and write down ways you can get your blood sugar back in your target range. A log book can help you track your levels. Take the book to all of your medical appointments. · Check your blood sugar _____ times a day, at these times:________________________________________________. (For example: Before meals, at bedtime, before exercise, during exercise, other.)  · Your blood sugar target range before a meal is ___________________. Your blood sugar target range after a meal is _______________________. · Do this--___________________________________________________--to get your blood sugar back within your safe range if your blood sugar results are _________________________________________.  (For example: Less than 70 or above 250 mg/dL.)  Call your doctor when your blood sugar results are ___________________________________. (For example: Less than 70 or above 250 mg/dL.)  What are the symptoms of low and high blood sugar? Common symptoms of low blood sugar are sweating and feeling shaky, weak, hungry, or confused. Symptoms can start quickly. Common symptoms of high blood sugar are feeling very thirsty or very hungry. You may also pass urine more often than usual. You may have blurry vision and may lose weight without trying. But some people may have high or low blood sugar without having any symptoms. That's a good reason to check your blood sugar on a regular schedule. What should you do if you have symptoms? Work with your doctor to fill in the blank spaces below that apply to you. Low blood sugar  If you have symptoms of low blood sugar, check your blood sugar. If it's below _____ ( for example, below 70), eat or drink a quick-sugar food that has about 15 grams of carbohydrate. Your goal is to get your level back to your safe range. Check your blood sugar again 15 minutes later. If it's still not in your target range, take another 15 grams of carbohydrate and check your blood sugar again in 15 minutes. Repeat this until you reach your target. Then go back to your regular testing schedule. When you have low blood sugar, it's best to stop or reduce any physical activity until your blood sugar is back in your target range and is stable. If you must stay active, eat or drink 30 grams of carbohydrate. Then check your blood sugar again in 15 minutes. If it's not in your target range, take another 30 grams of carbohydrates. Check your blood sugar again in 15 minutes. Keep doing this until you reach your target. You can then go back to your regular testing schedule. If your symptoms or blood sugar levels are getting worse or have not improved after 15 minutes, seek medical care right away.   Here are some examples of quick-sugar foods with 15 grams of carbohydrate:  · 3 or 4 glucose tablets  · 1 tube of glucose gel  · Hard candy (such as 3 Jolly Ranchers or 5 to 7 Life Savers)  · ½ cup to ¾ cup (4 to 6 ounces) of fruit juice or regular (not diet) soda  High blood sugar  If you have symptoms of high blood sugar, check your blood sugar. Your goal is to get your level back to your target range. If it's above ______ ( for example, above 250), follow these steps:  · If you missed a dose of your diabetes medicine, take it now. Take only the amount of medicine that you have been prescribed. Do not take more or less medicine. · Give yourself insulin if your doctor has prescribed it for high blood sugar. · Test for ketones, if the doctor told you to do so. If the results of the ketone test show a moderate-to-large amount of ketones, call the doctor for advice. · Wait 30 minutes after you take the extra insulin or the missed medicine. Check your blood sugar again. If your symptoms or blood sugar levels are getting worse or have not improved after taking these steps, seek medical care right away. Follow-up care is a key part of your treatment and safety. Be sure to make and go to all appointments, and call your doctor if you are having problems. It's also a good idea to know your test results and keep a list of the medicines you take. Where can you learn more? Go to http://mehnaz-xochilt.info/. Enter V229 in the search box to learn more about \"Diabetes Blood Sugar Emergencies: Your Action Plan. \"  Current as of: July 25, 2018  Content Version: 11.9  © 0430-5853 [employer]. Care instructions adapted under license by [employer] (which disclaims liability or warranty for this information). If you have questions about a medical condition or this instruction, always ask your healthcare professional. [place] [address] any warranty or liability for your use of this information.          Learning About Diabetes Food Guidelines  Your Care Instructions    Meal planning is important to manage diabetes. It helps keep your blood sugar at a target level (which you set with your doctor). You don't have to eat special foods. You can eat what your family eats, including sweets once in a while. But you do have to pay attention to how often you eat and how much you eat of certain foods. You may want to work with a dietitian or a certified diabetes educator (CDE) to help you plan meals and snacks. A dietitian or CDE can also help you lose weight if that is one of your goals. What should you know about eating carbs? Managing the amount of carbohydrate (carbs) you eat is an important part of healthy meals when you have diabetes. Carbohydrate is found in many foods. · Learn which foods have carbs. And learn the amounts of carbs in different foods. ? Bread, cereal, pasta, and rice have about 15 grams of carbs in a serving. A serving is 1 slice of bread (1 ounce), ½ cup of cooked cereal, or 1/3 cup of cooked pasta or rice. ? Fruits have 15 grams of carbs in a serving. A serving is 1 small fresh fruit, such as an apple or orange; ½ of a banana; ½ cup of cooked or canned fruit; ½ cup of fruit juice; 1 cup of melon or raspberries; or 2 tablespoons of dried fruit. ? Milk and no-sugar-added yogurt have 15 grams of carbs in a serving. A serving is 1 cup of milk or 2/3 cup of no-sugar-added yogurt. ? Starchy vegetables have 15 grams of carbs in a serving. A serving is ½ cup of mashed potatoes or sweet potato; 1 cup winter squash; ½ of a small baked potato; ½ cup of cooked beans; or ½ cup cooked corn or green peas. · Learn how much carbs to eat each day and at each meal. A dietitian or CDE can teach you how to keep track of the amount of carbs you eat. This is called carbohydrate counting. · If you are not sure how to count carbohydrate grams, use the Plate Method to plan meals.  It is a good, quick way to make sure that you have a balanced meal. It also helps you spread carbs throughout the day. ? Divide your plate by types of foods. Put non-starchy vegetables on half the plate, meat or other protein food on one-quarter of the plate, and a grain or starchy vegetable in the final quarter of the plate. To this you can add a small piece of fruit and 1 cup of milk or yogurt, depending on how many carbs you are supposed to eat at a meal.  · Try to eat about the same amount of carbs at each meal. Do not \"save up\" your daily allowance of carbs to eat at one meal.  · Proteins have very little or no carbs per serving. Examples of proteins are beef, chicken, turkey, fish, eggs, tofu, cheese, cottage cheese, and peanut butter. A serving size of meat is 3 ounces, which is about the size of a deck of cards. Examples of meat substitute serving sizes (equal to 1 ounce of meat) are 1/4 cup of cottage cheese, 1 egg, 1 tablespoon of peanut butter, and ½ cup of tofu. How can you eat out and still eat healthy? · Learn to estimate the serving sizes of foods that have carbohydrate. If you measure food at home, it will be easier to estimate the amount in a serving of restaurant food. · If the meal you order has too much carbohydrate (such as potatoes, corn, or baked beans), ask to have a low-carbohydrate food instead. Ask for a salad or green vegetables. · If you use insulin, check your blood sugar before and after eating out to help you plan how much to eat in the future. · If you eat more carbohydrate at a meal than you had planned, take a walk or do other exercise. This will help lower your blood sugar. What else should you know? · Limit saturated fat, such as the fat from meat and dairy products. This is a healthy choice because people who have diabetes are at higher risk of heart disease. So choose lean cuts of meat and nonfat or low-fat dairy products. Use olive or canola oil instead of butter or shortening when cooking.   · Don't skip meals. Your blood sugar may drop too low if you skip meals and take insulin or certain medicines for diabetes. · Check with your doctor before you drink alcohol. Alcohol can cause your blood sugar to drop too low. Alcohol can also cause a bad reaction if you take certain diabetes medicines. Follow-up care is a key part of your treatment and safety. Be sure to make and go to all appointments, and call your doctor if you are having problems. It's also a good idea to know your test results and keep a list of the medicines you take. Where can you learn more? Go to http://mehnazMinicom Digital Signagexochilt.info/. Enter H197 in the search box to learn more about \"Learning About Diabetes Food Guidelines. \"  Current as of: July 25, 2018  Content Version: 11.9  © 1190-7267 [employer]. Care instructions adapted under license by [employer] (which disclaims liability or warranty for this information). If you have questions about a medical condition or this instruction, always ask your healthcare professional. [place] [id number] any warranty or liability for your use of this information. Vaccine Information Statement     Pneumococcal Conjugate Vaccine (PCV13): What You Need to Know    Many Vaccine Information Statements are available in [place] and other languages. See www.immunize.org/vis. Hojas de información Sobre Vacunas están disponibles en español y en muchos otros idiomas. Visite www.immunize.org/vis. 1. Why get vaccinated? Vaccination can protect both children and adults from pneumococcal disease. Pneumococcal disease is caused by bacteria that can spread from person to person through close contact. It can cause ear infections, and it can also lead to more serious infections of the:   Lungs (pneumonia),   Blood (bacteremia), and   Covering of the brain and spinal cord (meningitis). Pneumococcal pneumonia is most common among adults.  Pneumococcal meningitis can cause deafness and brain damage, and it kills about 1 child in 10 who get it. Anyone can get pneumococcal disease, but children under 3years of age and adults 72 years and older, people with certain medical conditions, and cigarette smokers are at the highest risk. Before there was a vaccine, the [facility] saw:   more than 700 cases of meningitis,   about 13,000 blood infections,   about 5 million ear infections, and   about 200 deaths  in children under 5 each year from pneumococcal disease. Since vaccine became available, severe pneumococcal disease in these children has fallen by 88%. About 18,000 older adults die of pneumococcal disease each year in the [place]. Treatment of pneumococcal infections with penicillin and other drugs is not as effective as it used to be, because some strains of the disease have become resistant to these drugs. This makes prevention of the disease, through vaccination, even more important. 2. PCV13 vaccine    Pneumococcal conjugate vaccine (called PCV13) protects against 13 types of pneumococcal bacteria. PCV13 is routinely given to children at 2, 4, 6, and 1515 months of age. It is also recommended for children and adults 3to 59years of age with certain health conditions, and for all adults 72years of age and older. Your doctor can give you details. 3. Some people should not get this vaccine    Anyone who has ever had a life-threatening allergic reaction to a dose of this vaccine, to an earlier pneumococcal vaccine called PCV7, or to any vaccine containing diphtheria toxoid (for example, DTaP), should not get PCV13. Anyone with a severe allergy to any component of PCV13 should not get the vaccine. Tell your doctor if the person being vaccinated has any severe allergies. If the person scheduled for vaccination is not feeling well, your healthcare provider might decide to reschedule the shot on another day.      4. Risks of a vaccine reaction    With any medicine, including vaccines, there is a chance of reactions. These are usually mild and go away on their own, but serious reactions are also possible. Problems reported following PCV13 varied by age and dose in the series. The most common problems reported among children were:    About half became drowsy after the shot, had a temporary loss of appetite, or had redness or tenderness where the shot was given.  About 1 out of 3 had swelling where the shot was given.  About 1 out of 3 had a mild fever, and about 1 in 20 had a fever over 102.2°F.   Up to about 8 out of 10 became fussy or irritable. Adults have reported pain, redness, and swelling where the shot was given; also mild fever, fatigue, headache, chills, or muscle pain. [place] Bern[place] children who get PCV13 along with inactivated flu vaccine at the same time may be at increased risk for seizures caused by fever. Ask your doctor for more information. Problems that could happen after any vaccine:     People sometimes faint after a medical procedure, including vaccination. Sitting or lying down for about 15 minutes can help prevent fainting, and injuries caused by a fall. Tell your doctor if you feel dizzy, or have vision changes or ringing in the ears.  Some older children and adults get severe pain in the shoulder and have difficulty moving the arm where a shot was given. This happens very rarely.  Any medication can cause a severe allergic reaction. Such reactions from a vaccine are very rare, estimated at about 1 in a million doses, and would happen within a few minutes to a few hours after the vaccination. As with any medicine, there is a very small chance of a vaccine causing a serious injury or death. The safety of vaccines is always being monitored. For more information, visit: www.cdc.gov/vaccinesafety/     5. What if there is a serious reaction? What should I look for?      Look for anything that concerns you, such as signs of a severe allergic reaction, very high fever, or unusual behavior. Signs of a severe allergic reaction can include hives, swelling of the face and throat, difficulty breathing, a fast heartbeat, dizziness, and weakness - usually within a few minutes to a few hours after the vaccination. What should I do?  If you think it is a severe allergic reaction or other emergency that cant wait, call 9-1-1 or get the person to the nearest hospital. Otherwise, call your doctor. Reactions should be reported to the Vaccine Adverse Event Reporting System (VAERS). Your doctor should file this report, or you can do it yourself through the VAERS web site at www.vaers. [facility].gov, or by calling 2-951.668.4924. VAERS does not give medical advice. 6. The National Vaccine Injury Compensation Program    The [facility] Vaccine Injury Compensation Program (VICP) is a federal program that was created to compensate people who may have been injured by certain vaccines. Persons who believe they may have been injured by a vaccine can learn about the program and about filing a claim by calling 2-811.419.5866 or visiting the [employer] [address] website at www.[facility].gov/vaccinecompensation. There is a time limit to file a claim for compensation. 7. How can I learn more?  Ask your healthcare provider. He or she can give you the vaccine package insert or suggest other sources of information.  Call your local or state health department.  Contact the Centers for Disease Control and Prevention (CDC):  - Call 9-837.956.9015 (1-800-CDC-INFO) or  - Visit CDCs website at www.cdc.gov/vaccines    Vaccine Information Statement   PCV13 Vaccine   11/5/2015   42 BRANDI Maza Sharri [id number]    Department of Health and Human Services  Centers for Disease Control and Prevention    Office Use Only

## 2019-06-27 ENCOUNTER — HOSPITAL ENCOUNTER (OUTPATIENT)
Dept: PET IMAGING | Age: 69
Discharge: HOME | End: 2019-06-27
Attending: INTERNAL MEDICINE
Payer: MEDICARE

## 2019-06-27 DIAGNOSIS — C16.2 MALIGNANT NEOPLASM OF BODY OF STOMACH (HCC): ICD-10-CM

## 2019-06-27 PROCEDURE — A9552 F18 FDG: HCPCS

## 2019-06-27 RX ORDER — SODIUM CHLORIDE 0.9 % (FLUSH) 0.9 %
10 SYRINGE (ML) INJECTION
Status: COMPLETED | OUTPATIENT
Start: 2019-06-27 | End: 2019-06-27

## 2019-06-27 RX ADMIN — Medication 10 ML: at 09:12

## 2019-07-01 ENCOUNTER — OFFICE VISIT (OUTPATIENT)
Dept: SURGERY | Age: 69
End: 2019-07-01

## 2019-07-01 VITALS
BODY MASS INDEX: 27.94 KG/M2 | SYSTOLIC BLOOD PRESSURE: 122 MMHG | OXYGEN SATURATION: 99 % | WEIGHT: 167.7 LBS | HEART RATE: 93 BPM | DIASTOLIC BLOOD PRESSURE: 80 MMHG | RESPIRATION RATE: 20 BRPM | TEMPERATURE: 95.8 F | HEIGHT: 65 IN

## 2019-07-01 DIAGNOSIS — Z09 POSTOPERATIVE EXAMINATION: Primary | ICD-10-CM

## 2019-07-01 NOTE — PROGRESS NOTES
Chief Complaint   Patient presents with    Surgical Follow-up     Post/op insertion of left subclvian eileen cath on 6/20/19. 1. Have you been to the ER, urgent care clinic since your last visit? Hospitalized since your last visit? 2. Have you seen or consulted any other health care providers outside of the [address] [place] [address] since your last visit? Include any pap smears or colon screening.

## 2019-07-01 NOTE — PROGRESS NOTES
Surgery  Follow up  Procedure: port placement  OR date:  6/20/2019  Path:      S I feel fine, no drainage    Visit Vitals  /80 (BP 1 Location: Left arm, BP Patient Position: Sitting)   Pulse 93   Temp 95.8 °F (35.4 °C) (Oral)   Resp 20   Ht 5' 5\" (1.651 m)   Wt 76.1 kg (167 lb 11.2 oz)   LMP 05/29/1997 (Approximate)   SpO2 99%   BMI 27.91 kg/m²       O Incisions healing well without infection       A/P Doing well from surgery   PET 6/27/2019 surprisingly did not light up anywhere   RTC 3 months    Yuri Harden MD FACS

## 2019-07-07 ENCOUNTER — PATIENT MESSAGE (OUTPATIENT)
Dept: FAMILY MEDICINE CLINIC | Age: 69
End: 2019-07-07

## 2019-07-07 DIAGNOSIS — R79.0 LOW MAGNESIUM LEVEL: ICD-10-CM

## 2019-07-07 DIAGNOSIS — E11.9 DIABETES MELLITUS, NEW ONSET (HCC): Primary | ICD-10-CM

## 2019-07-08 DIAGNOSIS — E11.9 DIABETES MELLITUS, NEW ONSET (HCC): Primary | ICD-10-CM

## 2019-07-08 RX ORDER — LANCETS 33 GAUGE
EACH MISCELLANEOUS
Qty: 100 LANCET | Refills: 3 | Status: SHIPPED | OUTPATIENT
Start: 2019-07-08 | End: 2019-07-08 | Stop reason: ALTCHOICE

## 2019-07-08 RX ORDER — ZINC GLUCONATE 10 MG
1 LOZENGE ORAL DAILY
Qty: 90 TAB | Refills: 0 | Status: SHIPPED | OUTPATIENT
Start: 2019-07-08 | End: 2019-10-07 | Stop reason: SDUPTHER

## 2019-07-08 RX ORDER — LISINOPRIL 20 MG/1
20 TABLET ORAL DAILY
Qty: 90 TAB | Refills: 3 | Status: SHIPPED | OUTPATIENT
Start: 2019-07-08 | End: 2020-05-04 | Stop reason: ALTCHOICE

## 2019-07-08 RX ORDER — BLOOD-GLUCOSE CONTROL, NORMAL
EACH MISCELLANEOUS
Qty: 100 LANCET | Refills: 6 | Status: SHIPPED | OUTPATIENT
Start: 2019-07-08 | End: 2020-01-01

## 2019-07-10 ENCOUNTER — HOSPITAL ENCOUNTER (OUTPATIENT)
Dept: LAB | Age: 69
Discharge: HOME | End: 2019-07-10
Payer: MEDICARE

## 2019-07-10 PROCEDURE — 80061 LIPID PANEL: CPT

## 2019-07-11 LAB
CHOLEST SERPL-MCNC: 194 MG/DL (ref 100–199)
HDLC SERPL-MCNC: 57 MG/DL
INTERPRETATION, 910389: NORMAL
LDLC SERPL CALC-MCNC: 115 MG/DL (ref 0–99)
TRIGL SERPL-MCNC: 110 MG/DL (ref 0–149)
VLDLC SERPL CALC-MCNC: 22 MG/DL (ref 5–40)

## 2019-08-07 ENCOUNTER — OFFICE VISIT (OUTPATIENT)
Dept: FAMILY MEDICINE CLINIC | Age: 69
End: 2019-08-07

## 2019-08-07 ENCOUNTER — OFFICE VISIT (OUTPATIENT)
Dept: SURGERY | Age: 69
End: 2019-08-07

## 2019-08-07 VITALS
BODY MASS INDEX: 26.16 KG/M2 | SYSTOLIC BLOOD PRESSURE: 126 MMHG | TEMPERATURE: 97 F | DIASTOLIC BLOOD PRESSURE: 75 MMHG | OXYGEN SATURATION: 96 % | HEART RATE: 112 BPM | HEIGHT: 65 IN | RESPIRATION RATE: 16 BRPM | WEIGHT: 157 LBS

## 2019-08-07 VITALS
BODY MASS INDEX: 26.16 KG/M2 | WEIGHT: 157 LBS | SYSTOLIC BLOOD PRESSURE: 111 MMHG | RESPIRATION RATE: 12 BRPM | DIASTOLIC BLOOD PRESSURE: 73 MMHG | OXYGEN SATURATION: 95 % | HEART RATE: 106 BPM | TEMPERATURE: 98.1 F | HEIGHT: 65 IN

## 2019-08-07 DIAGNOSIS — Z85.3 HISTORY OF RIGHT BREAST CANCER: ICD-10-CM

## 2019-08-07 DIAGNOSIS — C16.9 MALIGNANT NEOPLASM OF STOMACH, UNSPECIFIED LOCATION (HCC): ICD-10-CM

## 2019-08-07 DIAGNOSIS — C16.9 GASTRIC ADENOCARCINOMA (HCC): ICD-10-CM

## 2019-08-07 DIAGNOSIS — L02.212 BACK ABSCESS: Primary | ICD-10-CM

## 2019-08-07 DIAGNOSIS — L02.212 ABSCESS OF UPPER BACK EXCLUDING SCAPULAR REGION: ICD-10-CM

## 2019-08-07 DIAGNOSIS — E11.9 DIABETES MELLITUS, NEW ONSET (HCC): Primary | ICD-10-CM

## 2019-08-07 NOTE — PATIENT INSTRUCTIONS
Learning About Meal Planning for Diabetes  Why plan your meals? Meal planning can be a key part of managing diabetes. Planning meals and snacks with the right balance of carbohydrate, protein, and fat can help you keep your blood sugar at the target level you set with your doctor. You don't have to eat special foods. You can eat what your family eats, including sweets once in a while. But you do have to pay attention to how often you eat and how much you eat of certain foods. You may want to work with a dietitian or a certified diabetes educator. He or she can give you tips and meal ideas and can answer your questions about meal planning. This health professional can also help you reach a healthy weight if that is one of your goals. What plan is right for you? Your dietitian or diabetes educator may suggest that you start with the plate format or carbohydrate counting. The plate format  The plate format is a simple way to help you manage how you eat. You plan meals by learning how much space each food should take on a plate. Using the plate format helps you spread carbohydrate throughout the day. It can make it easier to keep your blood sugar level within your target range. It also helps you see if you're eating healthy portion sizes. To use the plate format, you put non-starchy vegetables on half your plate. Add meat or meat substitutes on one-quarter of the plate. Put a grain or starchy vegetable (such as brown rice or a potato) on the final quarter of the plate. You can add a small piece of fruit and some low-fat or fat-free milk or yogurt, depending on your carbohydrate goal for each meal.  Here are some tips for using the plate format:  · Make sure that you are not using an oversized plate. A 9-inch plate is best. Many restaurants use larger plates. · Get used to using the plate format at home. Then you can use it when you eat out. · Write down your questions about using the plate format.  Talk to your doctor, a dietitian, or a diabetes educator about your concerns. Carbohydrate counting  With carbohydrate counting, you plan meals based on the amount of carbohydrate in each food. Carbohydrate raises blood sugar higher and more quickly than any other nutrient. It is found in desserts, breads and cereals, and fruit. It's also found in starchy vegetables such as potatoes and corn, grains such as rice and pasta, and milk and yogurt. Spreading carbohydrate throughout the day helps keep your blood sugar levels within your target range. Your daily amount depends on several things, including your weight, how active you are, which diabetes medicines you take, and what your goals are for your blood sugar levels. A registered dietitian or diabetes educator can help you plan how much carbohydrate to include in each meal and snack. A guideline for your daily amount of carbohydrate is:  · 45 to 60 grams at each meal. That's about the same as 3 to 4 carbohydrate servings. · 15 to 20 grams at each snack. That's about the same as 1 carbohydrate serving. The Nutrition Facts label on packaged foods tells you how much carbohydrate is in a serving of the food. First, look at the serving size on the food label. Is that the amount you eat in a serving? All of the nutrition information on a food label is based on that serving size. So if you eat more or less than that, you'll need to adjust the other numbers. Total carbohydrate is the next thing you need to look for on the label. If you count carbohydrate servings, one serving of carbohydrate is 15 grams. For foods that don't come with labels, such as fresh fruits and vegetables, you'll need a guide that lists carbohydrate in these foods. Ask your doctor, dietitian, or diabetes educator about books or other nutrition guides you can use.   If you take insulin, you need to know how many grams of carbohydrate are in a meal. This lets you know how much rapid-acting insulin to take before you eat. If you use an insulin pump, you get a constant rate of insulin during the day. So the pump must be programmed at meals to give you extra insulin to cover the rise in blood sugar after meals. When you know how much carbohydrate you will eat, you can take the right amount of insulin. Or, if you always use the same amount of insulin, you need to make sure that you eat the same amount of carbohydrate at meals. If you need more help to understand carbohydrate counting and food labels, ask your doctor, dietitian, or diabetes educator. How do you get started with meal planning? Here are some tips to get started:  · Plan your meals a week at a time. Don't forget to include snacks too. · Use cookbooks or online recipes to plan several main meals. Plan some quick meals for busy nights. You also can double some recipes that freeze well. Then you can save half for other busy nights when you don't have time to cook. · Make sure you have the ingredients you need for your recipes. If you're running low on basic items, put these items on your shopping list too. · List foods that you use to make breakfasts, lunches, and snacks. List plenty of fruits and vegetables. · Post this list on the refrigerator. Add to it as you think of more things you need. · Take the list to the store to do your weekly shopping. Follow-up care is a key part of your treatment and safety. Be sure to make and go to all appointments, and call your doctor if you are having problems. It's also a good idea to know your test results and keep a list of the medicines you take. Where can you learn more? Go to http://mehnaz-xochilt.info/. Leann Forrest in the search box to learn more about \"Learning About Meal Planning for Diabetes. \"  Current as of: July 25, 2018  Content Version: 12.1  © 9151-9522 [employer].  Care instructions adapted under license by [employer] (which disclaims liability or warranty for this information). If you have questions about a medical condition or this instruction, always ask your healthcare professional. [address] any warranty or liability for your use of this information. Skin Abscess: Care Instructions  Your Care Instructions    A skin abscess is a bacterial infection that forms a pocket of pus. A boil is a kind of skin abscess. The doctor may have cut an opening in the abscess so that the pus can drain out. You may have gauze in the cut so that the abscess will stay open and keep draining. You may need antibiotics. You will need to follow up with your doctor to make sure the infection has gone away. The doctor has checked you carefully, but problems can develop later. If you notice any problems or new symptoms, get medical treatment right away. Follow-up care is a key part of your treatment and safety. Be sure to make and go to all appointments, and call your doctor if you are having problems. It's also a good idea to know your test results and keep a list of the medicines you take. How can you care for yourself at home? · Apply warm and dry compresses, a heating pad set on low, or a hot water bottle 3 or 4 times a day for pain. Keep a cloth between the heat source and your skin. · If your doctor prescribed antibiotics, take them as directed. Do not stop taking them just because you feel better. You need to take the full course of antibiotics. · Take pain medicines exactly as directed. ? If the doctor gave you a prescription medicine for pain, take it as prescribed. ? If you are not taking a prescription pain medicine, ask your doctor if you can take an over-the-counter medicine. · Keep your bandage clean and dry. Change the bandage whenever it gets wet or dirty, or at least one time a day. · If the abscess was packed with gauze:  ? Keep follow-up appointments to have the gauze changed or removed.  If the doctor instructed you to remove the gauze, follow the instructions you were given for how to remove it. ? After the gauze is removed, soak the area in warm water for 15 to 20 minutes 2 times a day, until the wound closes. When should you call for help? Call your doctor now or seek immediate medical care if:    · You have signs of worsening infection, such as:  ? Increased pain, swelling, warmth, or redness. ? Red streaks leading from the infected skin. ? Pus draining from the wound. ? A fever.    Watch closely for changes in your health, and be sure to contact your doctor if:    · You do not get better as expected. Where can you learn more? Go to http://mehnaz-xochilt.info/. Enter O607 in the search box to learn more about \"Skin Abscess: Care Instructions. \"  Current as of: April 1, 2019  Content Version: 12.1  © 2959-7083 [employer]. Care instructions adapted under license by [employer] (which disclaims liability or warranty for this information). If you have questions about a medical condition or this instruction, always ask your healthcare professional. [place] [address] any warranty or liability for your use of this information.

## 2019-08-07 NOTE — PROGRESS NOTES
Chief Complaint Patient presents with  
 Skin Problem Patient was seen by PCP and was told to follow up with Dr. Chrissie Guerrero re abscess right side near back, in line with post surgical scar from mastectomy. 1. Have you been to the ER, urgent care clinic since your last visit? Hospitalized since your last visit? No 
 
2. Have you seen or consulted any other health care providers outside of the [address] [place] [address] since your last visit? Include any pap smears or colon screening.  No

## 2019-08-07 NOTE — PROGRESS NOTES
.  Chief Complaint   Patient presents with    Diabetes    Cyst     on back     . 1. Have you been to the ER, urgent care clinic since your last visit? Hospitalized since your last visit? Yes with [facility]    2. Have you seen or consulted any other health care providers outside of the [address] [place] [address] since your last visit? Include any pap smears or colon screening. No    .  Health Maintenance Due   Topic Date Due    FOOT EXAM Q1  07/17/1961    EYE EXAM RETINAL OR DILATED  07/17/1961    DTaP/Tdap/Td series (1 - Tdap) 07/17/1972    Shingrix Vaccine Age 50> (1 of 2) 07/17/2001    GLAUCOMA SCREENING Q2Y  07/17/2016    MEDICARE YEARLY EXAM  03/14/2018    Influenza Age 9 to Adult  08/01/2019     . Juju De León         Diabetic foot exam performed by Juju De León     Measurement  Response Nurse Comment Physician Comment   Monofilament  R - normal sensation with micro filament  L - normal sensation with micro filament     Pulse DP R - 2+ (normal)  L - 2+ (normal)     Pulse TP R - 2+ (normal)  L - 2+ (normal)     Structural deformity R - None  L - None     Skin Integrity / Deformity R - None  L - None        Reviewed by: Rupali Mejia

## 2019-08-07 NOTE — PROGRESS NOTES
HISTORY OF PRESENT ILLNESS Mary Bauman is a 77 y.o. female who comes in for a right flank/back abscess Breast Cancer Pertinent negatives include no chest pain, no abdominal pain, no headaches and no shortness of breath. Breast Problem Pertinent negatives include no chest pain, no abdominal pain, no headaches and no shortness of breath. She developed swelling over the right back/flank and went to urgent care and had an I and D and was placed on abx. She has not been doing any local wound care. She is immunosuppressed from chemotherapy but denies fever, chills or sweats. She presented with obstructive jaundice 5/16/2019 and was found to have a pancreatic mass. Dr Jeremiah Alegre did an ERCP and brushings with stent placement and the jaundice cleared. The brushings were negative. 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 There were too many vessels near the pancreatic mass to do a biopsy. She is to see Dr Randi Garcia next week to discuss options. She went for routine screening mammogram in late Dec 2016 and was noted to have a developing density in the outer right breast.  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 She had a breast MRI suggesting the area to be 2.3 cm or two separate lesions. She was also noted to have two foci in the left breast for which MRI biopsy was recommended. She denies feeling any masses, skin changes, nipple changes, unexplained weight loss or bone pain. She previously underwent a right lumpectomy and ALND followed by chemotherapy Alireza Julian) and WBRT in 2003 for a ??stage 1-2 triple negative breast cancer. She has had other bilateral breast biopsies as well.   She had menarche at 16, first of two pregnancies at 35, menopause at 51 and did not take HRT. She denies family hx of breast or ovarian cancer but her mother had kidney cancer and her brother had lung cancer. She has been received neoadjuvant chemotherapy by Dr Best Schmidt. She is having ongoing herceptin. She underwent a right mastectomy/SLNB and left mastectomy 6/2017 with pCR. She completed adjuvant herceptin as well. She has switched to Dr Lázaro Allred as Dr Best Schmidt retired. Past Medical History:  
Diagnosis Date  Cancer [facility]) 2003, 2017  
 right breast cancer  Diabetes ([facility] [address]  Hypertension  Psychiatric disorder   
 anxiety Past Surgical History:  
Procedure Laterality Date  BREAST SURGERY PROCEDURE UNLISTED  2003  
 right breast lumpectomy  COLONOSCOPY N/A 4/11/2019 COLONOSCOPY performed by Deysi Omalley MD at [facility] ENDOSCOPY  
 HX MASTECTOMY Bilateral 6/29/2017 RIGHT AND LEFT MASTECTOMY, RIGHT AXILLARY SENTINAL LYMPH NODE BIOPSY performed by Zulema Matos MD at [facility] MAIN OR  
 HX VASCULAR ACCESS    
 left chest portacath - removed 4/2019  VASCULAR SURGERY PROCEDURE UNLIST  06/20/2019 Port -a cath placement Family History Problem Relation Age of Onset  Cancer Mother   
     kidney  Cancer Brother   
     lung  Heart Disease Father  Diabetes Sister  Diabetes Brother Social History Tobacco Use  Smoking status: Never Smoker  Smokeless tobacco: Never Used Substance Use Topics  Alcohol use: No  
 Drug use: No  
 
Current Outpatient Medications Medication Sig  
 lisinopril (PRINIVIL, ZESTRIL) 20 mg tablet Take 1 Tab by mouth daily.  magnesium 250 mg tab Take 1 Tab by mouth daily.  lancets (ONETOUCH DELICA LANCETS) 30 gauge misc Check blood sugars three times daily  insulin glargine (LANTUS,BASAGLAR) 100 unit/mL (3 mL) inpn 18 Units by SubCUTAneous route nightly.   
 glucose blood VI test strips (ASCENSIA AUTODISC VI, ONE TOUCH ULTRA TEST VI) strip Check blood sugars 3 times per day. DX code E11.9  
 ONETOUCH ULTRAMINI monitoring kit use as directed TO CHECK BLOOD SUGAR THREE TIMES DAILY  Insulin Needles, Disposable, 30 gauge x 1/3\" To use daily with lantus  metFORMIN (GLUCOPHAGE) 500 mg tablet Take 1 Tab by mouth daily (with breakfast).  ibuprofen (ADVIL) 200 mg tablet Take 200 mg by mouth every four (4) hours as needed for Pain.  anastrozole (ARIMIDEX) 1 mg tablet Take 1 mg by mouth daily.  denosumab (PROLIA) 60 mg/mL injection 60 mg by SubCUTAneous route every 6 months. At [facility]  multivitamin (ONE A DAY) tablet Take 1 Tab by mouth daily (with lunch).  aspirin delayed-release 81 mg tablet Take 81 mg by mouth daily.  CALCIUM CARBONATE/VITAMIN D3 (CALTRATE 600 + D PO) Take 2 Tabs by mouth daily (with lunch).  doxycycline (VIBRA-TABS) 100 mg tablet Take 1 Tab by mouth two (2) times a day for 7 days. Indications: Abscess right back, post-incision/drainage  omega-3 fatty acids-vitamin e (FISH OIL) 1,000 mg cap Take 2 Caps by mouth daily (with lunch). No current facility-administered medications for this visit. No Known Allergies Review of Systems Constitutional: Negative for chills, diaphoresis, fever, malaise/fatigue and weight loss. HENT: Negative for congestion, ear pain and sore throat. Eyes: Negative for blurred vision and pain. Respiratory: Negative for cough, hemoptysis, sputum production, shortness of breath, wheezing and stridor. Cardiovascular: Negative for chest pain, palpitations, orthopnea, claudication, leg swelling and PND. Gastrointestinal: Negative for abdominal pain, blood in stool, constipation, diarrhea, heartburn, melena, nausea and vomiting. Genitourinary: Negative for dysuria, flank pain, frequency, hematuria and urgency. Musculoskeletal: Negative for back pain, joint pain, myalgias and neck pain. Skin: Negative for itching and rash. Neurological: Negative for dizziness, tremors, focal weakness, seizures, weakness and headaches. Endo/Heme/Allergies: Negative for polydipsia. Psychiatric/Behavioral: Negative for depression and memory loss. The patient is nervous/anxious. Blood pressure 126/75, pulse (!) 112, temperature 97 °F (36.1 °C), temperature source Oral, resp. rate 16, height 5' 5\" (1.651 m), weight 71.2 kg (157 lb), last menstrual period 05/29/1997, SpO2 96 %. Physical Exam  
Constitutional: She is oriented to person, place, and time. She appears well-developed and well-nourished. No distress. HENT:  
Head: Normocephalic and atraumatic. Mouth/Throat: Oropharynx is clear and moist. No oropharyngeal exudate. Eyes: Pupils are equal, round, and reactive to light. Conjunctivae and EOM are normal. No scleral icterus. Neck: Normal range of motion. Neck supple. No JVD present. No tracheal deviation present. No thyromegaly present. Cardiovascular: Normal rate and regular rhythm. Exam reveals no gallop and no friction rub. No murmur heard. Pulmonary/Chest: Effort normal and breath sounds normal. No respiratory distress. She has no wheezes. She has no rales. Right breast exhibits no mass, no skin change and no tenderness. Left breast exhibits no mass, no skin change and no tenderness. Breasts are symmetrical (bilateral amastia). Abdominal: Soft. Bowel sounds are normal. She exhibits no distension and no mass. There is no hepatosplenomegaly. There is no tenderness. There is no rebound, no guarding and no CVA tenderness. No hernia. Hernia confirmed negative in the ventral area. Musculoskeletal: Normal range of motion. She exhibits no edema. Lymphadenopathy:  
  She has no cervical adenopathy. She has no axillary adenopathy. Right: No supraclavicular adenopathy present. Left: No supraclavicular adenopathy present. Neurological: She is alert and oriented to person, place, and time.  No cranial nerve deficit. Skin: Skin is warm and dry. No rash noted. She is not diaphoretic. No erythema. No pallor. Psychiatric: She has a normal mood and affect. Her behavior is normal. Judgment and thought content normal.  
 
 
ASSESSMENT and PLAN 1. Right breast cancer early stage, Clinically stage 2 (T2NxM0)  ER 98% CT 83% and her2/wil amplified dx 1/2017. SELENE 2 year and 1  months Continue arimidex Keep f/u with Dr Gerda Duque 
2. Hx right breast cancer in 2003 triple negative (treated by Dr Burlene Spatz). This is unrelated to the new cancer 3. Right axillary hematoma/seroma   Minimal residual 
4. High risk for genetic mutation due to triple negative breast ca at age 49 and now a metachronous breast cancer. My Risk genetic testing negative 5. Gastric adenocarcinoma and Pancreatic mass with biliary obstruction but this is due to gastric cancer-adenocarcinoma with signet ring cells and possible pancreatic ca. Very unusual to have two separate synchronous malignancies. Will let Dr Gerda Duque discuss chemotherapy. I will place a left sided port a cath under MAC next week if she desires. Risks and benefits were discussed including, but not limited to, bleeding, infection, pneumothorax, hemothorax, DVT, port malfunction, etc. 
 
6.  Offered lymphedema appt but she declined. She is currently at higher risk but asymptomatic 7. Colon screening. She wishes to go to GI 
DME surgical bras (she does not want new prosthetics at this time) 8. Right back/flank abscess. S/p I and D. Finish abx Local wound care with damp saline gauze BID 
 
RTC  3 weeks Joyce Velarde MD FACS

## 2019-08-07 NOTE — PROGRESS NOTES
Chief Complaint   Patient presents with    Diabetes    Cyst     on back       S: Hallie Murry is a 77 y.o. yo female who presents for DM follow up. Last DM check hemoglobin A1c on 6/26/2019    Blood sugars- at home have been ok, 113-208 over the past couple weeks, lower in AM and higher in PM.  She is taking 18 units lantus daily and 500mg Metformin daily. She is eating ok, some nausea after her treatments. Foot exam- no numbness or tingling. No wounds    Continues to follow with oncology. Chemo scheduled for today, third cycle. Overall, feeling ok. Had a \"cyst\" on her back, that \"came up about two weeks ago and it burst on Sunday night and I went to the Emergency Room and he just cut it a little more and squeezed it and put me on antibiotics\", told to leave open so that it could continue to drain. She started doxycycline. Area on back looking and feeling much better. Was [place] by [place] to [facility] [place] and let it drain\". Daughter says she had same infected cyst a year or so ago and had to have it lanced. No fever. Health Maintenance Reviewed    Denies cardiac complaints including chest pain or discomfort, elevated heart rate, or palpitations. Denies any headache, vision changes, numbness and tingling or weakness in her extremities. Denies respiratory complaints including SOB, difficulty or pain with breathing, wheezes, and cough. Feels ok and ROS is otherwise negative.      Past Medical History:   Diagnosis Date    Cancer [facility]) 2003, 2017    right breast cancer    Diabetes ([facility] [address]     Hypertension     Psychiatric disorder     anxiety      Past Surgical History:   Procedure Laterality Date    BREAST SURGERY PROCEDURE UNLISTED  2003    right breast lumpectomy    COLONOSCOPY N/A 4/11/2019    COLONOSCOPY performed by Thong Oliver MD at [facility] ENDOSCOPY    HX MASTECTOMY Bilateral 6/29/2017    RIGHT AND LEFT MASTECTOMY, RIGHT AXILLARY SENTINAL LYMPH NODE BIOPSY performed by Dorothy Lassiter Caitie Jett MD at [facility] MAIN OR    HX VASCULAR ACCESS      left chest portacath - removed 4/2019    VASCULAR SURGERY PROCEDURE UNLIST  06/20/2019    Port -a cath placement     Social History     Socioeconomic History    Marital status:      Spouse name: Not on file    Number of children: Not on file    Years of education: Not on file    Highest education level: Not on file   Occupational History    Not on file   Social Needs    Financial resource strain: Not on file    Food insecurity:     Worry: Not on file     Inability: Not on file    Transportation needs:     Medical: Not on file     Non-medical: Not on file   Tobacco Use    Smoking status: Never Smoker    Smokeless tobacco: Never Used   Substance and Sexual Activity    Alcohol use: No    Drug use: No    Sexual activity: Yes   Lifestyle    Physical activity:     Days per week: Not on file     Minutes per session: Not on file    Stress: Not on file   Relationships    Social connections:     Talks on phone: Not on file     Gets together: Not on file     Attends Ca[employer] service: Not on file     Active member of club or organization: Not on file     Attends meetings of clubs or organizations: Not on file     Relationship status: Not on file    Intimate partner violence:     Fear of current or ex partner: Not on file     Emotionally abused: Not on file     Physically abused: Not on file     Forced sexual activity: Not on file   Other Topics Concern    Not on file   Social History Narrative    Not on file     Current Outpatient Medications   Medication Sig Dispense Refill    doxycycline (VIBRA-TABS) 100 mg tablet Take 1 Tab by mouth two (2) times a day for 7 days. Indications: Abscess right back, post-incision/drainage 14 Tab 0    lisinopril (PRINIVIL, ZESTRIL) 20 mg tablet Take 1 Tab by mouth daily. 90 Tab 3    magnesium 250 mg tab Take 1 Tab by mouth daily.  90 Tab 0    lancets (ONETOUCH DELICA LANCETS) 30 gauge misc Check blood sugars three times daily 100 Lancet 6    insulin glargine (LANTUS,BASAGLAR) 100 unit/mL (3 mL) inpn 18 Units by SubCUTAneous route nightly. 3 Pen 1    glucose blood VI test strips (ASCENSIA AUTODISC VI, ONE TOUCH ULTRA TEST VI) strip Check blood sugars 3 times per day. DX code E11.9 100 Strip 11    ONETOUCH ULTRAMINI monitoring kit use as directed TO CHECK BLOOD SUGAR THREE TIMES DAILY  0    Insulin Needles, Disposable, 30 gauge x 1/3\" To use daily with lantus 100 Package 11    metFORMIN (GLUCOPHAGE) 500 mg tablet Take 1 Tab by mouth daily (with breakfast). 90 Tab 0    ibuprofen (ADVIL) 200 mg tablet Take 200 mg by mouth every four (4) hours as needed for Pain.  anastrozole (ARIMIDEX) 1 mg tablet Take 1 mg by mouth daily.  denosumab (PROLIA) 60 mg/mL injection 60 mg by SubCUTAneous route every 6 months. At [facility]      multivitamin (ONE A DAY) tablet Take 1 Tab by mouth daily (with lunch).  aspirin delayed-release 81 mg tablet Take 81 mg by mouth daily.  omega-3 fatty acids-vitamin e (FISH OIL) 1,000 mg cap Take 2 Caps by mouth daily (with lunch).  CALCIUM CARBONATE/VITAMIN D3 (CALTRATE 600 + D PO) Take 2 Tabs by mouth daily (with lunch). Pt is taking all medications as prescribed without any side effects or difficulty. No Known Allergies      Agree with nurses note. O:   Visit Vitals  /73 (BP 1 Location: Left arm, BP Patient Position: Sitting)   Pulse (!) 106   Temp 98.1 °F (36.7 °C) (Oral)   Resp 12   Ht 5' 5\" (1.651 m)   Wt 157 lb (71.2 kg)   LMP 05/29/1997 (Approximate)   SpO2 95%   BMI 26.13 kg/m²      PAIN: No complaints of pain today. GENERAL: Roberto Zacarias  is sitting in the chair in NAD.   EYE: PERRLA. EOMs intact. Sclera anicteric without injection. RESP: Unlabored without SOB. Speaking in full sentences. Breath sounds are symmetrical bilaterally. Clear to auscultation to all fields. No wheezes. No rales or rhonchi. CV: normal rate.   Regular rhythm. S1, S2 audible. No murmur noted. No rubs, clicks or gallops noted. NEURO:  awake, alert and oriented to person, place, and time and event. Clear speech. Muscle strength is +5/5 x 4 extremities. Steady gait. HEME/LYMPH: peripheral pulses palpable 2+ x 4 extremities. No peripheral edema is noted. FEET: Intact no wounds or abrasions. Denies numbness or tingling. Sensation intact  SKIN:  There is a large, draining abscess to the right mid lateral back. Surgically opened by ER 3 days ago. Daughter says that area is improving, no pain on palpation per patient. Results for orders placed or performed during the hospital encounter of 08/04/19   CULTURE, WOUND W GRAM STAIN   Result Value Ref Range    Special Requests: NO SPECIAL REQUESTS      GRAM STAIN FEW WBCS SEEN      GRAM STAIN FEW GRAM POSITIVE COCCI IN PAIRS      Culture result: SCANT STAPHYLOCOCCUS SPECIES, COAGULASE NEGATIVE (A)           A/P:  Differential diagnosis and treatment options reviewed with patient who is in agreement with treatment plan as outlined below. ICD-10-CM ICD-9-CM    1. Diabetes mellitus, new onset ([facility] [address] E11.9 250.00  DIABETES FOOT EXAM   2. Abscess of upper back excluding scapular region L02.212 682.2    3. History of right breast cancer Z85.3 V10.3    4. Malignant neoplasm of stomach, unspecified location (HCC) C16.9 151.9    5. Gastric adenocarcinoma (HCC) C16.9 151.9      Advised to call Dr Ciro Eldridge to evaluate abscess to the right mid back, in line with post surgical scar from mastectomy. Abscess is fairly large, appears to be draining appropriately but may need surgical intervention. Daughter will call today and schedule appointment. DM is stable on current therapy, continue current treatment. Will continue to follow with Oncology. Had CBC and CMP done yesterday which looked pretty good. Discussed BMI and healthy weight.  Encouraged patient to work to implement changes including diet high in raw fruits and vegetables, lean protein and good fats. Limit refined, processed carbohydrates and sugar. Advised of frequent feet checks  Advised yearly eye exam  Reviewed warning signs of diabetic emergency, hypertension, stroke and heart attack   Follow up one month or sooner if needed. Verbal and written instructions (see AVS) provided. Patient expresses understanding and agreement of diagnosis and treatment plan.

## 2019-08-27 ENCOUNTER — OFFICE VISIT (OUTPATIENT)
Dept: SURGERY | Age: 69
End: 2019-08-27

## 2019-08-27 VITALS
HEART RATE: 118 BPM | DIASTOLIC BLOOD PRESSURE: 68 MMHG | HEIGHT: 65 IN | WEIGHT: 157 LBS | OXYGEN SATURATION: 100 % | RESPIRATION RATE: 16 BRPM | TEMPERATURE: 97 F | SYSTOLIC BLOOD PRESSURE: 119 MMHG | BODY MASS INDEX: 26.16 KG/M2

## 2019-08-27 DIAGNOSIS — C16.9 GASTRIC ADENOCARCINOMA (HCC): ICD-10-CM

## 2019-08-27 DIAGNOSIS — C50.411 MALIGNANT NEOPLASM OF UPPER-OUTER QUADRANT OF RIGHT BREAST IN FEMALE, ESTROGEN RECEPTOR POSITIVE (HCC): Primary | ICD-10-CM

## 2019-08-27 DIAGNOSIS — Z17.0 MALIGNANT NEOPLASM OF UPPER-OUTER QUADRANT OF RIGHT BREAST IN FEMALE, ESTROGEN RECEPTOR POSITIVE (HCC): Primary | ICD-10-CM

## 2019-08-27 DIAGNOSIS — L72.0 EPIDERMAL CYST: ICD-10-CM

## 2019-08-27 NOTE — PROGRESS NOTES
HISTORY OF PRESENT ILLNESS  Murtaza Kenny is a 77 y.o. female who comes in for a right flank/back abscess  Skin Problem   Pertinent negatives include no chest pain, no abdominal pain, no headaches and no shortness of breath. Breast Cancer   Pertinent negatives include no chest pain, no abdominal pain, no headaches and no shortness of breath. Breast Problem   Pertinent negatives include no chest pain, no abdominal pain, no headaches and no shortness of breath. She developed swelling over the right back/flank and went to urgent care and had an I and D and was placed on abx. She is immunosuppressed from chemotherapy but denies fever, chills or sweats. She has been doing local wound care. She presented with obstructive jaundice 5/16/2019 and was found to have a pancreatic mass. Dr Bernardino Abdullhai did an ERCP and brushings with stent placement and the jaundice cleared. The brushings were negative. She had an EGD and EUS at [facility] demonstrating adenocarcinoma with signet ring cells in a gastric mass. There were too many vessels near the pancreatic mass to do a biopsy. She is to see Dr Zulema Rudolph next week to discuss options. She went for routine screening mammogram in late Dec 2016 and was noted to have a developing density in the outer right breast.  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 She had a breast MRI suggesting the area to be 2.3 cm or two separate lesions. She was also noted to have two foci in the left breast for which MRI biopsy was recommended. She denies feeling any masses, skin changes, nipple changes, unexplained weight loss or bone pain. She previously underwent a right lumpectomy and ALND followed by chemotherapy Maico Endo) and WBRT in 2003 for a ??stage 1-2 triple negative breast cancer.    She has had other bilateral breast biopsies as well. She had menarche at 16, first of two pregnancies at 35, menopause at 51 and did not take HRT. She denies family hx of breast or ovarian cancer but her mother had kidney cancer and her brother had lung cancer. She has been received neoadjuvant chemotherapy by Dr Carmelo Reynoso. She is having ongoing herceptin. She underwent a right mastectomy/SLNB and left mastectomy 6/2017 with pCR. She completed adjuvant herceptin as well. She has switched to Dr López Argueta as Dr Carmelo Reynoso retired. Past Medical History:   Diagnosis Date    Cancer [facility]) 2003, 2017    right breast cancer    Diabetes ([facility] [address]     Hypertension     Psychiatric disorder     anxiety     Past Surgical History:   Procedure Laterality Date    BREAST SURGERY PROCEDURE UNLISTED  2003    right breast lumpectomy    COLONOSCOPY N/A 4/11/2019    COLONOSCOPY performed by Anamaria Becerra MD at [facility] ENDOSCOPY    HX MASTECTOMY Bilateral 6/29/2017    RIGHT AND LEFT MASTECTOMY, RIGHT AXILLARY SENTINAL LYMPH NODE BIOPSY performed by Dian Brito MD at [facility] MAIN OR    HX VASCULAR ACCESS      left chest portacath - removed 4/2019    VASCULAR SURGERY PROCEDURE UNLIST  06/20/2019    Port -a cath placement     Family History   Problem Relation Age of Onset    Cancer Mother         kidney    Cancer Brother         lung    Heart Disease Father     Diabetes Sister     Diabetes Brother      Social History     Tobacco Use    Smoking status: Never Smoker    Smokeless tobacco: Never Used   Substance Use Topics    Alcohol use: No    Drug use: No     Current Outpatient Medications   Medication Sig    sodium chloride (SALINE WOUND WASH) 0.9 % soln 50 mL by Does Not Apply route three (3) times daily.  lisinopril (PRINIVIL, ZESTRIL) 20 mg tablet Take 1 Tab by mouth daily.  magnesium 250 mg tab Take 1 Tab by mouth daily.     lancets (ONETOUCH DELICA LANCETS) 30 gauge misc Check blood sugars three times daily    insulin glargine (LANTUS,BASAGLAR) 100 unit/mL (3 mL) inpn 18 Units by SubCUTAneous route nightly.  glucose blood VI test strips (ASCENSIA AUTODISC VI, ONE TOUCH ULTRA TEST VI) strip Check blood sugars 3 times per day. DX code E11.9    ONETOUCH ULTRAMINI monitoring kit use as directed TO CHECK BLOOD SUGAR THREE TIMES DAILY    Insulin Needles, Disposable, 30 gauge x 1/3\" To use daily with lantus    metFORMIN (GLUCOPHAGE) 500 mg tablet Take 1 Tab by mouth daily (with breakfast).  ibuprofen (ADVIL) 200 mg tablet Take 200 mg by mouth every four (4) hours as needed for Pain.  anastrozole (ARIMIDEX) 1 mg tablet Take 1 mg by mouth daily.  denosumab (PROLIA) 60 mg/mL injection 60 mg by SubCUTAneous route every 6 months. At [facility]    multivitamin (ONE A DAY) tablet Take 1 Tab by mouth daily (with lunch).  aspirin delayed-release 81 mg tablet Take 81 mg by mouth daily.  CALCIUM CARBONATE/VITAMIN D3 (CALTRATE 600 + D PO) Take 2 Tabs by mouth daily (with lunch).  omega-3 fatty acids-vitamin e (FISH OIL) 1,000 mg cap Take 2 Caps by mouth daily (with lunch). No current facility-administered medications for this visit. No Known Allergies    Review of Systems   Constitutional: Negative for chills, diaphoresis, fever, malaise/fatigue and weight loss. HENT: Negative for congestion, ear pain and sore throat. Eyes: Negative for blurred vision and pain. Respiratory: Negative for cough, hemoptysis, sputum production, shortness of breath, wheezing and stridor. Cardiovascular: Negative for chest pain, palpitations, orthopnea, claudication, leg swelling and PND. Gastrointestinal: Negative for abdominal pain, blood in stool, constipation, diarrhea, heartburn, melena, nausea and vomiting. Genitourinary: Negative for dysuria, flank pain, frequency, hematuria and urgency. Musculoskeletal: Negative for back pain, joint pain, myalgias and neck pain.    Skin: Negative for itching and rash.   Neurological: Negative for dizziness, tremors, focal weakness, seizures, weakness and headaches. Endo/Heme/Allergies: Negative for polydipsia. Psychiatric/Behavioral: Negative for depression and memory loss. The patient is nervous/anxious. Blood pressure 119/68, pulse (!) 118, temperature 97 °F (36.1 °C), temperature source Oral, resp. rate 16, height 5' 5\" (1.651 m), weight 71.2 kg (157 lb), last menstrual period 05/29/1997, SpO2 100 %. Physical Exam   Constitutional: She is oriented to person, place, and time. She appears well-developed and well-nourished. No distress. HENT:   Head: Normocephalic and atraumatic. Mouth/Throat: Oropharynx is clear and moist. No oropharyngeal exudate. Eyes: Pupils are equal, round, and reactive to light. Conjunctivae and EOM are normal. No scleral icterus. Neck: Normal range of motion. Neck supple. No JVD present. No tracheal deviation present. No thyromegaly present. Cardiovascular: Normal rate and regular rhythm. Exam reveals no gallop and no friction rub. No murmur heard. Pulmonary/Chest: Effort normal and breath sounds normal. No respiratory distress. She has no wheezes. She has no rales. Right breast exhibits no mass, no skin change and no tenderness. Left breast exhibits no mass, no skin change and no tenderness. Breasts are symmetrical (bilateral amastia). Abdominal: Soft. Bowel sounds are normal. She exhibits no distension and no mass. There is no hepatosplenomegaly. There is no tenderness. There is no rebound, no guarding and no CVA tenderness. No hernia. Hernia confirmed negative in the ventral area. Musculoskeletal: Normal range of motion. She exhibits no edema. Lymphadenopathy:     She has no cervical adenopathy. She has no axillary adenopathy. Right: No supraclavicular adenopathy present. Left: No supraclavicular adenopathy present. Neurological: She is alert and oriented to person, place, and time. No cranial nerve deficit. Skin: Skin is warm and dry. No rash noted. She is not diaphoretic. No erythema. No pallor. Psychiatric: She has a normal mood and affect. Her behavior is normal. Judgment and thought content normal.       ASSESSMENT and PLAN  1. Right breast cancer early stage, Clinically stage 2 (T2NxM0)  ER 98% NV 83% and her2/wil amplified dx 1/2017. SELENE 2 year and 1  months    Continue arimidex  Keep f/u with Dr Reynaldo Cruz  2. Hx right breast cancer in 2003 triple negative (treated by Dr Chel Marinelli). This is unrelated to the new cancer  3. Right axillary hematoma/seroma   Minimal residual  4. High risk for genetic mutation due to triple negative breast ca at age 49 and now a metachronous breast cancer. My Risk genetic testing negative    5. Gastric adenocarcinoma and Pancreatic mass with biliary obstruction but this is due to gastric cancer-adenocarcinoma with signet ring cells and possible pancreatic ca. Very unusual to have two separate synchronous malignancies. Will let Dr Reynaldo Cruz discuss chemotherapy. 6.  Offered lymphedema appt but she declined. She is currently at higher risk but asymptomatic  7. Colon screening. She wishes to go to GI  DME surgical bras and prosthesis  8. Right back/flank abscess.  Doing much better  Local wound care until healed    Ok to restart chemotherapy    RTC  2 months    Freeman Che MD FACS

## 2019-08-27 NOTE — PROGRESS NOTES
Chief Complaint   Patient presents with    Skin Problem     1. Have you been to the ER, urgent care clinic since your last visit? Hospitalized since your last visit? No    2. Have you seen or consulted any other health care providers outside of the [address] [place] [address] since your last visit? Include any pap smears or colon screening.  No

## 2019-09-04 ENCOUNTER — OFFICE VISIT (OUTPATIENT)
Dept: FAMILY MEDICINE CLINIC | Age: 69
End: 2019-09-04

## 2019-09-04 VITALS
HEIGHT: 65 IN | RESPIRATION RATE: 16 BRPM | OXYGEN SATURATION: 98 % | DIASTOLIC BLOOD PRESSURE: 64 MMHG | TEMPERATURE: 98.4 F | WEIGHT: 158.2 LBS | SYSTOLIC BLOOD PRESSURE: 100 MMHG | BODY MASS INDEX: 26.36 KG/M2 | HEART RATE: 105 BPM

## 2019-09-04 DIAGNOSIS — Z00.00 MEDICARE ANNUAL WELLNESS VISIT, SUBSEQUENT: Primary | ICD-10-CM

## 2019-09-04 DIAGNOSIS — L02.212 ABSCESS OF UPPER BACK EXCLUDING SCAPULAR REGION: ICD-10-CM

## 2019-09-04 DIAGNOSIS — Z13.39 SCREENING FOR ALCOHOLISM: ICD-10-CM

## 2019-09-04 DIAGNOSIS — C16.9 GASTRIC ADENOCARCINOMA (HCC): ICD-10-CM

## 2019-09-04 DIAGNOSIS — Z23 ENCOUNTER FOR IMMUNIZATION: ICD-10-CM

## 2019-09-04 DIAGNOSIS — E11.9 DIABETES MELLITUS, NEW ONSET (HCC): ICD-10-CM

## 2019-09-04 DIAGNOSIS — Z13.31 SCREENING FOR DEPRESSION: ICD-10-CM

## 2019-09-04 DIAGNOSIS — C16.9 MALIGNANT NEOPLASM OF STOMACH, UNSPECIFIED LOCATION (HCC): ICD-10-CM

## 2019-09-04 DIAGNOSIS — Z85.3 HISTORY OF RIGHT BREAST CANCER: ICD-10-CM

## 2019-09-04 RX ORDER — ONDANSETRON HYDROCHLORIDE 8 MG/1
TABLET, FILM COATED ORAL
Refills: 1 | COMMUNITY
Start: 2019-07-29

## 2019-09-04 RX ORDER — METFORMIN HYDROCHLORIDE 500 MG/1
500 TABLET, EXTENDED RELEASE ORAL
Qty: 90 TAB | Refills: 2 | Status: SHIPPED | OUTPATIENT
Start: 2019-09-04 | End: 2020-06-08

## 2019-09-04 RX ORDER — PROCHLORPERAZINE MALEATE 10 MG
TABLET ORAL
Refills: 1 | COMMUNITY
Start: 2019-07-29

## 2019-09-04 RX ORDER — LORAZEPAM 1 MG/1
TABLET ORAL
Refills: 0 | COMMUNITY
Start: 2019-07-24

## 2019-09-04 RX ORDER — INSULIN GLARGINE 100 [IU]/ML
20 INJECTION, SOLUTION SUBCUTANEOUS
Qty: 3 PEN | Refills: 1 | Status: SHIPPED | OUTPATIENT
Start: 2019-09-04 | End: 2019-10-17 | Stop reason: SDUPTHER

## 2019-09-04 NOTE — PATIENT INSTRUCTIONS
Medicare Wellness Visit, Female     The best way to live healthy is to have a lifestyle where you eat a well-balanced diet, exercise regularly, limit alcohol use, and quit all forms of tobacco/nicotine, if applicable. Regular preventive services are another way to keep healthy. Preventive services (vaccines, screening tests, monitoring & exams) can help personalize your care plan, which helps you manage your own care. Screening tests can find health problems at the earliest stages, when they are easiest to treat. [facility] follows the current, evidence-based guidelines published by the [facility] ([facility]) when recommending preventive services for our patients. Because we follow these guidelines, sometimes recommendations change over time as research supports it. (For example, mammograms used to be recommended annually. Even though Medicare will still pay for an annual mammogram, the newer guidelines recommend a mammogram every two years for women of average risk.)  Of course, you and your doctor may decide to screen more often for some diseases, based on your risk and your health status. Preventive services for you include:  - Medicare offers their members a free annual wellness visit, which is time for you and your primary care provider to discuss and plan for your preventive service needs. Take advantage of this benefit every year!  -All adults over the age of 72 should receive the recommended pneumonia vaccines. Current USPSTF guidelines recommend a series of two vaccines for the best pneumonia protection.   -All adults should have a flu vaccine yearly and a tetanus vaccine every 10 years. All adults age 61 and older should receive a shingles vaccine once in their lifetime.    -A bone mass density test is recommended when a woman turns 65 to screen for osteoporosis. This test is only recommended one time, as a screening.  Some providers will use this same test as a disease monitoring tool if you already have osteoporosis. -All adults age 38-68 who are overweight should have a diabetes screening test once every three years.   -Other screening tests and preventive services for persons with diabetes include: an eye exam to screen for diabetic retinopathy, a kidney function test, a foot exam, and stricter control over your cholesterol.   -Cardiovascular screening for adults with routine risk involves an electrocardiogram (ECG) at intervals determined by your doctor.   -Colorectal cancer screenings should be done for adults age 54-65 with no increased risk factors for colorectal cancer. There are a number of acceptable methods of screening for this type of cancer. Each test has its own benefits and drawbacks. Discuss with your doctor what is most appropriate for you during your annual wellness visit. The different tests include: colonoscopy (considered the best screening method), a fecal occult blood test, a fecal DNA test, and sigmoidoscopy. -Breast cancer screenings are recommended every other year for women of normal risk, age 54-69.  -Cervical cancer screenings for women over age 72 are only recommended with certain risk factors.   -All adults born between [facility] should be screened once for Hepatitis C. Here is a list of your current Health Maintenance items (your personalized list of preventive services) with a due date:  Health Maintenance Due   Topic Date Due    Eye Exam  07/17/1960    DTaP/Tdap/Td  (1 - Tdap) 07/17/1972    Shingles Vaccine (1 of 2) 07/17/2001    Glaucoma Screening   07/17/2016    Annual Well Visit  03/14/2018    Flu Vaccine  08/01/2019         Vaccine Information Statement    Influenza (Flu) Vaccine (Inactivated or Recombinant): What You Need to Know    Many Vaccine Information Statements are available in Azeri and other languages.  See www.immunize.org/vis  Hojas de información sobre vacunas están disponibles en español y en muchos tre payan. Visite www.immunize.org/vis    1. Why get vaccinated? Influenza vaccine can prevent influenza (flu). Flu is a contagious disease that spreads around the [place] every year, usually between October and May. Anyone can get the flu, but it is more dangerous for some people. Infants and young children, people 72years of age and older, pregnant women, and people with certain health conditions or a weakened immune system are at greatest risk of flu complications. Pneumonia, bronchitis, sinus infections and ear infections are examples of flu-related complications. If you have a medical condition, such as heart disease, cancer or diabetes, flu can make it worse. Flu can cause fever and chills, sore throat, muscle aches, fatigue, cough, headache, and runny or stuffy nose. Some people may have vomiting and diarrhea, though this is more common in children than adults. Each year thousands of people in the [facility] die from flu, and many more are hospitalized. Flu vaccine prevents millions of illnesses and flu-related visits to the doctor each year. 2. Influenza vaccines     CDC recommends everyone 10months of age and older get vaccinated every flu season. Children 6 months through 6years of age may need 2 doses during a single flu season. Everyone else needs only 1 dose each flu season. It takes about 2 weeks for protection to develop after vaccination. There are many flu viruses, and they are always changing. Each year a new flu vaccine is made to protect against three or four viruses that are likely to cause disease in the upcoming flu season. Even when the vaccine doesnt exactly match these viruses, it may still provide some protection. Influenza vaccine does not cause flu. Influenza vaccine may be given at the same time as other vaccines.     3. Talk with your health care provider    Tell your vaccine provider if the person getting the vaccine:   Has had an allergic reaction after a previous dose of influenza vaccine, or has any severe, life-threatening allergies.  Has ever had Guillain-Barré Syndrome (also called GBS). In some cases, your health care provider may decide to postpone influenza vaccination to a future visit. People with minor illnesses, such as a cold, may be vaccinated. People who are moderately or severely ill should usually wait until they recover before getting influenza vaccine. Your health care provider can give you more information. 4. Risks of a reaction     Soreness, redness, and swelling where shot is given, fever, muscle aches, and headache can happen after influenza vaccine.  There may be a very small increased risk of Guillain-Barré Syndrome (GBS) after inactivated influenza vaccine (the flu shot). Mirian Bravo children who get the flu shot along with pneumococcal vaccine (PCV13), and/or DTaP vaccine at the same time might be slightly more likely to have a seizure caused by fever. Tell your health care provider if a child who is getting flu vaccine has ever had a seizure. People sometimes faint after medical procedures, including vaccination. Tell your provider if you feel dizzy or have vision changes or ringing in the ears. As with any medicine, there is a very remote chance of a vaccine causing a severe allergic reaction, other serious injury, or death. 5. What if there is a serious problem? An allergic reaction could occur after the vaccinated person leaves the clinic. If you see signs of a severe allergic reaction (hives, swelling of the face and throat, difficulty breathing, a fast heartbeat, dizziness, or weakness), call 9-1-1 and get the person to the nearest hospital.    For other signs that concern you, call your health care provider. Adverse reactions should be reported to the Vaccine Adverse Event Reporting System (VAERS).  Your health care provider will usually file this report, or you can do it yourself. Visit the VAERS website at www.vaers. hhs.gov or call 0-787.957.8625. VAERS is only for reporting reactions, and VAERS staff do not give medical advice. 6. The National Vaccine Injury Compensation Program    The [facility] Vaccine Injury Compensation Program (VICP) is a federal program that was created to compensate people who may have been injured by certain vaccines. Visit the VICP website at www.[facility].gov/vaccinecompensation or call 7-685.221.2934 to learn about the program and about filing a claim. There is a time limit to file a claim for compensation. 7. How can I learn more?  Ask your health care provider.  Call your local or state health department.  Contact the Centers for Disease Control and Prevention (CDC):  - Call 4-241.151.5389 (1-800-CDC-INFO) or  - Visit CDCs influenza website at www.cdc.gov/flu    Vaccine Information Statement (Interim)  Inactivated Influenza Vaccine   8/15/2019  42 BRANDI Skelton [id number]   Department of Health and Human Services  Centers for Disease Control and Prevention    Office Use Only

## 2019-09-04 NOTE — PROGRESS NOTES
Chief Complaint   Patient presents with    Annual Wellness Visit     Medicare    Diabetes     1 month f/u        1. Have you been to the ER, urgent care clinic since your last visit? Hospitalized since your last visit? No    2. Have you seen or consulted any other health care providers outside of the [address] [place] [address] since your last visit? Include any pap smears or colon screening. No    Health maintenance reviewed. Pt informed of health maintenance past due and/or upcoming. Pt verbalized understanding.

## 2019-09-04 NOTE — PROGRESS NOTES
Chief Complaint   Patient presents with   [facility] Annual Wellness Visit     Medicare    Diabetes     1 month f/u        This is the Subsequent Medicare Annual Wellness Exam, performed 12 months or more after the Initial AWV or the last Subsequent AWV    I have reviewed the patient's medical history in detail and updated the computerized patient record. History     Past Medical History:   Diagnosis Date    Cancer [facility]) 2003, 2017    right breast cancer    Diabetes ([address]     Hypertension     Psychiatric disorder     anxiety      Past Surgical History:   Procedure Laterality Date    BREAST SURGERY PROCEDURE UNLISTED  2003    right breast lumpectomy    COLONOSCOPY N/A 4/11/2019    COLONOSCOPY performed by Purnima Richards MD at [facility] ENDOSCOPY    HX MASTECTOMY Bilateral 6/29/2017    RIGHT AND LEFT MASTECTOMY, RIGHT AXILLARY SENTINAL LYMPH NODE BIOPSY performed by Amina Murray MD at [facility] MAIN OR    HX VASCULAR ACCESS      left chest portacath - removed 4/2019    VASCULAR SURGERY PROCEDURE UNLIST  06/20/2019    Port -a cath placement     Current Outpatient Medications   Medication Sig Dispense Refill    ondansetron hcl (ZOFRAN) 8 mg tablet TAKE ONE TABLET BY MOUTH EVERY 8 HOURS THREE TIMES DAILY AS NEEDED FOR unrelieved nausea  1    LORazepam (ATIVAN) 1 mg tablet take 1/2-1 tablets by mouth every 6 hours if needed for nausea  0    prochlorperazine (COMPAZINE) 10 mg tablet TAKE ONE TABLET BY MOUTH EVERY 6 HOURS AS NEEDED FOR NAUSEA  1    sodium chloride (SALINE WOUND WASH) 0.9 % soln 50 mL by Does Not Apply route three (3) times daily. 1000 mL 3    lisinopril (PRINIVIL, ZESTRIL) 20 mg tablet Take 1 Tab by mouth daily. 90 Tab 3    magnesium 250 mg tab Take 1 Tab by mouth daily. 90 Tab 0    lancets (ONETOUCH DELICA LANCETS) 30 gauge misc Check blood sugars three times daily 100 Lancet 6    insulin glargine (LANTUS,BASAGLAR) 100 unit/mL (3 mL) inpn 18 Units by SubCUTAneous route nightly.  3 Pen 1    glucose blood VI test strips (ASCENSIA AUTODISC VI, ONE TOUCH ULTRA TEST VI) strip Check blood sugars 3 times per day. DX code E11.9 100 Strip 11    ONETOUCH ULTRAMINI monitoring kit use as directed TO CHECK BLOOD SUGAR THREE TIMES DAILY  0    Insulin Needles, Disposable, 30 gauge x 1/3\" To use daily with lantus 100 Package 11    metFORMIN (GLUCOPHAGE) 500 mg tablet Take 1 Tab by mouth daily (with breakfast). 90 Tab 0    ibuprofen (ADVIL) 200 mg tablet Take 200 mg by mouth every four (4) hours as needed for Pain.  anastrozole (ARIMIDEX) 1 mg tablet Take 1 mg by mouth daily.  denosumab (PROLIA) 60 mg/mL injection 60 mg by SubCUTAneous route every 6 months. At [facility]      multivitamin (ONE A DAY) tablet Take 1 Tab by mouth daily (with lunch).  aspirin delayed-release 81 mg tablet Take 81 mg by mouth daily.  CALCIUM CARBONATE/VITAMIN D3 (CALTRATE 600 + D PO) Take 2 Tabs by mouth daily (with lunch).  omega-3 fatty acids-vitamin e (FISH OIL) 1,000 mg cap Take 2 Caps by mouth daily (with lunch).        No Known Allergies  Family History   Problem Relation Age of Onset   [employer] Cancer Mother         kidney    Cancer Brother         lung    Heart Disease Father     Diabetes Sister     Diabetes Brother      Social History     Tobacco Use    Smoking status: Never Smoker    Smokeless tobacco: Never Used   Substance Use Topics    Alcohol use: No     Patient Active Problem List   Diagnosis Code    Hx of breast cancer Z85.3    Abnormal MRI, breast R92.8    Breast cancer of upper-outer quadrant of right female breast ([facility] C50.411    Breast cancer ([facility] C50.919    Seroma of breast N64.89    HX: breast cancer Z85.3    Pancreatic mass K86.9    Elevated bilirubin R17    Gastric adenocarcinoma (HCC) C16.9    Epidermal cyst L72.0       Depression Risk Factor Screening:     3 most recent PHQ Screens 9/4/2019   Little interest or pleasure in doing things Not at all   Feeling down, depressed, irritable, or hopeless Not at all   Total Score PHQ 2 0     Alcohol Risk Factor Screening: You do not drink alcohol or very rarely. Functional Ability and Level of Safety:   Hearing Loss  Hearing is good. Activities of Daily Living  The home contains: no safety equipment. Patient does total self care    Fall Risk  Fall Risk Assessment, last 12 mths 9/4/2019   Able to walk? Yes   Fall in past 12 months? No       Abuse Screen  Patient is not abused    Cognitive Screening   Evaluation of Cognitive Function:  Has your family/caregiver stated any concerns about your memory: no  Normal    Patient Care Team   Patient Care Team:  Clau Agudelo NP as PCP - General (Pediatrics)  Chester Morales MD as Surgeon (General Surgery)    Assessment/Plan   Education and counseling provided:  Are appropriate based on today's review and evaluation  End-of-Life planning (with patient's consent)  Influenza Vaccine  Screening for glaucoma  Medical nutrition therapy for individuals with diabetes or renal disease  Diabetes outpatient self-management training services    Diagnoses and all orders for this visit:    1. Medicare annual wellness visit, subsequent    2. Screening for alcoholism  -     ID ANNUAL ALCOHOL SCREEN 15 MIN    3. Screening for depression  -     [place] Maintenance Due   Topic Date Due    EYE EXAM RETINAL OR DILATED  07/17/1961    DTaP/Tdap/Td series (1 - Tdap) 07/17/1972    Shingrix Vaccine Age 50> (1 of 2) 07/17/2001    GLAUCOMA SCREENING Q2Y  07/17/2016    MEDICARE YEARLY EXAM  03/14/2018    Influenza Age 5 to Adult  08/01/2019         CCP: Diabetes follow up    S: Issa Hansen is a 77 y.o. yo female who presents for DM follow up. Last DM check hemoglobin A1c on 6/26/19 was 8.0    Blood sugars- -120; PM-130-220  Taking lantus 18 units every evening. Has not had any low blood sugars. Taking metformin 500mg daily.   She does have some abdominal pain-middle abdomen \"it just hurt\", does not think that it is related to metformin, thinks that it is related to her cancer. Tylenol relieves the pain but she rarely takes it. Diet- has some days that she is not hungry or nauseated or has hard time swallowing secondary to her chemo/cancer treatment. She was seen by surgery for back abscess. She finished antibiotics and She has been doing local wound care, no fever and no purulent draining. Has follow up October 1. Currently followed by Oncology, had 3rd round chemo one week ago. A little nausea yesterday but better today. They had held her treatments since 8/7/19 secondary to back abscess (suppose to have every 2 weeks). Has follow up with oncology [facility] tomorrow. Had labs done last week, copy brought with her today. Health Maintenance Reviewed    Denies cardiac complaints including chest pain or discomfort, elevated heart rate, or palpitations. Denies any headache, vision changes, numbness and tingling or weakness in her extremities. Denies respiratory complaints including SOB, difficulty or pain with breathing, wheezes, and cough. Feels well and ROS is otherwise negative.      Past Medical History:   Diagnosis Date    Cancer [facility]) 2003, 2017    right breast cancer    Diabetes ([facility]     Hypertension     Psychiatric disorder     anxiety      Past Surgical History:   Procedure Laterality Date    BREAST SURGERY PROCEDURE UNLISTED  2003    right breast lumpectomy    COLONOSCOPY N/A 4/11/2019    COLONOSCOPY performed by [facility] Murillo MD at [facility] ENDOSCOPY    HX MASTECTOMY Bilateral 6/29/2017    RIGHT AND LEFT MASTECTOMY, RIGHT AXILLARY SENTINAL LYMPH NODE BIOPSY performed by Ivone Buck MD at [facility] MAIN OR    HX VASCULAR ACCESS      left chest portacath - removed 4/2019    VASCULAR SURGERY PROCEDURE UNLIST  06/20/2019    Port -a cath placement     Social History     Socioeconomic History    Marital status:  Spouse name: Not on file    Number of children: Not on file    Years of education: Not on file    Highest education level: Not on file   Occupational History    Not on file   Social Needs    Financial resource strain: Not on file    Food insecurity:     Worry: Not on file     Inability: Not on file    Transportation needs:     Medical: Not on file     Non-medical: Not on file   Tobacco Use    Smoking status: Never Smoker    Smokeless tobacco: Never Used   Substance and Sexual Activity    Alcohol use: No    Drug use: No    Sexual activity: Yes   Lifestyle    Physical activity:     Days per week: Not on file     Minutes per session: Not on file    Stress: Not on file   Relationships    Social connections:     Talks on phone: Not on file     Gets together: Not on file     Attends [employer] service: Not on file     Active member of club or organization: Not on file     Attends meetings of clubs or organizations: Not on file     Relationship status: Not on file    Intimate partner violence:     Fear of current or ex partner: Not on file     Emotionally abused: Not on file     Physically abused: Not on file     Forced sexual activity: Not on file   Other Topics Concern    Not on file   Social History Narrative    Not on file     Current Outpatient Medications   Medication Sig Dispense Refill    ondansetron hcl (ZOFRAN) 8 mg tablet TAKE ONE TABLET BY MOUTH EVERY 8 HOURS THREE TIMES DAILY AS NEEDED FOR unrelieved nausea  1    LORazepam (ATIVAN) 1 mg tablet take 1/2-1 tablets by mouth every 6 hours if needed for nausea  0    prochlorperazine (COMPAZINE) 10 mg tablet TAKE ONE TABLET BY MOUTH EVERY 6 HOURS AS NEEDED FOR NAUSEA  1    sodium chloride (SALINE WOUND WASH) 0.9 % soln 50 mL by Does Not Apply route three (3) times daily. 1000 mL 3    lisinopril (PRINIVIL, ZESTRIL) 20 mg tablet Take 1 Tab by mouth daily. 90 Tab 3    magnesium 250 mg tab Take 1 Tab by mouth daily.  90 Tab 0    lancets (ONETOUCH DELICA LANCETS) 30 gauge misc Check blood sugars three times daily 100 Lancet 6    insulin glargine (LANTUS,BASAGLAR) 100 unit/mL (3 mL) inpn 18 Units by SubCUTAneous route nightly. 3 Pen 1    glucose blood VI test strips (ASCENSIA AUTODISC VI, ONE TOUCH ULTRA TEST VI) strip Check blood sugars 3 times per day. DX code E11.9 100 Strip 11    ONETOUCH ULTRAMINI monitoring kit use as directed TO CHECK BLOOD SUGAR THREE TIMES DAILY  0    Insulin Needles, Disposable, 30 gauge x 1/3\" To use daily with lantus 100 Package 11    metFORMIN (GLUCOPHAGE) 500 mg tablet Take 1 Tab by mouth daily (with breakfast). 90 Tab 0    ibuprofen (ADVIL) 200 mg tablet Take 200 mg by mouth every four (4) hours as needed for Pain.  anastrozole (ARIMIDEX) 1 mg tablet Take 1 mg by mouth daily.  denosumab (PROLIA) 60 mg/mL injection 60 mg by SubCUTAneous route every 6 months. At [facility]      multivitamin (ONE A DAY) tablet Take 1 Tab by mouth daily (with lunch).  aspirin delayed-release 81 mg tablet Take 81 mg by mouth daily.  CALCIUM CARBONATE/VITAMIN D3 (CALTRATE 600 + D PO) Take 2 Tabs by mouth daily (with lunch).  omega-3 fatty acids-vitamin e (FISH OIL) 1,000 mg cap Take 2 Caps by mouth daily (with lunch). Pt is taking all medications as prescribed without any side effects or difficulty. No Known Allergies      Agree with nurses note. O:   Visit Vitals  /64 (BP 1 Location: Left arm, BP Patient Position: Sitting)   Pulse (!) 105   Temp 98.4 °F (36.9 °C) (Oral)   Resp 16   Ht 5' 5\" (1.651 m)   Wt 158 lb 3.2 oz (71.8 kg)   LMP 05/29/1997 (Approximate)   SpO2 98%   BMI 26.33 kg/m²      PAIN: No complaints of pain today. GENERAL: Sandeep Alicea  is sitting in the chair in NAD.   EYE: PERRLA. EOMs intact. Sclera anicteric without injection. RESP: Unlabored without SOB. Speaking in full sentences. Breath sounds are symmetrical bilaterally.   Clear to auscultation to all fields. No wheezes. No rales or rhonchi. CV: normal rate. Regular rhythm. S1, S2 audible. No murmur noted. No rubs, clicks or gallops noted. NEURO:  awake, alert and oriented to person, place, and time and event. Clear speech. Muscle strength is +5/5 x 4 extremities. Steady gait. HEME/LYMPH: peripheral pulses palpable 2+ x 4 extremities. No peripheral edema is noted. FEET: Intact no wounds or abrasions. Denies numbness or tingling. Sensation intact  Back:  Dressing intact to upper right back. A/P:  Differential diagnosis and treatment options reviewed with patient who is in agreement with treatment plan as outlined below. ICD-10-CM ICD-9-CM    1. Medicare annual wellness visit, subsequent Z00.00 V70.0    2. Screening for alcoholism Z13.39 V79.1 NE ANNUAL ALCOHOL SCREEN 15 MIN   3. Screening for depression Z13.31 V79.0 DEPRESSION SCREEN ANNUAL   4. Diabetes mellitus, new onset ([facility] E11.9 250.00 insulin glargine (LANTUS,BASAGLAR) 100 unit/mL (3 mL) inpn      metFORMIN ER (GLUCOPHAGE XR) 500 mg tablet   5. Encounter for immunization Z23 V03.89 ADMIN INFLUENZA VIRUS VAC      INFLUENZA VACCINE INACTIVATED (IIV), SUBUNIT, ADJUVANTED, IM   6. Gastric adenocarcinoma (HCC) C16.9 151.9    7. History of right breast cancer Z85.3 V10.3    8. Abscess of upper back excluding scapular region L02.212 682.2    9. Malignant neoplasm of stomach, unspecified location [facility] C16.9 151.9        DM is stable on current therapy, little higher blood sugar in evening. Will trial changing metformin to XR to see if that helps with some GI symptoms and will also try increasing lantus to 20 units to see if we can improve her PM blood sugar readings. Daughter will message me in one month with her BS readings (or sooner if concerns). Discussed sick day care/insulin changes if needed. Discussed BMI and healthy weight.  Encouraged patient to work to implement changes including diet high in raw fruits and vegetables, lean protein and good fats. Limit refined, processed carbohydrates and sugar. Encouraged regular exercise. Advised of frequent feet checks  Advised yearly eye exam  Reviewed warning signs of diabetic emergency, hypertension, stroke and heart attack      Continue care with oncology and surgery as planned. Flu shot today. VIS discussed and copy given in AVS.    Verbal and written instructions (see AVS) provided. Patient expresses understanding and agreement of diagnosis and treatment plan.

## 2019-09-04 NOTE — ACP (ADVANCE CARE PLANNING)
Advance Care Planning (ACP) Provider Conversation Snapshot    Date of ACP Conversation: 09/04/19  Persons included in Conversation:  patient  Length of ACP Conversation in minutes:  <16 minutes (Non-Billable)      Conversation Outcomes / Follow-Up Plan:   Recommended completion of Advance Directive form after review of ACP materials and conversation with prospective healthcare agent

## 2019-09-06 ENCOUNTER — HOSPITAL ENCOUNTER (OUTPATIENT)
Dept: MRI IMAGING | Age: 69
Discharge: HOME | End: 2019-09-06
Attending: INTERNAL MEDICINE
Payer: MEDICARE

## 2019-09-06 ENCOUNTER — HOSPITAL ENCOUNTER (OUTPATIENT)
Dept: CT IMAGING | Age: 69
Discharge: HOME | End: 2019-09-06
Attending: INTERNAL MEDICINE
Payer: MEDICARE

## 2019-09-06 DIAGNOSIS — C16.2 MALIGNANT NEOPLASM OF BODY OF STOMACH (HCC): ICD-10-CM

## 2019-09-06 DIAGNOSIS — C50.311 MALIGNANT NEOPLASM OF LOWER-INNER QUADRANT OF RIGHT FEMALE BREAST (HCC): ICD-10-CM

## 2019-09-06 DIAGNOSIS — C50.411 MALIGNANT NEOPLASM OF UPPER-OUTER QUADRANT OF RIGHT FEMALE BREAST (HCC): ICD-10-CM

## 2019-09-06 PROCEDURE — A9585 GADOBUTROL INJECTION: HCPCS | Performed by: INTERNAL MEDICINE

## 2019-09-06 PROCEDURE — 74011250636 HC RX REV CODE- 250/636: Performed by: INTERNAL MEDICINE

## 2019-09-06 PROCEDURE — 71260 CT THORAX DX C+: CPT

## 2019-09-06 PROCEDURE — 74177 CT ABD & PELVIS W/CONTRAST: CPT

## 2019-09-06 PROCEDURE — 74183 MRI ABD W/O CNTR FLWD CNTR: CPT

## 2019-09-06 PROCEDURE — 74011636320 HC RX REV CODE- 636/320: Performed by: INTERNAL MEDICINE

## 2019-09-06 RX ORDER — SODIUM CHLORIDE 0.9 % (FLUSH) 0.9 %
10 SYRINGE (ML) INJECTION
Status: COMPLETED | OUTPATIENT
Start: 2019-09-06 | End: 2019-09-06

## 2019-09-06 RX ADMIN — IOPAMIDOL 100 ML: 755 INJECTION, SOLUTION INTRAVENOUS at 09:43

## 2019-09-06 RX ADMIN — Medication 10 ML: at 09:43

## 2019-09-06 RX ADMIN — GADOBUTROL 10 ML: 604.72 INJECTION INTRAVENOUS at 10:16

## 2019-09-06 RX ADMIN — IOHEXOL 20 ML: 240 INJECTION, SOLUTION INTRATHECAL; INTRAVASCULAR; INTRAVENOUS; ORAL at 09:44

## 2019-10-01 ENCOUNTER — OFFICE VISIT (OUTPATIENT)
Dept: SURGERY | Age: 69
End: 2019-10-01

## 2019-10-01 VITALS
HEIGHT: 65 IN | WEIGHT: 155 LBS | SYSTOLIC BLOOD PRESSURE: 131 MMHG | BODY MASS INDEX: 25.83 KG/M2 | OXYGEN SATURATION: 100 % | DIASTOLIC BLOOD PRESSURE: 74 MMHG | TEMPERATURE: 98.2 F | HEART RATE: 105 BPM

## 2019-10-01 DIAGNOSIS — C50.411 MALIGNANT NEOPLASM OF UPPER-OUTER QUADRANT OF RIGHT BREAST IN FEMALE, ESTROGEN RECEPTOR POSITIVE (HCC): ICD-10-CM

## 2019-10-01 DIAGNOSIS — C16.9 GASTRIC ADENOCARCINOMA (HCC): Primary | ICD-10-CM

## 2019-10-01 DIAGNOSIS — Z17.0 MALIGNANT NEOPLASM OF UPPER-OUTER QUADRANT OF RIGHT BREAST IN FEMALE, ESTROGEN RECEPTOR POSITIVE (HCC): ICD-10-CM

## 2019-10-01 NOTE — PROGRESS NOTES
Chief Complaint Patient presents with  Breast Cancer  
  3 MO. F/U  
 
1. Have you been to the ER, urgent care clinic since your last visit? Hospitalized since your last visit? NO 
 
2. Have you seen or consulted any other health care providers outside of the [address] [place] [address] since your last visit? Include any pap smears or colon screening.  NO

## 2019-10-01 NOTE — PROGRESS NOTES
HISTORY OF PRESENT ILLNESS Len Owusu is a 77 y.o. female who comes in for a right flank/back abscess and her cancers Skin Problem Pertinent negatives include no chest pain, no abdominal pain, no headaches and no shortness of breath. Breast Cancer Pertinent negatives include no chest pain, no abdominal pain, no headaches and no shortness of breath. Breast Problem Pertinent negatives include no chest pain, no abdominal pain, no headaches and no shortness of breath. She developed swelling over the right back/flank and went to urgent care and had an I and D and was placed on abx. She is immunosuppressed from chemotherapy but denies fever, chills or sweats. She has been doing local wound care. The area has healed per the patient She presented with obstructive jaundice 5/16/2019 and was found to have a pancreatic mass. Dr Liz Duong did an ERCP and brushings with stent placement and the jaundice cleared. The brushings were negative. 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 There were too many vessels near the pancreatic mass to do a biopsy. Dr Lakshmi Pisano is giving her chemotherapy. She went for routine screening mammogram in late Dec 2016 and was noted to have a developing density in the outer right breast.  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 She had a breast MRI suggesting the area to be 2.3 cm or two separate lesions. She was also noted to have two foci in the left breast for which MRI biopsy was recommended. She denies feeling any masses, skin changes, nipple changes, unexplained weight loss or bone pain.    She previously underwent a right lumpectomy and ALND followed by chemotherapy Anjelica Le) and WBRT in 2003 for a ??stage 1-2 triple negative breast cancer. She has had other bilateral breast biopsies as well. She had menarche at 16, first of two pregnancies at 35, menopause at 51 and did not take HRT. She denies family hx of breast or ovarian cancer but her mother had kidney cancer and her brother had lung cancer. She has been received neoadjuvant chemotherapy by Dr Avelina Hsu. She is having ongoing herceptin. She underwent a right mastectomy/SLNB and left mastectomy 6/2017 with pCR. She completed adjuvant herceptin as well. She has switched to Dr Ketty Frias as Dr Avelina Hsu retired. Past Medical History:  
Diagnosis Date  Cancer [facility]) 2003, 2017  
 right breast cancer  Diabetes ([facility] [address]  Hypertension  Psychiatric disorder   
 anxiety Past Surgical History:  
Procedure Laterality Date  BREAST SURGERY PROCEDURE UNLISTED  2003  
 right breast lumpectomy  COLONOSCOPY N/A 4/11/2019 COLONOSCOPY performed by Rayna Tapia MD at [facility] ENDOSCOPY  
 HX MASTECTOMY Bilateral 6/29/2017 RIGHT AND LEFT MASTECTOMY, RIGHT AXILLARY SENTINAL LYMPH NODE BIOPSY performed by Harshal Cox MD at [facility] MAIN OR  
 HX VASCULAR ACCESS    
 left chest portacath - removed 4/2019  VASCULAR SURGERY PROCEDURE UNLIST  06/20/2019 Port -a cath placement Family History Problem Relation Age of Onset  Cancer Mother   
     kidney  Cancer Brother   
     lung  Heart Disease Father  Diabetes Sister  Diabetes Brother Social History Tobacco Use  Smoking status: Never Smoker  Smokeless tobacco: Never Used Substance Use Topics  Alcohol use: No  
 Drug use: No  
 
Current Outpatient Medications Medication Sig  
 ondansetron hcl (ZOFRAN) 8 mg tablet TAKE ONE TABLET BY MOUTH EVERY 8 HOURS THREE TIMES DAILY AS NEEDED FOR unrelieved nausea  LORazepam (ATIVAN) 1 mg tablet take 1/2-1 tablets by mouth every 6 hours if needed for nausea  prochlorperazine (COMPAZINE) 10 mg tablet TAKE ONE TABLET BY MOUTH EVERY 6 HOURS AS NEEDED FOR NAUSEA  insulin glargine (LANTUS,BASAGLAR) 100 unit/mL (3 mL) inpn 20 Units by SubCUTAneous route nightly.  metFORMIN ER (GLUCOPHAGE XR) 500 mg tablet Take 1 Tab by mouth daily (with dinner).  sodium chloride (SALINE WOUND WASH) 0.9 % soln 50 mL by Does Not Apply route three (3) times daily.  lisinopril (PRINIVIL, ZESTRIL) 20 mg tablet Take 1 Tab by mouth daily.  magnesium 250 mg tab Take 1 Tab by mouth daily.  lancets (ONETOUCH DELICA LANCETS) 30 gauge misc Check blood sugars three times daily  glucose blood VI test strips (ASCENSIA AUTODISC VI, ONE TOUCH ULTRA TEST VI) strip Check blood sugars 3 times per day. DX code E11.9  
 ONETOUCH ULTRAMINI monitoring kit use as directed TO CHECK BLOOD SUGAR THREE TIMES DAILY  Insulin Needles, Disposable, 30 gauge x 1/3\" To use daily with lantus  ibuprofen (ADVIL) 200 mg tablet Take 200 mg by mouth every four (4) hours as needed for Pain.  anastrozole (ARIMIDEX) 1 mg tablet Take 1 mg by mouth daily.  denosumab (PROLIA) 60 mg/mL injection 60 mg by SubCUTAneous route every 6 months. At [facility]  multivitamin (ONE A DAY) tablet Take 1 Tab by mouth daily (with lunch).  aspirin delayed-release 81 mg tablet Take 81 mg by mouth daily.  omega-3 fatty acids-vitamin e (FISH OIL) 1,000 mg cap Take 2 Caps by mouth daily (with lunch).  CALCIUM CARBONATE/VITAMIN D3 (CALTRATE 600 + D PO) Take 2 Tabs by mouth daily (with lunch). No current facility-administered medications for this visit. No Known Allergies Review of Systems Constitutional: Negative for chills, diaphoresis, fever, malaise/fatigue and weight loss. HENT: Negative for congestion, ear pain and sore throat. Eyes: Negative for blurred vision and pain. Respiratory: Negative for cough, hemoptysis, sputum production, shortness of breath, wheezing and stridor. Cardiovascular: Negative for chest pain, palpitations, orthopnea, claudication, leg swelling and PND. Gastrointestinal: Negative for abdominal pain, blood in stool, constipation, diarrhea, heartburn, melena, nausea and vomiting. Genitourinary: Negative for dysuria, flank pain, frequency, hematuria and urgency. Musculoskeletal: Negative for back pain, joint pain, myalgias and neck pain. Skin: Negative for itching and rash. Neurological: Negative for dizziness, tremors, focal weakness, seizures, weakness and headaches. Endo/Heme/Allergies: Negative for polydipsia. Psychiatric/Behavioral: Negative for depression and memory loss. The patient is nervous/anxious. Height 5' 5\" (1.651 m), last menstrual period 05/29/1997. Physical Exam  
Constitutional: She is oriented to person, place, and time. She appears well-developed and well-nourished. No distress. HENT:  
Head: Normocephalic and atraumatic. Mouth/Throat: Oropharynx is clear and moist. No oropharyngeal exudate. Eyes: Pupils are equal, round, and reactive to light. Conjunctivae and EOM are normal. No scleral icterus. Neck: Normal range of motion. Neck supple. No JVD present. No tracheal deviation present. No thyromegaly present. Cardiovascular: Normal rate and regular rhythm. Exam reveals no gallop and no friction rub. No murmur heard. Pulmonary/Chest: Effort normal and breath sounds normal. No respiratory distress. She has no wheezes. She has no rales. Right breast exhibits no mass, no skin change and no tenderness. Left breast exhibits no mass, no skin change and no tenderness. Breasts are symmetrical (bilateral amastia). Abdominal: Soft. Bowel sounds are normal. She exhibits no distension and no mass. There is no hepatosplenomegaly. There is no tenderness. There is no rebound, no guarding and no CVA tenderness. No hernia. Hernia confirmed negative in the ventral area. Musculoskeletal: Normal range of motion. She exhibits no edema. Lymphadenopathy:  
  She has no cervical adenopathy. She has no axillary adenopathy. Right: No supraclavicular adenopathy present. Left: No supraclavicular adenopathy present. Neurological: She is alert and oriented to person, place, and time. No cranial nerve deficit. Skin: Skin is warm and dry. No rash noted. She is not diaphoretic. No erythema. No pallor. Psychiatric: She has a normal mood and affect. Her behavior is normal. Judgment and thought content normal.  
 
 
ASSESSMENT and PLAN 1. Right breast cancer early stage, Clinically stage 2 (T2NxM0)  ER 98% [place] 83% and her2/wil amplified dx 1/2017. SELENE 2 year and 9  months Continue arimidex Keep f/u with Dr Diego Suarez 
2. Hx right breast cancer in 2003 triple negative (treated by Dr Yoni Agarwal). This is unrelated to the new cancer 3. Right axillary hematoma/seroma   Minimal residual 
4. High risk for genetic mutation due to triple negative breast ca at age 49 and now a metachronous breast cancer. My Risk genetic testing negative 5. Gastric adenocarcinoma and Pancreatic mass with biliary obstruction but this is due to gastric cancer-adenocarcinoma with signet ring cells and possible pancreatic ca. Very unusual to have two separate synchronous malignancies. Dr Diego Suarez giving chemotherapy. 6.  Offered lymphedema appt but she declined. She is currently at higher risk but asymptomatic 7. Colon screening. She wishes to go to GI 
DME surgical bras and prosthesis 8. Right back/flank abscess. healed RTC  4 months Dee Rubalcava MD FACS

## 2019-10-07 DIAGNOSIS — R79.0 LOW MAGNESIUM LEVEL: ICD-10-CM

## 2019-10-07 RX ORDER — ZINC GLUCONATE 10 MG
1 LOZENGE ORAL DAILY
Qty: 90 TAB | Refills: 0 | Status: SHIPPED | OUTPATIENT
Start: 2019-10-07 | End: 2019-12-27 | Stop reason: SDUPTHER

## 2019-10-17 DIAGNOSIS — E11.9 DIABETES MELLITUS, NEW ONSET (HCC): ICD-10-CM

## 2019-10-17 RX ORDER — INSULIN GLARGINE 100 [IU]/ML
20 INJECTION, SOLUTION SUBCUTANEOUS
Qty: 3 PEN | Refills: 3 | Status: SHIPPED | OUTPATIENT
Start: 2019-10-17 | End: 2020-01-29

## 2019-11-05 ENCOUNTER — OFFICE VISIT (OUTPATIENT)
Dept: FAMILY MEDICINE CLINIC | Age: 69
End: 2019-11-05

## 2019-11-05 ENCOUNTER — HOSPITAL ENCOUNTER (OUTPATIENT)
Dept: LAB | Age: 69
Discharge: HOME | End: 2019-11-05
Payer: MEDICARE

## 2019-11-05 VITALS
HEART RATE: 114 BPM | WEIGHT: 151.4 LBS | DIASTOLIC BLOOD PRESSURE: 65 MMHG | HEIGHT: 65 IN | SYSTOLIC BLOOD PRESSURE: 97 MMHG | RESPIRATION RATE: 16 BRPM | BODY MASS INDEX: 25.22 KG/M2 | TEMPERATURE: 98.2 F | OXYGEN SATURATION: 98 %

## 2019-11-05 DIAGNOSIS — E11.9 DIABETES MELLITUS, NEW ONSET (HCC): Primary | ICD-10-CM

## 2019-11-05 DIAGNOSIS — C16.9 GASTRIC ADENOCARCINOMA (HCC): ICD-10-CM

## 2019-11-05 DIAGNOSIS — K86.89 PANCREATIC MASS: ICD-10-CM

## 2019-11-05 DIAGNOSIS — Z85.3 HISTORY OF RIGHT BREAST CANCER: ICD-10-CM

## 2019-11-05 PROCEDURE — 83036 HEMOGLOBIN GLYCOSYLATED A1C: CPT

## 2019-11-05 NOTE — PROGRESS NOTES
Chief Complaint   Patient presents with    Medication Evaluation     Metformin         S: Beverly Mejia is a 77 y.o. yo female who presents for DM follow up. Last DM check hemoglobin A1c was 8.0 on 6/26/19    Blood sugars- varies but relates to what she eats, ranging 100-160. Checking twice per day, highest outlier over 200 once at 252 in the past 3 weeks and that was after chemo (secondary to steroids given with chemo). Taking metformin 500mg once daily, no diarrhea. No hypoglycemia episodes or symptoms. Foot exam- no numbness or tingling. Gastric Adenocarcinoma/pancreatic mass:   She is still doing chemo (usually every two weeks), last treatment two weeks ago 10/23/19 and due again tomorrow if labs ok. She has lost about 7 lbs since last visit with me but she is trying to drink 2 shakes per day and eat small meals. She is taking zofran as needed for nausea and nausea is controlled except after chemo treatment. She will see oncology next week. Has CT next week as well. Health Maintenance Reviewed    Denies cardiac complaints including chest pain or discomfort, elevated heart rate, or palpitations. Denies any headache, vision changes, numbness and tingling or weakness in her extremities. Denies respiratory complaints including SOB, difficulty or pain with breathing, wheezes, and cough. Feels well and ROS is otherwise negative.      Past Medical History:   Diagnosis Date    Cancer [facility]) 2003, 2017    right breast cancer    Diabetes ([facility] [address]     Hypertension     Psychiatric disorder     anxiety      Past Surgical History:   Procedure Laterality Date    BREAST SURGERY PROCEDURE UNLISTED  2003    right breast lumpectomy    COLONOSCOPY N/A 4/11/2019    COLONOSCOPY performed by Milan Gomez MD at [facility] ENDOSCOPY    HX MASTECTOMY Bilateral 6/29/2017    RIGHT AND LEFT MASTECTOMY, RIGHT AXILLARY SENTINAL LYMPH NODE BIOPSY performed by Noe Ortiz MD at [facility] MAIN OR    HX VASCULAR ACCESS      left chest portacath - removed 4/2019    VASCULAR SURGERY PROCEDURE UNLIST  06/20/2019    Port -a cath placement     Social History     Socioeconomic History    Marital status:      Spouse name: Not on file    Number of children: Not on file    Years of education: Not on file    Highest education level: Not on file   Occupational History    Not on file   Social Needs    Financial resource strain: Not on file    Food insecurity:     Worry: Not on file     Inability: Not on file    Transportation needs:     Medical: Not on file     Non-medical: Not on file   Tobacco Use    Smoking status: Never Smoker    Smokeless tobacco: Never Used   Substance and Sexual Activity    Alcohol use: No    Drug use: No    Sexual activity: Yes   Lifestyle    Physical activity:     Days per week: Not on file     Minutes per session: Not on file    Stress: Not on file   Relationships    Social connections:     Talks on phone: Not on file     Gets together: Not on file     Attends [employer] service: Not on file     Active member of club or organization: Not on file     Attends meetings of clubs or organizations: Not on file     Relationship status: Not on file    Intimate partner violence:     Fear of current or ex partner: Not on file     Emotionally abused: Not on file     Physically abused: Not on file     Forced sexual activity: Not on file   Other Topics Concern    Not on file   Social History Narrative    Not on file     Current Outpatient Medications   Medication Sig Dispense Refill    insulin glargine (LANTUS,BASAGLAR) 100 unit/mL (3 mL) inpn 20 Units by SubCUTAneous route nightly. 3 Pen 3    magnesium 250 mg tab Take 1 Tab by mouth daily.  90 Tab 0    ondansetron hcl (ZOFRAN) 8 mg tablet TAKE ONE TABLET BY MOUTH EVERY 8 HOURS THREE TIMES DAILY AS NEEDED FOR unrelieved nausea  1    LORazepam (ATIVAN) 1 mg tablet take 1/2-1 tablets by mouth every 6 hours if needed for nausea  0    prochlorperazine (COMPAZINE) 10 mg tablet TAKE ONE TABLET BY MOUTH EVERY 6 HOURS AS NEEDED FOR NAUSEA  1    metFORMIN ER (GLUCOPHAGE XR) 500 mg tablet Take 1 Tab by mouth daily (with dinner). 90 Tab 2    lisinopril (PRINIVIL, ZESTRIL) 20 mg tablet Take 1 Tab by mouth daily. 90 Tab 3    lancets (ONETOUCH DELICA LANCETS) 30 gauge misc Check blood sugars three times daily 100 Lancet 6    glucose blood VI test strips (ASCENSIA AUTODISC VI, ONE TOUCH ULTRA TEST VI) strip Check blood sugars 3 times per day. DX code E11.9 100 Strip 11    ONETOUCH ULTRAMINI monitoring kit use as directed TO CHECK BLOOD SUGAR THREE TIMES DAILY  0    Insulin Needles, Disposable, 30 gauge x 1/3\" To use daily with lantus 100 Package 11    ibuprofen (ADVIL) 200 mg tablet Take 200 mg by mouth every four (4) hours as needed for Pain.  anastrozole (ARIMIDEX) 1 mg tablet Take 1 mg by mouth daily.  denosumab (PROLIA) 60 mg/mL injection 60 mg by SubCUTAneous route every 6 months. At [facility]      multivitamin (ONE A DAY) tablet Take 1 Tab by mouth daily (with lunch).  aspirin delayed-release 81 mg tablet Take 81 mg by mouth daily.  CALCIUM CARBONATE/VITAMIN D3 (CALTRATE 600 + D PO) Take 2 Tabs by mouth daily (with lunch).  sodium chloride (SALINE WOUND WASH) 0.9 % soln 50 mL by Does Not Apply route three (3) times daily. 1000 mL 3    omega-3 fatty acids-vitamin e (FISH OIL) 1,000 mg cap Take 2 Caps by mouth daily (with lunch). Pt is taking all medications as prescribed without any side effects or difficulty. No Known Allergies      Agree with nurses note. O:   Visit Vitals  BP 97/65 (BP 1 Location: Left arm, BP Patient Position: Sitting)   Pulse (!) 114   Temp 98.2 °F (36.8 °C) (Oral)   Resp 16   Ht 5' 5\" (1.651 m)   Wt 151 lb 6.4 oz (68.7 kg)   LMP 05/29/1997 (Approximate)   SpO2 98%   BMI 25.19 kg/m²      PAIN: No complaints of pain today.   GENERAL: Andre Rob  is sitting in the chair in NAD.   EYE: PERRLA. EOMs intact. Sclera anicteric without injection. RESP: Unlabored without SOB. Speaking in full sentences. Breath sounds are symmetrical bilaterally. Clear to auscultation to all fields. No wheezes. No rales or rhonchi. CV: normal rate. Regular rhythm. S1, S2 audible. No murmur noted. No rubs, clicks or gallops noted. NEURO:  awake, alert and oriented to person, place, and time and event. Clear speech. Muscle strength is +5/5 x 4 extremities. Steady gait. HEME/LYMPH: peripheral pulses palpable 2+ x 4 extremities. No peripheral edema is noted. FEET: Intact no wounds or abrasions. Denies numbness or tingling. Sensation intact          A/P:  Differential diagnosis and treatment options reviewed with patient who is in agreement with treatment plan as outlined below. ICD-10-CM ICD-9-CM    1. [facility] E11.9 250.00 HEMOGLOBIN A1C WITH EAG   2. Gastric adenocarcinoma (HCC) C16.9 151.9    3. History of right breast cancer Z85.3 V10.3    4. Pancreatic mass K86.89 577.8        DM is stable on current therapy, gave order for her to have HgbA1c done with her labs that she is getting done today at [address] [place] [address] for oncology. Will adjust dosing of medication if needed after results. I told her that I would like her to eat whatever she would like to eat and we can cover her blood sugars with insulin dosing if needed given her current cancer treatment and lack of appetite and weight loss. Discussed BMI and healthy weight. Encouraged patient to work to implement changes including diet high in raw fruits and vegetables, lean protein and good fats. Advised of frequent feet checks  Advised yearly eye exam  Reviewed warning signs of diabetic emergency, hypertension, stroke and heart attack  Continue to follow with oncology as planned. Will continue to monitor glucose at home and keep me posted on readings via [employer]  Follow up in 3 months or sooner if needed. Verbal and written instructions (see AVS) provided. Patient expresses understanding and agreement of diagnosis and treatment plan.

## 2019-11-06 LAB
EST. AVERAGE GLUCOSE BLD GHB EST-MCNC: 123 MG/DL
HBA1C MFR BLD: 5.9 % (ref 4.8–5.6)

## 2019-11-12 ENCOUNTER — HOSPITAL ENCOUNTER (OUTPATIENT)
Dept: CT IMAGING | Age: 69
Discharge: HOME | End: 2019-11-12
Attending: INTERNAL MEDICINE
Payer: MEDICARE

## 2019-11-12 DIAGNOSIS — T45.1X5A ADVERSE EFFECT OF ANTINEOPLASTIC AND IMMUNOSUPPRESSIVE DRUGS, INITIAL ENCOUNTER: ICD-10-CM

## 2019-11-12 DIAGNOSIS — Z51.11 ENCOUNTER FOR ANTINEOPLASTIC CHEMOTHERAPY: ICD-10-CM

## 2019-11-12 DIAGNOSIS — R11.2 NAUSEA WITH VOMITING: ICD-10-CM

## 2019-11-12 DIAGNOSIS — Z79.811 LONG TERM CURRENT USE OF AROMATASE INHIBITOR: ICD-10-CM

## 2019-11-12 DIAGNOSIS — C16.2 MALIGNANT NEOPLASM OF BODY OF STOMACH (HCC): ICD-10-CM

## 2019-11-12 DIAGNOSIS — C50.311 MALIGNANT NEOPLASM OF LOWER-INNER QUADRANT OF RIGHT FEMALE BREAST (HCC): ICD-10-CM

## 2019-11-12 DIAGNOSIS — Z13.6 SCREENING FOR ISCHEMIC HEART DISEASE: ICD-10-CM

## 2019-11-12 DIAGNOSIS — C50.411 MALIGNANT NEOPLASM OF UPPER-OUTER QUADRANT OF RIGHT FEMALE BREAST (HCC): ICD-10-CM

## 2019-11-12 PROCEDURE — 71260 CT THORAX DX C+: CPT

## 2019-11-12 PROCEDURE — 74177 CT ABD & PELVIS W/CONTRAST: CPT

## 2019-11-12 PROCEDURE — 74011636320 HC RX REV CODE- 636/320: Performed by: INTERNAL MEDICINE

## 2019-11-12 RX ORDER — SODIUM CHLORIDE 0.9 % (FLUSH) 0.9 %
10 SYRINGE (ML) INJECTION
Status: COMPLETED | OUTPATIENT
Start: 2019-11-12 | End: 2019-11-12

## 2019-11-12 RX ADMIN — IOPAMIDOL 100 ML: 755 INJECTION, SOLUTION INTRAVENOUS at 13:35

## 2019-11-12 RX ADMIN — Medication 10 ML: at 13:35

## 2019-11-12 RX ADMIN — IOHEXOL 50 ML: 240 INJECTION, SOLUTION INTRATHECAL; INTRAVASCULAR; INTRAVENOUS; ORAL at 13:35

## 2019-12-10 ENCOUNTER — ANESTHESIA (OUTPATIENT)
Dept: ENDOSCOPY | Age: 69
End: 2019-12-10
Payer: MEDICARE

## 2019-12-10 ENCOUNTER — HOSPITAL ENCOUNTER (OUTPATIENT)
Age: 69
Setting detail: OUTPATIENT SURGERY
Discharge: HOME | End: 2019-12-10
Attending: INTERNAL MEDICINE | Admitting: INTERNAL MEDICINE
Payer: MEDICARE

## 2019-12-10 ENCOUNTER — ANESTHESIA EVENT (OUTPATIENT)
Dept: ENDOSCOPY | Age: 69
End: 2019-12-10
Payer: MEDICARE

## 2019-12-10 VITALS
BODY MASS INDEX: 24.07 KG/M2 | WEIGHT: 144.44 LBS | RESPIRATION RATE: 15 BRPM | HEART RATE: 93 BPM | HEIGHT: 65 IN | OXYGEN SATURATION: 99 % | TEMPERATURE: 98.5 F | SYSTOLIC BLOOD PRESSURE: 125 MMHG | DIASTOLIC BLOOD PRESSURE: 69 MMHG

## 2019-12-10 LAB
GLUCOSE BLD STRIP.AUTO-MCNC: 97 MG/DL (ref 65–100)
SERVICE CMNT-IMP: NORMAL

## 2019-12-10 PROCEDURE — 76060000031 HC ANESTHESIA FIRST 0.5 HR: Performed by: INTERNAL MEDICINE

## 2019-12-10 PROCEDURE — 74011000250 HC RX REV CODE- 250: Performed by: NURSE ANESTHETIST, CERTIFIED REGISTERED

## 2019-12-10 PROCEDURE — 82962 GLUCOSE BLOOD TEST: CPT

## 2019-12-10 PROCEDURE — 77030018846 HC SOL IRR STRL H20 ICUM -A: Performed by: INTERNAL MEDICINE

## 2019-12-10 PROCEDURE — 76040000019: Performed by: INTERNAL MEDICINE

## 2019-12-10 PROCEDURE — 77030003406 HC NDL ASPIR BIOP OCOA -C: Performed by: INTERNAL MEDICINE

## 2019-12-10 PROCEDURE — 77030019957 HC CUF BLN GASTSCP OCOA -B: Performed by: INTERNAL MEDICINE

## 2019-12-10 PROCEDURE — 77030019988 HC FCPS ENDOSC DISP BSC -B: Performed by: INTERNAL MEDICINE

## 2019-12-10 PROCEDURE — 88342 IMHCHEM/IMCYTCHM 1ST ANTB: CPT

## 2019-12-10 PROCEDURE — 74011250636 HC RX REV CODE- 250/636: Performed by: INTERNAL MEDICINE

## 2019-12-10 PROCEDURE — 88305 TISSUE EXAM BY PATHOLOGIST: CPT

## 2019-12-10 PROCEDURE — 74011250636 HC RX REV CODE- 250/636: Performed by: NURSE ANESTHETIST, CERTIFIED REGISTERED

## 2019-12-10 RX ORDER — EPINEPHRINE 0.1 MG/ML
1 INJECTION INTRACARDIAC; INTRAVENOUS
Status: DISCONTINUED | OUTPATIENT
Start: 2019-12-10 | End: 2019-12-10 | Stop reason: HOSPADM

## 2019-12-10 RX ORDER — ATROPINE SULFATE 0.1 MG/ML
0.5 INJECTION INTRAVENOUS
Status: DISCONTINUED | OUTPATIENT
Start: 2019-12-10 | End: 2019-12-10 | Stop reason: HOSPADM

## 2019-12-10 RX ORDER — LIDOCAINE HYDROCHLORIDE 20 MG/ML
INJECTION, SOLUTION EPIDURAL; INFILTRATION; INTRACAUDAL; PERINEURAL AS NEEDED
Status: DISCONTINUED | OUTPATIENT
Start: 2019-12-10 | End: 2019-12-10 | Stop reason: HOSPADM

## 2019-12-10 RX ORDER — NALOXONE HYDROCHLORIDE 0.4 MG/ML
0.4 INJECTION, SOLUTION INTRAMUSCULAR; INTRAVENOUS; SUBCUTANEOUS
Status: DISCONTINUED | OUTPATIENT
Start: 2019-12-10 | End: 2019-12-10 | Stop reason: HOSPADM

## 2019-12-10 RX ORDER — SODIUM CHLORIDE 0.9 % (FLUSH) 0.9 %
5-40 SYRINGE (ML) INJECTION EVERY 8 HOURS
Status: DISCONTINUED | OUTPATIENT
Start: 2019-12-10 | End: 2019-12-10 | Stop reason: HOSPADM

## 2019-12-10 RX ORDER — PROPOFOL 10 MG/ML
INJECTION, EMULSION INTRAVENOUS AS NEEDED
Status: DISCONTINUED | OUTPATIENT
Start: 2019-12-10 | End: 2019-12-10 | Stop reason: HOSPADM

## 2019-12-10 RX ORDER — DEXTROMETHORPHAN/PSEUDOEPHED 2.5-7.5/.8
1.2 DROPS ORAL
Status: DISCONTINUED | OUTPATIENT
Start: 2019-12-10 | End: 2019-12-10 | Stop reason: HOSPADM

## 2019-12-10 RX ORDER — SODIUM CHLORIDE 9 MG/ML
75 INJECTION, SOLUTION INTRAVENOUS CONTINUOUS
Status: DISCONTINUED | OUTPATIENT
Start: 2019-12-10 | End: 2019-12-10 | Stop reason: HOSPADM

## 2019-12-10 RX ORDER — FLUMAZENIL 0.1 MG/ML
0.2 INJECTION INTRAVENOUS
Status: DISCONTINUED | OUTPATIENT
Start: 2019-12-10 | End: 2019-12-10 | Stop reason: HOSPADM

## 2019-12-10 RX ORDER — SODIUM CHLORIDE 0.9 % (FLUSH) 0.9 %
5-40 SYRINGE (ML) INJECTION AS NEEDED
Status: DISCONTINUED | OUTPATIENT
Start: 2019-12-10 | End: 2019-12-10 | Stop reason: HOSPADM

## 2019-12-10 RX ADMIN — PROPOFOL 100 MG: 10 INJECTION, EMULSION INTRAVENOUS at 11:35

## 2019-12-10 RX ADMIN — LIDOCAINE HYDROCHLORIDE 100 MG: 20 INJECTION, SOLUTION EPIDURAL; INFILTRATION; INTRACAUDAL; PERINEURAL at 11:33

## 2019-12-10 RX ADMIN — PROPOFOL 100 MG: 10 INJECTION, EMULSION INTRAVENOUS at 11:33

## 2019-12-10 RX ADMIN — SODIUM CHLORIDE 75 ML/HR: 900 INJECTION, SOLUTION INTRAVENOUS at 11:11

## 2019-12-10 NOTE — H&P
Gastroenterology Outpatient History and Physical 
 
Patient: Alcides Search Physician: Bruna Jefferson MD 
 
Chief Complaint: Gastric and Pancreatic malignancy History of Present Illness: No GI complaints History: 
Past Medical History:  
Diagnosis Date  Cancer [facility]) 2003, 2017  
 right breast cancer  Diabetes ([address]  Hypertension  Psychiatric disorder   
 anxiety Past Surgical History:  
Procedure Laterality Date  BREAST SURGERY PROCEDURE UNLISTED  2003  
 right breast lumpectomy  COLONOSCOPY N/A 4/11/2019 COLONOSCOPY performed by Ameya Yang MD at [facility] ENDOSCOPY  
 HX MASTECTOMY Bilateral 6/29/2017 RIGHT AND LEFT MASTECTOMY, RIGHT AXILLARY SENTINAL LYMPH NODE BIOPSY performed by Ashlyn Trinh MD at [facility] MAIN OR  
 HX VASCULAR ACCESS    
 left chest portacath - removed 4/2019  VASCULAR SURGERY PROCEDURE UNLIST  06/20/2019 Port -a cath placement Social History Socioeconomic History  Marital status:  Spouse name: Not on file  Number of children: Not on file  Years of education: Not on file  Highest education level: Not on file Tobacco Use  Smoking status: Never Smoker  Smokeless tobacco: Never Used Substance and Sexual Activity  Alcohol use: No  
 Drug use: No  
 Sexual activity: Yes Family History Problem Relation Age of Onset  Cancer Mother   
     kidney  Cancer Brother   
     lung  Heart Disease Father  Diabetes Sister  Diabetes Brother Patient Active Problem List  
Diagnosis Code  Hx of breast cancer Z85.3  Abnormal MRI, breast R92.8  Breast cancer of upper-outer quadrant of right female breast ([address] C50.411  Breast cancer ([address] C50.919  Seroma of breast N64.89  
 HX: breast cancer Z85.3  Pancreatic mass K86.89  
 Elevated bilirubin R17  Gastric adenocarcinoma ([address] C16.9  Epidermal cyst L72.0 Allergies: No Known Allergies Medications: Prior to Admission medications Medication Sig Start Date End Date Taking? Authorizing Provider  
insulin glargine (LANTUS,BASAGLAR) 100 unit/mL (3 mL) inpn 20 Units by SubCUTAneous route nightly. 10/17/19  Yes Zohreh Duong NP  
magnesium 250 mg tab Take 1 Tab by mouth daily. 10/7/19  Yes Zohreh Duong NP  
ondansetron hcl (ZOFRAN) 8 mg tablet TAKE ONE TABLET BY MOUTH EVERY 8 HOURS THREE TIMES DAILY AS NEEDED FOR unrelieved nausea 7/29/19  Yes Provider, Historical  
LORazepam (ATIVAN) 1 mg tablet take 1/2-1 tablets by mouth every 6 hours if needed for nausea 7/24/19  Yes Provider, Historical  
prochlorperazine (COMPAZINE) 10 mg tablet TAKE ONE TABLET BY MOUTH EVERY 6 HOURS AS NEEDED FOR NAUSEA 7/29/19  Yes Provider, Historical  
metFORMIN ER (GLUCOPHAGE XR) 500 mg tablet Take 1 Tab by mouth daily (with dinner). 9/4/19  Yes Zohreh Duong NP  
lisinopril (PRINIVIL, ZESTRIL) 20 mg tablet Take 1 Tab by mouth daily. 7/8/19  Yes Zohreh Duong NP  
lancets (ONETOUCH DELICA LANCETS) 30 gauge misc Check blood sugars three times daily 7/8/19  Yes Zohreh Duong NP  
glucose blood VI test strips (ASCENSIA AUTODISC VI, ONE TOUCH ULTRA TEST VI) strip Check blood sugars 3 times per day. DX code E11.9 6/12/19  Yes Rinku Dave NP  
ONETOUCH ULTRAMINI monitoring kit use as directed TO [address] [place] [address] [address] DAILY 5/11/19  Yes Provider, Historical  
Insulin Needles, Disposable, 30 gauge x 1/3\" To use daily with lantus 5/29/19  Yes Zohreh Duong NP  
ibuprofen (ADVIL) 200 mg tablet Take 200 mg by mouth every four (4) hours as needed for Pain. Yes Provider, Historical  
anastrozole (ARIMIDEX) 1 mg tablet Take 1 mg by mouth daily. Yes Provider, Historical  
multivitamin (ONE A DAY) tablet Take 1 Tab by mouth daily (with lunch). Yes Provider, Historical  
aspirin delayed-release 81 mg tablet Take 81 mg by mouth daily.    Yes Provider, Historical  
 CALCIUM CARBONATE/VITAMIN D3 (CALTRATE 600 + D PO) Take 2 Tabs by mouth daily (with lunch). Yes Provider, Historical  
sodium chloride (SALINE WOUND [address]) 0.9 % soln 50 mL by Does Not Apply route three (3) times daily. 8/7/19   Deedee Bland MD  
denosumab (PROLIA) 60 mg/mL injection 60 mg by SubCUTAneous route every 6 months. At [facility]    Provider, Historical  
omega-3 fatty acids-vitamin e (FISH OIL) 1,000 mg cap Take 2 Caps by mouth daily (with lunch). Provider, Historical  
 
Physical Exam:  
Vital Signs: Blood pressure 155/72, pulse (!) 112, temperature 98.2 °F (36.8 °C), resp. rate 13, height 5' 5\" (1.651 m), weight 65.5 kg (144 lb 7 oz), last menstrual period 05/29/1997, SpO2 99 %, not currently breastfeeding. General: well developed, well nourished HEENT: unremarkable Heart: regular rhythm no mumur Lungs: clear Abdominal:  benign Neurological: unremarkable Extremities: no edema Findings/Diagnosis: Gastric and Pancreatic mass Plan of Care/Planned Procedure: EGD/EUS with conscious/deep sedation Signed: 
Hilaria Palomo MD 12/10/2019

## 2019-12-10 NOTE — ANESTHESIA POSTPROCEDURE EVALUATION
Procedure(s): ESOPHAGOGASTRODUODENAL (EGD) BIOPSY 
ESOPHAGOGASTRODUODENOSCOPY (EGD). total IV anesthesia Anesthesia Post Evaluation Patient location during evaluation: PACU Note status: Adequate. Level of consciousness: responsive to verbal stimuli and sleepy but conscious Pain management: satisfactory to patient Airway patency: patent Anesthetic complications: no 
Cardiovascular status: acceptable Respiratory status: acceptable Hydration status: acceptable Comments: +Post-Anesthesia Evaluation and Assessment Patient: Bela Peters MRN: [id number]  SSN: [id number] YOB: 1950  Age: 77 y.o. Sex: female Cardiovascular Function/Vital Signs /56   Pulse (!) 118   Temp 36.8 °C (98.2 °F)   Resp 20   Ht 5' 5\" (1.651 m)   Wt 65.5 kg (144 lb 7 oz)   SpO2 99%   Breastfeeding No   BMI 24.04 kg/m² Patient is status post Procedure(s): ESOPHAGOGASTRODUODENAL (EGD) BIOPSY 
ESOPHAGOGASTRODUODENOSCOPY (EGD). Nausea/Vomiting: Controlled. Postoperative hydration reviewed and adequate. Pain: 
Pain Scale 1: Numeric (0 - 10) (12/10/19 1104) Pain Intensity 1: 0 (12/10/19 1104) Managed. Neurological Status: At baseline. Mental Status and Level of Consciousness: Arousable. Pulmonary Status:  
O2 Device: Nasal cannula (12/10/19 1148) Adequate oxygenation and airway patent. Complications related to anesthesia: None Post-anesthesia assessment completed. No concerns. Signed By: Ming Chappell MD  
 12/10/2019 Post anesthesia nausea and vomiting:  controlled Vitals Value Taken Time /73 12/10/2019 11:54 AM  
Temp Pulse 0 12/10/2019 11:55 AM  
Resp 0 12/10/2019 11:55 AM  
SpO2 100 % 12/10/2019 11:55 AM  
Vitals shown include unvalidated device data.

## 2019-12-10 NOTE — DISCHARGE INSTRUCTIONS
Trevor Henao  [id number]  1950    EGD DISCHARGE INSTRUCTIONS  Discomfort:  Sore throat- throat lozenges or warm salt water gargle  redness at IV site- apply warm compress to area; if redness or soreness persist- contact your physician  Gaseous discomfort- walking, belching will help relieve any discomfort  You may not operate a vehicle for 12 hours  You may not engage in an occupation involving machinery or appliances for rest of today  You may not drink alcoholic beverages for at least 12 hours  Avoid making any critical decisions for at least 24 hour  DIET  You may resume your regular diet - however -  remember your colon is empty and a heavy meal will produce gas. Avoid these foods:  vegetables, fried / greasy foods, carbonated drinks    MEDICATIONS:  Per Medication Reconciliation      ACTIVITY  You may resume your normal daily activities until tomorrow AM;  Spend the remainder of the day resting -  avoid any strenuous activity. CALL M.D. ANY SIGN OF   Increasing pain, nausea, vomiting  Abdominal distension (swelling)  New increased bleeding (oral or rectal)  Fever (chills)  Pain in chest area  Bloody discharge from nose or mouth  Shortness of breath    You may not take any Advil,  Ibuprofen, Motrin, Aleve, or Goodys for 10 days, ONLY  Tylenol as needed for pain. IMPRESSION:  Impression:    1. Normal proximal, mid, and distal esophagus  2. Patchy nodularity with ulceration in gastric antrum. This was overall subtle and significantly improved from prior when patient was diagnosed with gastric adenocarcinoma  3. Large amount of retained food in gastric body  4. Stomach otherwise normal, including retroflexion  5. Normal duodenal bulb and 2nd portion of the duodenum     EUS was not performed given aspiration risk with large amount of food in stomach. On my personal review of recent CT there is still unlikely to be a window for FNB/biopsy.  For future endoscopies I would recommend CLD the day before     Recommendations:  1. Follow up pathology  2.  Follow up with Dr. Antonio/Oncology    Follow-up Instructions:   Call Dr. Miguel Angel Lopez for the results of procedure / biopsy in 7-10 days   Telephone #[phone]    Nanci Singh MD

## 2019-12-10 NOTE — ANESTHESIA PREPROCEDURE EVALUATION
Relevant Problems No relevant active problems Anesthetic History No history of anesthetic complications Review of Systems / Medical History Patient summary reviewed, nursing notes reviewed and pertinent labs reviewed Pulmonary Within defined limits Neuro/Psych Psychiatric history Comments: Anxiety Cardiovascular Hypertension Exercise tolerance: >4 METS 
  
GI/Hepatic/Renal 
  
 
 
Renal disease: CRI Comments: Hx Gastric adenocarcinoma  Endo/Other Diabetes: using insulin Cancer Other Findings Comments: Hx Pancreatic mass Hx right breast cancer Physical Exam 
 
Airway Mallampati: I 
TM Distance: 4 - 6 cm Neck ROM: normal range of motion Mouth opening: Normal 
 
 Cardiovascular Regular rate and rhythm,  S1 and S2 normal,  no murmur, click, rub, or gallop Dental 
 
Dentition: Edentulous Pulmonary Breath sounds clear to auscultation Abdominal 
GI exam deferred Other Findings Anesthetic Plan ASA: 3 Anesthesia type: total IV anesthesia Induction: Intravenous Anesthetic plan and risks discussed with: Patient

## 2019-12-10 NOTE — PROCEDURES
NAME:  Len Batch :   1950 MRN:   [id number] Date/Time:  12/10/2019 11:48 AM 
 
Esophagogastroduodenoscopy (EGD) Procedure Note Procedure: Esophagogastroduodenoscopy with biopsy Indication: Gastric adenocarcinoma Pre-operative Diagnosis: see indication above Post-operative Diagnosis: see findings below :  Jerald Barnett MD 
Referring Provider:   --Irl Phoenix, NP Exam: Airway: clear, no airway problems anticipated Heart: RRR, without gallops or rubs Lungs: clear bilaterally without wheezes, crackles, or rhonchi Abdomen: soft, nontender, nondistended, bowel sounds present Mental Status: awake, alert and oriented to person, place and time Anethesia/Sedation:  MAC anesthesia Propofol Procedure Details After informed consent was obtained for the procedure, with all risks and benefits of procedure explained the patient was taken to the endoscopy suite and placed in the left lateral decubitus position. Following sequential administration of sedation as per above, the [id number] gastroscope was inserted into the mouth and advanced under direct vision to second portion of the duodenum. A careful inspection was made as the gastroscope was withdrawn, including a retroflexed view of the proximal stomach; findings and interventions are described below. Findings:  
1. Normal proximal, mid, and distal esophagus 2. Patchy nodularity with ulceration in gastric antrum. This was overall subtle and significantly improved from prior when patient was diagnosed with gastric adenocarcinoma 3. Large amount of retained food in gastric body 4. Stomach otherwise normal, including retroflexion 5. Normal duodenal bulb and 2nd portion of the duodenum Therapies:  1. Biopsies Specimens: 1. Gastric nodularity EBL:  None. Complications:   None; patient tolerated the procedure well. Impression: 1. Normal proximal, mid, and distal esophagus 2. Patchy nodularity with ulceration in gastric antrum. This was overall subtle and significantly improved from prior when patient was diagnosed with gastric adenocarcinoma 3. Large amount of retained food in gastric body 4. Stomach otherwise normal, including retroflexion 5. Normal duodenal bulb and 2nd portion of the duodenum EUS was not performed given aspiration risk with large amount of food in stomach. On my personal review of recent CT there is still unlikely to be a window for FNB/biopsy. For future endoscopies I would recommend CLD the day before Recommendations: 
1. Follow up pathology 2. Follow up with Dr. Antonio/Oncology Discharge disposition:  Home in the company of  when able to ambulate Brigette Smith MD

## 2019-12-10 NOTE — ROUTINE PROCESS
Angeline Feliz 1950 
[id number] Situation: 
Verbal report received from: Rose Jean-Baptiste Procedure: Procedure(s): ESOPHAGOGASTRODUODENAL (EGD) BIOPSY 
ESOPHAGOGASTRODUODENOSCOPY (EGD) Background: 
 
Preoperative diagnosis: PANCREATIC MASS Postoperative diagnosis: gastric nodularity; hx of gastric CA :  Dr. Hussein Tamez Assistant(s): Endoscopy Technician-1: Kaila Farris Endoscopy RN-1: Jennifer Pastor RN Endoscopy RN-Relief: Telma Bourgeois RN Specimens:  
ID Type Source Tests Collected by Time Destination 1 : nodular gastric bx Preservative Gastric  Lolly Grajeda MD 12/10/2019 1137 Pathology H. Pylori  no Assessment: 
Intra-procedure medications Anesthesia gave intra-procedure sedation and medications, see anesthesia flow sheet yes Intravenous fluids: NS@ Marysol Evens Vital signs stable Abdominal assessment: round and soft Recommendation: 
Discharge patient per MD order. Family or Friend Permission to share finding with family or friend yes

## 2019-12-10 NOTE — PROGRESS NOTES
Anesthesia reports 200 mg Propofol, 100 mg Lidocaine and 400 ml of Normal Saline were given during procedure. Received report from anesthesia staff on vital signs and status of patient.

## 2019-12-27 DIAGNOSIS — R79.0 LOW MAGNESIUM LEVEL: ICD-10-CM

## 2019-12-30 RX ORDER — ZINC GLUCONATE 10 MG
1 LOZENGE ORAL DAILY
Qty: 90 TAB | Refills: 0 | Status: SHIPPED | OUTPATIENT
Start: 2019-12-30

## 2020-01-01 ENCOUNTER — TRANSCRIBE ORDER (OUTPATIENT)
Dept: SCHEDULING | Age: 70
End: 2020-01-01

## 2020-01-01 ENCOUNTER — HOSPITAL ENCOUNTER (OUTPATIENT)
Dept: CT IMAGING | Age: 70
Discharge: HOME | End: 2020-06-24
Attending: INTERNAL MEDICINE
Payer: MEDICARE

## 2020-01-01 ENCOUNTER — HOSPITAL ENCOUNTER (OUTPATIENT)
Dept: CT IMAGING | Age: 70
End: 2020-01-01
Attending: INTERNAL MEDICINE
Payer: MEDICARE

## 2020-01-01 ENCOUNTER — HOSPITAL ENCOUNTER (OUTPATIENT)
Dept: PREADMISSION TESTING | Age: 70
Discharge: HOME | End: 2020-12-26
Payer: MEDICARE

## 2020-01-01 ENCOUNTER — HOSPITAL ENCOUNTER (OUTPATIENT)
Age: 70
Setting detail: OUTPATIENT SURGERY
Discharge: HOME | End: 2020-12-30
Attending: INTERNAL MEDICINE | Admitting: INTERNAL MEDICINE
Payer: MEDICARE

## 2020-01-01 ENCOUNTER — OFFICE VISIT (OUTPATIENT)
Dept: SURGERY | Age: 70
End: 2020-01-01
Payer: MEDICARE

## 2020-01-01 ENCOUNTER — HOSPITAL ENCOUNTER (OUTPATIENT)
Dept: LAB | Age: 70
Discharge: HOME | End: 2020-08-06
Payer: MEDICARE

## 2020-01-01 ENCOUNTER — ANESTHESIA EVENT (OUTPATIENT)
Dept: ENDOSCOPY | Age: 70
End: 2020-01-01
Payer: MEDICARE

## 2020-01-01 ENCOUNTER — HOSPITAL ENCOUNTER (OUTPATIENT)
Dept: CT IMAGING | Age: 70
Discharge: HOME | End: 2020-11-24
Attending: INTERNAL MEDICINE
Payer: MEDICARE

## 2020-01-01 ENCOUNTER — HOSPITAL ENCOUNTER (OUTPATIENT)
Dept: CT IMAGING | Age: 70
Discharge: HOME | End: 2020-08-10
Attending: INTERNAL MEDICINE
Payer: MEDICARE

## 2020-01-01 ENCOUNTER — ANESTHESIA (OUTPATIENT)
Dept: ENDOSCOPY | Age: 70
End: 2020-01-01
Payer: MEDICARE

## 2020-01-01 ENCOUNTER — OFFICE VISIT (OUTPATIENT)
Dept: FAMILY MEDICINE CLINIC | Age: 70
End: 2020-01-01
Payer: MEDICARE

## 2020-01-01 ENCOUNTER — TELEPHONE (OUTPATIENT)
Dept: FAMILY MEDICINE CLINIC | Age: 70
End: 2020-01-01

## 2020-01-01 ENCOUNTER — HOSPITAL ENCOUNTER (OUTPATIENT)
Dept: CT IMAGING | Age: 70
Discharge: HOME | End: 2020-09-23
Attending: INTERNAL MEDICINE
Payer: MEDICARE

## 2020-01-01 VITALS
HEART RATE: 133 BPM | WEIGHT: 116 LBS | RESPIRATION RATE: 11 BRPM | SYSTOLIC BLOOD PRESSURE: 137 MMHG | WEIGHT: 119.9 LBS | DIASTOLIC BLOOD PRESSURE: 69 MMHG | HEIGHT: 65 IN | RESPIRATION RATE: 18 BRPM | BODY MASS INDEX: 19.98 KG/M2 | HEIGHT: 65 IN | HEART RATE: 99 BPM | SYSTOLIC BLOOD PRESSURE: 125 MMHG | TEMPERATURE: 98 F | TEMPERATURE: 96.7 F | OXYGEN SATURATION: 99 % | BODY MASS INDEX: 19.33 KG/M2 | DIASTOLIC BLOOD PRESSURE: 87 MMHG | OXYGEN SATURATION: 98 %

## 2020-01-01 VITALS
HEART RATE: 106 BPM | WEIGHT: 126 LBS | BODY MASS INDEX: 20.99 KG/M2 | SYSTOLIC BLOOD PRESSURE: 104 MMHG | DIASTOLIC BLOOD PRESSURE: 69 MMHG | TEMPERATURE: 98.3 F | OXYGEN SATURATION: 96 % | RESPIRATION RATE: 19 BRPM | HEIGHT: 65 IN

## 2020-01-01 VITALS
WEIGHT: 118 LBS | BODY MASS INDEX: 19.66 KG/M2 | SYSTOLIC BLOOD PRESSURE: 106 MMHG | HEART RATE: 113 BPM | TEMPERATURE: 60.8 F | HEIGHT: 65 IN | DIASTOLIC BLOOD PRESSURE: 72 MMHG | RESPIRATION RATE: 16 BRPM | OXYGEN SATURATION: 98 %

## 2020-01-01 VITALS
HEART RATE: 113 BPM | OXYGEN SATURATION: 98 % | WEIGHT: 117.5 LBS | HEIGHT: 65 IN | SYSTOLIC BLOOD PRESSURE: 125 MMHG | BODY MASS INDEX: 19.58 KG/M2 | TEMPERATURE: 97.5 F | DIASTOLIC BLOOD PRESSURE: 60 MMHG | RESPIRATION RATE: 18 BRPM

## 2020-01-01 DIAGNOSIS — C16.9 GASTRIC ADENOCARCINOMA (HCC): ICD-10-CM

## 2020-01-01 DIAGNOSIS — D70.1 CHEMOTHERAPY-INDUCED NEUTROPENIA (HCC): ICD-10-CM

## 2020-01-01 DIAGNOSIS — Z13.6 SCREENING FOR ISCHEMIC HEART DISEASE: ICD-10-CM

## 2020-01-01 DIAGNOSIS — C50.311 MALIGNANT NEOPLASM OF LOWER-INNER QUADRANT OF RIGHT FEMALE BREAST (HCC): Primary | ICD-10-CM

## 2020-01-01 DIAGNOSIS — C50.411 MALIGNANT NEOPLASM OF UPPER-OUTER QUADRANT OF RIGHT FEMALE BREAST (HCC): ICD-10-CM

## 2020-01-01 DIAGNOSIS — C50.311 MALIGNANT NEOPLASM OF LOWER-INNER QUADRANT OF RIGHT FEMALE BREAST (HCC): ICD-10-CM

## 2020-01-01 DIAGNOSIS — R11.2 NAUSEA WITH VOMITING: ICD-10-CM

## 2020-01-01 DIAGNOSIS — Z51.11 ENCOUNTER FOR ANTINEOPLASTIC CHEMOTHERAPY: ICD-10-CM

## 2020-01-01 DIAGNOSIS — R50.9 HYPERTHERMIA-INDUCED DEFECT: ICD-10-CM

## 2020-01-01 DIAGNOSIS — Z13.6 ENCOUNTER FOR LIPID SCREENING FOR CARDIOVASCULAR DISEASE: ICD-10-CM

## 2020-01-01 DIAGNOSIS — C16.2 MALIGNANT NEOPLASM OF BODY OF STOMACH (HCC): ICD-10-CM

## 2020-01-01 DIAGNOSIS — L72.0 EPIDERMAL CYST: ICD-10-CM

## 2020-01-01 DIAGNOSIS — Z13.29 SCREENING FOR THYROID DISORDER: ICD-10-CM

## 2020-01-01 DIAGNOSIS — D70.1 AGRANULOCYTOSIS SECONDARY TO CANCER CHEMOTHERAPY (HCC): ICD-10-CM

## 2020-01-01 DIAGNOSIS — Z13.220 ENCOUNTER FOR LIPID SCREENING FOR CARDIOVASCULAR DISEASE: ICD-10-CM

## 2020-01-01 DIAGNOSIS — G62.0 DRUG-INDUCED POLYNEUROPATHY (HCC): ICD-10-CM

## 2020-01-01 DIAGNOSIS — E11.9 DIABETES MELLITUS, NEW ONSET (HCC): ICD-10-CM

## 2020-01-01 DIAGNOSIS — E11.9 CONTROLLED TYPE 2 DIABETES MELLITUS WITHOUT COMPLICATION, WITH LONG-TERM CURRENT USE OF INSULIN (HCC): Primary | ICD-10-CM

## 2020-01-01 DIAGNOSIS — T45.1X5A ANTINEOPLASTIC ANTIBIOTICS CAUSING ADVERSE EFFECT IN THERAPEUTIC USE: ICD-10-CM

## 2020-01-01 DIAGNOSIS — L02.212 BACK ABSCESS: Primary | ICD-10-CM

## 2020-01-01 DIAGNOSIS — T45.1X5A CHEMOTHERAPY-INDUCED NEUTROPENIA (HCC): ICD-10-CM

## 2020-01-01 DIAGNOSIS — Z79.811 LONG TERM CURRENT USE OF AROMATASE INHIBITOR: ICD-10-CM

## 2020-01-01 DIAGNOSIS — Z09 POSTOPERATIVE EXAMINATION: Primary | ICD-10-CM

## 2020-01-01 DIAGNOSIS — Z79.4 CONTROLLED TYPE 2 DIABETES MELLITUS WITHOUT COMPLICATION, WITH LONG-TERM CURRENT USE OF INSULIN (HCC): Primary | ICD-10-CM

## 2020-01-01 DIAGNOSIS — T45.1X5A: ICD-10-CM

## 2020-01-01 DIAGNOSIS — Z13.6 SCREENING FOR CARDIOVASCULAR CONDITION: ICD-10-CM

## 2020-01-01 DIAGNOSIS — C25.0 MALIGNANT NEOPLASM OF HEAD OF PANCREAS (HCC): ICD-10-CM

## 2020-01-01 DIAGNOSIS — L02.212 BACK ABSCESS: ICD-10-CM

## 2020-01-01 DIAGNOSIS — R50.9 FEVER: ICD-10-CM

## 2020-01-01 DIAGNOSIS — C16.9 GASTRIC ADENOCARCINOMA (HCC): Primary | ICD-10-CM

## 2020-01-01 DIAGNOSIS — G62.0 POLYNEUROPATHY DUE TO DRUG (HCC): ICD-10-CM

## 2020-01-01 DIAGNOSIS — E55.9 VITAMIN D DEFICIENCY: ICD-10-CM

## 2020-01-01 DIAGNOSIS — T45.1X5A AGRANULOCYTOSIS SECONDARY TO CANCER CHEMOTHERAPY (HCC): ICD-10-CM

## 2020-01-01 DIAGNOSIS — R50.9 FEVER, UNSPECIFIED: ICD-10-CM

## 2020-01-01 LAB
25(OH)D3+25(OH)D2 SERPL-MCNC: 60.8 NG/ML (ref 30–100)
ALBUMIN UR QL STRIP: 150 MG/L
BACTERIA SPEC AEROBE CULT: NORMAL
CHOLEST SERPL-MCNC: 125 MG/DL (ref 100–199)
CREATININE, URINE POC: 300 MG/DL
EST. AVERAGE GLUCOSE BLD GHB EST-MCNC: 120 MG/DL
GLUCOSE BLD STRIP.AUTO-MCNC: 72 MG/DL (ref 65–100)
HBA1C MFR BLD: 5.8 % (ref 4.8–5.6)
HDLC SERPL-MCNC: 30 MG/DL
HEALTH STATUS, XMCV2T: NORMAL
INTERPRETATION, 910389: NORMAL
LDLC SERPL CALC-MCNC: 79 MG/DL (ref 0–99)
MICROALBUMIN/CREAT RATIO POC: NORMAL MG/G
SARS-COV-2, COV2NT: NOT DETECTED
SERVICE CMNT-IMP: NORMAL
SOURCE, COVRS: NORMAL
SPECIMEN SOURCE, FCOV2M: NORMAL
SPECIMEN STATUS REPORT, ROLRST: NORMAL
SPECIMEN TYPE, XMCV1T: NORMAL
TRIGL SERPL-MCNC: 78 MG/DL (ref 0–149)
TSH SERPL DL<=0.005 MIU/L-ACNC: 0.93 UIU/ML (ref 0.45–4.5)
VLDLC SERPL CALC-MCNC: 16 MG/DL (ref 5–40)

## 2020-01-01 PROCEDURE — 3017F COLORECTAL CA SCREEN DOC REV: CPT | Performed by: NURSE PRACTITIONER

## 2020-01-01 PROCEDURE — 82306 VITAMIN D 25 HYDROXY: CPT

## 2020-01-01 PROCEDURE — 74011636320 HC RX REV CODE- 636/320: Performed by: INTERNAL MEDICINE

## 2020-01-01 PROCEDURE — 74177 CT ABD & PELVIS W/CONTRAST: CPT

## 2020-01-01 PROCEDURE — 74011250636 HC RX REV CODE- 250/636: Performed by: INTERNAL MEDICINE

## 2020-01-01 PROCEDURE — 3017F COLORECTAL CA SCREEN DOC REV: CPT | Performed by: SURGERY

## 2020-01-01 PROCEDURE — 87635 SARS-COV-2 COVID-19 AMP PRB: CPT

## 2020-01-01 PROCEDURE — 80061 LIPID PANEL: CPT

## 2020-01-01 PROCEDURE — 74011000250 HC RX REV CODE- 250: Performed by: ANESTHESIOLOGY

## 2020-01-01 PROCEDURE — G8536 NO DOC ELDER MAL SCRN: HCPCS | Performed by: SURGERY

## 2020-01-01 PROCEDURE — G8420 CALC BMI NORM PARAMETERS: HCPCS | Performed by: SURGERY

## 2020-01-01 PROCEDURE — 99024 POSTOP FOLLOW-UP VISIT: CPT | Performed by: SURGERY

## 2020-01-01 PROCEDURE — G8432 DEP SCR NOT DOC, RNG: HCPCS | Performed by: SURGERY

## 2020-01-01 PROCEDURE — G0444 DEPRESSION SCREEN ANNUAL: HCPCS | Performed by: SURGERY

## 2020-01-01 PROCEDURE — 1101F PT FALLS ASSESS-DOCD LE1/YR: CPT | Performed by: NURSE PRACTITIONER

## 2020-01-01 PROCEDURE — G8536 NO DOC ELDER MAL SCRN: HCPCS | Performed by: NURSE PRACTITIONER

## 2020-01-01 PROCEDURE — G8427 DOCREV CUR MEDS BY ELIG CLIN: HCPCS | Performed by: SURGERY

## 2020-01-01 PROCEDURE — G8510 SCR DEP NEG, NO PLAN REQD: HCPCS | Performed by: SURGERY

## 2020-01-01 PROCEDURE — 74011000636 HC RX REV CODE- 636: Performed by: INTERNAL MEDICINE

## 2020-01-01 PROCEDURE — 71260 CT THORAX DX C+: CPT

## 2020-01-01 PROCEDURE — 10060 I&D ABSCESS SIMPLE/SINGLE: CPT | Performed by: SURGERY

## 2020-01-01 PROCEDURE — 1090F PRES/ABSN URINE INCON ASSESS: CPT | Performed by: SURGERY

## 2020-01-01 PROCEDURE — G8399 PT W/DXA RESULTS DOCUMENT: HCPCS | Performed by: SURGERY

## 2020-01-01 PROCEDURE — G8399 PT W/DXA RESULTS DOCUMENT: HCPCS | Performed by: NURSE PRACTITIONER

## 2020-01-01 PROCEDURE — 83036 HEMOGLOBIN GLYCOSYLATED A1C: CPT

## 2020-01-01 PROCEDURE — 1101F PT FALLS ASSESS-DOCD LE1/YR: CPT | Performed by: SURGERY

## 2020-01-01 PROCEDURE — 77030019988 HC FCPS ENDOSC DISP BSC -B: Performed by: INTERNAL MEDICINE

## 2020-01-01 PROCEDURE — 99213 OFFICE O/P EST LOW 20 MIN: CPT | Performed by: SURGERY

## 2020-01-01 PROCEDURE — 84443 ASSAY THYROID STIM HORMONE: CPT

## 2020-01-01 PROCEDURE — G9708 BILAT MAST/HX BI /UNILAT MAS: HCPCS | Performed by: SURGERY

## 2020-01-01 PROCEDURE — 2022F DILAT RTA XM EVC RTNOPTHY: CPT | Performed by: NURSE PRACTITIONER

## 2020-01-01 PROCEDURE — 82962 GLUCOSE BLOOD TEST: CPT

## 2020-01-01 PROCEDURE — 99214 OFFICE O/P EST MOD 30 MIN: CPT | Performed by: NURSE PRACTITIONER

## 2020-01-01 PROCEDURE — G9708 BILAT MAST/HX BI /UNILAT MAS: HCPCS | Performed by: NURSE PRACTITIONER

## 2020-01-01 PROCEDURE — G8420 CALC BMI NORM PARAMETERS: HCPCS | Performed by: NURSE PRACTITIONER

## 2020-01-01 PROCEDURE — 88305 TISSUE EXAM BY PATHOLOGIST: CPT

## 2020-01-01 PROCEDURE — 82044 UR ALBUMIN SEMIQUANTITATIVE: CPT | Performed by: NURSE PRACTITIONER

## 2020-01-01 PROCEDURE — 76060000031 HC ANESTHESIA FIRST 0.5 HR: Performed by: INTERNAL MEDICINE

## 2020-01-01 PROCEDURE — 1090F PRES/ABSN URINE INCON ASSESS: CPT | Performed by: NURSE PRACTITIONER

## 2020-01-01 PROCEDURE — G8427 DOCREV CUR MEDS BY ELIG CLIN: HCPCS | Performed by: NURSE PRACTITIONER

## 2020-01-01 PROCEDURE — 74011250636 HC RX REV CODE- 250/636: Performed by: ANESTHESIOLOGY

## 2020-01-01 PROCEDURE — G8432 DEP SCR NOT DOC, RNG: HCPCS | Performed by: NURSE PRACTITIONER

## 2020-01-01 PROCEDURE — 3044F HG A1C LEVEL LT 7.0%: CPT | Performed by: NURSE PRACTITIONER

## 2020-01-01 PROCEDURE — 2709999900 HC NON-CHARGEABLE SUPPLY: Performed by: INTERNAL MEDICINE

## 2020-01-01 PROCEDURE — 76040000019: Performed by: INTERNAL MEDICINE

## 2020-01-01 RX ORDER — INSULIN GLARGINE 100 [IU]/ML
INJECTION, SOLUTION SUBCUTANEOUS
Qty: 18 ML | Refills: 2 | Status: SHIPPED | OUTPATIENT
Start: 2020-01-01

## 2020-01-01 RX ORDER — SODIUM CHLORIDE 0.9 % (FLUSH) 0.9 %
5-40 SYRINGE (ML) INJECTION AS NEEDED
Status: DISCONTINUED | OUTPATIENT
Start: 2020-01-01 | End: 2020-01-01 | Stop reason: HOSPADM

## 2020-01-01 RX ORDER — SODIUM CHLORIDE 0.9 % (FLUSH) 0.9 %
10 SYRINGE (ML) INJECTION
Status: COMPLETED | OUTPATIENT
Start: 2020-01-01 | End: 2020-01-01

## 2020-01-01 RX ORDER — SODIUM CHLORIDE 9 MG/ML
75 INJECTION, SOLUTION INTRAVENOUS CONTINUOUS
Status: DISCONTINUED | OUTPATIENT
Start: 2020-01-01 | End: 2020-01-01 | Stop reason: HOSPADM

## 2020-01-01 RX ORDER — NALOXONE HYDROCHLORIDE 0.4 MG/ML
0.4 INJECTION, SOLUTION INTRAMUSCULAR; INTRAVENOUS; SUBCUTANEOUS
Status: DISCONTINUED | OUTPATIENT
Start: 2020-01-01 | End: 2020-01-01 | Stop reason: HOSPADM

## 2020-01-01 RX ORDER — HEPARIN 100 UNIT/ML
300 SYRINGE INTRAVENOUS AS NEEDED
Status: DISCONTINUED | OUTPATIENT
Start: 2020-01-01 | End: 2020-01-01 | Stop reason: HOSPADM

## 2020-01-01 RX ORDER — CEPHALEXIN 500 MG/1
500 CAPSULE ORAL 4 TIMES DAILY
Qty: 40 CAP | Refills: 0 | Status: SHIPPED | OUTPATIENT
Start: 2020-01-01 | End: 2020-01-01

## 2020-01-01 RX ORDER — PROPOFOL 10 MG/ML
INJECTION, EMULSION INTRAVENOUS AS NEEDED
Status: DISCONTINUED | OUTPATIENT
Start: 2020-01-01 | End: 2020-01-01 | Stop reason: HOSPADM

## 2020-01-01 RX ORDER — ATROPINE SULFATE 0.1 MG/ML
0.5 INJECTION INTRAVENOUS
Status: DISCONTINUED | OUTPATIENT
Start: 2020-01-01 | End: 2020-01-01 | Stop reason: HOSPADM

## 2020-01-01 RX ORDER — DEXTROMETHORPHAN/PSEUDOEPHED 2.5-7.5/.8
1.2 DROPS ORAL
Status: DISCONTINUED | OUTPATIENT
Start: 2020-01-01 | End: 2020-01-01 | Stop reason: HOSPADM

## 2020-01-01 RX ORDER — LANCETS 30 GAUGE
EACH MISCELLANEOUS
Qty: 100 LANCET | Refills: 4 | Status: SHIPPED | OUTPATIENT
Start: 2020-01-01

## 2020-01-01 RX ORDER — PEN NEEDLE, DIABETIC 31 GX5/16"
NEEDLE, DISPOSABLE MISCELLANEOUS
Qty: 100 PEN NEEDLE | Refills: 1 | Status: SHIPPED | OUTPATIENT
Start: 2020-01-01

## 2020-01-01 RX ORDER — EPINEPHRINE 0.1 MG/ML
1 INJECTION INTRACARDIAC; INTRAVENOUS
Status: DISCONTINUED | OUTPATIENT
Start: 2020-01-01 | End: 2020-01-01 | Stop reason: HOSPADM

## 2020-01-01 RX ORDER — INSULIN GLARGINE 100 [IU]/ML
INJECTION, SOLUTION SUBCUTANEOUS
Qty: 18 ML | Refills: 2 | Status: SHIPPED | OUTPATIENT
Start: 2020-01-01 | End: 2020-01-01 | Stop reason: SDUPTHER

## 2020-01-01 RX ORDER — LIDOCAINE HYDROCHLORIDE 20 MG/ML
INJECTION, SOLUTION EPIDURAL; INFILTRATION; INTRACAUDAL; PERINEURAL AS NEEDED
Status: DISCONTINUED | OUTPATIENT
Start: 2020-01-01 | End: 2020-01-01 | Stop reason: HOSPADM

## 2020-01-01 RX ORDER — GABAPENTIN 600 MG/1
TABLET ORAL
COMMUNITY
Start: 2020-01-01

## 2020-01-01 RX ORDER — SODIUM CHLORIDE 0.9 % (FLUSH) 0.9 %
5-40 SYRINGE (ML) INJECTION EVERY 8 HOURS
Status: DISCONTINUED | OUTPATIENT
Start: 2020-01-01 | End: 2020-01-01 | Stop reason: HOSPADM

## 2020-01-01 RX ORDER — FLUMAZENIL 0.1 MG/ML
0.2 INJECTION INTRAVENOUS
Status: DISCONTINUED | OUTPATIENT
Start: 2020-01-01 | End: 2020-01-01 | Stop reason: HOSPADM

## 2020-01-01 RX ADMIN — IOPAMIDOL 100 ML: 755 INJECTION, SOLUTION INTRAVENOUS at 09:15

## 2020-01-01 RX ADMIN — Medication 10 ML: at 08:20

## 2020-01-01 RX ADMIN — SODIUM CHLORIDE 75 ML/HR: 9 INJECTION, SOLUTION INTRAVENOUS at 12:15

## 2020-01-01 RX ADMIN — Medication 10 ML: at 08:45

## 2020-01-01 RX ADMIN — IOHEXOL 50 ML: 240 INJECTION, SOLUTION INTRATHECAL; INTRAVASCULAR; INTRAVENOUS; ORAL at 08:45

## 2020-01-01 RX ADMIN — IOPAMIDOL 100 ML: 755 INJECTION, SOLUTION INTRAVENOUS at 08:20

## 2020-01-01 RX ADMIN — Medication 10 ML: at 11:39

## 2020-01-01 RX ADMIN — PROPOFOL 160 MG: 10 INJECTION, EMULSION INTRAVENOUS at 12:39

## 2020-01-01 RX ADMIN — LIDOCAINE HYDROCHLORIDE 60 MG: 20 INJECTION, SOLUTION EPIDURAL; INFILTRATION; INTRACAUDAL; PERINEURAL at 12:24

## 2020-01-01 RX ADMIN — Medication 10 ML: at 09:15

## 2020-01-01 RX ADMIN — IOHEXOL 50 ML: 240 INJECTION, SOLUTION INTRATHECAL; INTRAVASCULAR; INTRAVENOUS; ORAL at 11:39

## 2020-01-01 RX ADMIN — IOPAMIDOL 100 ML: 755 INJECTION, SOLUTION INTRAVENOUS at 08:45

## 2020-01-01 RX ADMIN — IOHEXOL 50 ML: 240 INJECTION, SOLUTION INTRATHECAL; INTRAVASCULAR; INTRAVENOUS; ORAL at 09:15

## 2020-01-01 RX ADMIN — Medication 500 UNITS: at 08:36

## 2020-01-01 RX ADMIN — IOHEXOL 50 ML: 240 INJECTION, SOLUTION INTRATHECAL; INTRAVASCULAR; INTRAVENOUS; ORAL at 08:20

## 2020-01-01 RX ADMIN — IOPAMIDOL 100 ML: 755 INJECTION, SOLUTION INTRAVENOUS at 11:39

## 2020-01-07 ENCOUNTER — HOSPITAL ENCOUNTER (OUTPATIENT)
Dept: CT IMAGING | Age: 70
Discharge: HOME | End: 2020-01-07
Attending: INTERNAL MEDICINE
Payer: MEDICARE

## 2020-01-07 DIAGNOSIS — C50.311 MALIGNANT NEOPLASM OF LOWER-INNER QUADRANT OF RIGHT FEMALE BREAST (HCC): ICD-10-CM

## 2020-01-07 PROCEDURE — 74011636320 HC RX REV CODE- 636/320: Performed by: INTERNAL MEDICINE

## 2020-01-07 PROCEDURE — 74011250636 HC RX REV CODE- 250/636: Performed by: INTERNAL MEDICINE

## 2020-01-07 PROCEDURE — 74177 CT ABD & PELVIS W/CONTRAST: CPT

## 2020-01-07 PROCEDURE — 71260 CT THORAX DX C+: CPT

## 2020-01-07 RX ORDER — SODIUM CHLORIDE 0.9 % (FLUSH) 0.9 %
10 SYRINGE (ML) INJECTION
Status: COMPLETED | OUTPATIENT
Start: 2020-01-07 | End: 2020-01-07

## 2020-01-07 RX ORDER — HEPARIN 100 UNIT/ML
300 SYRINGE INTRAVENOUS AS NEEDED
Status: DISCONTINUED | OUTPATIENT
Start: 2020-01-07 | End: 2020-01-11 | Stop reason: HOSPADM

## 2020-01-07 RX ADMIN — IOPAMIDOL 100 ML: 755 INJECTION, SOLUTION INTRAVENOUS at 10:13

## 2020-01-07 RX ADMIN — Medication 10 ML: at 10:13

## 2020-01-07 RX ADMIN — SODIUM CHLORIDE, PRESERVATIVE FREE 500 UNITS: 5 INJECTION INTRAVENOUS at 10:21

## 2020-01-07 RX ADMIN — IOHEXOL 20 ML: 240 INJECTION, SOLUTION INTRATHECAL; INTRAVASCULAR; INTRAVENOUS; ORAL at 10:13

## 2020-01-24 ENCOUNTER — HOSPITAL ENCOUNTER (OUTPATIENT)
Dept: GENERAL RADIOLOGY | Age: 70
Discharge: HOME | End: 2020-01-24
Payer: MEDICARE

## 2020-01-24 DIAGNOSIS — R11.2 NAUSEA WITH VOMITING: ICD-10-CM

## 2020-01-24 PROCEDURE — 71046 X-RAY EXAM CHEST 2 VIEWS: CPT

## 2020-01-27 ENCOUNTER — OFFICE VISIT (OUTPATIENT)
Dept: SURGERY | Age: 70
End: 2020-01-27

## 2020-01-27 VITALS
HEART RATE: 118 BPM | DIASTOLIC BLOOD PRESSURE: 63 MMHG | TEMPERATURE: 97.8 F | RESPIRATION RATE: 16 BRPM | OXYGEN SATURATION: 100 % | SYSTOLIC BLOOD PRESSURE: 130 MMHG | HEIGHT: 65 IN | WEIGHT: 141 LBS | BODY MASS INDEX: 23.49 KG/M2

## 2020-01-27 DIAGNOSIS — Z17.0 MALIGNANT NEOPLASM OF UPPER-OUTER QUADRANT OF RIGHT BREAST IN FEMALE, ESTROGEN RECEPTOR POSITIVE (HCC): ICD-10-CM

## 2020-01-27 DIAGNOSIS — C16.9 GASTRIC ADENOCARCINOMA (HCC): Primary | ICD-10-CM

## 2020-01-27 DIAGNOSIS — C50.411 MALIGNANT NEOPLASM OF UPPER-OUTER QUADRANT OF RIGHT BREAST IN FEMALE, ESTROGEN RECEPTOR POSITIVE (HCC): ICD-10-CM

## 2020-01-27 NOTE — PROGRESS NOTES
Chief Complaint Patient presents with  Breast Cancer 3month breast check. 1. Have you been to the ER, urgent care clinic since your last visit? Hospitalized since your last visit? No 
 
2. Have you seen or consulted any other health care providers outside of the [address] [place] [address] since your last visit? Include any pap smears or colon screening.  No

## 2020-01-27 NOTE — PROGRESS NOTES
HISTORY OF PRESENT ILLNESS Aaron Arora is a 77 y.o. female who comes in for a right flank/back abscess and her cancers Breast Cancer Pertinent negatives include no chest pain, no abdominal pain, no headaches and no shortness of breath. Skin Problem Pertinent negatives include no chest pain, no abdominal pain, no headaches and no shortness of breath. Breast Problem Pertinent negatives include no chest pain, no abdominal pain, no headaches and no shortness of breath. She presented with obstructive jaundice 5/16/2019 and was found to have a pancreatic mass. Dr Kyra Degroot did an ERCP and brushings with stent placement and the jaundice cleared. The brushings were negative. She had an EGD and EUS at [facility] demonstrating adenocarcinoma with signet ring cells in a gastric mass. There were too many vessels near the pancreatic mass to do a biopsy. Dr William  is giving her chemotherapy. Repeat CT 1/7/2020 demonstrated no improvement of the abdominal mass. She went for routine screening mammogram in late Dec 2016 and was noted to have a developing density in the outer right breast.  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 She had a breast MRI suggesting the area to be 2.3 cm or two separate lesions. She was also noted to have two foci in the left breast for which MRI biopsy was recommended. She denies feeling any masses, skin changes, nipple changes, unexplained weight loss or bone pain. She previously underwent a right lumpectomy and ALND followed by chemotherapy Layo Henderson and WBRT in 2003 for a ??stage 1-2 triple negative breast cancer. She has had other bilateral breast biopsies as well. She had menarche at 16, first of two pregnancies at 35, menopause at 51 and did not take HRT.   She denies family hx of breast or ovarian cancer but her mother had kidney cancer and her brother had lung cancer. She has been received neoadjuvant chemotherapy by Dr Jorge Hernández. She is having ongoing herceptin. She underwent a right mastectomy/SLNB and left mastectomy 6/2017 with pCR. She completed adjuvant herceptin as well. She has switched to Dr Ketty Deutsch as Dr Jorge Hernández retired. Past Medical History:  
Diagnosis Date  Cancer [facility]) 2003, 2017  
 right breast cancer  Diabetes ([facility] [address]  Hypertension  Psychiatric disorder   
 anxiety Past Surgical History:  
Procedure Laterality Date  BREAST SURGERY PROCEDURE UNLISTED  2003  
 right breast lumpectomy  COLONOSCOPY N/A 4/11/2019 COLONOSCOPY performed by Nadiya Mcgee MD at [facility] ENDOSCOPY  
 HX MASTECTOMY Bilateral 6/29/2017 RIGHT AND LEFT MASTECTOMY, RIGHT AXILLARY SENTINAL LYMPH NODE BIOPSY performed by Otilia Haley MD at [facility] MAIN OR  
 HX VASCULAR ACCESS    
 left chest portacath - removed 4/2019  VASCULAR SURGERY PROCEDURE UNLIST  06/20/2019 Port -a cath placement Family History Problem Relation Age of Onset  Cancer Mother   
     kidney  Cancer Brother   
     lung  Heart Disease Father  Diabetes Sister  Diabetes Brother Social History Tobacco Use  Smoking status: Never Smoker  Smokeless tobacco: Never Used Substance Use Topics  Alcohol use: No  
 Drug use: No  
 
Current Outpatient Medications Medication Sig  
 magnesium 250 mg tab Take 1 Tab by mouth daily.  insulin glargine (LANTUS,BASAGLAR) 100 unit/mL (3 mL) inpn 20 Units by SubCUTAneous route nightly.  ondansetron hcl (ZOFRAN) 8 mg tablet TAKE ONE TABLET BY MOUTH EVERY 8 HOURS THREE TIMES DAILY AS NEEDED FOR unrelieved nausea  LORazepam (ATIVAN) 1 mg tablet take 1/2-1 tablets by mouth every 6 hours if needed for nausea  prochlorperazine (COMPAZINE) 10 mg tablet TAKE ONE TABLET BY MOUTH EVERY 6 HOURS AS NEEDED FOR NAUSEA  metFORMIN ER (GLUCOPHAGE XR) 500 mg tablet Take 1 Tab by mouth daily (with dinner).  lisinopril (PRINIVIL, ZESTRIL) 20 mg tablet Take 1 Tab by mouth daily.  lancets (ONETOUCH DELICA LANCETS) 30 gauge misc Check blood sugars three times daily  glucose blood VI test strips (ASCENSIA AUTODISC VI, ONE TOUCH ULTRA TEST VI) strip Check blood sugars 3 times per day. DX code E11.9  
 ONETOUCH ULTRAMINI monitoring kit use as directed TO CHECK BLOOD SUGAR THREE TIMES DAILY  Insulin Needles, Disposable, 30 gauge x 1/3\" To use daily with lantus  ibuprofen (ADVIL) 200 mg tablet Take 200 mg by mouth every four (4) hours as needed for Pain.  anastrozole (ARIMIDEX) 1 mg tablet Take 1 mg by mouth daily.  denosumab (PROLIA) 60 mg/mL injection 60 mg by SubCUTAneous route every 6 months. At [facility]  multivitamin (ONE A DAY) tablet Take 1 Tab by mouth daily (with lunch).  aspirin delayed-release 81 mg tablet Take 81 mg by mouth daily.  CALCIUM CARBONATE/VITAMIN D3 (CALTRATE 600 + D PO) Take 2 Tabs by mouth daily (with lunch).  sodium chloride (SALINE WOUND WASH) 0.9 % soln 50 mL by Does Not Apply route three (3) times daily.  omega-3 fatty acids-vitamin e (FISH OIL) 1,000 mg cap Take 2 Caps by mouth daily (with lunch). No current facility-administered medications for this visit. No Known Allergies Review of Systems Constitutional: Negative for chills, diaphoresis, fever, malaise/fatigue and weight loss. HENT: Negative for congestion, ear pain and sore throat. Eyes: Negative for blurred vision and pain. Respiratory: Negative for cough, hemoptysis, sputum production, shortness of breath, wheezing and stridor. Cardiovascular: Negative for chest pain, palpitations, orthopnea, claudication, leg swelling and PND.   
Gastrointestinal: Negative for abdominal pain, blood in stool, constipation, diarrhea, heartburn, melena, nausea and vomiting. Genitourinary: Negative for dysuria, flank pain, frequency, hematuria and urgency. Musculoskeletal: Negative for back pain, joint pain, myalgias and neck pain. Skin: Negative for itching and rash. Neurological: Negative for dizziness, tremors, focal weakness, seizures, weakness and headaches. Endo/Heme/Allergies: Negative for polydipsia. Psychiatric/Behavioral: Negative for depression and memory loss. The patient is nervous/anxious. Blood pressure 130/63, pulse (!) 118, temperature 97.8 °F (36.6 °C), temperature source Oral, resp. rate 16, height 5' 5\" (1.651 m), weight 64 kg (141 lb), last menstrual period 05/29/1997, SpO2 100 %. Physical Exam 
Constitutional:   
   General: She is not in acute distress. Appearance: She is well-developed. She is not diaphoretic. HENT:  
   Head: Normocephalic and atraumatic. Mouth/Throat:  
   Pharynx: No oropharyngeal exudate. Eyes:  
   General: No scleral icterus. Conjunctiva/sclera: Conjunctivae normal.  
   Pupils: Pupils are equal, round, and reactive to light. Neck: Musculoskeletal: Normal range of motion and neck supple. Thyroid: No thyromegaly. Vascular: No JVD. Trachea: No tracheal deviation. Cardiovascular:  
   Rate and Rhythm: Normal rate and regular rhythm. Heart sounds: No murmur. No friction rub. No gallop. Pulmonary:  
   Effort: Pulmonary effort is normal. No respiratory distress. Breath sounds: Normal breath sounds. No wheezing or rales. Chest:  
   Breasts: Breasts are symmetrical (bilateral amastia). Right: No mass, skin change or tenderness. Left: No mass, skin change or tenderness. Abdominal:  
   General: Bowel sounds are normal. There is no distension. Palpations: Abdomen is soft. There is no mass. Tenderness: There is no tenderness. There is no guarding or rebound. Hernia: No hernia is present. There is no hernia in the ventral area. Musculoskeletal: Normal range of motion. Lymphadenopathy:  
   Cervical: No cervical adenopathy. Upper Body:  
   Right upper body: No supraclavicular adenopathy. Left upper body: No supraclavicular adenopathy. Skin: 
   General: Skin is warm and dry. Coloration: Skin is not pale. Findings: No erythema or rash. Neurological:  
   Mental Status: She is alert and oriented to person, place, and time. Cranial Nerves: No cranial nerve deficit. Psychiatric:     
   Behavior: Behavior normal.     
   Thought Content: Thought content normal.     
   Judgment: Judgment normal.  
 
 
 
ASSESSMENT and PLAN 1. Right breast cancer early stage, Clinically stage 2 (T2NxM0)  ER 98% MA 83% and her2/wil amplified dx 1/2017. SELENE 2 year and 9  months Continue arimidex Keep f/u with Dr Loida Rasheed 
2. Hx right breast cancer in 2003 triple negative (treated by Dr Verner President). This is unrelated to the new cancer 3. Right axillary hematoma/seroma   Minimal residual 
4. High risk for genetic mutation due to triple negative breast ca at age 49 and now a metachronous breast cancer. My Risk genetic testing negative 5. Gastric adenocarcinoma and Pancreatic mass with biliary obstruction but this is due to gastric cancer-adenocarcinoma with signet ring cells and possible pancreatic ca. Unresectable. Very unusual to have two separate synchronous malignancies. Dr Loida Rasheed giving chemotherapy. 6.  Offered lymphedema appt but she declined. She is currently at higher risk but asymptomatic 7. Colon screening. She wishes to go to GI 
DME surgical bras and prosthesis 8. Weight loss. 15 pounds. Loss of appetite and chemotherapy RTC  6 months or prn Terence Goltz, MD FACS

## 2020-01-29 DIAGNOSIS — E11.9 DIABETES MELLITUS, NEW ONSET (HCC): ICD-10-CM

## 2020-01-29 RX ORDER — INSULIN GLARGINE 100 [IU]/ML
INJECTION, SOLUTION SUBCUTANEOUS
Qty: 18 ML | Refills: 2 | Status: SHIPPED | OUTPATIENT
Start: 2020-01-29 | End: 2020-02-04

## 2020-02-04 ENCOUNTER — OFFICE VISIT (OUTPATIENT)
Dept: FAMILY MEDICINE CLINIC | Age: 70
End: 2020-02-04

## 2020-02-04 VITALS
WEIGHT: 137.2 LBS | DIASTOLIC BLOOD PRESSURE: 69 MMHG | TEMPERATURE: 98.6 F | OXYGEN SATURATION: 98 % | HEIGHT: 65 IN | BODY MASS INDEX: 22.86 KG/M2 | HEART RATE: 116 BPM | RESPIRATION RATE: 16 BRPM | SYSTOLIC BLOOD PRESSURE: 110 MMHG

## 2020-02-04 DIAGNOSIS — E11.9 CONTROLLED TYPE 2 DIABETES MELLITUS WITHOUT COMPLICATION, WITH LONG-TERM CURRENT USE OF INSULIN (HCC): Primary | ICD-10-CM

## 2020-02-04 DIAGNOSIS — C16.9 GASTRIC ADENOCARCINOMA (HCC): ICD-10-CM

## 2020-02-04 DIAGNOSIS — Z79.4 CONTROLLED TYPE 2 DIABETES MELLITUS WITHOUT COMPLICATION, WITH LONG-TERM CURRENT USE OF INSULIN (HCC): Primary | ICD-10-CM

## 2020-02-04 DIAGNOSIS — E11.9 DIABETES MELLITUS, NEW ONSET (HCC): ICD-10-CM

## 2020-02-04 LAB — HBA1C MFR BLD HPLC: 5.9 %

## 2020-02-04 RX ORDER — OXYCODONE AND ACETAMINOPHEN 5; 325 MG/1; MG/1
TABLET ORAL
COMMUNITY

## 2020-02-04 RX ORDER — INSULIN GLARGINE 100 [IU]/ML
INJECTION, SOLUTION SUBCUTANEOUS
Qty: 18 ML | Refills: 2
Start: 2020-02-04 | End: 2020-01-01 | Stop reason: SDUPTHER

## 2020-02-04 NOTE — PROGRESS NOTES
Chief Complaint   Patient presents with    Diabetes     3 month f/u       S: Moshe Colon is a 77 y.o. yo female who presents for DM follow up. Last DM check hemoglobin A1c on 11/5/19 was 5.9    Blood sugars most days 95-130s, blood sugars elevate after treament days secondary to steroids. Taking lantus 15 units daily but holds for blood sugar <130 or if not eating   She is taking metformin 500 mg daily. No GI side effects  Diet and Exercise- admits that her appetite has decreased, tries to eat 3 small meals per day and drinking boost 2-3 shakes per day. Gastric Adenocarcinoma/pancreatic mass:   She is still doing chemo (usually every two weeks). She has lost about 14 lbs since last visit with me in October but she is trying to drink 2 shakes per day and eat small meals. She is taking zofran as needed for nausea and nausea is controlled except after chemo treatment. Follow up with Dr Mark Live next week. Has steroid infusions prior to treatment. Health Maintenance Reviewed    Denies cardiac complaints including chest pain or discomfort, elevated heart rate, or palpitations. Denies any headache, vision changes, numbness and tingling or weakness in her extremities. Denies respiratory complaints including SOB, difficulty or pain with breathing, wheezes, and cough. Feels well and ROS is otherwise negative.      Past Medical History:   Diagnosis Date    Cancer [facility]) 2003, 2017    right breast cancer    Diabetes ([facility] [address]     Hypertension     Psychiatric disorder     anxiety      Past Surgical History:   Procedure Laterality Date    BREAST SURGERY PROCEDURE UNLISTED  2003    right breast lumpectomy    COLONOSCOPY N/A 4/11/2019    COLONOSCOPY performed by Guerita Reddy MD at [facility] ENDOSCOPY    HX MASTECTOMY Bilateral 6/29/2017    RIGHT AND LEFT MASTECTOMY, RIGHT AXILLARY SENTINAL LYMPH NODE BIOPSY performed by Macho Vasquez MD at [facility] MAIN OR    HX VASCULAR ACCESS      left chest portacath - removed 4/2019    VASCULAR SURGERY PROCEDURE UNLIST  06/20/2019    Port -a cath placement     Social History     Socioeconomic History    Marital status:      Spouse name: Not on file    Number of children: Not on file    Years of education: Not on file    Highest education level: Not on file   Occupational History    Not on file   Social Needs    Financial resource strain: Not on file    Food insecurity:     Worry: Not on file     Inability: Not on file    Transportation needs:     Medical: Not on file     Non-medical: Not on file   Tobacco Use    Smoking status: Never Smoker    Smokeless tobacco: Never Used   Substance and Sexual Activity    Alcohol use: No    Drug use: No    Sexual activity: Yes   Lifestyle    Physical activity:     Days per week: Not on file     Minutes per session: Not on file    Stress: Not on file   Relationships    Social connections:     Talks on phone: Not on file     Gets together: Not on file     Attends Sikh service: Not on file     Active member of club or organization: Not on file     Attends meetings of clubs or organizations: Not on file     Relationship status: Not on file    Intimate partner violence:     Fear of current or ex partner: Not on file     Emotionally abused: Not on file     Physically abused: Not on file     Forced sexual activity: Not on file   Other Topics Concern    Not on file   Social History Narrative    Not on file     Current Outpatient Medications   Medication Sig Dispense Refill    oxyCODONE-acetaminophen (PERCOCET) 5-325 mg per tablet Take  by mouth every four (4) hours as needed for Pain.  LANTUS SOLOSTAR U-100 INSULIN 100 unit/mL (3 mL) inpn INJECT 20 UNITS SUBCUTANEOUSLY NIGHTLY 18 mL 2    magnesium 250 mg tab Take 1 Tab by mouth daily.  90 Tab 0    ondansetron hcl (ZOFRAN) 8 mg tablet TAKE ONE TABLET BY MOUTH EVERY 8 HOURS THREE TIMES DAILY AS NEEDED FOR unrelieved nausea  1    LORazepam (ATIVAN) 1 mg tablet take 1/2-1 tablets by mouth every 6 hours if needed for nausea  0    prochlorperazine (COMPAZINE) 10 mg tablet TAKE ONE TABLET BY MOUTH EVERY 6 HOURS AS NEEDED FOR NAUSEA  1    metFORMIN ER (GLUCOPHAGE XR) 500 mg tablet Take 1 Tab by mouth daily (with dinner). 90 Tab 2    lisinopril (PRINIVIL, ZESTRIL) 20 mg tablet Take 1 Tab by mouth daily. 90 Tab 3    lancets (ONETOUCH DELICA LANCETS) 30 gauge misc Check blood sugars three times daily 100 Lancet 6    glucose blood VI test strips (ASCENSIA AUTODISC VI, ONE TOUCH ULTRA TEST VI) strip Check blood sugars 3 times per day. DX code E11.9 100 Strip 11    ONETOUCH ULTRAMINI monitoring kit use as directed TO CHECK BLOOD SUGAR THREE TIMES DAILY  0    Insulin Needles, Disposable, 30 gauge x 1/3\" To use daily with lantus 100 Package 11    ibuprofen (ADVIL) 200 mg tablet Take 200 mg by mouth every four (4) hours as needed for Pain.  anastrozole (ARIMIDEX) 1 mg tablet Take 1 mg by mouth daily.  denosumab (PROLIA) 60 mg/mL injection 60 mg by SubCUTAneous route every 6 months. At [facility]      multivitamin (ONE A DAY) tablet Take 1 Tab by mouth daily (with lunch).  aspirin delayed-release 81 mg tablet Take 81 mg by mouth daily.  CALCIUM CARBONATE/VITAMIN D3 (CALTRATE 600 + D PO) Take 2 Tabs by mouth daily (with lunch).  omega-3 fatty acids-vitamin e (FISH OIL) 1,000 mg cap Take 2 Caps by mouth daily (with lunch). Pt is taking all medications as prescribed without any side effects or difficulty. No Known Allergies      Agree with nurses note. O:   Visit Vitals  /69 (BP 1 Location: Left arm, BP Patient Position: Sitting)   Pulse (!) 116   Temp 98.6 °F (37 °C) (Oral)   Resp 16   Ht 5' 5\" (1.651 m)   Wt 137 lb 3.2 oz (62.2 kg)   LMP 05/29/1997 (Approximate)   SpO2 98%   BMI 22.83 kg/m²      PAIN: No complaints of pain today. GENERAL: Urvashi Uriostegui  is sitting in the chair in NAD.   EYE: PERRLA. EOMs intact. Sclera anicteric without injection. RESP: Unlabored without SOB. Speaking in full sentences. Breath sounds are symmetrical bilaterally. Clear to auscultation to all fields. No wheezes. No rales or rhonchi. CV: normal rate. Regular rhythm. S1, S2 audible. No murmur noted. No rubs, clicks or gallops noted. NEURO:  awake, alert and oriented to person, place, and time and event. Clear speech. Muscle strength is +5/5 x 4 extremities. Steady gait. HEME/LYMPH: peripheral pulses palpable 2+ x 4 extremities. No peripheral edema is noted. FEET: Intact no wounds or abrasions. Denies numbness or tingling. Sensation intact    Results for orders placed or performed in visit on 02/04/20   AMB POC HEMOGLOBIN A1C   Result Value Ref Range    Hemoglobin A1c (POC) 5.9 %         A/P:  Differential diagnosis and treatment options reviewed with patient who is in agreement with treatment plan as outlined below. ICD-10-CM ICD-9-CM    1. Controlled type 2 diabetes mellitus without complication, with long-term current use of insulin ([facility]) E11.9 250.00 AMB POC HEMOGLOBIN A1C    Z79.4 V58.67    2. Diabetes mellitus, new onset ([facility] E11.9 250.00 insulin glargine (LANTUS SOLOSTAR U-100 INSULIN) 100 unit/mL (3 mL) inpn     She just had labs done at Heme/Onc, she has these levels with her today  DM is stable on current therapy, worry that her blood sugars may be getting lower at times since HgbA1c is low. Will use lantus PRN for fasting blood sugar >150 for more than 3 days and after steroid injection with chemo. Decrease dose to 8 units. Daughter will send me blood sugar readings after two weeks or sooner if needed. Discussed BMI and healthy weight. Encouraged patient to work to implement changes including diet high in raw fruits and vegetables, lean protein and good fats. Limit refined, processed carbohydrates and sugar. Encouraged regular exercise.    Advised of frequent feet checks  Advised yearly eye exam  Reviewed warning signs of diabetic emergency, hypertension, stroke and heart attack   Continue to follow with oncology as planned. Verbal and written instructions (see AVS) provided. Patient expresses understanding and agreement of diagnosis and treatment plan.

## 2020-02-04 NOTE — PATIENT INSTRUCTIONS

## 2020-02-04 NOTE — PROGRESS NOTES
Chief Complaint   Patient presents with    Diabetes     3 month f/u     1. Have you been to the ER, urgent care clinic since your last visit? Hospitalized since your last visit? No    2. Have you seen or consulted any other health care providers outside of the [address] [place] [address] since your last visit? Include any pap smears or colon screening. No    Health maintenance reviewed. Pt informed of health maintenance past due and/or upcoming. Pt verbalized understanding.      Health Maintenance Due   Topic Date Due    EYE EXAM RETINAL OR DILATED  07[birth year]17/1961    DTaP/Tdap/Td series (1 - Tdap) 07/17/1962    Shingrix Vaccine Age 50> (1 of 2) 07/17/2001    GLAUCOMA SCREENING Q2Y  07/17/2016

## 2020-03-31 ENCOUNTER — HOSPITAL ENCOUNTER (OUTPATIENT)
Dept: CT IMAGING | Age: 70
Discharge: HOME | End: 2020-03-31
Attending: INTERNAL MEDICINE
Payer: MEDICARE

## 2020-03-31 DIAGNOSIS — C50.411 MALIGNANT NEOPLASM OF UPPER-OUTER QUADRANT OF RIGHT FEMALE BREAST (HCC): ICD-10-CM

## 2020-03-31 DIAGNOSIS — C25.0 MALIGNANT NEOPLASM OF HEAD OF PANCREAS (HCC): ICD-10-CM

## 2020-03-31 DIAGNOSIS — C50.311 MALIGNANT NEOPLASM OF LOWER-INNER QUADRANT OF RIGHT FEMALE BREAST (HCC): ICD-10-CM

## 2020-03-31 DIAGNOSIS — C16.2 MALIGNANT NEOPLASM OF BODY OF STOMACH (HCC): ICD-10-CM

## 2020-03-31 PROCEDURE — 71260 CT THORAX DX C+: CPT

## 2020-03-31 PROCEDURE — 74177 CT ABD & PELVIS W/CONTRAST: CPT

## 2020-03-31 PROCEDURE — 74011636320 HC RX REV CODE- 636/320: Performed by: INTERNAL MEDICINE

## 2020-03-31 RX ORDER — SODIUM CHLORIDE 0.9 % (FLUSH) 0.9 %
10 SYRINGE (ML) INJECTION
Status: COMPLETED | OUTPATIENT
Start: 2020-03-31 | End: 2020-03-31

## 2020-03-31 RX ADMIN — Medication 10 ML: at 10:01

## 2020-03-31 RX ADMIN — IOPAMIDOL 100 ML: 755 INJECTION, SOLUTION INTRAVENOUS at 10:01

## 2020-03-31 RX ADMIN — IOHEXOL 50 ML: 240 INJECTION, SOLUTION INTRATHECAL; INTRAVASCULAR; INTRAVENOUS; ORAL at 10:01

## 2020-05-04 ENCOUNTER — VIRTUAL VISIT (OUTPATIENT)
Dept: FAMILY MEDICINE CLINIC | Age: 70
End: 2020-05-04

## 2020-05-04 VITALS
WEIGHT: 134 LBS | DIASTOLIC BLOOD PRESSURE: 57 MMHG | BODY MASS INDEX: 22.33 KG/M2 | HEIGHT: 65 IN | SYSTOLIC BLOOD PRESSURE: 94 MMHG

## 2020-05-04 DIAGNOSIS — E11.9 CONTROLLED TYPE 2 DIABETES MELLITUS WITHOUT COMPLICATION, WITH LONG-TERM CURRENT USE OF INSULIN (HCC): Primary | ICD-10-CM

## 2020-05-04 DIAGNOSIS — Z79.4 CONTROLLED TYPE 2 DIABETES MELLITUS WITHOUT COMPLICATION, WITH LONG-TERM CURRENT USE OF INSULIN (HCC): Primary | ICD-10-CM

## 2020-05-04 DIAGNOSIS — C16.9 GASTRIC ADENOCARCINOMA (HCC): ICD-10-CM

## 2020-05-04 RX ORDER — GABAPENTIN 300 MG/1
300 CAPSULE ORAL 2 TIMES DAILY
COMMUNITY
End: 2020-01-01

## 2020-05-04 NOTE — PROGRESS NOTES
Evie Fowler is a 77 y.o. female who was seen by synchronous (real-time) audio-video technology on 5/4/2020. Consent: Evie Fowler, who was seen by synchronous (real-time) audio-video technology, and/or her healthcare decision maker, is aware that this patient-initiated, Telehealth encounter on 5/4/2020 is a billable service, with coverage as determined by her insurance carrier. She is aware that she may receive a bill and has provided verbal consent to proceed: Yes. Subjective:  
Evie Fowler is a 77 y.o. female who was seen for Diabetes DM Last HgbA1c was 5.9 on 2/4/2020. Taking  lantus PRN for fasting blood sugar >150 for more than 3 days and after steroid injection with chemo. Decreased dose to 8 units at last visit. She is taking metformin 500 mg daily. No GI side effects Blood sugars most days 95-130s, blood sugars elevate after treament days secondary to steroids. Still doing chemo every two weeks, feels that her insulin treatment is working well for her to use PRN only. Diet and Exercise- says that her appetite has improved some, she is managing to eat 3 small meals per day and feels that she is eating better and not drinking boost as much as she was since she is eating better, says that her weight is stable, not losing weight like she was either. Nausea controlled. Gastric Adenocarcinoma/pancreatic mass:  
Had blood work done two weeks ago. Will have labs tomorrow, next chemo tomorrow. Follow up with Dr Mariah Telles next week. Has steroid infusions prior to treatment BP- stopped lisinopril in March secondary to hypotension. BP remains /60s. Feels pretty good. f 
 
 
Prior to Admission medications Medication Sig Start Date End Date Taking? Authorizing Provider  
gabapentin (NEURONTIN) 300 mg capsule Take 300 mg by mouth two (2) times a day.    Yes Provider, Historical  
oxyCODONE-acetaminophen (PERCOCET) 5-325 mg per tablet Take  by mouth every four (4) hours as needed for Pain. Yes Provider, Historical  
insulin glargine (LANTUS SOLOSTAR U-100 INSULIN) 100 unit/mL (3 mL) inpn 8 units SQ as needed for blood sugar greater than 150 2/4/20  Yes Zohreh Duong NP  
magnesium 250 mg tab Take 1 Tab by mouth daily. 12/30/19  Yes Zohreh Duong NP  
ondansetron hcl (ZOFRAN) 8 mg tablet TAKE ONE TABLET BY MOUTH EVERY 8 HOURS THREE TIMES DAILY AS NEEDED FOR unrelieved nausea 7/29/19  Yes Provider, Historical  
LORazepam (ATIVAN) 1 mg tablet take 1/2-1 tablets by mouth every 6 hours if needed for nausea 7/24/19  Yes Provider, Historical  
prochlorperazine (COMPAZINE) 10 mg tablet TAKE ONE TABLET BY MOUTH EVERY 6 HOURS AS NEEDED FOR NAUSEA 7/29/19  Yes Provider, Historical  
metFORMIN ER (GLUCOPHAGE XR) 500 mg tablet Take 1 Tab by mouth daily (with dinner). 9/4/19  Yes Zohreh Duong NP  
lisinopril (PRINIVIL, ZESTRIL) 20 mg tablet Take 1 Tab by mouth daily. 7/8/19  Yes Zohreh Duong NP  
lancets (ONETOUCH DELICA LANCETS) 30 gauge misc Check blood sugars three times daily 7/8/19  Yes Zohreh Duong NP  
glucose blood VI test strips (ASCENSIA AUTODISC VI, ONE TOUCH ULTRA TEST VI) strip Check blood sugars 3 times per day. DX code E11.9 6/12/19  Yes Ladona Galeazzi, NP  
ONETOUCH ULTRAMINI monitoring kit use as directed TO [address] [place] [address] [address] DAILY 5/11/19  Yes Provider, Historical  
Insulin Needles, Disposable, 30 gauge x 1/3\" To use daily with lantus 5/29/19  Yes Zohreh Duong NP  
ibuprofen (ADVIL) 200 mg tablet Take 200 mg by mouth every four (4) hours as needed for Pain. Yes Provider, Historical  
anastrozole (ARIMIDEX) 1 mg tablet Take 1 mg by mouth daily. Yes Provider, Historical  
denosumab (PROLIA) 60 mg/mL injection 60 mg by SubCUTAneous route every 6 months. At [facility]   Yes Provider, Historical  
multivitamin (ONE A DAY) tablet Take 1 Tab by mouth daily (with lunch).    Yes Provider, Historical  
 aspirin delayed-release 81 mg tablet Take 81 mg by mouth daily. Yes Provider, Historical  
omega-3 fatty acids-vitamin e (FISH OIL) 1,000 mg cap Take 2 Caps by mouth daily (with lunch). Yes Provider, Historical  
CALCIUM CARBONATE/VITAMIN D3 (CALTRATE 600 + D PO) Take 2 Tabs by mouth daily (with lunch). Yes Provider, Historical  
 
No Known Allergies Patient Active Problem List  
 Diagnosis Date Noted  Epidermal cyst 08/27/2019  Gastric adenocarcinoma ([facility] [address] 06/14/2019  
 HX: breast cancer 05/16/2019  Pancreatic mass 05/16/2019  Elevated bilirubin 05/16/2019  Seroma of breast 10/23/2017  Breast cancer ([facility] [address] 06/29/2017  Hx of breast cancer 01/19/2017  Abnormal MRI, breast 01/19/2017  Breast cancer of upper-outer quadrant of right female breast ([facility] [address] 01/19/2017 Past Medical History:  
Diagnosis Date  Cancer [facility]) 2003, 2017  
 right breast cancer  Diabetes ([facility] [address]  Hypertension  Psychiatric disorder   
 anxiety Past Surgical History:  
Procedure Laterality Date  BREAST SURGERY PROCEDURE UNLISTED  2003  
 right breast lumpectomy  COLONOSCOPY N/A 4/11/2019 COLONOSCOPY performed by Brandon Mcmillan MD at [facility] ENDOSCOPY  
 HX MASTECTOMY Bilateral 6/29/2017 RIGHT AND LEFT MASTECTOMY, RIGHT AXILLARY SENTINAL LYMPH NODE BIOPSY performed by Tamara Jean MD at [facility] MAIN OR  
 HX VASCULAR ACCESS    
 left chest portacath - removed 4/2019  VASCULAR SURGERY PROCEDURE UNLIST  06/20/2019 Port -a cath placement ROS Gen: no fatigue, fever, chills Eyes: no excessive tearing, itching, or discharge Nose: no rhinorrhea, no sinus pain Mouth: no oral lesions, no sore throat Resp: no shortness of breath, no wheezing, no cough CV: no chest pain, no paroxysmal nocturnal dyspnea Abd: no nausea, no heartburn, no diarrhea, no constipation, no abdominal pain Neuro: no headaches, no syncope or presyncopal episodes Endo: no polyuria, no polydipsia Heme: no lymphadenopathy, no easy bruising or bleeding Objective:  
Vital Signs: (As obtained by patient/caregiver at home) Visit Vitals BP 94/57 (BP 1 Location: Left arm, BP Patient Position: Sitting) Ht 5' 5\" (1.651 m) Wt 134 lb (60.8 kg) LMP 05/29/1997 (Approximate) BMI 22.30 kg/m² [INSTRUCTIONS:  \"[x]\" Indicates a positive item  \"[]\" Indicates a negative item  -- DELETE ALL ITEMS NOT EXAMINED] Constitutional: [x] Appears well-developed and well-nourished [x] No apparent distress Mental status: [x] Alert and awake  [x] Oriented to person/place/time [x] Able to follow commands Eyes:   EOM    [x]  Normal     
Sclera  [x]  Normal     
        Discharge [x]  None visible HENT: [x] Normocephalic, atraumatic [x] Mouth/Throat: Mucous membranes are moist 
 
 
 
Neck: [x] No visualized mass -  
 
Pulmonary/Chest: [x] Respiratory effort normal   [x] No visualized signs of difficulty breathing or respiratory distress Musculoskeletal:   [x] Normal gait with no signs of ataxia [x] Normal range of motion of neck Neurological:        [x] No Facial Asymmetry (Cranial nerve 7 motor function) (limited exam due to video visit) [x] No gaze palsy Skin:        [x] No significant exanthematous lesions or discoloration noted on facial skin Psychiatric:       [x] Normal Affect [x] No Hallucinations Other pertinent observable physical exam findings:- 
 
 
 
 
Assessment & Plan:  
Differential diagnosis and treatment options reviewed with patient who is in agreement with treatment plan as outlined below. ICD-10-CM ICD-9-CM 1. Controlled type 2 diabetes mellitus without complication, with long-term current use of insulin (HCC) E11.9 250.00   
 Z79.4 V58.67   
2. Gastric adenocarcinoma (HCC) C16.9 151.9 DM controlled on current treatment. No hypoglycemia.   Will follow up in office in three months or sooner if needed. Continue treatment with oncology as planned. Daughter will try to get me copy of last labs as well. I spent at least 23 minutes on this visit with this established patient. ([id number]) We discussed the expected course, resolution and complications of the diagnosis(es) in detail. Medication risks, benefits, costs, interactions, and alternatives were discussed as indicated. I advised her to contact the office if her condition worsens, changes or fails to improve as anticipated. She expressed understanding with the diagnosis(es) and plan. Caroline Nelson is a 77 y.o. female who was evaluated by a video visit encounter for concerns as above. Patient identification was verified prior to start of the visit. A caregiver was present when appropriate. Due to this being a TeleHealth encounter (During [facility]- public health emergency), evaluation of the following organ systems was limited: Vitals/Constitutional/EENT/Resp/CV/GI//MS/Neuro/Skin/Heme-Lymph-Imm. Pursuant to the emergency declaration under the [facility], [facility] waiver authority and the [employer] Act, this Virtual  Visit was conducted, with patient's (and/or legal guardian's) consent, to reduce the patient's risk of exposure to COVID-19 and provide necessary medical care. Services were provided through a video synchronous discussion virtually to substitute for in-person clinic visit. Patient and provider were located at their individual homes.  
 
 
Zachery Castillo NP

## 2020-05-04 NOTE — PROGRESS NOTES
Chief Complaint Patient presents with  Diabetes 1. Have you been to the ER, urgent care clinic since your last visit? Hospitalized since your last visit? No 
 
2. Have you seen or consulted any other health care providers outside of the [address] [place] [address] since your last visit? Include any pap smears or colon screening. Yes When: Oncologist  
Health Maintenance Due Topic Date Due  Eye Exam Retinal or Dilated  07/17/1961  
 DTaP/Tdap/Td series (1 - Tdap) 07/17/1972  Shingrix Vaccine Age 50> (1 of 2) 07/17/2001  GLAUCOMA SCREENING Q2Y  07/17/2016  MICROALBUMIN Q1  05/15/2020

## 2020-06-08 DIAGNOSIS — E11.9 DIABETES MELLITUS, NEW ONSET (HCC): ICD-10-CM

## 2020-06-08 RX ORDER — METFORMIN HYDROCHLORIDE 500 MG/1
TABLET, EXTENDED RELEASE ORAL
Qty: 90 TAB | Refills: 2 | Status: SHIPPED | OUTPATIENT
Start: 2020-06-08 | End: 2021-01-01 | Stop reason: SINTOL

## 2020-08-03 NOTE — PROGRESS NOTES
Subjective: Chief Complaint Patient presents with  Diabetes HPI: 
72 y.o.  presents for follow up appointment. DM- blood sugars stable. Ranging  but usually 110-150. Only needing insulin about 2-3 times per week, 8-1 units when needed. Last HgbA1c was 5.9 on 2/4/2020 She is taking metformin 500 mg daily.  No GI side effects. Holds when she has to have CT scans. BP- stopped lisinopril in March secondary to hypotension. BP remains 90s-120s/60-70s. Gastric Adenocarcinoma/pancreatic mass Dr Laura Tai once per month. Switched her to new chemo about a month ago. Has chemo treatments three times per month. She notes that she has some diarrhea after the chemo for one that day. Takes imodium as needed. Nausea is mostly controlled. She is down 11 lbs since February. She does not have much appetite. She is trying to eat small meals. Has CT scan scheduled Monday. Has labs scheduled later this week. Will have her do her labs then No hospital, ER or specialist visits since last primary care visit except as noted above. Past Medical History:  
Diagnosis Date  Cancer [facility]) 2003, 2017  
 right breast cancer  Diabetes ([facility]  Hypertension  Psychiatric disorder   
 anxiety Social History Tobacco Use  Smoking status: Never Smoker  Smokeless tobacco: Never Used Substance Use Topics  Alcohol use: No  
 Drug use: No  
 
 
Outpatient Medications Marked as Taking for the 8/3/20 encounter (Office Visit) with Joy Rizzo NP Medication Sig Dispense Refill  OneTouch Ultra Blue Test Strip strip TEST THREE TIMES A  Strip 11  
 insulin glargine (Lantus Solostar U-100 Insulin) 100 unit/mL (3 mL) inpn 8 units SQ as needed for blood sugar greater than 150 18 mL 2  
 OneTouch Delica Lancets 30 gauge misc TEST THREE TIMES A  Lancet 4  
 metFORMIN ER (GLUCOPHAGE XR) 500 mg tablet TAKE ONE TABLET BY MOUTH ONCE DAILY WITH DINNER 90 Tab 2  
 gabapentin (NEURONTIN) 300 mg capsule Take 300 mg by mouth two (2) times a day.  oxyCODONE-acetaminophen (PERCOCET) 5-325 mg per tablet Take  by mouth every four (4) hours as needed for Pain.  magnesium 250 mg tab Take 1 Tab by mouth daily. 90 Tab 0  
 ondansetron hcl (ZOFRAN) 8 mg tablet TAKE ONE TABLET BY MOUTH EVERY 8 HOURS THREE TIMES DAILY AS NEEDED FOR unrelieved nausea  1  
 LORazepam (ATIVAN) 1 mg tablet take 1/2-1 tablets by mouth every 6 hours if needed for nausea  0  prochlorperazine (COMPAZINE) 10 mg tablet TAKE ONE TABLET BY MOUTH EVERY 6 HOURS AS NEEDED FOR NAUSEA  1  
 ONETOUCH ULTRAMINI monitoring kit use as directed TO CHECK BLOOD SUGAR THREE TIMES DAILY  0  
 Insulin Needles, Disposable, 30 gauge x 1/3\" To use daily with lantus 100 Package 11  
 ibuprofen (ADVIL) 200 mg tablet Take 200 mg by mouth every four (4) hours as needed for Pain.  anastrozole (ARIMIDEX) 1 mg tablet Take 1 mg by mouth daily.  denosumab (PROLIA) 60 mg/mL injection 60 mg by SubCUTAneous route every 6 months. At [facility]  multivitamin (ONE A DAY) tablet Take 1 Tab by mouth daily (with lunch).  aspirin delayed-release 81 mg tablet Take 81 mg by mouth daily.  CALCIUM CARBONATE/VITAMIN D3 (CALTRATE 600 + D PO) Take 2 Tabs by mouth daily (with lunch). No Known Allergies Health Maintenance reviewed - 
 
ROS: 
Gen: no fatigue, no fever, no chills, no unexplained weight loss or weight gain Eyes: no excessive tearing, itching, or discharge Nose: no rhinorrhea, no sinus pain Mouth: no oral lesions, no sore throat, no difficulty swallowing Resp: no shortness of breath, no wheezing, no cough CV: no chest pain, no orthopnea, no paroxysmal nocturnal dyspnea, no lower extremity edema, no palpitations Abd: no nausea, no heartburn, no diarrhea, no constipation, no abdominal pain Neuro: no headaches, no syncope or presyncopal episodes Endo: no polyuria, no polydipsia. : no hematuria, no dysuria, no frequency, no incontinence Heme: no lymphadenopathy, no easy bruising or bleeding, no night sweats MSK: no joint pain or swelling PE: 
Visit Vitals /69 Pulse (!) 106 Temp 98.3 °F (36.8 °C) (Temporal) Resp 19 Ht 5' 5\" (1.651 m) Wt 126 lb (57.2 kg) LMP 05/29/1997 (Approximate) SpO2 96% BMI 20.97 kg/m² Gen: alert, oriented, no acute distress Head: normocephalic, atraumatic Ears: external auditory canals clear, TMs without erythema or effusion Eyes: pupils equal round reactive to light, sclera clear, conjunctiva clear Oral: moist mucus membranes, no oral lesions, no pharyngeal inflammation or exudate Neck: symmetric normal sized thyroid, no carotid bruits, no jugular vein distention Resp: no increase work of breathing, lungs clear to ausculation bilaterally, no wheezing, rales or rhonchi CV: S1, S2 normal.  No murmurs, rubs, or gallops. Abd: soft, not tender, not distended. No hepatosplenomegaly. Normal bowel sounds. No hernias. No abdominal or renal bruits. Neuro: cranial nerves intact, normal strength and movement in all extremities, reflexes and sensation intact and symmetric. Skin: no lesion or rash Extremities: no cyanosis or edema Results for orders placed or performed in visit on 08/03/20 AMB POC URINE, MICROALBUMIN, SEMIQUANT (3 RESULTS) Result Value Ref Range ALBUMIN, URINE  Negative mg/L  
 CREATININE, URINE  mg/dL Microalbumin/creat ratio (POC)  <30 MG/G Assessment/Plan: 
Differential diagnosis and treatment options reviewed with patient who is in agreement with treatment plan as outlined below. ICD-10-CM ICD-9-CM 1. Controlled type 2 diabetes mellitus without complication, with long-term current use of insulin (HCC)  E11.9 250.00 AMB POC URINE, MICROALBUMIN, SEMIQUANT (3 RESULTS)  Z79.4 V58.67 LIPID PANEL  
   HEMOGLOBIN A1C WITH EAG  
 2. Screening for thyroid disorder  Z13.29 V77.0 TSH 3RD GENERATION 3. Vitamin D deficiency  E55.9 268.9 VITAMIN D, 25 HYDROXY 4. [facility]  E11.9 250.00 insulin glargine (Lantus Solostar U-100 Insulin) 100 unit/mL (3 mL) inpn 5. Gastric adenocarcinoma (HCC)  C16.9 151.9 DM controlled on current treatment. No hypoglycemia.  will get labs done with her next labs for oncology. Will follow up in office in 6 months or sooner if needed. Continue treatment with oncology as planned. Encouraged small, frequent meals, increase hydration. Consider boost for nutrition. Hold insulin for blood sugar <150. Daughter has done great with monitoring her vitals and documenting. I have discussed the diagnosis with the patient and the intended plan as seen in the above orders. The patient has received an after-visit summary and questions were answered concerning future plans. I have discussed medication side effects and warnings with the patient as well. The patient verbalizes understanding and agreement with the plan.

## 2020-08-17 NOTE — PROGRESS NOTES
Sent to [place]: 
Labs look good. Her HgbA1c still in pre-diabetic range. Let me know if she has any questions.  
[address] [facility] NP

## 2020-08-20 NOTE — TELEPHONE ENCOUNTER
Pharmacy requesting clarification on lantus solostar 8 units prn bs greater than 150, up to how many times per day?

## 2020-10-07 PROBLEM — L02.212 BACK ABSCESS: Status: ACTIVE | Noted: 2020-01-01

## 2020-10-07 NOTE — PROGRESS NOTES
HISTORY OF PRESENT ILLNESS Lenny Gillis is a 72 y.o. female who comes in for a recurrent right flank/back abscess and her cancers Breast Cancer Pertinent negatives include no chest pain, no abdominal pain, no headaches and no shortness of breath. Skin Problem Pertinent negatives include no chest pain, no abdominal pain, no headaches and no shortness of breath. Breast Problem Pertinent negatives include no chest pain, no abdominal pain, no headaches and no shortness of breath. She developed an infected epidermal cyst in the right back and had an I and D at Urgent care 8/2019. She did local wound care and it healed. It was never resected due to her chemotherapy. She now has recurrent swelling and pain. She denies fever chills or sweats or drainage. She presented with obstructive jaundice 5/16/2019 and was found to have a pancreatic mass. Dr Alesha Meza did an ERCP and brushings with stent placement and the jaundice cleared. The brushings were negative. She had an EGD and EUS at [facility] demonstrating adenocarcinoma with signet ring cells in a gastric mass. There were too many vessels near the pancreatic mass to do a biopsy. Dr Joana Plasencia is giving her chemotherapy. Repeat CT 1/7/2020 demonstrated no improvement of the abdominal mass. She went for routine screening mammogram in late Dec 2016 and was noted to have a developing density in the outer right breast.  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 She had a breast MRI suggesting the area to be 2.3 cm or two separate lesions. She was also noted to have two foci in the left breast for which MRI biopsy was recommended. She denies feeling any masses, skin changes, nipple changes, unexplained weight loss or bone pain.    She previously underwent a right lumpectomy and ALND followed by chemotherapy Dorvenu Mccracken) and WBRT in 2003 for a ??stage 1-2 triple negative breast cancer. She has had other bilateral breast biopsies as well. She had menarche at 16, first of two pregnancies at 35, menopause at 51 and did not take HRT. She denies family hx of breast or ovarian cancer but her mother had kidney cancer and her brother had lung cancer. She has been received neoadjuvant chemotherapy by Dr Pao Logan. She is having ongoing herceptin. She underwent a right mastectomy/SLNB and left mastectomy 6/2017 with pCR. She completed adjuvant herceptin as well. She has switched to Dr Joana Plasencia as Dr Pao Logan retired. Past Medical History:  
Diagnosis Date  Cancer [facility]) 2003, 2017  
 right breast cancer  Diabetes ([facility]  Hypertension  Psychiatric disorder   
 anxiety Past Surgical History:  
Procedure Laterality Date  BREAST SURGERY PROCEDURE UNLISTED  2003  
 right breast lumpectomy  COLONOSCOPY N/A 4/11/2019 COLONOSCOPY performed by Shaheen Small MD at \[facility]\"" ENDOSCOPY  
 HX MASTECTOMY Bilateral 6/29/2017 RIGHT AND LEFT MASTECTOMY, RIGHT AXILLARY SENTINAL LYMPH NODE BIOPSY performed by Rolf Mcneal MD at [facility] MAIN OR  
 HX VASCULAR ACCESS    
 left chest portacath - removed 4/2019  VASCULAR SURGERY PROCEDURE UNLIST  06/20/2019 Port -a cath placement Family History Problem Relation Age of Onset  Cancer Mother   
     kidney  Cancer Brother   
     lung  Heart Disease Father  Diabetes Sister  Diabetes Brother Social History Tobacco Use  Smoking status: Never Smoker  Smokeless tobacco: Never Used Substance Use Topics  Alcohol use: No  
 Drug use: No  
 
Current Outpatient Medications Medication Sig  BD Ultra-Fine Mini Pen Needle 31 gauge x 3/16\" ndle USE DAILY WITH LANTUS  
 insulin glargine (Lantus Solostar U-100 Insulin) 100 unit/mL (3 mL) inpn 8 units SQ daily as needed for blood sugar greater than 150  OneTouch Ultra Blue Test Strip strip TEST THREE TIMES A DAY  OneTouch Delica Lancets 30 gauge misc TEST THREE TIMES A DAY  metFORMIN ER (GLUCOPHAGE XR) 500 mg tablet TAKE ONE TABLET BY MOUTH ONCE DAILY WITH DINNER  
 gabapentin (NEURONTIN) 300 mg capsule Take 300 mg by mouth two (2) times a day.  magnesium 250 mg tab Take 1 Tab by mouth daily.  ondansetron hcl (ZOFRAN) 8 mg tablet TAKE ONE TABLET BY MOUTH EVERY 8 HOURS THREE TIMES DAILY AS NEEDED FOR unrelieved nausea  LORazepam (ATIVAN) 1 mg tablet take 1/2-1 tablets by mouth every 6 hours if needed for nausea  Insulin Needles, Disposable, 30 gauge x 1/3\" To use daily with lantus  ibuprofen (ADVIL) 200 mg tablet Take 200 mg by mouth every four (4) hours as needed for Pain.  anastrozole (ARIMIDEX) 1 mg tablet Take 1 mg by mouth daily.  denosumab (PROLIA) 60 mg/mL injection 60 mg by SubCUTAneous route every 6 months. At [facility]  multivitamin (ONE A DAY) tablet Take 1 Tab by mouth daily (with lunch).  aspirin delayed-release 81 mg tablet Take 81 mg by mouth daily.  CALCIUM CARBONATE/VITAMIN D3 (CALTRATE 600 + D PO) Take 2 Tabs by mouth daily (with lunch).  oxyCODONE-acetaminophen (PERCOCET) 5-325 mg per tablet Take  by mouth every four (4) hours as needed for Pain.  prochlorperazine (COMPAZINE) 10 mg tablet TAKE ONE TABLET BY MOUTH EVERY 6 HOURS AS NEEDED FOR NAUSEA  
 ONETOUCH ULTRAMINI monitoring kit use as directed TO CHECK BLOOD SUGAR THREE TIMES DAILY No current facility-administered medications for this visit. No Known Allergies Review of Systems Constitutional: Positive for weight loss. Negative for chills, diaphoresis, fever and malaise/fatigue. HENT: Negative for congestion, ear pain and sore throat. Eyes: Negative for blurred vision and pain.   
Respiratory: Negative for cough, hemoptysis, sputum production, shortness of breath, wheezing and stridor. Cardiovascular: Negative for chest pain, palpitations, orthopnea, claudication, leg swelling and PND. Gastrointestinal: Negative for abdominal pain, blood in stool, constipation, diarrhea, heartburn, melena, nausea and vomiting. Genitourinary: Negative for dysuria, flank pain, frequency, hematuria and urgency. Musculoskeletal: Positive for back pain. Negative for joint pain, myalgias and neck pain. Skin: Negative for itching and rash. Neurological: Negative for dizziness, tremors, focal weakness, seizures, weakness and headaches. Endo/Heme/Allergies: Negative for polydipsia. Psychiatric/Behavioral: Negative for depression and memory loss. The patient is nervous/anxious. Blood pressure 125/69, pulse (!) 133, temperature (!) 96.7 °F (35.9 °C), temperature source Oral, resp. rate 18, height 5' 5\" (1.651 m), weight 54.4 kg (119 lb 14.4 oz), last menstrual period 05/29/1997, SpO2 99 %. Physical Exam 
Constitutional:   
   General: She is not in acute distress. Appearance: She is well-developed. She is not diaphoretic. HENT:  
   Head: Normocephalic and atraumatic. Mouth/Throat:  
   Pharynx: No oropharyngeal exudate. Eyes:  
   General: No scleral icterus. Conjunctiva/sclera: Conjunctivae normal.  
   Pupils: Pupils are equal, round, and reactive to light. Neck: Musculoskeletal: Normal range of motion and neck supple. Thyroid: No thyromegaly. Vascular: No JVD. Trachea: No tracheal deviation. Cardiovascular:  
   Rate and Rhythm: Normal rate and regular rhythm. Heart sounds: No murmur. No friction rub. No gallop. Pulmonary:  
   Effort: Pulmonary effort is normal. No respiratory distress. Breath sounds: Normal breath sounds. No wheezing or rales. Chest:  
   Breasts: Breasts are symmetrical (bilateral amastia). Right: No mass, skin change or tenderness. Left: No mass, skin change or tenderness. Abdominal:  
   General: Bowel sounds are normal. There is no distension. Palpations: Abdomen is soft. There is no mass. Tenderness: There is no abdominal tenderness. There is no guarding or rebound. Hernia: No hernia is present. There is no hernia in the ventral area. Musculoskeletal: Normal range of motion. Lymphadenopathy:  
   Cervical: No cervical adenopathy. Upper Body:  
   Right upper body: No supraclavicular adenopathy. Left upper body: No supraclavicular adenopathy. Skin: 
   General: Skin is warm and dry. Coloration: Skin is not pale. Findings: No erythema or rash. Comments: 6 cm area of fluctuance with central erythema on the right back Neurological:  
   Mental Status: She is alert and oriented to person, place, and time. Cranial Nerves: No cranial nerve deficit. Psychiatric:     
   Behavior: Behavior normal.     
   Thought Content: Thought content normal.     
   Judgment: Judgment normal.  
 
 
 
ASSESSMENT and PLAN 1. Right breast cancer early stage, Clinically stage 2 (T2NxM0)  ER 98% MN 83% and her2/wil amplified dx 1/2017. SELENE 2 year and 9  months Continue arimidex Keep f/u with Dr Fatoumata Matias 
2. Hx right breast cancer in 2003 triple negative (treated by Dr Ernestine Luong). This is unrelated to the new cancer 3. Right axillary hematoma/seroma   Minimal residual 
4. High risk for genetic mutation due to triple negative breast ca at age 49 and now a metachronous breast cancer. My Risk genetic testing negative 5. Gastric adenocarcinoma and Pancreatic mass with biliary obstruction but this is due to gastric cancer-adenocarcinoma with signet ring cells and possible pancreatic ca. Unresectable. Very unusual to have two separate synchronous malignancies. Dr Fatoumata Matias giving chemotherapy. 6.  Offered lymphedema appt but she declined.   She is currently at higher risk but asymptomatic 7. Colon screening. She wishes to go to GI 
DME surgical bras and prosthesis 8. Weight loss. 15 pounds. Loss of appetite and chemotherapy 9. Recurrent right upper back infected epidermal cyst.  I explained to her about the anatomy and pathophysiology and need for repeat incision and drainage. Risks for bleeding, worsening infection, were discussed. She will need to perform wound care with saline dressing changes BID I did that in the office today She will need to hold on chemotherapy for now RTC 2 weeks Derrek Palomares MD FACS

## 2020-10-07 NOTE — PROGRESS NOTES
Chief Complaint Patient presents with  
 Skin Problem  
  recurrent cyst on back, last seen  1/27/20

## 2020-10-07 NOTE — PROGRESS NOTES
Chief Complaint Patient presents with  
 Skin Problem  
  recurrent cyst on back, last seen  1/27/20 1. Have you been to the ER, urgent care clinic since your last visit? Hospitalized since your last visit? No 
 
2. Have you seen or consulted any other health care providers outside of the [address] [place] [address] since your last visit? Include any pap smears or colon screening.  No

## 2020-10-08 NOTE — PROGRESS NOTES
Procedure Note Pre OP Dx:  Right back abscess/cellulitis Post OP Dx Right back abscess/cellulitis Procedure Incision and drainage of complex right back abscess/infected epidermal cyst 
Surgeon Anne Marie Charles Anesthesia 1% Lidocaine with epi  10 ml 
EBL   Minimal 
Pathology None Microbiology Culture of abscess right back Procedure After informed consent and time out, the right back was prepped with chlorhexidine and draped sterilely. Local anesthetic was injected into the skin and subcutaneous tissues around the abscess. A 3 x 1 cm cruciate skin incision was made over the abscess. Extensive thick purulent discharge as well as some sebaceous material was cleaned out of the wound. Cultures were obtained. The wound was packed with a damp saline gauze. A sterile dressing was applied. The patient tolerated the procedure well.  
 
Ofelia Muir MD FACS

## 2020-10-20 NOTE — PROGRESS NOTES
Surgery  Follow up  Procedure: I and D back abscess  OR date:  10/7/2020  Culture:  Mixed skin taiwo    S I feel fine    Visit Vitals  /72 (BP 1 Location: Left arm, BP Patient Position: Sitting)   Pulse (!) 113   Temp (!) 60.8 °F (16 °C) (Oral)   Resp 16   Ht 5' 5\" (1.651 m)   Wt 53.5 kg (118 lb)   LMP 05/29/1997 (Approximate)   SpO2 98%   BMI 19.64 kg/m²       O Wound with excellent granulation tissue 1 x 1 cm       A/P Doing well   Continue local wound care   Ok to resume chemotherapy   RTC 4-6 weeks    Anderson MD Juan Ramon FACS

## 2020-10-20 NOTE — PROGRESS NOTES
Chief Complaint   Patient presents with    Surgical Follow-up     2 wk f/u I&D cyst on back 10/7     1. Have you been to the ER, urgent care clinic since your last visit? Hospitalized since your last visit? No    2. Have you seen or consulted any other health care providers outside of the [address] [place] [address] since your last visit? Include any pap smears or colon screening.  No

## 2020-12-01 NOTE — PROGRESS NOTES
HISTORY OF PRESENT ILLNESS Stalin Lee is a 72 y.o. female who comes in for a recurrent right flank/back abscess and her cancers Skin Problem Pertinent negatives include no chest pain, no abdominal pain, no headaches and no shortness of breath. Breast Cancer Pertinent negatives include no chest pain, no abdominal pain, no headaches and no shortness of breath. Breast Problem Pertinent negatives include no chest pain, no abdominal pain, no headaches and no shortness of breath. Surgical Follow-up Pertinent negatives include no chest pain, no abdominal pain, no headaches and no shortness of breath. She developed an infected epidermal cyst in the right back and had an I and D at Urgent care 8/2019. She did local wound care and it healed. It was never resected due to her chemotherapy. She now has recurrent swelling and pain. She denies fever chills or sweats or drainage. She presented with obstructive jaundice 5/16/2019 and was found to have a pancreatic mass. Dr Dlalin Alston did an ERCP and brushings with stent placement and the jaundice cleared. The brushings were negative. She had an EGD and EUS at [facility] demonstrating adenocarcinoma with signet ring cells in a gastric mass. There were too many vessels near the pancreatic mass to do a biopsy. Dr Marisa Hall is giving her chemotherapy. Repeat CT 1/7/2020 demonstrated no improvement of the abdominal mass. She went for routine screening mammogram in late Dec 2016 and was noted to have a developing density in the outer right breast.  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 She had a breast MRI suggesting the area to be 2.3 cm or two separate lesions.    She was also noted to have two foci in the left breast for which MRI biopsy was recommended. She denies feeling any masses, skin changes, nipple changes, unexplained weight loss or bone pain. She previously underwent a right lumpectomy and ALND followed by chemotherapy Zita Hudson and WBRT in 2003 for a ??stage 1-2 triple negative breast cancer. She has had other bilateral breast biopsies as well. She had menarche at 16, first of two pregnancies at 35, menopause at 51 and did not take HRT. She denies family hx of breast or ovarian cancer but her mother had kidney cancer and her brother had lung cancer. She has been received neoadjuvant chemotherapy by Dr Fred Dominguez. She is having ongoing herceptin. She underwent a right mastectomy/SLNB and left mastectomy 6/2017 with pCR. She completed adjuvant herceptin as well. She has switched to Dr Sujit Beasley as Dr Fred Dominguez retired. Past Medical History:  
Diagnosis Date  Cancer [facility]) 2003, 2017  
 right breast cancer  Diabetes ([facility] [address]  Hypertension  Psychiatric disorder   
 anxiety Past Surgical History:  
Procedure Laterality Date  BREAST SURGERY PROCEDURE UNLISTED  2003  
 right breast lumpectomy  COLONOSCOPY N/A 4/11/2019 COLONOSCOPY performed by Karin David MD at \[facility]"" ENDOSCOPY  
 HX MASTECTOMY Bilateral 6/29/2017 RIGHT AND LEFT MASTECTOMY, RIGHT AXILLARY SENTINAL LYMPH NODE BIOPSY performed by Rhianna Munoz MD at [facility] MAIN OR  
 HX VASCULAR ACCESS    
 left chest portacath - removed 4/2019  VASCULAR SURGERY PROCEDURE UNLIST  06/20/2019 Port -a cath placement Family History Problem Relation Age of Onset  Cancer Mother   
     kidney  Cancer Brother   
     lung  Heart Disease Father  Diabetes Sister  Diabetes Brother Social History Tobacco Use  Smoking status: Never Smoker  Smokeless tobacco: Never Used Substance Use Topics  Alcohol use: No  
 Drug use: No  
 
Current Outpatient Medications Medication Sig  
  gabapentin (NEURONTIN) 600 mg tablet TK 1 T PO QD HS  
 BD Ultra-Fine Mini Pen Needle 31 gauge x 3/16\" ndle USE DAILY WITH LANTUS  
 insulin glargine (Lantus Solostar U-100 Insulin) 100 unit/mL (3 mL) inpn 8 units SQ daily as needed for blood sugar greater than 150  OneTouch Ultra Blue Test Strip strip TEST THREE TIMES A DAY  OneTouch Delica Lancets 30 gauge misc TEST THREE TIMES A DAY  metFORMIN ER (GLUCOPHAGE XR) 500 mg tablet TAKE ONE TABLET BY MOUTH ONCE DAILY WITH DINNER  
 oxyCODONE-acetaminophen (PERCOCET) 5-325 mg per tablet Take  by mouth every four (4) hours as needed for Pain.  magnesium 250 mg tab Take 1 Tab by mouth daily.  ondansetron hcl (ZOFRAN) 8 mg tablet TAKE ONE TABLET BY MOUTH EVERY 8 HOURS THREE TIMES DAILY AS NEEDED FOR unrelieved nausea  LORazepam (ATIVAN) 1 mg tablet take 1/2-1 tablets by mouth every 6 hours if needed for nausea  prochlorperazine (COMPAZINE) 10 mg tablet TAKE ONE TABLET BY MOUTH EVERY 6 HOURS AS NEEDED FOR NAUSEA  
 ONETOUCH ULTRAMINI monitoring kit use as directed TO CHECK BLOOD SUGAR THREE TIMES DAILY  Insulin Needles, Disposable, 30 gauge x 1/3\" To use daily with lantus  ibuprofen (ADVIL) 200 mg tablet Take 200 mg by mouth every four (4) hours as needed for Pain.  anastrozole (ARIMIDEX) 1 mg tablet Take 1 mg by mouth daily.  denosumab (PROLIA) 60 mg/mL injection 60 mg by SubCUTAneous route every 6 months. At [facility]  multivitamin (ONE A DAY) tablet Take 1 Tab by mouth daily (with lunch).  aspirin delayed-release 81 mg tablet Take 81 mg by mouth daily.  CALCIUM CARBONATE/VITAMIN D3 (CALTRATE 600 + D PO) Take 2 Tabs by mouth daily (with lunch).  gabapentin (NEURONTIN) 300 mg capsule Take 300 mg by mouth two (2) times a day. No current facility-administered medications for this visit. No Known Allergies Review of Systems Constitutional: Positive for weight loss.  Negative for chills, diaphoresis, fever and malaise/fatigue. HENT: Negative for congestion, ear pain and sore throat. Eyes: Negative for blurred vision and pain. Respiratory: Negative for cough, hemoptysis, sputum production, shortness of breath, wheezing and stridor. Cardiovascular: Negative for chest pain, palpitations, orthopnea, claudication, leg swelling and PND. Gastrointestinal: Negative for abdominal pain, blood in stool, constipation, diarrhea, heartburn, melena, nausea and vomiting. Genitourinary: Negative for dysuria, flank pain, frequency, hematuria and urgency. Musculoskeletal: Positive for back pain. Negative for joint pain, myalgias and neck pain. Skin: Negative for itching and rash. Neurological: Negative for dizziness, tremors, focal weakness, seizures, weakness and headaches. Endo/Heme/Allergies: Negative for polydipsia. Psychiatric/Behavioral: Negative for depression and memory loss. The patient is nervous/anxious. Blood pressure 125/60, pulse (!) 113, temperature 97.5 °F (36.4 °C), temperature source Oral, resp. rate 18, height 5' 5\" (1.651 m), weight 53.3 kg (117 lb 8 oz), last menstrual period 05/29/1997, SpO2 98 %. Physical Exam 
Constitutional:   
   General: She is not in acute distress. Appearance: She is well-developed. She is not diaphoretic. HENT:  
   Head: Normocephalic and atraumatic. Mouth/Throat:  
   Pharynx: No oropharyngeal exudate. Eyes:  
   General: No scleral icterus. Conjunctiva/sclera: Conjunctivae normal.  
   Pupils: Pupils are equal, round, and reactive to light. Neck: Musculoskeletal: Normal range of motion and neck supple. Thyroid: No thyromegaly. Vascular: No JVD. Trachea: No tracheal deviation. Cardiovascular:  
   Rate and Rhythm: Normal rate and regular rhythm. Heart sounds: No murmur. No friction rub. No gallop. Pulmonary:  
   Effort: Pulmonary effort is normal. No respiratory distress. Breath sounds: Normal breath sounds. No wheezing or rales. Chest:  
   Breasts: Breasts are symmetrical (bilateral amastia). Right: No mass, skin change or tenderness. Left: No mass, skin change or tenderness. Abdominal:  
   General: Bowel sounds are normal. There is no distension. Palpations: Abdomen is soft. There is no mass. Tenderness: There is no abdominal tenderness. There is no guarding or rebound. Hernia: No hernia is present. There is no hernia in the ventral area. Musculoskeletal: Normal range of motion. Lymphadenopathy:  
   Cervical: No cervical adenopathy. Upper Body:  
   Right upper body: No supraclavicular adenopathy. Left upper body: No supraclavicular adenopathy. Skin: 
   General: Skin is warm and dry. Coloration: Skin is not pale. Findings: No erythema or rash. Comments: 6 x 6 x 8 mm wound with excellent granulation tissue No erythema/edema Right back Neurological:  
   Mental Status: She is alert and oriented to person, place, and time. Cranial Nerves: No cranial nerve deficit. Psychiatric:     
   Behavior: Behavior normal.     
   Thought Content: Thought content normal.     
   Judgment: Judgment normal.  
 
 
 
ASSESSMENT and PLAN 1. Right breast cancer early stage, Clinically stage 2 (T2NxM0)  ER 98% MT 83% and her2/wil amplified dx 1/2017. SELENE 2 year and 9  months Continue arimidex Keep f/u with Dr Casandra Adhikari 
2. Hx right breast cancer in 2003 triple negative (treated by Dr Clarke Wells). This is unrelated to the new cancer 3. Right axillary hematoma/seroma   Minimal residual 
4. High risk for genetic mutation due to triple negative breast ca at age 49 and now a metachronous breast cancer. My Risk genetic testing negative 5.    Gastric adenocarcinoma and Pancreatic mass with biliary obstruction but this is due to gastric cancer-adenocarcinoma with signet ring cells and possible pancreatic ca. Unresectable. Very unusual to have two separate synchronous malignancies. Dr Uzair Lockhart giving chemotherapy. 6.  Offered lymphedema appt but she declined. She is currently at higher risk but asymptomatic 7. Colon screening. She wishes to go to GI 
DME surgical bras and prosthesis 8. Weight loss. 15 pounds. Loss of appetite and chemotherapy 9. Recurrent right upper back epidermal cyst s/p drainage. Not currently infected. Slowly healing likely from chemotherapy, cancer and malnutrition RTC 6 weeks Sekou Smallwood MD FACS

## 2020-12-01 NOTE — PROGRESS NOTES
Chief Complaint Patient presents with  Surgical Follow-up 6w f/u I & D cyst on back 10/7/20 1. Have you been to the ER, urgent care clinic since your last visit? Hospitalized since your last visit? No 
 
2. Have you seen or consulted any other health care providers outside of the [address] [place] [address] since your last visit? Include any pap smears or colon screening.  No

## 2020-12-30 NOTE — H&P
Gastroenterology Outpatient History and Physical 
 
Patient: Yinka Donnelly Physician: Agus Tatum MD 
 
Chief Complaint: Gastric adenocarcinoma History of Present Illness: No complaints History: 
Past Medical History:  
Diagnosis Date  Cancer [facility]) 2003, 2017  
 right breast cancer  Diabetes ([address]  Hypertension  Psychiatric disorder   
 anxiety Past Surgical History:  
Procedure Laterality Date  BREAST SURGERY PROCEDURE UNLISTED  2003  
 right breast lumpectomy  COLONOSCOPY N/A 4/11/2019 COLONOSCOPY performed by Reyes Zabala MD at [facility] ENDOSCOPY  
 HX MASTECTOMY Bilateral 6/29/2017 RIGHT AND LEFT MASTECTOMY, RIGHT AXILLARY SENTINAL LYMPH NODE BIOPSY performed by Denny Hauser MD at [facility] MAIN OR  
 HX VASCULAR ACCESS    
 left chest portacath - removed 4/2019  VASCULAR SURGERY PROCEDURE UNLIST  06/20/2019 Port -a cath placement Social History Socioeconomic History  Marital status:  Spouse name: Not on file  Number of children: Not on file  Years of education: Not on file  Highest education level: Not on file Tobacco Use  Smoking status: Never Smoker  Smokeless tobacco: Never Used Substance and Sexual Activity  Alcohol use: No  
 Drug use: No  
 Sexual activity: Yes Family History Problem Relation Age of Onset  Cancer Mother   
     kidney  Cancer Brother   
     lung  Heart Disease Father  Diabetes Sister  Diabetes Brother Patient Active Problem List  
Diagnosis Code  Hx of breast cancer Z85.3  Abnormal MRI, breast R92.8  Breast cancer of upper-outer quadrant of right female breast ([address] C50.411  Breast cancer ([address] C50.919  Seroma of breast N64.89  
 HX: breast cancer Z85.3  Pancreatic mass K86.89  
 Elevated bilirubin R17  Gastric adenocarcinoma ([address] C16.9  Epidermal cyst L72.0  Back abscess L02.212 Allergies: No Known Allergies Medications:  
Prior to Admission medications Medication Sig Start Date End Date Taking? Authorizing Provider  
gabapentin (NEURONTIN) 600 mg tablet TK 1 T PO QD HS 10/28/20  Yes Provider, Historical  
insulin glargine (Lantus Solostar U-100 Insulin) 100 unit/mL (3 mL) inpn 8 units SQ daily as needed for blood sugar greater than 150 8/20/20  Yes Zohreh Duong NP OneTouch Ultra Blue Test Strip strip TEST THREE TIMES A DAY 8/3/20  Yes Zohreh Duong NP OneTouch Delica Lancets 30 gauge misc TEST THREE TIMES A DAY 7/13/20  Yes Zohreh Duong NP  
metFORMIN ER (GLUCOPHAGE XR) 500 mg tablet TAKE ONE TABLET BY MOUTH ONCE DAILY WITH DINNER 6/8/20  Yes Zohreh Duong NP  
magnesium 250 mg tab Take 1 Tab by mouth daily. Patient taking differently: Take 1 Tab by mouth daily. Pt states she gets this intravenously- when needed 12/30/19  Yes Zohreh Duong NP  
ondansetron hcl (ZOFRAN) 8 mg tablet TAKE ONE TABLET BY MOUTH EVERY 8 HOURS THREE TIMES DAILY AS NEEDED FOR unrelieved nausea 7/29/19  Yes Provider, Historical  
LORazepam (ATIVAN) 1 mg tablet take 1/2-1 tablets by mouth every 6 hours if needed for nausea 7/24/19  Yes Provider, Historical  
prochlorperazine (COMPAZINE) 10 mg tablet TAKE ONE TABLET BY MOUTH EVERY 6 HOURS AS NEEDED FOR NAUSEA 7/29/19  Yes Provider, Historical  
ONETOUCH ULTRAMINI monitoring kit use as directed TO [address] [place] [address] 5/11/19  Yes Provider, Historical  
Insulin Needles, Disposable, 30 gauge x 1/3\" To use daily with lantus 5/29/19  Yes Zohreh Duong NP  
anastrozole (ARIMIDEX) 1 mg tablet Take 1 mg by mouth daily. Yes Provider, Historical  
denosumab (PROLIA) 60 mg/mL injection 60 mg by SubCUTAneous route every 6 months. At [facility]   Yes Provider, Historical  
multivitamin (ONE A DAY) tablet Take 1 Tab by mouth daily (with lunch).    Yes Provider, Historical  
 aspirin delayed-release 81 mg tablet Take 81 mg by mouth daily. Yes Provider, Historical  
CALCIUM CARBONATE/VITAMIN D3 (CALTRATE 600 + D PO) Take 2 Tabs by mouth daily (with lunch). Yes Provider, Historical  
BD Ultra-Fine Mini Pen Needle 31 gauge x 3/16\" ndle USE DAILY WITH LANTUS 9/8/20   Zohreh Duong, NP  
oxyCODONE-acetaminophen (PERCOCET) 5-325 mg per tablet Take  by mouth every four (4) hours as needed for Pain. Provider, Historical  
ibuprofen (ADVIL) 200 mg tablet Take 200 mg by mouth every four (4) hours as needed for Pain. Provider, Historical  
 
Physical Exam:  
Vital Signs: Blood pressure (!) 148/83, pulse (!) 119, resp. rate 16, height 5' 5\" (1.651 m), weight 52.6 kg (116 lb), last menstrual period 05/29/1997, SpO2 100 %, not currently breastfeeding. General: well developed, well nourished HEENT: unremarkable Heart: regular rhythm no mumur Lungs: clear Abdominal:  benign Neurological: unremarkable Extremities: no edema Findings/Diagnosis: Gastric adenocarcinoma Plan of Care/Planned Procedure: EGD with conscious/deep sedation Signed: 
Agus Tatum MD 12/30/2020

## 2020-12-30 NOTE — ROUTINE PROCESS
Patient given verbal and written discharge instructions. Patient given opportunity to ask questions and answers given. Patient able to verbalize understanding of these instructions

## 2020-12-30 NOTE — PROCEDURES
NAME:  Yinka Donnelly :   1950 MRN:   [id number] Date/Time:  2020 12:41 PM 
 
Esophagogastroduodenoscopy (EGD) Procedure Note Procedure: Esophagogastroduodenoscopy with biopsy Indication: Gastric adenocarcinoma Pre-operative Diagnosis: see indication above Post-operative Diagnosis: see findings below :  Aleah Eason MD 
Referring Provider:   -Phyllis Feldman [place], NP Exam: Airway: clear, no airway problems anticipated Heart: RRR, without gallops or rubs Lungs: clear bilaterally without wheezes, crackles, or rhonchi Abdomen: soft, nontender, nondistended, bowel sounds present Mental Status: awake, alert and oriented to person, place and time Anethesia/Sedation:  MAC anesthesia Propofol Procedure Details After informed consent was obtained for the procedure, with all risks and benefits of procedure explained the patient was taken to the endoscopy suite and placed in the left lateral decubitus position. Following sequential administration of sedation as per above, the [id number] gastroscope was inserted into the mouth and advanced under direct vision to third portion of the duodenum. A careful inspection was made as the gastroscope was withdrawn, including a retroflexed view of the proximal stomach; findings and interventions are described below. Findings:  
1. Normal proximal, mid, and distal esophagus 2. Diffuse atrophic gastritis in proximal stomach without mass lesions/nodularity 3. Very distorted antrum which made visualization and intubation of pylorus very difficult though I did not appreciate any mass lesions or definitive nodularity. I did take extensive biopsies of the antral area which previously was +adenocarcinoma 4. Very distorted duodenal bulb/2nd portion of the duodenum with apparent intraluminal mass along medial portion of the duodenal wall ?invasion of pancreatic mass. Numerous biopsies taken. This resulted in partial luminal obstruction but I could easily pass a standard endoscope through the area 5. FC-SEMS biliary stent seen in ideal position at ampulla Therapies:  1. Biopsies Specimens: 1. Duodenal mass 2. Gastric nodularity EBL:  None. Complications:   None; patient tolerated the procedure well. Impression: 1. Normal proximal, mid, and distal esophagus 2. Diffuse atrophic gastritis in proximal stomach without mass lesions/nodularity 3. Very distorted antrum which made visualization and intubation of pylorus very difficult though I did not appreciate any mass lesions or definitive nodularity. I did take extensive biopsies of the antral area which previously was +adenocarcinoma 4. Very distorted duodenal bulb/2nd portion of the duodenum with apparent intraluminal mass along medial portion of the duodenal wall ?invasion of pancreatic mass. Numerous biopsies taken. This resulted in partial luminal obstruction but I could easily pass a standard endoscope through the area 5. FC-SEMS biliary stent seen in ideal position at ampulla Recommendations: 
1. Follow up pathology 2. Above discussed with Dr. Phuc Paz 
3. If patient develops symptoms of Gastric outlet obstruction (which she currently does not have) I would favor Gastro-jejunal bypass as distorted antrum/difficult to locate pylorus would make duodenal stent very difficult Discharge disposition:  Home in the company of  when able to ambulate Teresita Mac MD

## 2020-12-30 NOTE — ANESTHESIA PREPROCEDURE EVALUATION
Relevant Problems No relevant active problems Anesthetic History No history of anesthetic complications Review of Systems / Medical History Patient summary reviewed, nursing notes reviewed and pertinent labs reviewed Pulmonary Within defined limits Neuro/Psych Psychiatric history Comments: Anxiety Cardiovascular Hypertension Exercise tolerance: >4 METS 
  
GI/Hepatic/Renal 
  
 
 
Renal disease: CRI Comments: Hx Gastric adenocarcinoma  Endo/Other Diabetes: using insulin Cancer Other Findings Comments: Hx Pancreatic mass Hx right breast cancer Physical Exam 
 
Airway Mallampati: I 
TM Distance: 4 - 6 cm Neck ROM: normal range of motion Mouth opening: Normal 
 
 Cardiovascular Regular rate and rhythm,  S1 and S2 normal,  no murmur, click, rub, or gallop Dental 
 
Dentition: Edentulous Pulmonary Breath sounds clear to auscultation Abdominal 
GI exam deferred Other Findings Anesthetic Plan ASA: 3 Anesthesia type: total IV anesthesia and general 
 
 
 
 
Induction: Intravenous Anesthetic plan and risks discussed with: Patient Propofol MAC

## 2020-12-30 NOTE — ANESTHESIA POSTPROCEDURE EVALUATION
Procedure(s): ESOPHAGOGASTRODUODENAL (EGD) BIOPSY 
ESOPHAGOGASTRODUODENOSCOPY (EGD). total IV anesthesia Anesthesia Post Evaluation Patient location during evaluation: PACU Note status: Adequate. Level of consciousness: responsive to verbal stimuli and sleepy but conscious Pain management: satisfactory to patient Airway patency: patent Anesthetic complications: no 
Cardiovascular status: acceptable Respiratory status: acceptable Hydration status: acceptable Comments: +Post-Anesthesia Evaluation and Assessment Patient: Virginia Spence MRN: [id number]  SSN: [id number] YOB: 1950  Age: 72 y.o. Sex: female Cardiovascular Function/Vital Signs /87   Pulse 99   Temp 36.7 °C (98 °F)   Resp 11   Ht 5' 5\" (1.651 m)   Wt 52.6 kg (116 lb)   SpO2 98%   Breastfeeding No   BMI 19.30 kg/m² Patient is status post Procedure(s): ESOPHAGOGASTRODUODENAL (EGD) BIOPSY 
ESOPHAGOGASTRODUODENOSCOPY (EGD). Nausea/Vomiting: Controlled. Postoperative hydration reviewed and adequate. Pain: 
Pain Scale 1: Numeric (0 - 10) (12/30/20 1311) Pain Intensity 1: 0 (12/30/20 1311) Managed. Neurological Status: At baseline. Mental Status and Level of Consciousness: Arousable. Pulmonary Status:  
O2 Device: Room air (12/30/20 1249) Adequate oxygenation and airway patent. Complications related to anesthesia: None Post-anesthesia assessment completed. No concerns. Signed By: Emani Lloyd MD  
 12/30/2020 Post anesthesia nausea and vomiting:  controlled INITIAL Post-op Vital signs:  
Vitals Value Taken Time /87 12/30/20 1312 Temp 36.7 °C (98 °F) 12/30/20 1249 Pulse 95 12/30/20 1313 Resp 12 12/30/20 1313 SpO2 100 % 12/30/20 1313 Vitals shown include unvalidated device data.

## 2021-01-01 ENCOUNTER — OFFICE VISIT (OUTPATIENT)
Dept: FAMILY MEDICINE CLINIC | Age: 71
End: 2021-01-01
Payer: MEDICARE

## 2021-01-01 ENCOUNTER — DOCUMENTATION ONLY (OUTPATIENT)
Dept: ONCOLOGY | Age: 71
End: 2021-01-01

## 2021-01-01 ENCOUNTER — APPOINTMENT (OUTPATIENT)
Dept: GENERAL RADIOLOGY | Age: 71
DRG: 176 | End: 2021-01-01
Attending: STUDENT IN AN ORGANIZED HEALTH CARE EDUCATION/TRAINING PROGRAM
Payer: MEDICARE

## 2021-01-01 ENCOUNTER — APPOINTMENT (OUTPATIENT)
Dept: NON INVASIVE DIAGNOSTICS | Age: 71
DRG: 176 | End: 2021-01-01
Attending: INTERNAL MEDICINE
Payer: MEDICARE

## 2021-01-01 ENCOUNTER — HOSPITAL ENCOUNTER (INPATIENT)
Age: 71
LOS: 4 days | Discharge: HOME | DRG: 176 | End: 2021-01-19
Attending: STUDENT IN AN ORGANIZED HEALTH CARE EDUCATION/TRAINING PROGRAM | Admitting: INTERNAL MEDICINE
Payer: MEDICARE

## 2021-01-01 ENCOUNTER — TRANSCRIBE ORDER (OUTPATIENT)
Dept: SCHEDULING | Age: 71
End: 2021-01-01

## 2021-01-01 ENCOUNTER — PATIENT OUTREACH (OUTPATIENT)
Dept: CASE MANAGEMENT | Age: 71
End: 2021-01-01

## 2021-01-01 ENCOUNTER — APPOINTMENT (OUTPATIENT)
Dept: CT IMAGING | Age: 71
DRG: 176 | End: 2021-01-01
Attending: STUDENT IN AN ORGANIZED HEALTH CARE EDUCATION/TRAINING PROGRAM
Payer: MEDICARE

## 2021-01-01 ENCOUNTER — HOSPITAL ENCOUNTER (OUTPATIENT)
Dept: GENERAL RADIOLOGY | Age: 71
Discharge: HOME | End: 2021-04-23
Payer: MEDICARE

## 2021-01-01 ENCOUNTER — HOSPITAL ENCOUNTER (OUTPATIENT)
Dept: CT IMAGING | Age: 71
Discharge: HOME | End: 2021-03-23
Attending: INTERNAL MEDICINE
Payer: MEDICARE

## 2021-01-01 ENCOUNTER — TRANSCRIBE ORDER (OUTPATIENT)
Dept: REGISTRATION | Age: 71
End: 2021-01-01

## 2021-01-01 ENCOUNTER — APPOINTMENT (OUTPATIENT)
Dept: ULTRASOUND IMAGING | Age: 71
DRG: 176 | End: 2021-01-01
Attending: INTERNAL MEDICINE
Payer: MEDICARE

## 2021-01-01 VITALS
DIASTOLIC BLOOD PRESSURE: 67 MMHG | BODY MASS INDEX: 17.96 KG/M2 | HEIGHT: 65 IN | TEMPERATURE: 98.5 F | OXYGEN SATURATION: 100 % | SYSTOLIC BLOOD PRESSURE: 105 MMHG | RESPIRATION RATE: 18 BRPM | WEIGHT: 107.81 LBS | HEART RATE: 98 BPM

## 2021-01-01 VITALS
SYSTOLIC BLOOD PRESSURE: 118 MMHG | WEIGHT: 108 LBS | HEIGHT: 65 IN | RESPIRATION RATE: 16 BRPM | HEART RATE: 134 BPM | BODY MASS INDEX: 17.99 KG/M2 | OXYGEN SATURATION: 95 % | DIASTOLIC BLOOD PRESSURE: 77 MMHG | TEMPERATURE: 98 F

## 2021-01-01 VITALS
BODY MASS INDEX: 19.56 KG/M2 | OXYGEN SATURATION: 99 % | SYSTOLIC BLOOD PRESSURE: 111 MMHG | HEART RATE: 132 BPM | TEMPERATURE: 98.2 F | RESPIRATION RATE: 20 BRPM | HEIGHT: 65 IN | WEIGHT: 117.4 LBS | DIASTOLIC BLOOD PRESSURE: 74 MMHG

## 2021-01-01 DIAGNOSIS — Z51.11 ENCOUNTER FOR ANTINEOPLASTIC CHEMOTHERAPY: ICD-10-CM

## 2021-01-01 DIAGNOSIS — R05.9 COUGH: Primary | ICD-10-CM

## 2021-01-01 DIAGNOSIS — Z13.6 ENCOUNTER FOR SCREENING FOR CARDIOVASCULAR DISORDERS: ICD-10-CM

## 2021-01-01 DIAGNOSIS — E11.9 CONTROLLED TYPE 2 DIABETES MELLITUS WITHOUT COMPLICATION, WITH LONG-TERM CURRENT USE OF INSULIN (HCC): ICD-10-CM

## 2021-01-01 DIAGNOSIS — Z79.4 CONTROLLED TYPE 2 DIABETES MELLITUS WITHOUT COMPLICATION, WITH LONG-TERM CURRENT USE OF INSULIN (HCC): ICD-10-CM

## 2021-01-01 DIAGNOSIS — T45.1X5A CHEMOTHERAPY INDUCED DIARRHEA: ICD-10-CM

## 2021-01-01 DIAGNOSIS — L29.8: ICD-10-CM

## 2021-01-01 DIAGNOSIS — C16.9 GASTRIC ADENOCARCINOMA (HCC): ICD-10-CM

## 2021-01-01 DIAGNOSIS — Z09 HOSPITAL DISCHARGE FOLLOW-UP: Primary | ICD-10-CM

## 2021-01-01 DIAGNOSIS — Z00.00 MEDICARE ANNUAL WELLNESS VISIT, SUBSEQUENT: Primary | ICD-10-CM

## 2021-01-01 DIAGNOSIS — R14.0 ABDOMINAL BLOATING: ICD-10-CM

## 2021-01-01 DIAGNOSIS — R05.9 COUGH: ICD-10-CM

## 2021-01-01 DIAGNOSIS — M54.31 RIGHT SIDED SCIATICA: ICD-10-CM

## 2021-01-01 DIAGNOSIS — T45.1X5A: ICD-10-CM

## 2021-01-01 DIAGNOSIS — C50.311 MALIGNANT NEOPLASM OF LOWER-INNER QUADRANT OF RIGHT FEMALE BREAST (HCC): ICD-10-CM

## 2021-01-01 DIAGNOSIS — E86.0 DEHYDRATION: ICD-10-CM

## 2021-01-01 DIAGNOSIS — C50.311 MALIGNANT NEOPLASM OF LOWER-INNER QUADRANT OF RIGHT FEMALE BREAST (HCC): Primary | ICD-10-CM

## 2021-01-01 DIAGNOSIS — C50.411 MALIGNANT NEOPLASM OF UPPER-OUTER QUADRANT OF RIGHT FEMALE BREAST (HCC): ICD-10-CM

## 2021-01-01 DIAGNOSIS — K52.1 CHEMOTHERAPY INDUCED DIARRHEA: ICD-10-CM

## 2021-01-01 DIAGNOSIS — R11.2 NAUSEA WITH VOMITING: ICD-10-CM

## 2021-01-01 DIAGNOSIS — I26.94 MULTIPLE SUBSEGMENTAL PULMONARY EMBOLI WITHOUT ACUTE COR PULMONALE (HCC): Primary | ICD-10-CM

## 2021-01-01 DIAGNOSIS — R00.0 TACHYCARDIA: ICD-10-CM

## 2021-01-01 DIAGNOSIS — R50.9 FEVER: ICD-10-CM

## 2021-01-01 DIAGNOSIS — I26.99 BILATERAL PULMONARY EMBOLISM (HCC): ICD-10-CM

## 2021-01-01 DIAGNOSIS — I82.402 ACUTE DEEP VEIN THROMBOSIS (DVT) OF LEFT LOWER EXTREMITY, UNSPECIFIED VEIN (HCC): ICD-10-CM

## 2021-01-01 LAB
ALBUMIN SERPL-MCNC: 2.4 G/DL (ref 3.5–5)
ALBUMIN/GLOB SERPL: 0.7 {RATIO} (ref 1.1–2.2)
ALP SERPL-CCNC: 226 U/L (ref 45–117)
ALT SERPL-CCNC: 57 U/L (ref 12–78)
ANION GAP SERPL CALC-SCNC: 3 MMOL/L (ref 5–15)
ANION GAP SERPL CALC-SCNC: 5 MMOL/L (ref 5–15)
APPEARANCE UR: CLEAR
APTT PPP: 28.1 SEC (ref 22.1–31)
APTT PPP: 38.7 SEC (ref 22.1–31)
APTT PPP: 52.6 SEC (ref 22.1–31)
APTT PPP: 53.2 SEC (ref 22.1–31)
APTT PPP: 61.3 SEC (ref 22.1–31)
APTT PPP: 62.5 SEC (ref 22.1–31)
APTT PPP: 72.7 SEC (ref 22.1–31)
APTT PPP: 76.2 SEC (ref 22.1–31)
APTT PPP: 84.2 SEC (ref 22.1–31)
APTT PPP: >130 SEC (ref 22.1–31)
AST SERPL-CCNC: 57 U/L (ref 15–37)
ATRIAL RATE: 138 BPM
BACTERIA URNS QL MICRO: NEGATIVE /HPF
BASOPHILS # BLD: 0 K/UL (ref 0–0.1)
BASOPHILS # BLD: 0 K/UL (ref 0–0.1)
BASOPHILS NFR BLD: 0 % (ref 0–1)
BASOPHILS NFR BLD: 0 % (ref 0–1)
BILIRUB SERPL-MCNC: 0.7 MG/DL (ref 0.2–1)
BILIRUB UR QL CFM: NEGATIVE
BNP SERPL-MCNC: 399 PG/ML
BUN SERPL-MCNC: 10 MG/DL (ref 6–20)
BUN SERPL-MCNC: 5 MG/DL (ref 6–20)
BUN SERPL-MCNC: 5 MG/DL (ref 6–20)
BUN SERPL-MCNC: 6 MG/DL (ref 6–20)
BUN/CREAT SERPL: 13 (ref 12–20)
BUN/CREAT SERPL: 17 (ref 12–20)
BUN/CREAT SERPL: 17 (ref 12–20)
BUN/CREAT SERPL: 22 (ref 12–20)
CALCIUM SERPL-MCNC: 8.3 MG/DL (ref 8.5–10.1)
CALCIUM SERPL-MCNC: 8.5 MG/DL (ref 8.5–10.1)
CALCIUM SERPL-MCNC: 8.6 MG/DL (ref 8.5–10.1)
CALCIUM SERPL-MCNC: 8.9 MG/DL (ref 8.5–10.1)
CALCULATED P AXIS, ECG09: 70 DEGREES
CALCULATED R AXIS, ECG10: 73 DEGREES
CALCULATED T AXIS, ECG11: -90 DEGREES
CHLORIDE SERPL-SCNC: 100 MMOL/L (ref 97–108)
CHLORIDE SERPL-SCNC: 100 MMOL/L (ref 97–108)
CHLORIDE SERPL-SCNC: 102 MMOL/L (ref 97–108)
CHLORIDE SERPL-SCNC: 103 MMOL/L (ref 97–108)
CO2 SERPL-SCNC: 29 MMOL/L (ref 21–32)
CO2 SERPL-SCNC: 30 MMOL/L (ref 21–32)
COLOR UR: ABNORMAL
COMMENT, HOLDF: NORMAL
COVID-19 RAPID TEST, COVR: NOT DETECTED
CREAT SERPL-MCNC: 0.3 MG/DL (ref 0.55–1.02)
CREAT SERPL-MCNC: 0.36 MG/DL (ref 0.55–1.02)
CREAT SERPL-MCNC: 0.39 MG/DL (ref 0.55–1.02)
CREAT SERPL-MCNC: 0.46 MG/DL (ref 0.55–1.02)
DIAGNOSIS, 93000: NORMAL
DIFFERENTIAL METHOD BLD: ABNORMAL
DIFFERENTIAL METHOD BLD: ABNORMAL
ECHO AO ROOT DIAM: 3.07 CM
ECHO EST RA PRESSURE: 10 MMHG
ECHO LA AREA 4C: 7.84 CM2
ECHO LA MAJOR AXIS: 2.55 CM
ECHO LA MINOR AXIS: 1.68 CM
ECHO LA VOL 4C: 12.59 ML (ref 22–52)
ECHO LA VOLUME INDEX A4C: 8.28 ML/M2 (ref 16–28)
ECHO LV E' LATERAL VELOCITY: 12.43 CM/S
ECHO LV E' SEPTAL VELOCITY: 7.02 CM/S
ECHO LV EDV A2C: 52.44 ML
ECHO LV EDV A4C: 63.33 ML
ECHO LV EDV BP: 59.14 ML (ref 56–104)
ECHO LV EDV INDEX A4C: 41.6 ML/M2
ECHO LV EDV INDEX BP: 38.9 ML/M2
ECHO LV EDV NDEX A2C: 34.5 ML/M2
ECHO LV EJECTION FRACTION A2C: 49 PERCENT
ECHO LV EJECTION FRACTION A4C: 58 PERCENT
ECHO LV EJECTION FRACTION BIPLANE: 52.2 PERCENT (ref 55–100)
ECHO LV ESV A2C: 26.89 ML
ECHO LV ESV A4C: 26.67 ML
ECHO LV ESV BP: 28.29 ML (ref 19–49)
ECHO LV ESV INDEX A2C: 17.7 ML/M2
ECHO LV ESV INDEX A4C: 17.5 ML/M2
ECHO LV ESV INDEX BP: 18.6 ML/M2
ECHO LV INTERNAL DIMENSION DIASTOLIC: 3.35 CM (ref 3.9–5.3)
ECHO LV INTERNAL DIMENSION SYSTOLIC: 2.62 CM
ECHO LV IVSD: 1.19 CM (ref 0.6–0.9)
ECHO LV IVSS: 1.41 CM
ECHO LV MASS 2D: 130.8 G (ref 67–162)
ECHO LV MASS INDEX 2D: 86 G/M2 (ref 43–95)
ECHO LV POSTERIOR WALL DIASTOLIC: 1.25 CM (ref 0.6–0.9)
ECHO LV POSTERIOR WALL SYSTOLIC: 1.45 CM
ECHO LVOT DIAM: 2.1 CM
ECHO MV E DECELERATION TIME (DT): 68.73 MS
ECHO MV E VELOCITY: 85.72 CM/S
ECHO MV E/E' LATERAL: 6.9
ECHO MV E/E' RATIO (AVERAGED): 9.55
ECHO MV E/E' SEPTAL: 12.21
ECHO PV MAX VELOCITY: 63.89 CM/S
ECHO PV PEAK INSTANTANEOUS GRADIENT SYSTOLIC: 1.63 MMHG
ECHO RIGHT VENTRICULAR SYSTOLIC PRESSURE (RVSP): 38.3 MMHG
ECHO RV INTERNAL DIMENSION: 2.99 CM
ECHO TV REGURGITANT MAX VELOCITY: 265.16 CM/S
ECHO TV REGURGITANT PEAK GRADIENT: 28.3 MMHG
EOSINOPHIL # BLD: 0 K/UL (ref 0–0.4)
EOSINOPHIL # BLD: 0.1 K/UL (ref 0–0.4)
EOSINOPHIL NFR BLD: 0 % (ref 0–7)
EOSINOPHIL NFR BLD: 2 % (ref 0–7)
EPITH CASTS URNS QL MICRO: ABNORMAL /LPF
ERYTHROCYTE [DISTWIDTH] IN BLOOD BY AUTOMATED COUNT: 14.5 % (ref 11.5–14.5)
ERYTHROCYTE [DISTWIDTH] IN BLOOD BY AUTOMATED COUNT: 14.5 % (ref 11.5–14.5)
ERYTHROCYTE [DISTWIDTH] IN BLOOD BY AUTOMATED COUNT: 14.7 % (ref 11.5–14.5)
GLOBULIN SER CALC-MCNC: 3.6 G/DL (ref 2–4)
GLUCOSE BLD STRIP.AUTO-MCNC: 102 MG/DL (ref 65–100)
GLUCOSE BLD STRIP.AUTO-MCNC: 105 MG/DL (ref 65–100)
GLUCOSE BLD STRIP.AUTO-MCNC: 107 MG/DL (ref 65–100)
GLUCOSE BLD STRIP.AUTO-MCNC: 112 MG/DL (ref 65–100)
GLUCOSE BLD STRIP.AUTO-MCNC: 116 MG/DL (ref 65–100)
GLUCOSE BLD STRIP.AUTO-MCNC: 117 MG/DL (ref 65–100)
GLUCOSE BLD STRIP.AUTO-MCNC: 118 MG/DL (ref 65–100)
GLUCOSE BLD STRIP.AUTO-MCNC: 126 MG/DL (ref 65–100)
GLUCOSE BLD STRIP.AUTO-MCNC: 131 MG/DL (ref 65–100)
GLUCOSE BLD STRIP.AUTO-MCNC: 145 MG/DL (ref 65–100)
GLUCOSE BLD STRIP.AUTO-MCNC: 145 MG/DL (ref 65–100)
GLUCOSE BLD STRIP.AUTO-MCNC: 146 MG/DL (ref 65–100)
GLUCOSE BLD STRIP.AUTO-MCNC: 152 MG/DL (ref 65–100)
GLUCOSE BLD STRIP.AUTO-MCNC: 75 MG/DL (ref 65–100)
GLUCOSE BLD STRIP.AUTO-MCNC: 89 MG/DL (ref 65–100)
GLUCOSE SERPL-MCNC: 115 MG/DL (ref 65–100)
GLUCOSE SERPL-MCNC: 128 MG/DL (ref 65–100)
GLUCOSE SERPL-MCNC: 135 MG/DL (ref 65–100)
GLUCOSE SERPL-MCNC: 97 MG/DL (ref 65–100)
GLUCOSE UR STRIP.AUTO-MCNC: NEGATIVE MG/DL
HCT VFR BLD AUTO: 28.4 % (ref 35–47)
HCT VFR BLD AUTO: 29.5 % (ref 35–47)
HCT VFR BLD AUTO: 32.5 % (ref 35–47)
HEALTH STATUS, XMCV2T: NORMAL
HGB BLD-MCNC: 10.6 G/DL (ref 11.5–16)
HGB BLD-MCNC: 9.4 G/DL (ref 11.5–16)
HGB BLD-MCNC: 9.5 G/DL (ref 11.5–16)
HGB UR QL STRIP: NEGATIVE
IMM GRANULOCYTES # BLD AUTO: 0 K/UL (ref 0–0.04)
IMM GRANULOCYTES # BLD AUTO: 0 K/UL (ref 0–0.04)
IMM GRANULOCYTES NFR BLD AUTO: 0 % (ref 0–0.5)
IMM GRANULOCYTES NFR BLD AUTO: 0 % (ref 0–0.5)
INR PPP: 1.2 (ref 0.9–1.1)
KETONES UR QL STRIP.AUTO: ABNORMAL MG/DL
LACTATE BLD-SCNC: 1.29 MMOL/L (ref 0.4–2)
LEUKOCYTE ESTERASE UR QL STRIP.AUTO: ABNORMAL
LIPASE SERPL-CCNC: 17 U/L (ref 73–393)
LYMPHOCYTES # BLD: 0.7 K/UL (ref 0.8–3.5)
LYMPHOCYTES # BLD: 1.4 K/UL (ref 0.8–3.5)
LYMPHOCYTES NFR BLD: 14 % (ref 12–49)
LYMPHOCYTES NFR BLD: 27 % (ref 12–49)
MAGNESIUM SERPL-MCNC: 1.2 MG/DL (ref 1.6–2.4)
MCH RBC QN AUTO: 28.9 PG (ref 26–34)
MCH RBC QN AUTO: 29.5 PG (ref 26–34)
MCH RBC QN AUTO: 30.1 PG (ref 26–34)
MCHC RBC AUTO-ENTMCNC: 31.9 G/DL (ref 30–36.5)
MCHC RBC AUTO-ENTMCNC: 32.6 G/DL (ref 30–36.5)
MCHC RBC AUTO-ENTMCNC: 33.5 G/DL (ref 30–36.5)
MCV RBC AUTO: 89.9 FL (ref 80–99)
MCV RBC AUTO: 90.5 FL (ref 80–99)
MCV RBC AUTO: 90.8 FL (ref 80–99)
MONOCYTES # BLD: 0.6 K/UL (ref 0–1)
MONOCYTES # BLD: 0.7 K/UL (ref 0–1)
MONOCYTES NFR BLD: 11 % (ref 5–13)
MONOCYTES NFR BLD: 14 % (ref 5–13)
MUCOUS THREADS URNS QL MICRO: ABNORMAL /LPF
NEUTS SEG # BLD: 2.9 K/UL (ref 1.8–8)
NEUTS SEG # BLD: 4 K/UL (ref 1.8–8)
NEUTS SEG NFR BLD: 57 % (ref 32–75)
NEUTS SEG NFR BLD: 75 % (ref 32–75)
NITRITE UR QL STRIP.AUTO: NEGATIVE
NRBC # BLD: 0 K/UL (ref 0–0.01)
NRBC BLD-RTO: 0 PER 100 WBC
P-R INTERVAL, ECG05: 128 MS
PH UR STRIP: 6.5 [PH] (ref 5–8)
PLATELET # BLD AUTO: 196 K/UL (ref 150–400)
PLATELET # BLD AUTO: 212 K/UL (ref 150–400)
PLATELET # BLD AUTO: 235 K/UL (ref 150–400)
PMV BLD AUTO: 10 FL (ref 8.9–12.9)
PMV BLD AUTO: 10.3 FL (ref 8.9–12.9)
PMV BLD AUTO: 10.5 FL (ref 8.9–12.9)
POTASSIUM SERPL-SCNC: 3.2 MMOL/L (ref 3.5–5.1)
POTASSIUM SERPL-SCNC: 3.5 MMOL/L (ref 3.5–5.1)
POTASSIUM SERPL-SCNC: 3.5 MMOL/L (ref 3.5–5.1)
POTASSIUM SERPL-SCNC: 3.8 MMOL/L (ref 3.5–5.1)
PROT SERPL-MCNC: 6 G/DL (ref 6.4–8.2)
PROT UR STRIP-MCNC: 30 MG/DL
PROTHROMBIN TIME: 12.5 SEC (ref 9–11.1)
Q-T INTERVAL, ECG07: 216 MS
QRS DURATION, ECG06: 62 MS
QTC CALCULATION (BEZET), ECG08: 327 MS
RBC # BLD AUTO: 3.16 M/UL (ref 3.8–5.2)
RBC # BLD AUTO: 3.25 M/UL (ref 3.8–5.2)
RBC # BLD AUTO: 3.59 M/UL (ref 3.8–5.2)
RBC #/AREA URNS HPF: ABNORMAL /HPF (ref 0–5)
RBC MORPH BLD: ABNORMAL
SAMPLES BEING HELD,HOLD: NORMAL
SERVICE CMNT-IMP: ABNORMAL
SERVICE CMNT-IMP: NORMAL
SERVICE CMNT-IMP: NORMAL
SODIUM SERPL-SCNC: 134 MMOL/L (ref 136–145)
SODIUM SERPL-SCNC: 134 MMOL/L (ref 136–145)
SODIUM SERPL-SCNC: 135 MMOL/L (ref 136–145)
SODIUM SERPL-SCNC: 137 MMOL/L (ref 136–145)
SOURCE, COVRS: NORMAL
SP GR UR REFRACTOMETRY: 1.03 (ref 1–1.03)
SPECIMEN SOURCE, FCOV2M: NORMAL
SPECIMEN TYPE, XMCV1T: NORMAL
THERAPEUTIC RANGE,PTTT: ABNORMAL SECS (ref 58–77)
THERAPEUTIC RANGE,PTTT: NORMAL SECS (ref 58–77)
TROPONIN I SERPL-MCNC: 0.11 NG/ML
TROPONIN I SERPL-MCNC: 0.2 NG/ML
TROPONIN I SERPL-MCNC: 0.2 NG/ML
UROBILINOGEN UR QL STRIP.AUTO: 1 EU/DL (ref 0.2–1)
VENTRICULAR RATE, ECG03: 138 BPM
WBC # BLD AUTO: 5.2 K/UL (ref 3.6–11)
WBC # BLD AUTO: 5.3 K/UL (ref 3.6–11)
WBC # BLD AUTO: 5.7 K/UL (ref 3.6–11)
WBC URNS QL MICRO: ABNORMAL /HPF (ref 0–4)

## 2021-01-01 PROCEDURE — 3017F COLORECTAL CA SCREEN DOC REV: CPT | Performed by: NURSE PRACTITIONER

## 2021-01-01 PROCEDURE — 81001 URINALYSIS AUTO W/SCOPE: CPT

## 2021-01-01 PROCEDURE — G8427 DOCREV CUR MEDS BY ELIG CLIN: HCPCS | Performed by: NURSE PRACTITIONER

## 2021-01-01 PROCEDURE — 36415 COLL VENOUS BLD VENIPUNCTURE: CPT

## 2021-01-01 PROCEDURE — 93005 ELECTROCARDIOGRAM TRACING: CPT

## 2021-01-01 PROCEDURE — 80048 BASIC METABOLIC PNL TOTAL CA: CPT

## 2021-01-01 PROCEDURE — 65660000001 HC RM ICU INTERMED STEPDOWN

## 2021-01-01 PROCEDURE — 74011250636 HC RX REV CODE- 250/636: Performed by: STUDENT IN AN ORGANIZED HEALTH CARE EDUCATION/TRAINING PROGRAM

## 2021-01-01 PROCEDURE — G8420 CALC BMI NORM PARAMETERS: HCPCS | Performed by: NURSE PRACTITIONER

## 2021-01-01 PROCEDURE — 74011250637 HC RX REV CODE- 250/637: Performed by: INTERNAL MEDICINE

## 2021-01-01 PROCEDURE — 83605 ASSAY OF LACTIC ACID: CPT

## 2021-01-01 PROCEDURE — 82962 GLUCOSE BLOOD TEST: CPT

## 2021-01-01 PROCEDURE — 85610 PROTHROMBIN TIME: CPT

## 2021-01-01 PROCEDURE — G0463 HOSPITAL OUTPT CLINIC VISIT: HCPCS | Performed by: NURSE PRACTITIONER

## 2021-01-01 PROCEDURE — 80053 COMPREHEN METABOLIC PANEL: CPT

## 2021-01-01 PROCEDURE — 99232 SBSQ HOSP IP/OBS MODERATE 35: CPT | Performed by: INTERNAL MEDICINE

## 2021-01-01 PROCEDURE — 87635 SARS-COV-2 COVID-19 AMP PRB: CPT

## 2021-01-01 PROCEDURE — 74011000250 HC RX REV CODE- 250: Performed by: STUDENT IN AN ORGANIZED HEALTH CARE EDUCATION/TRAINING PROGRAM

## 2021-01-01 PROCEDURE — 74011000636 HC RX REV CODE- 636: Performed by: STUDENT IN AN ORGANIZED HEALTH CARE EDUCATION/TRAINING PROGRAM

## 2021-01-01 PROCEDURE — 71260 CT THORAX DX C+: CPT

## 2021-01-01 PROCEDURE — 74011000636 HC RX REV CODE- 636: Performed by: INTERNAL MEDICINE

## 2021-01-01 PROCEDURE — G0439 PPPS, SUBSEQ VISIT: HCPCS | Performed by: NURSE PRACTITIONER

## 2021-01-01 PROCEDURE — 74011250636 HC RX REV CODE- 250/636: Performed by: INTERNAL MEDICINE

## 2021-01-01 PROCEDURE — 83880 ASSAY OF NATRIURETIC PEPTIDE: CPT

## 2021-01-01 PROCEDURE — 77030003560 HC NDL HUBR BARD -A

## 2021-01-01 PROCEDURE — 85730 THROMBOPLASTIN TIME PARTIAL: CPT

## 2021-01-01 PROCEDURE — 96365 THER/PROPH/DIAG IV INF INIT: CPT

## 2021-01-01 PROCEDURE — 85025 COMPLETE CBC W/AUTO DIFF WBC: CPT

## 2021-01-01 PROCEDURE — 1090F PRES/ABSN URINE INCON ASSESS: CPT | Performed by: NURSE PRACTITIONER

## 2021-01-01 PROCEDURE — 93306 TTE W/DOPPLER COMPLETE: CPT

## 2021-01-01 PROCEDURE — 96366 THER/PROPH/DIAG IV INF ADDON: CPT

## 2021-01-01 PROCEDURE — 99495 TRANSJ CARE MGMT MOD F2F 14D: CPT | Performed by: NURSE PRACTITIONER

## 2021-01-01 PROCEDURE — 74011636637 HC RX REV CODE- 636/637: Performed by: INTERNAL MEDICINE

## 2021-01-01 PROCEDURE — 74011250637 HC RX REV CODE- 250/637

## 2021-01-01 PROCEDURE — 2022F DILAT RTA XM EVC RTNOPTHY: CPT | Performed by: NURSE PRACTITIONER

## 2021-01-01 PROCEDURE — 71046 X-RAY EXAM CHEST 2 VIEWS: CPT

## 2021-01-01 PROCEDURE — 71275 CT ANGIOGRAPHY CHEST: CPT

## 2021-01-01 PROCEDURE — 83690 ASSAY OF LIPASE: CPT

## 2021-01-01 PROCEDURE — 74011250637 HC RX REV CODE- 250/637: Performed by: STUDENT IN AN ORGANIZED HEALTH CARE EDUCATION/TRAINING PROGRAM

## 2021-01-01 PROCEDURE — 2709999900 HC NON-CHARGEABLE SUPPLY

## 2021-01-01 PROCEDURE — 84484 ASSAY OF TROPONIN QUANT: CPT

## 2021-01-01 PROCEDURE — 74011250636 HC RX REV CODE- 250/636: Performed by: EMERGENCY MEDICINE

## 2021-01-01 PROCEDURE — 99285 EMERGENCY DEPT VISIT HI MDM: CPT

## 2021-01-01 PROCEDURE — 90471 IMMUNIZATION ADMIN: CPT

## 2021-01-01 PROCEDURE — G8536 NO DOC ELDER MAL SCRN: HCPCS | Performed by: NURSE PRACTITIONER

## 2021-01-01 PROCEDURE — 85027 COMPLETE CBC AUTOMATED: CPT

## 2021-01-01 PROCEDURE — 74177 CT ABD & PELVIS W/CONTRAST: CPT

## 2021-01-01 PROCEDURE — 99214 OFFICE O/P EST MOD 30 MIN: CPT | Performed by: NURSE PRACTITIONER

## 2021-01-01 PROCEDURE — G8432 DEP SCR NOT DOC, RNG: HCPCS | Performed by: NURSE PRACTITIONER

## 2021-01-01 PROCEDURE — 65660000000 HC RM CCU STEPDOWN

## 2021-01-01 PROCEDURE — G8399 PT W/DXA RESULTS DOCUMENT: HCPCS | Performed by: NURSE PRACTITIONER

## 2021-01-01 PROCEDURE — 93970 EXTREMITY STUDY: CPT

## 2021-01-01 PROCEDURE — 90686 IIV4 VACC NO PRSV 0.5 ML IM: CPT | Performed by: INTERNAL MEDICINE

## 2021-01-01 PROCEDURE — 3046F HEMOGLOBIN A1C LEVEL >9.0%: CPT | Performed by: NURSE PRACTITIONER

## 2021-01-01 PROCEDURE — 1111F DSCHRG MED/CURRENT MED MERGE: CPT | Performed by: NURSE PRACTITIONER

## 2021-01-01 PROCEDURE — 83735 ASSAY OF MAGNESIUM: CPT

## 2021-01-01 PROCEDURE — 71045 X-RAY EXAM CHEST 1 VIEW: CPT

## 2021-01-01 PROCEDURE — 99223 1ST HOSP IP/OBS HIGH 75: CPT | Performed by: INTERNAL MEDICINE

## 2021-01-01 PROCEDURE — G9708 BILAT MAST/HX BI /UNILAT MAS: HCPCS | Performed by: NURSE PRACTITIONER

## 2021-01-01 PROCEDURE — 1101F PT FALLS ASSESS-DOCD LE1/YR: CPT | Performed by: NURSE PRACTITIONER

## 2021-01-01 PROCEDURE — 74011250636 HC RX REV CODE- 250/636

## 2021-01-01 RX ORDER — OXYCODONE AND ACETAMINOPHEN 5; 325 MG/1; MG/1
1 TABLET ORAL
Status: DISCONTINUED | OUTPATIENT
Start: 2021-01-01 | End: 2021-01-01 | Stop reason: HOSPADM

## 2021-01-01 RX ORDER — HEPARIN SODIUM 10000 [USP'U]/100ML
18-36 INJECTION, SOLUTION INTRAVENOUS
Status: DISCONTINUED | OUTPATIENT
Start: 2021-01-01 | End: 2021-01-01

## 2021-01-01 RX ORDER — MORPHINE SULFATE 2 MG/ML
INJECTION, SOLUTION INTRAMUSCULAR; INTRAVENOUS
Status: COMPLETED
Start: 2021-01-01 | End: 2021-01-01

## 2021-01-01 RX ORDER — PANCRELIPASE 24000; 76000; 120000 [USP'U]/1; [USP'U]/1; [USP'U]/1
CAPSULE, DELAYED RELEASE PELLETS ORAL
COMMUNITY

## 2021-01-01 RX ORDER — DEXTROSE 50 % IN WATER (D50W) INTRAVENOUS SYRINGE
12.5-25 AS NEEDED
Status: DISCONTINUED | OUTPATIENT
Start: 2021-01-01 | End: 2021-01-01 | Stop reason: HOSPADM

## 2021-01-01 RX ORDER — SODIUM CHLORIDE 0.9 % (FLUSH) 0.9 %
5-40 SYRINGE (ML) INJECTION EVERY 8 HOURS
Status: DISCONTINUED | OUTPATIENT
Start: 2021-01-01 | End: 2021-01-01 | Stop reason: HOSPADM

## 2021-01-01 RX ORDER — POLYETHYLENE GLYCOL 3350 17 G/17G
17 POWDER, FOR SOLUTION ORAL DAILY PRN
Status: DISCONTINUED | OUTPATIENT
Start: 2021-01-01 | End: 2021-01-01 | Stop reason: HOSPADM

## 2021-01-01 RX ORDER — HEPARIN SODIUM 5000 [USP'U]/ML
40 INJECTION, SOLUTION INTRAVENOUS; SUBCUTANEOUS AS NEEDED
Status: DISCONTINUED | OUTPATIENT
Start: 2021-01-01 | End: 2021-01-01

## 2021-01-01 RX ORDER — HEPARIN SODIUM 5000 [USP'U]/ML
80 INJECTION, SOLUTION INTRAVENOUS; SUBCUTANEOUS ONCE
Status: COMPLETED | OUTPATIENT
Start: 2021-01-01 | End: 2021-01-01

## 2021-01-01 RX ORDER — HEPARIN 100 UNIT/ML
500 SYRINGE INTRAVENOUS AS NEEDED
Status: CANCELLED | OUTPATIENT
Start: 2021-01-01

## 2021-01-01 RX ORDER — ANASTROZOLE 1 MG/1
1 TABLET ORAL DAILY
Status: DISCONTINUED | OUTPATIENT
Start: 2021-01-01 | End: 2021-01-01 | Stop reason: HOSPADM

## 2021-01-01 RX ORDER — SODIUM CHLORIDE 0.9 % (FLUSH) 0.9 %
5-40 SYRINGE (ML) INJECTION AS NEEDED
Status: DISCONTINUED | OUTPATIENT
Start: 2021-01-01 | End: 2021-01-01 | Stop reason: HOSPADM

## 2021-01-01 RX ORDER — HEPARIN SODIUM 5000 [USP'U]/ML
80 INJECTION, SOLUTION INTRAVENOUS; SUBCUTANEOUS AS NEEDED
Status: DISCONTINUED | OUTPATIENT
Start: 2021-01-01 | End: 2021-01-01

## 2021-01-01 RX ORDER — HEPARIN 100 UNIT/ML
500 SYRINGE INTRAVENOUS AS NEEDED
Status: DISCONTINUED | OUTPATIENT
Start: 2021-01-01 | End: 2021-01-01 | Stop reason: HOSPADM

## 2021-01-01 RX ORDER — GABAPENTIN 300 MG/1
CAPSULE ORAL
Status: COMPLETED
Start: 2021-01-01 | End: 2021-01-01

## 2021-01-01 RX ORDER — POTASSIUM CHLORIDE 20 MEQ/1
40 TABLET, EXTENDED RELEASE ORAL
Status: COMPLETED | OUTPATIENT
Start: 2021-01-01 | End: 2021-01-01

## 2021-01-01 RX ORDER — MORPHINE SULFATE 2 MG/ML
1 INJECTION, SOLUTION INTRAMUSCULAR; INTRAVENOUS
Status: DISCONTINUED | OUTPATIENT
Start: 2021-01-01 | End: 2021-01-01 | Stop reason: HOSPADM

## 2021-01-01 RX ORDER — NALOXONE HYDROCHLORIDE 0.4 MG/ML
0.4 INJECTION, SOLUTION INTRAMUSCULAR; INTRAVENOUS; SUBCUTANEOUS
Status: DISCONTINUED | OUTPATIENT
Start: 2021-01-01 | End: 2021-01-01

## 2021-01-01 RX ORDER — ONDANSETRON 2 MG/ML
4 INJECTION INTRAMUSCULAR; INTRAVENOUS
Status: DISCONTINUED | OUTPATIENT
Start: 2021-01-01 | End: 2021-01-01 | Stop reason: HOSPADM

## 2021-01-01 RX ORDER — ONDANSETRON 4 MG/1
4 TABLET, ORALLY DISINTEGRATING ORAL
Status: DISCONTINUED | OUTPATIENT
Start: 2021-01-01 | End: 2021-01-01 | Stop reason: HOSPADM

## 2021-01-01 RX ORDER — INSULIN GLARGINE 100 [IU]/ML
8 INJECTION, SOLUTION SUBCUTANEOUS DAILY PRN
Status: DISCONTINUED | OUTPATIENT
Start: 2021-01-01 | End: 2021-01-01 | Stop reason: HOSPADM

## 2021-01-01 RX ORDER — GABAPENTIN 300 MG/1
600 CAPSULE ORAL
Status: DISCONTINUED | OUTPATIENT
Start: 2021-01-01 | End: 2021-01-01 | Stop reason: HOSPADM

## 2021-01-01 RX ORDER — NALOXONE HYDROCHLORIDE 0.4 MG/ML
0.4 INJECTION, SOLUTION INTRAMUSCULAR; INTRAVENOUS; SUBCUTANEOUS
Status: DISCONTINUED | OUTPATIENT
Start: 2021-01-01 | End: 2021-01-01 | Stop reason: HOSPADM

## 2021-01-01 RX ORDER — OXYCODONE AND ACETAMINOPHEN 5; 325 MG/1; MG/1
TABLET ORAL
Status: COMPLETED
Start: 2021-01-01 | End: 2021-01-01

## 2021-01-01 RX ORDER — ACETAMINOPHEN 325 MG/1
650 TABLET ORAL
Status: DISCONTINUED | OUTPATIENT
Start: 2021-01-01 | End: 2021-01-01 | Stop reason: HOSPADM

## 2021-01-01 RX ORDER — METOPROLOL SUCCINATE 25 MG/1
12.5 TABLET, EXTENDED RELEASE ORAL DAILY
Qty: 30 TAB | Refills: 0 | Status: SHIPPED | OUTPATIENT
Start: 2021-01-01

## 2021-01-01 RX ORDER — MAGNESIUM SULFATE 100 %
4 CRYSTALS MISCELLANEOUS AS NEEDED
Status: DISCONTINUED | OUTPATIENT
Start: 2021-01-01 | End: 2021-01-01 | Stop reason: HOSPADM

## 2021-01-01 RX ORDER — INSULIN LISPRO 100 [IU]/ML
INJECTION, SOLUTION INTRAVENOUS; SUBCUTANEOUS
Status: DISCONTINUED | OUTPATIENT
Start: 2021-01-01 | End: 2021-01-01 | Stop reason: HOSPADM

## 2021-01-01 RX ORDER — ACETAMINOPHEN 650 MG/1
650 SUPPOSITORY RECTAL
Status: DISCONTINUED | OUTPATIENT
Start: 2021-01-01 | End: 2021-01-01 | Stop reason: HOSPADM

## 2021-01-01 RX ADMIN — HEPARIN SODIUM AND DEXTROSE 16 UNITS/KG/HR: 10000; 5 INJECTION INTRAVENOUS at 10:45

## 2021-01-01 RX ADMIN — IOHEXOL 50 ML: 240 INJECTION, SOLUTION INTRATHECAL; INTRAVASCULAR; INTRAVENOUS; ORAL at 09:09

## 2021-01-01 RX ADMIN — GABAPENTIN 600 MG: 300 CAPSULE ORAL at 22:13

## 2021-01-01 RX ADMIN — HEPARIN SODIUM AND DEXTROSE 18 UNITS/KG/HR: 10000; 5 INJECTION INTRAVENOUS at 18:21

## 2021-01-01 RX ADMIN — OXYCODONE AND ACETAMINOPHEN 1 TABLET: 5; 325 TABLET ORAL at 01:45

## 2021-01-01 RX ADMIN — Medication 10 ML: at 21:29

## 2021-01-01 RX ADMIN — Medication 10 ML: at 21:36

## 2021-01-01 RX ADMIN — MORPHINE SULFATE 1 MG: 2 INJECTION, SOLUTION INTRAMUSCULAR; INTRAVENOUS at 20:25

## 2021-01-01 RX ADMIN — OXYCODONE AND ACETAMINOPHEN 1 TABLET: 5; 325 TABLET ORAL at 02:16

## 2021-01-01 RX ADMIN — Medication 10 ML: at 14:00

## 2021-01-01 RX ADMIN — OXYCODONE AND ACETAMINOPHEN 1 TABLET: 5; 325 TABLET ORAL at 15:37

## 2021-01-01 RX ADMIN — ANASTROZOLE 1 MG: 1 TABLET ORAL at 08:15

## 2021-01-01 RX ADMIN — INFLUENZA VIRUS VACCINE 0.5 ML: 15; 15; 15; 15 SUSPENSION INTRAMUSCULAR at 14:37

## 2021-01-01 RX ADMIN — Medication 1 SPRAY: at 13:50

## 2021-01-01 RX ADMIN — Medication 10 ML: at 22:14

## 2021-01-01 RX ADMIN — MORPHINE SULFATE 1 MG: 2 INJECTION, SOLUTION INTRAMUSCULAR; INTRAVENOUS at 18:11

## 2021-01-01 RX ADMIN — OXYCODONE AND ACETAMINOPHEN 1 TABLET: 5; 325 TABLET ORAL at 06:17

## 2021-01-01 RX ADMIN — INSULIN LISPRO 2 UNITS: 100 INJECTION, SOLUTION INTRAVENOUS; SUBCUTANEOUS at 11:35

## 2021-01-01 RX ADMIN — OXYCODONE AND ACETAMINOPHEN 1 TABLET: 5; 325 TABLET ORAL at 21:28

## 2021-01-01 RX ADMIN — OXYCODONE AND ACETAMINOPHEN 1 TABLET: 5; 325 TABLET ORAL at 14:42

## 2021-01-01 RX ADMIN — Medication 10 ML: at 06:00

## 2021-01-01 RX ADMIN — OXYCODONE AND ACETAMINOPHEN 1 TABLET: 5; 325 TABLET ORAL at 15:15

## 2021-01-01 RX ADMIN — ANASTROZOLE 1 MG: 1 TABLET ORAL at 09:07

## 2021-01-01 RX ADMIN — POTASSIUM CHLORIDE 40 MEQ: 20 TABLET, EXTENDED RELEASE ORAL at 17:39

## 2021-01-01 RX ADMIN — IOPAMIDOL 100 ML: 755 INJECTION, SOLUTION INTRAVENOUS at 09:09

## 2021-01-01 RX ADMIN — HEPARIN SODIUM 3900 UNITS: 5000 INJECTION INTRAVENOUS; SUBCUTANEOUS at 18:21

## 2021-01-01 RX ADMIN — OXYCODONE AND ACETAMINOPHEN 1 TABLET: 5; 325 TABLET ORAL at 01:21

## 2021-01-01 RX ADMIN — SODIUM CHLORIDE 1000 ML: 9 INJECTION, SOLUTION INTRAVENOUS at 14:05

## 2021-01-01 RX ADMIN — ANASTROZOLE 1 MG: 1 TABLET ORAL at 08:32

## 2021-01-01 RX ADMIN — APIXABAN 10 MG: 5 TABLET, FILM COATED ORAL at 18:11

## 2021-01-01 RX ADMIN — GABAPENTIN 600 MG: 300 CAPSULE ORAL at 21:36

## 2021-01-01 RX ADMIN — ANASTROZOLE 1 MG: 1 TABLET ORAL at 08:59

## 2021-01-01 RX ADMIN — OXYCODONE AND ACETAMINOPHEN 1 TABLET: 5; 325 TABLET ORAL at 14:58

## 2021-01-01 RX ADMIN — IOPAMIDOL 100 ML: 755 INJECTION, SOLUTION INTRAVENOUS at 16:49

## 2021-01-01 RX ADMIN — INSULIN LISPRO 2 UNITS: 100 INJECTION, SOLUTION INTRAVENOUS; SUBCUTANEOUS at 11:42

## 2021-01-01 RX ADMIN — GABAPENTIN 600 MG: 300 CAPSULE ORAL at 21:28

## 2021-01-01 RX ADMIN — HEPARIN SODIUM 1950 UNITS: 5000 INJECTION INTRAVENOUS; SUBCUTANEOUS at 11:42

## 2021-01-01 RX ADMIN — OXYCODONE AND ACETAMINOPHEN 1 TABLET: 5; 325 TABLET ORAL at 20:07

## 2021-01-01 RX ADMIN — Medication 5 ML: at 14:00

## 2021-01-01 RX ADMIN — GABAPENTIN 600 MG: 300 CAPSULE ORAL at 21:35

## 2021-01-01 RX ADMIN — APIXABAN 10 MG: 5 TABLET, FILM COATED ORAL at 08:15

## 2021-01-01 RX ADMIN — Medication 500 UNITS: at 15:15

## 2021-01-15 PROBLEM — E87.6 HYPOKALEMIA: Status: ACTIVE | Noted: 2021-01-01

## 2021-01-15 PROBLEM — I26.99 BILATERAL PULMONARY EMBOLISM (HCC): Status: ACTIVE | Noted: 2021-01-01

## 2021-01-15 PROBLEM — R77.8 ELEVATED TROPONIN: Status: ACTIVE | Noted: 2021-01-01

## 2021-01-15 PROBLEM — R00.0 SINUS TACHYCARDIA: Status: ACTIVE | Noted: 2021-01-01

## 2021-01-15 PROBLEM — K86.89 PANCREATIC MASS: Chronic | Status: ACTIVE | Noted: 2019-05-16

## 2021-01-15 NOTE — H&P
Hospitalist Admission Note NAME: Maxx Bagley :  1950 MRN:  [id number] Date/Time:  1/15/2021 6:28 PM 
 
Patient PCP: Gino Gordon NP oncology= Dr Leif Carlton. (I) 
______________________________________________________________________ Given the patient's current clinical presentation, I have a high level of concern for decompensation if discharged from the emergency department. Complex decision making was performed, which includes reviewing the patient's available past medical records, laboratory results, and x-ray films. My assessment of this patient's clinical condition and my plan of care is as follows. Assessment / Plan: 
Acute bilateral pulmonary embolism POA 
causing hypotension and elevated troponins POA-likely due to diverticular strain Hypokalemia POA-mild at 3.2 pancreatic cancer POA 
history of right breast cancer 
anxiety disorder Hypertension Diabetes 
troponin 0.20-> 0.20 
potassium 3.2 UA negative 
chest x-ray negative acute CTA chest positive for bilateral PE with significant blood burden 
lactate 1.29 
rapid Covid test negative in ED 
EKG sinus tachycardia 138 bpm 
 
admit to stepdown unit for close monitoring 
status post IV fluids in the ED-BP has normalized since 
s/p potassium p.o. x1 in the ED, follow BMP in a.m. 
continue IV heparin as started in the ED 
check 2D echocardiogram to rule out right ventricular strain 
inpatient oncology consult 
trend troponins for 1 more set in the a.m. 
pain management 
fingersticks before every meal and at bedtime SSI lispro here 
continue home Lantus 
continue home Arimidex 
continue home Neurontin nightly Code Status: DNR as per patient's wishes in the ED, witnessed by daughter at bedside Surrogate Decision Maker:  Bharat Benjamin DVT Prophylaxis: IV heparin as above GI Prophylaxis: not indicated Baseline: Patient is independent with ADLs at home, has non operable Pancreatic Ca on chemotherapy per Dr. Juanito Mccullough. Subjective: CHIEF COMPLAINT: Hypotension at oncologist office HISTORY OF PRESENT ILLNESS:    
Batsheva Randolph is a 72 y.o.  female who presents with above complaints from oncology office with daughter. Patient presents with chief complaint of low blood pressure Eliquis feeling very weak since past. 
History of pancreatic cancer on chemotherapy. Denies any history of clots in the past 
 
patient was found to have bilateral pulmonary embolism on CT imaging after she was found to have mildly elevated troponins 0.20 and sinus tachycardia on EKG. We were asked to admit for work up and evaluation of the above problems. Past Medical History:  
Diagnosis Date  Cancer [facility]) 2003, 2017  
 right breast cancer  Diabetes ([facility] [address]  Hypertension  Psychiatric disorder   
 anxiety Past Surgical History:  
Procedure Laterality Date  COLONOSCOPY N/A 4/11/2019 COLONOSCOPY performed by Aracelis Bautista MD at [facility] ENDOSCOPY  
 HX MASTECTOMY Bilateral 6/29/2017 RIGHT AND LEFT MASTECTOMY, RIGHT AXILLARY SENTINAL LYMPH NODE BIOPSY performed by Orlene Kehr, MD at [facility] MAIN OR  
 HX VASCULAR ACCESS    
 left chest portacath - removed 4/2019  [place] BREAST SURGERY PROCEDURE UNLISTED  2003  
 right breast lumpectomy  VASCULAR SURGERY PROCEDURE UNLIST  06/20/2019 Port -a cath placement Social History Tobacco Use  Smoking status: Never Smoker  Smokeless tobacco: Never Used Substance Use Topics  Alcohol use: No  
  
 
Family History Problem Relation Age of Onset  Cancer Mother   
     kidney  Cancer Brother   
     lung  Heart Disease Father  Diabetes Sister  Diabetes Brother No Known Allergies Prior to Admission medications Medication Sig Start Date End Date Taking? Authorizing Provider gabapentin (NEURONTIN) 600 mg tablet TK 1 T PO QD HS 10/28/20   Provider, Historical  
BD Ultra-Fine Mini Pen Needle 31 gauge x 3/16\" ndle USE DAILY WITH LANTUS 9/8/20   Zohreh Duong NP  
insulin glargine (Lantus Solostar U-100 Insulin) 100 unit/mL (3 mL) inpn 8 units SQ daily as needed for blood sugar greater than 150 8/20/20   Zohreh Duong NP OneTouch Ultra Blue Test Strip strip TEST THREE TIMES A DAY 8/3/20   Mary Duong NP OneTouch Delica Lancets 30 gauge misc TEST THREE TIMES A DAY 7/13/20   Zohreh Duong NP  
metFORMIN ER (GLUCOPHAGE XR) 500 mg tablet TAKE ONE TABLET BY MOUTH ONCE DAILY WITH DINNER 6/8/20   Zohreh Duong NP  
oxyCODONE-acetaminophen (PERCOCET) 5-325 mg per tablet Take  by mouth every four (4) hours as needed for Pain. Provider, Historical  
magnesium 250 mg tab Take 1 Tab by mouth daily. Patient taking differently: Take 1 Tab by mouth daily. Pt states she gets this intravenously- when needed 12/30/19   Zohreh Duong NP  
ondansetron hcl (ZOFRAN) 8 mg tablet TAKE ONE TABLET BY MOUTH EVERY 8 HOURS THREE TIMES DAILY AS NEEDED FOR unrelieved nausea 7/29/19   Provider, Historical  
LORazepam (ATIVAN) 1 mg tablet take 1/2-1 tablets by mouth every 6 hours if needed for nausea 7/24/19   Provider, Historical  
prochlorperazine (COMPAZINE) 10 mg tablet TAKE ONE TABLET BY MOUTH EVERY 6 HOURS AS NEEDED FOR NAUSEA 7/29/19   Provider, Historical  
ONETOUCH ULTRAMINI monitoring kit use as directed TO [address] [place] [address] 5/11/19   Provider, Historical  
Insulin Needles, Disposable, 30 gauge x 1/3\" To use daily with lantus 5/29/19   Zohreh Duong NP  
ibuprofen (ADVIL) 200 mg tablet Take 200 mg by mouth every four (4) hours as needed for Pain. Provider, Historical  
anastrozole (ARIMIDEX) 1 mg tablet Take 1 mg by mouth daily.     Provider, Historical  
 denosumab (PROLIA) 60 mg/mL injection 60 mg by SubCUTAneous route every 6 months. At [facility]    Provider, Historical  
multivitamin (ONE A DAY) tablet Take 1 Tab by mouth daily (with lunch). Provider, Historical  
aspirin delayed-release 81 mg tablet Take 81 mg by mouth daily. Provider, Historical  
CALCIUM CARBONATE/VITAMIN D3 (CALTRATE 600 + D PO) Take 2 Tabs by mouth daily (with lunch). Provider, Historical  
 
 
REVIEW OF SYSTEMS:    
 
 
Total of 12 systems reviewed as follows:   
   POSITIVE= underlined text  Negative = text not underlined General:  fever, chills, sweats, generalized weakness, weight loss/gain,  
   loss of appetite Eyes:    blurred vision, eye pain, loss of vision, double vision ENT:    rhinorrhea, pharyngitis Respiratory:   cough, sputum production, SOB, SAAB, wheezing, pleuritic pain  
Cardiology:   chest pain, palpitations, orthopnea, PND, edema, syncope Gastrointestinal:  abdominal pain , N/V, diarrhea, dysphagia, constipation, bleeding Genitourinary:  frequency, urgency, dysuria, hematuria, incontinence Muskuloskeletal :  arthralgia, myalgia, back pain Hematology:  easy bruising, nose or gum bleeding, lymphadenopathy Dermatological: rash, ulceration, pruritis, color change / jaundice Endocrine:   hot flashes or polydipsia Neurological:  headache, dizziness, confusion, focal weakness, paresthesia, Speech difficulties, memory loss, gait difficulty Psychological: Feelings of anxiety, depression, agitation Objective: VITALS:   
Visit Vitals /78 Pulse (!) 120 Temp 98 °F (36.7 °C) Resp 11 Ht 5' 5\" (1.651 m) Wt 48.9 kg (107 lb 12.9 oz) SpO2 94% BMI 17.94 kg/m² PHYSICAL EXAM: 
 
General:    Alert, cooperative, no distress, appears stated age. Chronically ill looking + HEENT: Atraumatic, anicteric sclerae, pink conjunctivae No oral ulcers, mucosa moist, throat clear, dentition fair Neck:  Supple, symmetrical,  thyroid: non tender Lungs:   Clear to auscultation bilaterally. No Wheezing or Rhonchi. No rales. Chest wall:  No tenderness  No Accessory muscle use. Left-sided Port-A-Cath noted+ Heart:   Regular  rhythm,  No  murmur   No edema Abdomen:   Soft, non-tender. Not distended. Bowel sounds normal 
Extremities: No cyanosis. No clubbing,   
  Skin turgor normal, Capillary refill normal, Radial dial pulse 2+ Skin:     Not pale. Not Jaundiced  No rashes Psych:  Good insight. Not depressed. Not anxious or agitated. Neurologic: EOMs intact. No facial asymmetry. No aphasia or slurred speech. Symmetrical strength, Sensation grossly intact. Alert and oriented X 4.  
 
_______________________________________________________________________ Care Plan discussed with: 
  Comments Patient x Family  x  daughter at bedside in ED  
RN x Care Manager Consultant:     
_______________________________________________________________________ Expected  Disposition:  
Home with Family x HH/PT/OT/RN ?  
SNF/LTC   
[facility]   
________________________________________________________________________ TOTAL TIME:  61 Minutes Critical Care Provided     Minutes non procedure based Comments  
 x Reviewed previous records  
>50% of visit spent in counseling and coordination of care x Discussion with patient and family and questions answered 
  
 
________________________________________________________________________ Signed: Margi Bush MD 
 
Procedures: see electronic medical records for all procedures/Xrays and details which were not copied into this note but were reviewed prior to creation of Plan. LAB DATA REVIEWED:   
Recent Results (from the past 24 hour(s)) EKG, 12 LEAD, INITIAL Collection Time: 01/15/21 12:10 PM  
Result Value Ref Range Ventricular Rate 138 BPM  
 Atrial Rate 138 BPM  
 P-R Interval 128 ms QRS Duration 62 ms Q-T Interval 216 ms  
 QTC Calculation (Bezet) 327 ms Calculated P Axis 70 degrees Calculated R Axis 73 degrees Calculated T Axis -90 degrees Diagnosis Sinus tachycardia Left ventricular hypertrophy with repolarization abnormality ST depression, consider subendocardial injury When compared with ECG of 17-MAY-2019 10:10, 
Vent. rate has increased BY  55 BPM 
ST now depressed in Anterior leads Confirmed by Hamzah Mills MD, Lana Castellano ([id number]) on 1/15/2021 5:44:38 PM 
  
CBC WITH AUTOMATED DIFF Collection Time: 01/15/21  2:07 PM  
Result Value Ref Range WBC 5.3 3.6 - 11.0 K/uL  
 RBC 3.59 (L) 3.80 - 5.20 M/uL  
 HGB 10.6 (L) 11.5 - 16.0 g/dL HCT 32.5 (L) 35.0 - 47.0 % MCV 90.5 80.0 - 99.0 FL  
 MCH 29.5 26.0 - 34.0 PG  
 MCHC 32.6 30.0 - 36.5 g/dL  
 RDW 14.5 11.5 - 14.5 % PLATELET 614 626 - 790 K/uL MPV 10.0 8.9 - 12.9 FL  
 NRBC 0.0 0  WBC ABSOLUTE NRBC 0.00 0.00 - 0.01 K/uL NEUTROPHILS 75 32 - 75 % LYMPHOCYTES 14 12 - 49 % MONOCYTES 11 5 - 13 % EOSINOPHILS 0 0 - 7 % BASOPHILS 0 0 - 1 % IMMATURE GRANULOCYTES 0 0.0 - 0.5 % ABS. NEUTROPHILS 4.0 1.8 - 8.0 K/UL  
 ABS. LYMPHOCYTES 0.7 (L) 0.8 - 3.5 K/UL  
 ABS. MONOCYTES 0.6 0.0 - 1.0 K/UL  
 ABS. EOSINOPHILS 0.0 0.0 - 0.4 K/UL  
 ABS. BASOPHILS 0.0 0.0 - 0.1 K/UL  
 ABS. IMM. GRANS. 0.0 0.00 - 0.04 K/UL  
 DF SMEAR SCANNED    
 RBC COMMENTS OVALOCYTES PRESENT 
    
 RBC COMMENTS FEW 
SCHISTOCYTES PRESENT 
   
 RBC COMMENTS RARE METABOLIC PANEL, COMPREHENSIVE Collection Time: 01/15/21  2:07 PM  
Result Value Ref Range Sodium 137 136 - 145 mmol/L Potassium 3.2 (L) 3.5 - 5.1 mmol/L Chloride 102 97 - 108 mmol/L  
 CO2 30 21 - 32 mmol/L Anion gap 5 5 - 15 mmol/L Glucose 135 (H) 65 - 100 mg/dL BUN 10 6 - 20 MG/DL Creatinine 0.46 (L) 0.55 - 1.02 MG/DL  
 BUN/Creatinine ratio 22 (H) 12 - 20 GFR est AA >60 >60 ml/min/1.73m2 GFR est non-AA >60 >60 ml/min/1.73m2 Calcium 8.9 8.5 - 10.1 MG/DL Bilirubin, total 0.7 0.2 - 1.0 MG/DL  
 ALT (SGPT) 57 12 - 78 U/L  
 AST (SGOT) 57 (H) 15 - 37 U/L Alk. phosphatase 226 (H) 45 - 117 U/L Protein, total 6.0 (L) 6.4 - 8.2 g/dL Albumin 2.4 (L) 3.5 - 5.0 g/dL Globulin 3.6 2.0 - 4.0 g/dL A-G Ratio 0.7 (L) 1.1 - 2.2 LIPASE Collection Time: 01/15/21  2:07 PM  
Result Value Ref Range Lipase 17 (L) 73 - 393 U/L  
TROPONIN I Collection Time: 01/15/21  2:07 PM  
Result Value Ref Range Troponin-I, Qt. 0.20 (H) <0.05 ng/mL URINALYSIS W/ RFLX MICROSCOPIC Collection Time: 01/15/21  3:28 PM  
Result Value Ref Range Color DARK YELLOW Appearance CLEAR CLEAR Specific gravity 1.026 1.003 - 1.030    
 pH (UA) 6.5 5.0 - 8.0 Protein 30 (A) NEG mg/dL Glucose Negative NEG mg/dL Ketone TRACE (A) NEG mg/dL Blood Negative NEG Urobilinogen 1.0 0.2 - 1.0 EU/dL Nitrites Negative NEG Leukocyte Esterase SMALL (A) NEG    
 WBC 0-4 0 - 4 /hpf  
 RBC 0-5 0 - 5 /hpf Epithelial cells FEW FEW /lpf Bacteria Negative NEG /hpf Mucus 2+ (A) NEG /lpf  
BILIRUBIN, CONFIRM Collection Time: 01/15/21  3:28 PM  
Result Value Ref Range Bilirubin UA, confirm Negative NEG    
POC LACTIC ACID Collection Time: 01/15/21  4:11 PM  
Result Value Ref Range Lactic Acid (POC) 1.29 0.40 - 2.00 mmol/L  
TROPONIN I Collection Time: 01/15/21  5:44 PM  
Result Value Ref Range Troponin-I, Qt. 0.20 (H) <0.05 ng/mL PROTHROMBIN TIME + INR Collection Time: 01/15/21  5:44 PM  
Result Value Ref Range INR 1.2 (H) 0.9 - 1.1 Prothrombin time 12.5 (H) 9.0 - 11.1 sec PTT Collection Time: 01/15/21  5:44 PM  
Result Value Ref Range  aPTT 28.1 22.1 - 31.0 sec  
 aPTT, therapeutic range     58.0 - 77.0 SECS

## 2021-01-15 NOTE — ED PROVIDER NOTES
EMERGENCY DEPARTMENT HISTORY AND PHYSICAL EXAM 
 
 
Date: 1/15/2021 Patient Name: Yinka Donnelly History of Presenting Illness Chief Complaint Patient presents with  Hypotension Hypotension at the oncologist's office today. Sent over here. HPI: Yinka Donnelly, 72 y.o. female presents to the ED with cc of hypotension. She is sent from her oncologist office today because her blood pressure was found to be low in clinic. She states that she has been feeling very weak for the past today, feels tired and has not been eating or drinking much. She has a history of pancreatic cancer as well as breast cancer. She is currently undergoing chemotherapy, daughter states that her last chemotherapy was about 3 weeks ago. She denies any chest pain or shortness of breath, no recent fevers or coughing, no abdominal pain, vomiting or diarrhea. No black or bloody stools, no dysuria or hematuria. There are no other complaints, changes, or physical findings at this time. PCP: Ladona Galeazzi, NP No current facility-administered medications on file prior to encounter. Current Outpatient Medications on File Prior to Encounter Medication Sig Dispense Refill  gabapentin (NEURONTIN) 600 mg tablet TK 1 T PO QD HS    
 BD Ultra-Fine Mini Pen Needle 31 gauge x 3/16\" ndle USE DAILY WITH LANTUS 100 Pen Needle 1  
 insulin glargine (Lantus Solostar U-100 Insulin) 100 unit/mL (3 mL) inpn 8 units SQ daily as needed for blood sugar greater than 150 18 mL 2  
 OneTouch Ultra Blue Test Strip strip TEST THREE TIMES A  Strip 11  
 OneTouch Delica Lancets 30 gauge misc TEST THREE TIMES A  Lancet 4  
 metFORMIN ER (GLUCOPHAGE XR) 500 mg tablet TAKE ONE TABLET BY MOUTH ONCE DAILY WITH DINNER 90 Tab 2  
 oxyCODONE-acetaminophen (PERCOCET) 5-325 mg per tablet Take  by mouth every four (4) hours as needed for Pain.  magnesium 250 mg tab Take 1 Tab by mouth daily. (Patient taking differently: Take 1 Tab by mouth daily. Pt states she gets this intravenously- when needed) 90 Tab 0  
 ondansetron hcl (ZOFRAN) 8 mg tablet TAKE ONE TABLET BY MOUTH EVERY 8 HOURS THREE TIMES DAILY AS NEEDED FOR unrelieved nausea  1  
 LORazepam (ATIVAN) 1 mg tablet take 1/2-1 tablets by mouth every 6 hours if needed for nausea  0  prochlorperazine (COMPAZINE) 10 mg tablet TAKE ONE TABLET BY MOUTH EVERY 6 HOURS AS NEEDED FOR NAUSEA  1  
 ONETOUCH ULTRAMINI monitoring kit use as directed TO CHECK BLOOD SUGAR THREE TIMES DAILY  0  
 Insulin Needles, Disposable, 30 gauge x 1/3\" To use daily with lantus 100 Package 11  
 ibuprofen (ADVIL) 200 mg tablet Take 200 mg by mouth every four (4) hours as needed for Pain.  anastrozole (ARIMIDEX) 1 mg tablet Take 1 mg by mouth daily.  denosumab (PROLIA) 60 mg/mL injection 60 mg by SubCUTAneous route every 6 months. At [facility]  multivitamin (ONE A DAY) tablet Take 1 Tab by mouth daily (with lunch).  aspirin delayed-release 81 mg tablet Take 81 mg by mouth daily.  CALCIUM CARBONATE/VITAMIN D3 (CALTRATE 600 + D PO) Take 2 Tabs by mouth daily (with lunch). Past History Past Medical History: 
Past Medical History:  
Diagnosis Date  Cancer [facility]) 2003, 2017  
 right breast cancer  Diabetes ([facility] [address]  Hypertension  Psychiatric disorder   
 anxiety Past Surgical History: 
Past Surgical History:  
Procedure Laterality Date  COLONOSCOPY N/A 4/11/2019 COLONOSCOPY performed by Jacob Jacobson [facility] at [facility]  
 HX MASTECTOMY Bilateral 6/29/2017 RIGHT AND LEFT MASTECTOMY, RIGHT AXILLARY SENTINAL LYMPH NODE BIOPSY performed by Stephen Mcdaniel MD at [facility] MAIN OR  
 HX VASCULAR ACCESS    
 left chest portacath - removed 4/2019  NV BREAST SURGERY PROCEDURE UNLISTED  2003  
 right breast lumpectomy  VASCULAR SURGERY PROCEDURE UNLIST  06/20/2019 Port -a cath placement Family History: 
Family History Problem Relation Age of Onset  Cancer Mother   
     kidney  Cancer Brother   
     lung  Heart Disease Father  Diabetes Sister  Diabetes Brother Social History: 
Social History Tobacco Use  Smoking status: Never Smoker  Smokeless tobacco: Never Used Substance Use Topics  Alcohol use: No  
 Drug use: No  
 
 
Allergies: 
No Known Allergies Review of Systems  
no fever No eye pain No ear pain 
no shortness of breath 
no chest pain 
no abdominal pain 
no dysuria 
no leg pain No rash No lymphadenopathy No weight loss Physical Exam  
Physical Exam 
Constitutional:   
   General: She is not in acute distress. Comments: Cachectic and chronically ill-appearing HENT:  
   Head: Normocephalic and atraumatic. Mouth/Throat:  
   Mouth: Mucous membranes are dry. Eyes:  
   Extraocular Movements: Extraocular movements intact. Neck: Musculoskeletal: Neck supple. Cardiovascular:  
   Rate and Rhythm: Regular rhythm. Tachycardia present. Pulmonary:  
   Effort: Pulmonary effort is normal.  
   Breath sounds: Normal breath sounds. Abdominal:  
   Palpations: Abdomen is soft. Tenderness: There is no abdominal tenderness. Musculoskeletal:     
   General: No swelling or tenderness. Skin: 
   General: Skin is warm and dry. Neurological:  
   General: No focal deficit present. Mental Status: She is alert and oriented to person, place, and time. Psychiatric:     
   Mood and Affect: Mood normal.  
 
 
 
Diagnostic Study Results Labs - Recent Results (from the past 24 hour(s)) EKG, 12 LEAD, INITIAL Collection Time: 01/15/21 12:10 PM  
Result Value Ref Range Ventricular Rate 138 BPM  
 Atrial Rate 138 BPM  
 P-R Interval 128 ms QRS Duration 62 ms Q-T Interval 216 ms  
 QTC Calculation (Bezet) 327 ms Calculated P Axis 70 degrees Calculated R Axis 73 degrees Calculated T Axis -90 degrees Diagnosis Sinus tachycardia Left ventricular hypertrophy with repolarization abnormality When compared with ECG of 17-MAY-2019 10:10, 
Vent. rate has increased BY  55 BPM 
ST now depressed in Anterior leads Nonspecific T wave abnormality, worse in Inferior leads T wave inversion now evident in Anterior leads CBC WITH AUTOMATED DIFF Collection Time: 01/15/21  2:07 PM  
Result Value Ref Range WBC 5.3 3.6 - 11.0 K/uL  
 RBC 3.59 (L) 3.80 - 5.20 M/uL  
 HGB 10.6 (L) 11.5 - 16.0 g/dL HCT 32.5 (L) 35.0 - 47.0 % MCV 90.5 80.0 - 99.0 FL  
 MCH 29.5 26.0 - 34.0 PG  
 MCHC 32.6 30.0 - 36.5 g/dL  
 RDW 14.5 11.5 - 14.5 % PLATELET 061 659 - 479 K/uL MPV 10.0 8.9 - 12.9 FL  
 NRBC 0.0 0  WBC ABSOLUTE NRBC 0.00 0.00 - 0.01 K/uL NEUTROPHILS 75 32 - 75 % LYMPHOCYTES 14 12 - 49 % MONOCYTES 11 5 - 13 % EOSINOPHILS 0 0 - 7 % BASOPHILS 0 0 - 1 % IMMATURE GRANULOCYTES 0 0.0 - 0.5 % ABS. NEUTROPHILS 4.0 1.8 - 8.0 K/UL  
 ABS. LYMPHOCYTES 0.7 (L) 0.8 - 3.5 K/UL  
 ABS. MONOCYTES 0.6 0.0 - 1.0 K/UL  
 ABS. EOSINOPHILS 0.0 0.0 - 0.4 K/UL  
 ABS. BASOPHILS 0.0 0.0 - 0.1 K/UL  
 ABS. IMM. GRANS. 0.0 0.00 - 0.04 K/UL  
 DF SMEAR SCANNED    
 RBC COMMENTS OVALOCYTES PRESENT 
    
 RBC COMMENTS FEW 
SCHISTOCYTES PRESENT 
   
 RBC COMMENTS RARE METABOLIC PANEL, COMPREHENSIVE Collection Time: 01/15/21  2:07 PM  
Result Value Ref Range Sodium 137 136 - 145 mmol/L Potassium 3.2 (L) 3.5 - 5.1 mmol/L Chloride 102 97 - 108 mmol/L  
 CO2 30 21 - 32 mmol/L Anion gap 5 5 - 15 mmol/L Glucose 135 (H) 65 - 100 mg/dL BUN 10 6 - 20 MG/DL Creatinine 0.46 (L) 0.55 - 1.02 MG/DL  
 BUN/Creatinine ratio 22 (H) 12 - 20 GFR est AA >60 >60 ml/min/1.73m2 GFR est non-AA >60 >60 ml/min/1.73m2 Calcium 8.9 8.5 - 10.1 MG/DL  Bilirubin, total 0.7 0.2 - 1.0 MG/DL  
 ALT (SGPT) 57 12 - 78 U/L  
 AST (SGOT) 57 (H) 15 - 37 U/L Alk. phosphatase 226 (H) 45 - 117 U/L Protein, total 6.0 (L) 6.4 - 8.2 g/dL Albumin 2.4 (L) 3.5 - 5.0 g/dL Globulin 3.6 2.0 - 4.0 g/dL A-G Ratio 0.7 (L) 1.1 - 2.2 LIPASE Collection Time: 01/15/21  2:07 PM  
Result Value Ref Range Lipase 17 (L) 73 - 393 U/L  
TROPONIN I Collection Time: 01/15/21  2:07 PM  
Result Value Ref Range Troponin-I, Qt. 0.20 (H) <0.05 ng/mL URINALYSIS W/ RFLX MICROSCOPIC Collection Time: 01/15/21  3:28 PM  
Result Value Ref Range Color DARK YELLOW Appearance CLEAR CLEAR Specific gravity 1.026 1.003 - 1.030    
 pH (UA) 6.5 5.0 - 8.0 Protein 30 (A) NEG mg/dL Glucose Negative NEG mg/dL Ketone TRACE (A) NEG mg/dL Blood Negative NEG Urobilinogen 1.0 0.2 - 1.0 EU/dL Nitrites Negative NEG Leukocyte Esterase SMALL (A) NEG    
 WBC 0-4 0 - 4 /hpf  
 RBC 0-5 0 - 5 /hpf Epithelial cells FEW FEW /lpf Bacteria Negative NEG /hpf Mucus 2+ (A) NEG /lpf  
BILIRUBIN, CONFIRM Collection Time: 01/15/21  3:28 PM  
Result Value Ref Range Bilirubin UA, confirm Negative NEG    
POC LACTIC ACID Collection Time: 01/15/21  4:11 PM  
Result Value Ref Range Lactic Acid (POC) 1.29 0.40 - 2.00 mmol/L Radiologic Studies -  
XR CHEST PORT Final Result IMPRESSION: No acute findings. CTA CHEST W OR W WO CONT    (Results Pending) CT Results  (Last 48 hours) None CXR Results  (Last 48 hours) 01/15/21 1425  XR CHEST PORT Final result Impression:  IMPRESSION: No acute findings. Narrative:  EXAM: XR CHEST PORT INDICATION: weakness COMPARISON: Chest CT 11/24/2020, chest x-ray 1/24/2020 FINDINGS: A portable AP radiograph of the chest was obtained at 1414 hours. A  
left subclavian portacatheter is again shown terminating in the superior vena cava. The lungs are clear. There is no pneumothorax or pleural effusion. Cardiac, mediastinal and hilar contours are within normal limits. The bones are  
moderately osteopenic. Medical Decision Making I am the first provider for this patient. I reviewed the vital signs, available nursing notes, past medical history, past surgical history, family history and social history. Vital Signs-Reviewed the patient's vital signs. Patient Vitals for the past 24 hrs: 
 Temp Pulse Resp BP SpO2  
01/15/21 1611  (!) 127 21  96 % 01/15/21 1610  (!) 128 21 103/64 96 % 01/15/21 1602    103/64   
01/15/21 1500  (!) 116 16 (!) 91/49 (!) 70 % 01/15/21 1400  (!) 125 (!) 31 104/68 97 % 01/15/21 1245  (!) 129 20 112/72 98 % 01/15/21 1207 98 °F (36.7 °C) (!) 138 18 110/62 99 % Provider Notes (Medical Decision Making):  
81-year-old female presenting for hypotension in clinic. Concern for volume depletion in the setting of poor p.o. intake, electrolyte/metabolic abnormalities, dysrhythmia, atypical ACS, pulmonary embolism with associated tachycardia and malignancy, infection. She is afebrile and nontoxic-appearing, currently denies any pain or complaints. She is however quite tachycardic. She is given 1 L normal saline bolus. EKG is performed at 12: 11, shows sinus tachycardia at a rate of 138, , QRS 62, QTc of 327, upright axis, good R wave progression, no obvious ST segment elevation or depression concerning for ACS, this is interpreted as sinus tachycardia. ED Course:  
 
Initial assessment performed. The patients presenting problems have been discussed, and they are in agreement with the care plan formulated and outlined with them. I have encouraged them to ask questions as they arise throughout their visit. UA is negative for UTI, lactic acid is not significantly elevated, chest x-ray is read as unremarkable. CBC shows anemia hemoglobin of 10.6, negative for leukocytosis. Basic metabolic panel shows normal renal function, hypokalemia of 3.2, this will be replaced. Her troponin is elevated at 0.2, she has no known prior cardiac history, this will be repeated, she does not have any chest pain, consistent with probable demand ischemia in the setting of her tachycardia and other underlying illness. CT scan for PE will be obtained given that she is high risk for pulmonary embolism, this shows abundant bilateral pulmonary emboli. She will be initiated on heparin. On reevaluation, she is resting comfortably, continues to be tachycardic in the low 120s, however otherwise her vitals are unremarkable, saturating well, breathing comfortably, continues to deny any chest pain or shortness of breath. I have spoken with her oncologist who is aware that she will be admitted. Critical Care Time: CRITICAL CARE NOTE : 
 
5:30 PM 
 
IMPENDING DETERIORATION -Cardiovascular ASSOCIATED RISK FACTORS - Hypotension and Dysrhythmia MANAGEMENT- Bedside Assessment and Supervision of Care INTERPRETATION -  Xrays, CT Scan, ECG and Blood Pressure INTERVENTIONS -anticoagulation initiation, fluid resuscitation CASE REVIEW - Medical Sub-Specialist, Nursing and Family TREATMENT RESPONSE -Stable PERFORMED BY - Self NOTES   : 
I have spent 30 minutes of critical care time involved in lab review, consultations with specialist, family decision- making, bedside attention and documentation. This time excludes time spent in any separate billed procedures. During this entire length of time I was immediately available to the patient . Aspen Navarro MD 
 
 
Disposition: 
Admit PLAN: 
1. Current Discharge Medication List  
  
 
2. Follow-up Information None Return to ED if worse Diagnosis Clinical Impression: Acute generalized weakness secondary to multiple pulmonary emboli, history of malignancy, acute hypokalemia

## 2021-01-15 NOTE — Clinical Note
Status[de-identified] INPATIENT [101]   Type of Bed: Stepdown [17]   Inpatient Hospitalization Certified Necessary for the Following Reasons: 3.  Patient receiving treatment that can only be provided in an inpatient setting (further clarification in H&P documentation)   Admitting Diagnosis: Bilateral pulmonary embolism [facility] [[id number]]   Admitting Diagnosis: Sinus tachycardia [[id number]]   Admitting Diagnosis: Hypokalemia [[id number]]   Admitting Diagnosis: Elevated troponin [[id number]]   Admitting Physician: Anderson Maria [[id number]]   Attending Physician: [place]   Estimated Length of Stay: 3-4 Midnights   Discharge Plan[de-identified] 2003 [facility]

## 2021-01-15 NOTE — PROGRESS NOTES
Hematology Oncology Consultation    Reason for consult: Deydration    Admitting Diagnosis: No admission diagnoses are documented for this encounter. Impression:   *) Gastric cancer and metasynchronus pancreatic cancer (not bx proven) and decreased po intake:  - Well known to me and has been getting systemic treatment in my office, was about to start Onyvide/5fu next week. She has been losing weight and having decreased po intake, was seen in clinic with hypotension and referred to ED for evaluation.  - If she were to develop gastric outlet obstruction will ask Dr Donald Johnson to place gastro-jejunal bypass. Currently, doesn't have any symptoms. *) Decreased PO intake, tachycardia, hypotension:  - getting IV hydration, dtr states she hasn't been drinking much fluids this weak. - CTA chest pending    *) Troponin bump:  - 0.2 on first check, trend out  - per primary    *) Chronic Abdominal Pain, cancer induced:  - resume home oxycodone/apap 5mg/325mg q 4 hr prn pain     *) Suspected pancreatic insufficiency:  - Resume home Creon with meals      *) Renal wasting hypomagnesemia:   - Check mag level, she has known hx of mag wasting, can't take oral mag due to diarrhea    *) Treatment related anemia:   Her hemoglobin is 10.6. We will continue to monitor closely. *) Chemoinduced neuropathy:   -She is on gabapentin 600 mg q.h.s. and vitamin B6 at home. *) History of breast cancer:   She had a rightsided breast cancer diagnosed in 2003, status post lumpectomy that showed stage I disease that was triple negative. She then had DDAC x4 and XRT. She then had recurrence in the right side in 01/2017, the tumor was triple positive, the mass was around 2 cm. She received neoadjuvant TCHPand had path CR surgery       Thank you for this kind referral, we will follow along with you. Discussion: Dolores Gutierrez is a  72y.o. year old seen in consultation at the request of Dr. Lourdes Samson for FTT.   She is on palliative tx for gastric cancer and presumed pancreatic cancer as well. She had recent EGD with Dr Ruby Orellana who noted improved gastric cancer but felt pancreatic tumor was worse with some duodenal compression, therefore, plan was to switch to [facility] and start 1/18. She came in to clinic and was noted to be tachycardic pulse >140 with low BP sbp 90's and was instructed to go to ED. In ED she has pulse 140 with normal bp, CT Chest pending, troponin elevated at 0.2. Hospitalist has yet to be called but ER provider states she will be admitted for dehydration. Daughter gives hx stating she hasn't been drinking much fluids this last week, no N/V reproted, she is having intermittent epigastric abd pains and using oxycodone 2time/day at home. Has frequent diarrhea at home and uses immodium prn. No fevers, chills, sob or cp. Imaging:    CTA Chest pending    Labs:    Recent Results (from the past 24 hour(s))   EKG, 12 LEAD, INITIAL    Collection Time: 01/15/21 12:10 PM   Result Value Ref Range    Ventricular Rate 138 BPM    Atrial Rate 138 BPM    P-R Interval 128 ms    QRS Duration 62 ms    Q-T Interval 216 ms    QTC Calculation (Bezet) 327 ms    Calculated P Axis 70 degrees    Calculated R Axis 73 degrees    Calculated T Axis -90 degrees    Diagnosis       Sinus tachycardia  Left ventricular hypertrophy with repolarization abnormality  When compared with ECG of 17-MAY-2019 10:10,  Vent.  rate has increased BY  55 BPM  ST now depressed in Anterior leads  Nonspecific T wave abnormality, worse in Inferior leads  T wave inversion now evident in Anterior leads     CBC WITH AUTOMATED DIFF    Collection Time: 01/15/21  2:07 PM   Result Value Ref Range    WBC 5.3 3.6 - 11.0 K/uL    RBC 3.59 (L) 3.80 - 5.20 M/uL    HGB 10.6 (L) 11.5 - 16.0 g/dL    HCT 32.5 (L) 35.0 - 47.0 %    MCV 90.5 80.0 - 99.0 FL    MCH 29.5 26.0 - 34.0 PG    MCHC 32.6 30.0 - 36.5 g/dL    RDW 14.5 11.5 - 14.5 %    PLATELET 701 698 - 435 K/uL    MPV 10.0 8.9 - 12.9 FL    NRBC 0.0 0  WBC    ABSOLUTE NRBC 0.00 0.00 - 0.01 K/uL    NEUTROPHILS 75 32 - 75 %    LYMPHOCYTES 14 12 - 49 %    MONOCYTES 11 5 - 13 %    EOSINOPHILS 0 0 - 7 %    BASOPHILS 0 0 - 1 %    IMMATURE GRANULOCYTES 0 0.0 - 0.5 %    ABS. NEUTROPHILS 4.0 1.8 - 8.0 K/UL    ABS. LYMPHOCYTES 0.7 (L) 0.8 - 3.5 K/UL    ABS. MONOCYTES 0.6 0.0 - 1.0 K/UL    ABS. EOSINOPHILS 0.0 0.0 - 0.4 K/UL    ABS. BASOPHILS 0.0 0.0 - 0.1 K/UL    ABS. IMM. GRANS. 0.0 0.00 - 0.04 K/UL    DF SMEAR SCANNED      RBC COMMENTS OVALOCYTES  PRESENT        RBC COMMENTS FEW  SCHISTOCYTES  PRESENT       RBC COMMENTS RARE    METABOLIC PANEL, COMPREHENSIVE    Collection Time: 01/15/21  2:07 PM   Result Value Ref Range    Sodium 137 136 - 145 mmol/L    Potassium 3.2 (L) 3.5 - 5.1 mmol/L    Chloride 102 97 - 108 mmol/L    CO2 30 21 - 32 mmol/L    Anion gap 5 5 - 15 mmol/L    Glucose 135 (H) 65 - 100 mg/dL    BUN 10 6 - 20 MG/DL    Creatinine 0.46 (L) 0.55 - 1.02 MG/DL    BUN/Creatinine ratio 22 (H) 12 - 20      GFR est AA >60 >60 ml/min/1.73m2    GFR est non-AA >60 >60 ml/min/1.73m2    Calcium 8.9 8.5 - 10.1 MG/DL    Bilirubin, total 0.7 0.2 - 1.0 MG/DL    ALT (SGPT) 57 12 - 78 U/L    AST (SGOT) 57 (H) 15 - 37 U/L    Alk.  phosphatase 226 (H) 45 - 117 U/L    Protein, total 6.0 (L) 6.4 - 8.2 g/dL    Albumin 2.4 (L) 3.5 - 5.0 g/dL    Globulin 3.6 2.0 - 4.0 g/dL    A-G Ratio 0.7 (L) 1.1 - 2.2     LIPASE    Collection Time: 01/15/21  2:07 PM   Result Value Ref Range    Lipase 17 (L) 73 - 393 U/L   TROPONIN I    Collection Time: 01/15/21  2:07 PM   Result Value Ref Range    Troponin-I, Qt. 0.20 (H) <0.05 ng/mL   URINALYSIS W/ RFLX MICROSCOPIC    Collection Time: 01/15/21  3:28 PM   Result Value Ref Range    Color DARK YELLOW      Appearance CLEAR CLEAR      Specific gravity 1.026 1.003 - 1.030      pH (UA) 6.5 5.0 - 8.0      Protein 30 (A) NEG mg/dL    Glucose Negative NEG mg/dL    Ketone TRACE (A) NEG mg/dL    Blood Negative NEG      Urobilinogen 1.0 0.2 - 1.0 EU/dL    Nitrites Negative NEG      Leukocyte Esterase SMALL (A) NEG     BILIRUBIN, CONFIRM    Collection Time: 01/15/21  3:28 PM   Result Value Ref Range    Bilirubin UA, confirm Negative NEG         History:  Past Medical History:   Diagnosis Date    Cancer ([facility] [address] 2003, 2017    right breast cancer    Diabetes ([facility] [address]     Hypertension     Psychiatric disorder     anxiety      Past Surgical History:   Procedure Laterality Date    COLONOSCOPY N/A 4/11/2019    COLONOSCOPY performed by Verito Potter MD at [facility] ENDOSCOPY    HX MASTECTOMY Bilateral 6/29/2017    RIGHT AND LEFT MASTECTOMY, RIGHT AXILLARY SENTINAL LYMPH NODE BIOPSY performed by Lissette Davenport MD at [facility] MAIN OR    HX VASCULAR ACCESS      left chest portacath - removed 4/2019    NE BREAST SURGERY PROCEDURE UNLISTED  2003    right breast lumpectomy    VASCULAR SURGERY PROCEDURE UNLIST  06/20/2019    Port -a cath placement      Prior to Admission medications    Medication Sig Start Date End Date Taking? Authorizing Provider   gabapentin (NEURONTIN) 600 mg tablet TK 1 T PO QD HS 10/28/20   Provider, Historical   BD Ultra-Fine Mini Pen Needle 31 gauge x 3/16\" ndle USE DAILY WITH LANTUS 9/8/20   Zohreh Duong NP   insulin glargine (Lantus Solostar U-100 Insulin) 100 unit/mL (3 mL) inpn 8 units SQ daily as needed for blood sugar greater than 150 8/20/20   Zohreh Duong NP   OneTouch Ultra [employer] Strip strip TEST THREE TIMES A DAY 8/3/20   Zohreh Duong NP   OneTouch Delica Lancets 30 gauge misc TEST THREE TIMES A DAY 7/13/20   Zohreh Duong NP   metFORMIN ER (GLUCOPHAGE XR) 500 mg tablet TAKE ONE TABLET BY MOUTH ONCE DAILY WITH DINNER 6/8/20   Zohreh Duong NP   oxyCODONE-acetaminophen (PERCOCET) 5-325 mg per tablet Take  by mouth every four (4) hours as needed for Pain. Provider, Historical   magnesium 250 mg tab Take 1 Tab by mouth daily. Patient taking differently: Take 1 Tab by mouth daily.  Pt states she gets this intravenously- when needed 12/30/19   Zohreh Duong NP   ondansetron hcl (ZOFRAN) 8 mg tablet TAKE ONE TABLET BY MOUTH EVERY 8 HOURS THREE TIMES DAILY AS NEEDED FOR unrelieved nausea 7/29/19   Provider, Historical   LORazepam (ATIVAN) 1 mg tablet take 1/2-1 tablets by mouth every 6 hours if needed for nausea 7/24/19   Provider, Historical   prochlorperazine (COMPAZINE) 10 mg tablet TAKE ONE TABLET BY MOUTH EVERY 6 HOURS AS NEEDED FOR NAUSEA 7/29/19   Provider, Historical   ONETOUCH ULTRAMINI monitoring kit use as directed TO [address] [place] [address] 5/11/19   Provider, Historical   Insulin Needles, Disposable, 30 gauge x 1/3\" To use daily with lantus 5/29/19   Zohreh Duong NP   ibuprofen (ADVIL) 200 mg tablet Take 200 mg by mouth every four (4) hours as needed for Pain. Provider, Historical   anastrozole (ARIMIDEX) 1 mg tablet Take 1 mg by mouth daily. Provider, Historical   denosumab (PROLIA) 60 mg/mL injection 60 mg by SubCUTAneous route every 6 months. At [facility]    Provider, Historical   multivitamin (ONE A DAY) tablet Take 1 Tab by mouth daily (with lunch). Provider, Historical   aspirin delayed-release 81 mg tablet Take 81 mg by mouth daily. Provider, Historical   CALCIUM CARBONATE/VITAMIN D3 (CALTRATE 600 + D PO) Take 2 Tabs by mouth daily (with lunch). Provider, Historical     No Known Allergies   Social History     Tobacco Use    Smoking status: Never Smoker    Smokeless tobacco: Never Used   Substance Use Topics    Alcohol use: No      Family History   Problem Relation Age of Onset    Cancer Mother         kidney    Cancer Brother         lung    Heart Disease Father     Diabetes Sister     Diabetes Brother         Current Medications:  No current facility-administered medications for this visit.       Current Outpatient Medications   Medication Sig    gabapentin (NEURONTIN) 600 mg tablet TK 1 T PO QD HS    BD Ultra-Fine Mini Pen Needle 31 gauge x 3/16\" ndle USE DAILY WITH LANTUS    insulin glargine (Lantus Solostar U-100 Insulin) 100 unit/mL (3 mL) inpn 8 units SQ daily as needed for blood sugar greater than 150    OneTouch Ultra Blue Test Strip strip TEST THREE TIMES A DAY    OneTouch Delica Lancets 30 gauge misc TEST THREE TIMES A DAY    metFORMIN ER (GLUCOPHAGE XR) 500 mg tablet TAKE ONE TABLET BY MOUTH ONCE DAILY WITH DINNER    oxyCODONE-acetaminophen (PERCOCET) 5-325 mg per tablet Take  by mouth every four (4) hours as needed for Pain.  magnesium 250 mg tab Take 1 Tab by mouth daily. (Patient taking differently: Take 1 Tab by mouth daily. Pt states she gets this intravenously- when needed)    ondansetron hcl (ZOFRAN) 8 mg tablet TAKE ONE TABLET BY MOUTH EVERY 8 HOURS THREE TIMES DAILY AS NEEDED FOR unrelieved nausea    LORazepam (ATIVAN) 1 mg tablet take 1/2-1 tablets by mouth every 6 hours if needed for nausea    prochlorperazine (COMPAZINE) 10 mg tablet TAKE ONE TABLET BY MOUTH EVERY 6 HOURS AS NEEDED FOR NAUSEA    ONETOUCH ULTRAMINI monitoring kit use as directed TO CHECK BLOOD SUGAR THREE TIMES DAILY    Insulin Needles, Disposable, 30 gauge x 1/3\" To use daily with lantus    ibuprofen (ADVIL) 200 mg tablet Take 200 mg by mouth every four (4) hours as needed for Pain.  anastrozole (ARIMIDEX) 1 mg tablet Take 1 mg by mouth daily.  denosumab (PROLIA) 60 mg/mL injection 60 mg by SubCUTAneous route every 6 months. At [facility]    multivitamin (ONE A DAY) tablet Take 1 Tab by mouth daily (with lunch).  aspirin delayed-release 81 mg tablet Take 81 mg by mouth daily.  CALCIUM CARBONATE/VITAMIN D3 (CALTRATE 600 + D PO) Take 2 Tabs by mouth daily (with lunch).      Facility-Administered Medications Ordered in Other Visits   Medication Dose Route Frequency    potassium chloride (K-DUR, KLOR-CON) SR tablet 40 mEq  40 mEq Oral NOW    iopamidoL (ISOVUE-370) 76 % injection 100 mL  100 mL IntraVENous RAD ONCE         Review of Systems:  Constitutional No fevers, chills, night sweats,+ weight loss. Allergic/Immunologic No recent allergic reactions   Eyes No significant visual difficulties. No diplopia. ENMT No problems with hearing, no sore throat, no sinus drainage. Endocrine No hot flashes or night sweats. No cold intolerance, polyuria, or polydipsia   Hematologic/Lymphatic No easy bruising or bleeding. The patient denies any tender or palpable lymph nodes   Breasts No abnormal masses of breast, nipple discharge or pain. Respiratory No dyspnea on exertion, orthopnea, chest pain, cough or hemoptysis. Cardiovascular No anginal chest pain, irregular heart beat, tachycardia, palpitations or orthopnea. Gastrointestinal No nausea, vomiting, diarrhea, constipation, cramping, dysphagia, reflux, heartburn, GI bleeding,+early satiety. No change in bowel habits. Genitourinary (M) No hematuria, dysuria, increased frequency, urgency, hesitancy or incontinence. Musculoskeletal No joint pain, swelling or redness. No decreased range of motion. Integumentary No chronic rashes, inflammation, ulcerations, pruritus, petechiae, purpura, ecchymoses, or skin changes. Neurologic No headache, blurred vision, and no areas of focal weakness or numbness. Normal gait. No sensory problems. Psychiatric No insomnia, depression, jose or mood swings. No psychotropic drugs. Physical Exam:  Constitutional Alert, cooperative, oriented. Mood and affect appropriate. Appears close to chronological age. Well nourished. Well developed. Head Normocephalic; no scars   Eyes Conjunctivae and sclerae are clear and without icterus. Pupils are reactive and equal.   ENMT Sinuses are nontender. No oral exudates, ulcers, masses, thrush or mucositis. Oropharynx clear. Tongue normal.   Neck Supple without masses or thyromegaly. No jugular venous distension. Hematologic/Lymphatic No petechiae or purpura.   No tender or palpable lymph nodes in the cervical, supraclavicular, axillary or inguinal area. Respiratory Lungs are clear to auscultation without rhonchi or wheezing. Cardiovascular Regular rate and rhythm of heart without murmurs, gallops or rubs. Chest / Line Site Chest is symmetric with no chest wall deformities. Abdomen Soft, TTP in epigastrum   Musculoskeletal No tenderness or swelling, normal range of motion without obvious weakness. Extremities No visible deformities, no cyanosis, clubbing or edema. Skin No rashes, scars, or lesions suggestive of malignancy. No petechiae, purpura, or ecchymoses. No excoriations. Neurologic No sensory or motor deficits, normal cerebellar function, normal gait, cranial nerves intact. Psychiatric Alert and oriented times three. Coherent speech. Verbalizes understanding of our discussions today.

## 2021-01-16 NOTE — ACP (ADVANCE CARE PLANNING)
Advance Care Planning Note NAME: Guzman Smith :  1950 MRN:  [id number] Date/Time:  1/15/2021 7:55 PM 
 
Active Diagnoses: 
Hospital Problems  Date Reviewed: 1/15/2021 Codes Class Noted POA Sinus tachycardia ICD-10-CM: R00.0 ICD-9-CM: 427.89  1/15/2021 Yes Hypokalemia ICD-10-CM: E87.6 ICD-9-CM: 276.8  1/15/2021 Yes Elevated troponin ICD-10-CM: R77.8 ICD-9-CM: 790.6  1/15/2021 Yes Bilateral pulmonary embolism ([facility] [address] ICD-10-CM: I26.99 
ICD-9-CM: 415.19  1/15/2021 Yes Pancreatic mass (Chronic) ICD-10-CM: U38.94 
ICD-9-CM: 577.8  2019 Yes These active diagnoses are of sufficient risk that focused discussion on advance care planning is indicated in order to allow the patient to thoughtfully consider personal goals of care, and if situations arise that prevent the ability to personally give input, to ensure appropriate representation of their personal desires for different levels and aggressiveness of care. Discussion:  
Code status addressed and wants to be a DNR / DNI. Patient ok with central line and vasopressors if needed. Patient would NOT want a feeding tube, if needed, for nutritional support. Patient  would like to assign  Maggie Horner as the surrogate decision maker. Persons present and participating in discussion: Omero Day MD, daughter Whit Lovejanki Time Spent:  
Total time spent face-to-face in education and discussion:   16  minutes.   
 
 
 
Jael Dave MD  
Hospitalist

## 2021-01-16 NOTE — ED NOTES
Call received from the lab. Patient's PTT/TNR has errors. PCT will redraw lab sample at this time and the lab will be reprocessed. Mo Etienne NP notified via [employer].

## 2021-01-16 NOTE — PROGRESS NOTES
Hospitalist Progress Note NAME: Emili Hogan :  1950 MRN:  [id number] Assessment / Plan: 
Acute bilateral pulmonary embolism POA 
causing hypotension and elevated troponins POA-likely due to diverticular strain Hypokalemia POA, repleted Pancreatic cancer POA, non operative, on chemo 
history of right breast cancer, cont' armidex 
anxiety disorder CTA chest positive for b/l PE with significant clot burden EKG sinus tachycardia 138 bpm on admission. Currently 110 on tele CXR neg for acute process Trop 0.2-->0.11 Echo pending Will obtain duplex US, probnp Oncology consulted Strict bedrest for now O2 suppl, wean as tolerated Hypertension BP soft, holding antihypertensives Diabetes SSI Hold lantus and neurontin Body mass index is 17.94 kg/m². Code status: DNR Prophylaxis: heparin gtt Recommended Disposition: Home w/Family Subjective: Chief Complaint / Reason for Physician Visit Pt seen, NAD. Denies any cp or sob. Counseled pt on bedrest for today. Discussed with RN events overnight. Review of Systems: 
Symptom Y/N Comments  Symptom Y/N Comments Fever/Chills n   Chest Pain n   
Poor Appetite    Edema Cough    Abdominal Pain Sputum    Joint Pain SOB/SAAB n   Pruritis/Rash Nausea/vomit    Tolerating PT/OT Diarrhea    Tolerating Diet Constipation    Other Could NOT obtain due to:   
 
Objective: VITALS:  
Last 24hrs VS reviewed since prior progress note. Most recent are: 
Patient Vitals for the past 24 hrs: 
 Temp Pulse Resp BP SpO2  
21 1108    111/76   
21 0900  (!) 116 10 (!) 123/110 98 % 21 0800  (!) 112 20 108/68 96 % 21 0700 98.5 °F (36.9 °C) (!) 112 13 102/69 97 % 21 0600  (!) 117 15 112/65 96 % 21 0530  (!) 111 23 105/63 96 % 21 0500  (!) 110 20 103/63 96 % 21 0430  (!) 109 21 104/61 95 % 21 0400  (!) 108 16 102/62 95 % 01/16/21 0330  (!) 107 20 102/68 97 % 01/16/21 0300  (!) 107 21 101/64 96 % 01/16/21 0230  (!) 108 19 96/62 97 % 01/16/21 0200  (!) 109 19 108/67 97 % 01/16/21 0130  (!) 106 18 90/60 97 % 01/16/21 0100  (!) 105 19 (!) 92/55 97 % 01/16/21 0000  (!) 106 18 95/61 96 % 01/15/21 2330  (!) 109 19 105/64 96 % 01/15/21 2300  (!) 110 19 104/62 95 % 01/15/21 2230  (!) 116 20 108/66 94 % 01/15/21 2200  (!) 118 21 113/72 97 % 01/15/21 2130  (!) 121 20 109/70 95 % 01/15/21 2100  (!) 121 13 100/71 97 % 01/15/21 2030  (!) 126 21 109/66 96 % 01/15/21 2000  (!) 117 25 114/71 97 % 01/15/21 1930  (!) 125 19 107/73 96 % 01/15/21 1900  (!) 122 23 101/64 98 % 01/15/21 1854  (!) 125  112/66 98 % 01/15/21 1830  (!) 125 11 110/70 97 % 01/15/21 1800  (!) 115 18 120/74 97 % 01/15/21 1730  (!) 120 11 115/78 94 % 01/15/21 1611  (!) 127 21  96 % 01/15/21 1610  (!) 128 21 103/64 96 % 01/15/21 1602    103/64   
01/15/21 1500  (!) 116 16 (!) 91/49 (!) 70 % 01/15/21 1400  (!) 125 (!) 31 104/68 97 % 01/15/21 1245  (!) 129 20 112/72 98 % 01/15/21 1207 98 °F (36.7 °C) (!) 138 18 110/62 99 % Intake/Output Summary (Last 24 hours) at 1/16/2021 1141 Last data filed at 1/15/2021 3707 Gross per 24 hour Intake 1000 ml Output  Net 1000 ml I had a face to face encounter and independently examined this patient on 1/16/2021, as outlined below: PHYSICAL EXAM: 
General: WD, WN. Alert, cooperative, no acute distress EENT:  EOMI. Anicteric sclerae. MMM Resp:  CTA bilaterally, no wheezing or rales. No accessory muscle use CV:  tachycardic,  No edema. + portacath on L side of chest 
GI:  Soft, Non distended, Non tender. +Bowel sounds Neurologic:  Alert and oriented X 3, normal speech, Psych:   Good insight. Not anxious nor agitated Skin:  No rashes. No jaundice Reviewed most current lab test results and cultures  YES 
 Reviewed most current radiology test results   YES Review and summation of old records today    NO Reviewed patient's current orders and MAR    YES 
PMH/SH reviewed - no change compared to H&P 
________________________________________________________________________ Care Plan discussed with: 
  Comments Patient x Family RN x Care Manager Consultant Multidiciplinary team rounds were held today with , nursing, pharmacist and clinical coordinator. Patient's plan of care was discussed; medications were reviewed and discharge planning was addressed. ________________________________________________________________________ Total NON critical care TIME:  35   Minutes Total CRITICAL CARE TIME Spent:   Minutes non procedure based Comments >50% of visit spent in counseling and coordination of care    
________________________________________________________________________ Maren Simon MD  
 
Procedures: see electronic medical records for all procedures/Xrays and details which were not copied into this note but were reviewed prior to creation of Plan. LABS: 
I reviewed today's most current labs and imaging studies. Pertinent labs include: 
Recent Labs  
  01/16/21 
0155 01/15/21 
1407 WBC 5.7 5.3 HGB 9.5* 10.6* HCT 28.4* 32.5*  
 235 Recent Labs  
  01/16/21 
0155 01/15/21 
1744 01/15/21 
1407 *  --  137  
K 3.8  --  3.2*  
  --  102 CO2 29  --  30  
*  --  135* BUN 6  --  10  
CREA 0.36*  --  0.46* CA 8.3*  --  8.9 MG 1.2*  --   --   
ALB  --   --  2.4* TBILI  --   --  0.7 ALT  --   --  57 INR  --  1.2*  --   
 
 
Signed: Maren Simon MD

## 2021-01-17 NOTE — ED NOTES
Bedside and Verbal shift change report given to Irene Angulo RN  (oncoming nurse) by this RN (offgoing nurse). Report included the following information SBAR.

## 2021-01-17 NOTE — PROGRESS NOTES
TRANSFER - IN REPORT: 
Verbal report received from [facility] RN(name) on Pallavi Vasquez  being received from ED(unit) for routine progression of care Report consisted of patients Situation, Background, Assessment and  
Recommendations(SBAR). Information from the following report(s) SBAR, Kardex, ED Summary, Procedure Summary, Intake/Output, MAR and Recent Results was reviewed with the receiving nurse. Opportunity for questions and clarification was provided. Assessment completed upon patients arrival to unit and care assumed. Primary Nurse Jacinda Powell RN and Shemar Kidd RN performed a dual skin assessment on this patient No impairment noted Bruce score is 20

## 2021-01-17 NOTE — CONSULTS
Subjective:  
  
Date of  Admission: 1/15/2021 12:17 PM  
 
Admission type:Emergency Caroline Nelson is a 72 y.o. female admitted for Bilateral pulmonary embolism ([address] [I26.99]; Sinus tachycardia [R00.0]; Hypokalemia [E87.6]; Elevated troponin [R77.8]. Patient  Presented from oncology office to ER with hypotension. Per pt she was SOB for about a wk. Found to have b/l PE on CT. Cardiology consult requested to elevated troponin. She denies chest pain, chest pressure/discomfort, palpitations, irregular heart beats, near-syncope, syncope, orthopnea, paroxysmal nocturnal dyspnea, exertional chest pressure/discomfort, claudication, lower extremity edema. Patient Active Problem List  
 Diagnosis Date Noted  Sinus tachycardia 01/15/2021  Hypokalemia 01/15/2021  Elevated troponin 01/15/2021  Bilateral pulmonary embolism ([address] 01/15/2021  Back abscess 10/07/2020  Epidermal cyst 08/27/2019  Gastric adenocarcinoma ([address] 06/14/2019  
 HX: breast cancer 05/16/2019  Pancreatic mass 05/16/2019  Elevated bilirubin 05/16/2019  Seroma of breast 10/23/2017  Breast cancer ([address] 06/29/2017  Hx of breast cancer 01/19/2017  Abnormal MRI, breast 01/19/2017  Breast cancer of upper-outer quadrant of right female breast ([address] 01/19/2017 Adelaida Deng NP Past Medical History:  
Diagnosis Date  Cancer [facility]) 2003, 2017  
 right breast cancer  Diabetes ([address]  Hypertension  Psychiatric disorder   
 anxiety Past Surgical History:  
Procedure Laterality Date  COLONOSCOPY N/A 4/11/2019 COLONOSCOPY performed by Enolia Simmonds, MD at [facility] ENDOSCOPY  
 HX MASTECTOMY Bilateral 6/29/2017 RIGHT AND LEFT MASTECTOMY, RIGHT AXILLARY SENTINAL LYMPH NODE BIOPSY performed by Chase Chambers MD at [facility] MAIN OR  
 HX VASCULAR ACCESS    
 left chest portacath - removed 4/2019  [place] BREAST SURGERY PROCEDURE UNLISTED  2003  
 right breast lumpectomy  VASCULAR SURGERY PROCEDURE UNLIST  06/20/2019 Port -a cath placement No Known Allergies Family History Problem Relation Age of Onset  Cancer Mother   
     kidney  Cancer Brother   
     lung  Heart Disease Father  Diabetes Sister  Diabetes Brother Current Facility-Administered Medications Medication Dose Route Frequency  heparin 25,000 units in D5W 250 ml infusion  18-36 Units/kg/hr IntraVENous TITRATE  heparin (porcine) injection 1,950 Units  40 Units/kg IntraVENous PRN Or  
 heparin (porcine) injection 3,900 Units  80 Units/kg IntraVENous PRN  
 insulin lispro (HUMALOG) injection   SubCUTAneous AC&HS  
 glucose chewable tablet 16 g  4 Tab Oral PRN  
 dextrose (D50W) injection syrg 12.5-25 g  12.5-25 g IntraVENous PRN  
 glucagon (GLUCAGEN) injection 1 mg  1 mg IntraMUSCular PRN  
 anastrozole (ARIMIDEX) tablet 1 mg  1 mg Oral DAILY  gabapentin (NEURONTIN) capsule 600 mg  600 mg Oral QHS  insulin glargine (LANTUS) injection 8 Units  8 Units SubCUTAneous DAILY PRN  
 oxyCODONE-acetaminophen (PERCOCET) 5-325 mg per tablet 1 Tab  1 Tab Oral Q4H PRN  
 morphine injection 1 mg  1 mg IntraVENous Q4H PRN  
 sodium chloride (NS) flush 5-40 mL  5-40 mL IntraVENous Q8H  
 sodium chloride (NS) flush 5-40 mL  5-40 mL IntraVENous PRN  
 acetaminophen (TYLENOL) tablet 650 mg  650 mg Oral Q6H PRN Or  
 acetaminophen (TYLENOL) suppository 650 mg  650 mg Rectal Q6H PRN  polyethylene glycol (MIRALAX) packet 17 g  17 g Oral DAILY PRN  
 ondansetron (ZOFRAN ODT) tablet 4 mg  4 mg Oral Q8H PRN Or  
 ondansetron (ZOFRAN) injection 4 mg  4 mg IntraVENous Q6H PRN  
 naloxone (NARCAN) injection 0.4 mg  0.4 mg IntraVENous EVERY 2 MINUTES AS NEEDED Review of Symptoms: 
Constitutional: negative Eyes: negative Ears, nose, mouth, throat, and face: negative Respiratory: No cough, hemoptysis, URI. Cardiovascular: No CP, palpitations, sweating, lightheadedness, dizziness, syncope, presyncope, lower extremity swelling. Gastrointestinal: No nausea, vomiting, diarrhea, constipation, abdominal pain, hematemesis, melena, hematochezia Genitourinary:No urinary complaints. Musculoskeletal:negative Neurological: negative Behvioral/Psych: negative Endocrine: negative Subjective:  
  
Visit Vitals /71 Pulse 94 Temp 98 °F (36.7 °C) Resp 18 Ht 5' 5\" (1.651 m) Wt 107 lb 12.9 oz (48.9 kg) SpO2 98% BMI 17.94 kg/m² Physical Exam 
Resting comfortably. No resp distress Abdomen: soft, non-tender. Bowel sounds normal.  
Extremities: extremities normal, atraumatic, no cyanosis or edema Heart: regular rate and rhythm, S1, S2 normal, no murmur, click, rub or gallop Lungs: clear to auscultation bilaterally Neck: supple, no carotid bruit and no JVD Neurologic: Grossly normal 
Pulses: 2+ and symmetric Cardiographics Telemetry: SR 
 
ECG: ST, non specific ST changes Echocardiogram: noted. Labs:  
Recent Results (from the past 24 hour(s)) ECHO ADULT COMPLETE Collection Time: 01/16/21 11:35 AM  
Result Value Ref Range IVSd 1.19 (A) 0.6 - 0.9 cm IVSs 1.41 cm LVIDd 3.35 (A) 3.9 - 5.3 cm LVIDs 2.62 cm  
 LVOT d 2.10 cm LVPWd 1.25 (A) 0.6 - 0.9 cm  
 LVPWs 1.45 cm  
 BP EF 52.2 (A) 55 - 100 percent LV Ejection Fraction MOD 2C 49 percent LV Ejection Fraction MOD 4C 58 percent LV ED Vol A2C 52.44 mL  
 LV ED Vol A4C 63.33 mL  
 LV ED Vol BP 59.14 56 - 104 mL  
 LV ES Vol A2C 26.89 mL  
 LV ES Vol A4C 26.67 mL  
 LV ES Vol BP 28.29 19 - 49 mL RVIDd 2.99 cm RVSP 38.30 mmHg Left Atrium Major Axis 2.55 cm  
 LA Area 4C 7.84 cm2 LA Vol 4C 12.59 (A) 22 - 52 mL Est. RA Pressure 10.00 mmHg Mitral Valve E Wave Deceleration Time 68.73 ms  MV E Adalberto 85.72 cm/s  
 E/E' ratio (averaged) 9.55   
 E/E' lateral 6.90   
 E/E' septal 12.21   
 LV E' Lateral Velocity 12.43 cm/s LV E' Septal Velocity 7.02 cm/s Pulmonic Valve Systolic Peak Instantaneous Gradient 1.63 mmHg Pulmonic Valve Max Velocity 63.89 cm/s Triscuspid Valve Regurgitation Peak Gradient 28.30 mmHg  
 TR Max Velocity 265.16 cm/s Ao Root D 3.07 cm  
 LV Mass .8 67 - 162 g  
 LV Mass AL Index 86.0 43 - 95 g/m2 LVES Vol Index BP 18.6 mL/m2 LVED Vol Index BP 38.9 mL/m2 Left Atrium Minor Axis 1.68 cm  
 LA Vol Index 8.28 16 - 28 ml/m2 LVED Vol Index A4C 41.6 mL/m2 LVED Vol Index A2C 34.5 mL/m2 LVES Vol Index A4C 17.5 mL/m2 LVES Vol Index A2C 17.7 mL/m2 GLUCOSE, POC Collection Time: 01/16/21 11:37 AM  
Result Value Ref Range Glucose (POC) 105 (H) 65 - 100 mg/dL Performed by Juan Rubi PTT Collection Time: 01/16/21  4:29 PM  
Result Value Ref Range aPTT 76.2 (H) 22.1 - 31.0 sec  
 aPTT, therapeutic range     58.0 - 77.0 SECS  
GLUCOSE, POC Collection Time: 01/16/21  5:21 PM  
Result Value Ref Range Glucose (POC) 89 65 - 100 mg/dL Performed by Ava SANCHEZ   
GLUCOSE, POC Collection Time: 01/16/21  9:20 PM  
Result Value Ref Range Glucose (POC) 131 (H) 65 - 100 mg/dL Performed by Norbert Flores RN   
PTT Collection Time: 01/16/21 10:59 PM  
Result Value Ref Range aPTT >130.0 (HH) 22.1 - 31.0 sec  
 aPTT, therapeutic range     58.0 - 77.0 SECS  
PTT Collection Time: 01/17/21  2:10 AM  
Result Value Ref Range aPTT 52.6 (H) 22.1 - 31.0 sec  
 aPTT, therapeutic range     58.0 - 77.0 SECS  
NT-PRO BNP Collection Time: 01/17/21  2:10 AM  
Result Value Ref Range NT pro- (H) <950 PG/ML  
METABOLIC PANEL, BASIC Collection Time: 01/17/21  2:10 AM  
Result Value Ref Range Sodium 134 (L) 136 - 145 mmol/L Potassium 3.5 3.5 - 5.1 mmol/L Chloride 100 97 - 108 mmol/L  
 CO2 29 21 - 32 mmol/L Anion gap 5 5 - 15 mmol/L Glucose 97 65 - 100 mg/dL  BUN 5 (L) 6 - 20 MG/DL  
 Creatinine 0.30 (L) 0.55 - 1.02 MG/DL  
 BUN/Creatinine ratio 17 12 - 20 GFR est AA >60 >60 ml/min/1.73m2 GFR est non-AA >60 >60 ml/min/1.73m2 Calcium 8.6 8.5 - 10.1 MG/DL  
SAMPLES BEING HELD Collection Time: 01/17/21  2:10 AM  
Result Value Ref Range SAMPLES BEING HELD LAV   
 COMMENT Add-on orders for these samples will be processed based on acceptable specimen integrity and analyte stability, which may vary by analyte. GLUCOSE, POC Collection Time: 01/17/21  7:42 AM  
Result Value Ref Range Glucose (POC) 75 65 - 100 mg/dL Performed by Adali Morales Assessment: 
 
 Assessment:  
  
 Active Problems: 
  Pancreatic mass (5/16/2019) Sinus tachycardia (1/15/2021) Hypokalemia (1/15/2021) Elevated troponin (1/15/2021) Bilateral pulmonary embolism ([address] (1/15/2021) Plan: 1. Non specific troponin: due to PE. Echo noted. IV AC for PE for now. No BB or ACEi due to borderline BP. Further cardiac evaluation as out pt if clinically indicated.

## 2021-01-17 NOTE — PROGRESS NOTES
0700: Bedside shift change report given to Ga Pollack RN (oncoming nurse) by Dina Canchola RN (offgoing nurse). Report included the following information SBAR and Kardex. 0945: Consulted Dr. Trixie Casillas for cardiology who will see patient today. 1100: Patient's aPTT came back at 38.7 d/w pharmacy who will check with Dr. Cari Van in regards to bolus and rate change and will call back.

## 2021-01-17 NOTE — PROGRESS NOTES
Pharmacy  Heparin Monitoring Indication: bilateral PE on CT 1/15 Goal aPTT: 58-77 seconds Initial dosing Weight: 48.9 kg Labs: 
Recent Labs  
  01/17/21 
0935 01/17/21 
0210 01/16/21 
2259 01/16/21 
0155 01/16/21 
0155 01/15/21 
1744 APTT 38.7* 52.6* >130.0*   < >  --  28.1 HGB  --   --   --   --  9.5*  --   
PLT  --   --   --   --  196  --   
INR  --   --   --   --   --  1.2*  
 < > = values in this interval not displayed. Current rate:  13 unit/kg/hr Impression/Plan:  
? We are questioning the aPTT >130 from last night and ?? If this result could have come from a draw near the heparin infusion location/central port; thus consideration is to give a 40un/kg IV bolus rather than 80un/kg protocol and increase infusion by 2 un/kg/hr which = 15un/kg/hr (1un/kg/hr less than infusion rate from suspected high aPTT last evening) ? Ok per Dr. Trey Woo 
? Plan to evaluate and adjust next rate based on protocol 
? Next aPTT I 6 hrs = 17:00 
? Discussed w/ Nurse, Brice Washington

## 2021-01-17 NOTE — ED NOTES
1930 - Bedside shift change report given to [address]  (oncoming nurse) by H[employer]R Adam RN  (offgoing nurse). Report included the following information SBAR, Kardex, ED Summary, Intake/Output and MAR. Pt remains with heparin drip infusing, rate and dose verified with out coming RN - pt resting in bed with no acute complications at this time - A/Ox4 at this time - soft spoken - remains on cardiac monitor, sinus tachycardia - normal POX on room air Note, last heparin rate change was at 1045 16 unit kg hr - 
 
last PTT was therapeutic 76.2 near 1600 with no rate change, no documentation note on MAR with rate maintained Next PTT will be drawn 12 hours from last rate change at 10:45PM (2245) (last rate change was documented at 1045) 0206 - TRANSFER - OUT REPORT: 
 
Verbal report given to Viviana Davis RN(name) on Marcela Garner  being transferred to PCU (unit) for routine progression of care Report consisted of patients Situation, Background, Assessment and  
Recommendations(SBAR). Information from the following report(s) SBAR, Kardex, ED Summary, Intake/Output and MAR was reviewed with the receiving nurse. Lines:  
Venous Access Device 01/15/21 Upper chest (subclavicular area), left (Active) Opportunity for questions and clarification was provided. Patient transported with: 
 Monitor / RN at bedside

## 2021-01-17 NOTE — ED NOTES
0715: Bedside and Verbal shift change report given to this RN  (oncoming nurse) by Purnima Garcia, RN (offgoing nurse). Report included the following information SBAR.  
 
1000: Skin warm and dry. Respirations even and unlabored. In no apparent distress at this time. 1500: Family at bedside. Pt eating lunch. Skin warm and dry. Respirations even and unlabored. In no apparent distress at this time.

## 2021-01-17 NOTE — PROGRESS NOTES
Hospitalist Progress Note NAME: Radha Davalos :  1950 MRN:  [id number] Assessment / Plan: 
Acute bilateral pulmonary embolism POA 
causing hypotension and elevated troponins POA-likely due to diverticular strain Hypokalemia POA, repleted Pancreatic cancer POA, non operative, on chemo 
history of right breast cancer, cont' armidex 
anxiety disorder CTA chest positive for b/l PE with significant clot burden EKG sinus tachycardia 138 bpm on admission. Currently 110 on tele CXR neg for acute process Trop 0.2-->0.11 Echo showed EF 45-50% LE duplex US neg for DVT on R leg.  + age indeterminate non-occlusive thrombus present in the L common femoral vein Cont' heparin gtt for now Oncology consulted Strict bedrest for now O2 suppl, wean as tolerated Hypertension CArdiomyopathy BP soft, holding antihypertensives Not able to do BB or ACEi due to low BP Appreciate cardiology's evaluation Diabetes 
SSI. BG . Stable on SSI alone Cont' neurontin Body mass index is 17.94 kg/m². Code status: DNR Prophylaxis: heparin gtt Recommended Disposition: Home w/Family Subjective: Chief Complaint / Reason for Physician Visit Pt seen, NAD. Denies any cp or sob. Discussed with RN events overnight. Review of Systems: 
Symptom Y/N Comments  Symptom Y/N Comments Fever/Chills n   Chest Pain n   
Poor Appetite    Edema Cough    Abdominal Pain Sputum    Joint Pain SOB/SAAB n   Pruritis/Rash Nausea/vomit    Tolerating PT/OT Diarrhea    Tolerating Diet Constipation    Other Could NOT obtain due to:   
 
Objective: VITALS:  
Last 24hrs VS reviewed since prior progress note. Most recent are: 
Patient Vitals for the past 24 hrs: 
 Temp Pulse Resp BP SpO2  
21 1105 98 °F (36.7 °C) (!) 115 18 115/64 99 % 21 0724 98 °F (36.7 °C) 94 18 108/71 98 % 21 0245 98.7 °F (37.1 °C) (!) 105 18 117/76 98 % 01/17/21 0200  (!) 104 18 101/62 97 % 01/16/21 2138 99 °F (37.2 °C) (!) 111 16 104/67 97 % 01/16/21 1940 99.1 °F (37.3 °C) (!) 112 16 103/79 98 % 01/16/21 1901  (!) 110 16 103/69 96 % 01/16/21 1800  (!) 109 11 107/75 97 % 01/16/21 1700  (!) 110 14 112/76 98 % 01/16/21 1600  (!) 121 15 127/80 98 % 01/16/21 1500  (!) 111 14 113/72 96 % 01/16/21 1400  (!) 114 13 120/65 97 % No intake or output data in the 24 hours ending 01/17/21 1336 I had a face to face encounter and independently examined this patient on 1/17/2021, as outlined below: PHYSICAL EXAM: 
General: WD, WN. Alert, cooperative, no acute distress EENT:  EOMI. Anicteric sclerae. MMM Resp:  CTA bilaterally, no wheezing or rales. No accessory muscle use CV:  tachycardic,  No edema. + portacath on L side of chest 
GI:  Soft, Non distended, Non tender. +Bowel sounds Neurologic:  Alert and oriented X 3, normal speech, Psych:   Good insight. Not anxious nor agitated Skin:  No rashes. No jaundice Reviewed most current lab test results and cultures  YES Reviewed most current radiology test results   YES Review and summation of old records today    NO Reviewed patient's current orders and MAR    YES 
PMH/SH reviewed - no change compared to H&P 
________________________________________________________________________ Care Plan discussed with: 
  Comments Patient x Family RN x Care Manager Consultant Multidiciplinary team rounds were held today with , nursing, pharmacist and clinical coordinator. Patient's plan of care was discussed; medications were reviewed and discharge planning was addressed. ________________________________________________________________________ Total NON critical care TIME:  35   Minutes Total CRITICAL CARE TIME Spent:   Minutes non procedure based Comments >50% of visit spent in counseling and coordination of care ________________________________________________________________________ Levi Akers MD  
 
Procedures: see electronic medical records for all procedures/Xrays and details which were not copied into this note but were reviewed prior to creation of Plan. LABS: 
I reviewed today's most current labs and imaging studies. Pertinent labs include: 
Recent Labs  
  01/16/21 
0155 01/15/21 
1407 WBC 5.7 5.3 HGB 9.5* 10.6* HCT 28.4* 32.5*  
 235 Recent Labs  
  01/17/21 
0210 01/16/21 
0155 01/15/21 
1744 01/15/21 
1407 * 135*  --  137  
K 3.5 3.8  --  3.2*  
 103  --  102 CO2 29 29  --  30  
GLU 97 115*  --  135* BUN 5* 6  --  10  
CREA 0.30* 0.36*  --  0.46* CA 8.6 8.3*  --  8.9 MG  --  1.2*  --   --   
ALB  --   --   --  2.4* TBILI  --   --   --  0.7 ALT  --   --   --  57 INR  --   --  1.2*  --   
 
 
Signed: Levi Akers MD

## 2021-01-18 NOTE — PROGRESS NOTES
Reason for Admission:   Bilateral pulmonary embolism. Sinus tachycardia, hypokalemia, and elevated troponin RUR Score:  15 Low risk for readmission PCP: First and Last name:  Antolin Palencia NP Name of Practice:  
 Are you a current patient: Yes/No: Yes Approximate date of last visit: 5 months ago Can you participate in a virtual visit if needed: Yes Do you (patient/family) have any concerns for transition/discharge? None Plan for utilizing home health:  Pt has not had home health in the past. Pt will not be utilizing home health at d/c. Current Advanced Directive/Advance Care Plan:  Pt is DNR code status. Pt does have an ACP on file. Advance Care Planning General Advance Care Planning (ACP) Conversation Date of Conversation: 1/18/2021 Conducted with: Jemima Meade [facility] Decision Maker:  
  Primary Decision Maker: Polomelany Brunson - Spouse - [phone] Secondary Decision Maker: Elda Devine - Daughter - [phone] Content/Action Overview: Has ACP document(s) on file - reflects the patient's care preferences Reviewed DNR/DNI and patient confirms current DNR status - completed forms on file (place new order if needed) Length of Voluntary ACP Conversation in minutes:  16 minutes Transition of Care Plan:     
Home Folllow-up appointments 2nd IM Letter CM met with pt and pt's daughter Vadim Barboza at bedside to discuss d/c plan. Pt was alert and oriented. CM verified pt's demographics, insurance and PCP. Pt is a  female admitted to [facility] on 1/15/2021 for Bilateral pulmonary embolism. Sinus tachycardia, hypokalemia, and elevated troponin. Pt's NP is [employer]. Pt sees NP every 5 months. Pt [facility] [address] [facility] in [facility] for Rx. Pt resides with his  and two daughter in an one level home with 2 JENNIFER. Pt needs assistance with ADL's and IADL's. Pt needs assistance with cooking. Pt does drive. No DME's. No HH, SNF or IPR in the past. Pt is DNR code status. Pt does have an ACP on file. Pt's  Cleotis Crumb or daughter will transport at d/c. Care Management Interventions PCP Verified by CM: Yes(Pt's NP is [employer]. Pt sees NP every 5 months.) Palliative Care Criteria Met (RRAT>21 & CHF Dx)?: No 
Mode of Transport at Discharge: Other (see comment)(Pt's  Cleotis Crumb or daughter will transport at d/c. ) Transition of Care Consult (CM Consult): Discharge Planning(Home with Follow-up Appointments) Discharge Durable Medical Equipment: No(No DME's) Physical Therapy Consult: No 
Occupational Therapy Consult: No 
Speech Therapy Consult: No 
Current Support Network: Lives with Spouse, Own Home(Pt resides with his  and two daughter in an one level home with 2 JENNIFER.) Confirm Follow Up Transport: Self(Pt does drive.) [employer] Information Provided?: No 
Discharge Location Discharge Placement: (Home with Follow-up Appointments) CM will continue to follow patient for discharge planning needs and arrange for services as deemed necessary. Baljeet Aquino [address] [place] [address] 
[phone]

## 2021-01-18 NOTE — CONSULTS
[facility] Name:  Kalia Mccullough 
MR#:  [id number] :  1950 ACCOUNT #:  [de-identified] DATE OF SERVICE:  2021 REASON FOR CONSULTATION:  Pancreatic cancer and newly diagnosed PE. 
 
REFERRING PHYSICIAN:  Dr. Avelina Ramires. HISTORY OF PRESENT ILLNESS:  The patient is a 64-year-old -American female, who is a patient of one of partners, Dr. Jorge Short.  Dr. Jorge Short is treating her for presumed pancreatic cancer. She has never been able to obtain a pathological diagnosis by several attempts. She recently had sign of progression over disease. She had been on Keytruda, but Dr. Jorge Short was planning on switching her over to 5-FU and liposomal irinotecan. She was going to be starting that this week. However, she came to the hospital.  She was found have a PE. She is on a heparin drip. She is feeling much better and we are asked to see her for further evaluation. PAST MEDICAL HISTORY:  Significant for breast cancer, presumed pancreatic cancer as above, diabetes, hypertension, and some anxiety. SOCIAL HISTORY:  She is not a smoker. She does not drink. FAMILY HISTORY:  Mother had kidney cancer. Brother had lung cancer. CURRENT MEDICATIONS:  She is on a heparin drip. She is on Arimidex and gabapentin. REVIEW OF SYSTEMS:  A 12-point systems was done and negative except for as above. PHYSICAL EXAMINATION: 
VITAL SIGNS:  Temperature 98.0, pulse 96 to 111, blood pressure 114/76, respirations 18, saturating 99% on room air. GENERAL:  Lying in bed, in no acute distress. HEENT:  Normocephalic, atraumatic. NECK:  No cervical, supraclavicular, or axillary lymphadenopathy appreciated. ABDOMEN:  Soft, nontender. EXTREMITIES:  No edema. LABORATORY DATA:  White blood cell count 5.2, hemoglobin 9.4, platelets 418. Chemistry, sodium 134, BUN 5, creatinine 0.39, total bilirubin 0.7, ALT 67, AST 57, alk phos 226. CTA of chest done on 01/15/2021, showed abundant bilateral pulmonary emboli. IMPRESSION AND PLAN:  The patient is a 50-year-old -American female with pancreatic cancer and breast cancer. She is currently on Arimidex for her breast cancer. For the pancreatic cancer, she recently progressed and Dr. Jorge Short, who is her oncologist, was planning on switching her to 5-FU and liposomal irinotecan, but she was admitted to the hospital with pulmonary embolism. She is doing better. She is on a heparin drip. I talked with Dr. Avelina Ramires and said, we could switch her over to a DOAC when Dr. Avelina Ramires is ready. They will need to see which one she can get with her insurance. We will continue to follow along with you. She has the hypercoagulable state because of her cancer. I will not sent off hypercoagulable workup. We will continue to follow along with you and make further recommendations as needed. MD PILO [email or web address] 
D:  01/18/2021 11:06 
T:  01/18/2021 13:41 JOB #:  [id number]

## 2021-01-18 NOTE — PROGRESS NOTES
Hospitalist Progress Note NAME: Russel Harmon :  1950 MRN:  [id number] Assessment / Plan: 
Acute bilateral pulmonary embolism POA 
causing hypotension and elevated troponins POA-likely due to diverticular strain Hypokalemia POA, repleted Pancreatic cancer POA, non operative, on chemo 
history of right breast cancer, cont' armidex 
anxiety disorder CTA chest positive for b/l PE with significant clot burden EKG sinus tachycardia 138 bpm on admission. Currently 110 on tele CXR neg for acute process Trop 0.2-->0.11 Echo showed EF 45-50% LE duplex US neg for DVT on R leg.  + age indeterminate non-occlusive thrombus present in the L common femoral vein On heparin gtt, will change to PO Eliquis, will need pricing prior to discharge Up in chair, ambulate prn 
O2 suppl, wean as tolerated Appreciate oncology following Possible discharge tomorrow Hypertension CArdiomyopathy BP soft, holding antihypertensives Not able to do BB or ACEi due to low BP Appreciate cardiology's evaluation Diabetes 
SSI. BG . Stable on SSI alone Cont' neurontin Body mass index is 17.94 kg/m². Code status: DNR Prophylaxis: heparin gtt Recommended Disposition: Home w/Family Subjective: Chief Complaint / Reason for Physician Visit Pt seen in bed. Still tachycardic but denies any cp, sob. Discussed with RN events overnight. Review of Systems: 
Symptom Y/N Comments  Symptom Y/N Comments Fever/Chills n   Chest Pain n   
Poor Appetite    Edema Cough    Abdominal Pain Sputum    Joint Pain SOB/SAAB n   Pruritis/Rash Nausea/vomit    Tolerating PT/OT Diarrhea    Tolerating Diet Constipation    Other Could NOT obtain due to:   
 
Objective: VITALS:  
Last 24hrs VS reviewed since prior progress note. Most recent are: 
Patient Vitals for the past 24 hrs: 
 Temp Pulse Resp BP SpO2  
21 0745 98 °F (36.7 °C) (!) 111 18 114/76 99 % 01/18/21 0030 98.6 °F (37 °C) 96 16 102/61 97 % 01/17/21 1930 98.7 °F (37.1 °C) (!) 101 18 101/74 97 % 01/17/21 1442 99.2 °F (37.3 °C) (!) 116 18 118/68 99 % No intake or output data in the 24 hours ending 01/18/21 1415 I had a face to face encounter and independently examined this patient on 1/18/2021, as outlined below: PHYSICAL EXAM: 
General: WD, WN. Alert, cooperative, no acute distress EENT:  EOMI. Anicteric sclerae. MMM Resp:  CTA bilaterally, no wheezing or rales. No accessory muscle use CV:  tachycardic,  No edema. + portacath on L side of chest 
GI:  Soft, Non distended, Non tender. +Bowel sounds Neurologic:  Alert and oriented X 3, normal speech, Psych:   Good insight. Not anxious nor agitated Skin:  No rashes. No jaundice Reviewed most current lab test results and cultures  YES Reviewed most current radiology test results   YES Review and summation of old records today    NO Reviewed patient's current orders and MAR    YES 
PMH/ reviewed - no change compared to H&P 
________________________________________________________________________ Care Plan discussed with: 
  Comments Patient x Family RN x Care Manager Consultant  x Multidiciplinary team rounds were held today with , nursing, pharmacist and clinical coordinator. Patient's plan of care was discussed; medications were reviewed and discharge planning was addressed. ________________________________________________________________________ Total NON critical care TIME:  35   Minutes Total CRITICAL CARE TIME Spent:   Minutes non procedure based Comments >50% of visit spent in counseling and coordination of care    
________________________________________________________________________ [facility] Sera, MD  
 
 Procedures: see electronic medical records for all procedures/Xrays and details which were not copied into this note but were reviewed prior to creation of Plan. LABS: 
I reviewed today's most current labs and imaging studies. Pertinent labs include: 
Recent Labs  
  01/18/21 0021 01/16/21 0155 WBC 5.2 5.7 HGB 9.4* 9.5* HCT 29.5* 28.4*  
 196 Recent Labs  
  01/18/21 0021 01/17/21 
0210 01/16/21 
0155 01/15/21 
1744 * 134* 135*  --   
K 3.5 3.5 3.8  --   
 100 103  --   
CO2 29 29 29  --   
* 97 115*  --   
BUN 5* 5* 6  --   
CREA 0.39* 0.30* 0.36*  --   
CA 8.5 8.6 8.3*  --   
MG  --   --  1.2*  --   
INR  --   --   --  1.2* Signed: Ellie Boone MD

## 2021-01-18 NOTE — PROGRESS NOTES
01/18/21 1713 Vital Signs Temp 98 °F (36.7 °C) Temp Source Oral  
Pulse (Heart Rate) (!) 113 Heart Rate Source Monitor Cardiac Rhythm Sinus Tach Resp Rate 18  
O2 Sat (%) 97 % Level of Consciousness Alert BP 95/64 MAP (Monitor) 73 MAP (Calculated) 74 MEWS Score 4 Oxygen Therapy Pulse via Oximetry 114 beats per minute MD made aware. Patient currently sitting in bed eating dinner w/ no complaints.

## 2021-01-18 NOTE — PROGRESS NOTES
1/18/2021 7:38 AM 
 
Admit Date: 1/15/2021 Admit Diagnosis: Bilateral pulmonary embolism ([facility] [address] [I26.99]; Sinus tachycardia [R00.0]; Hypokalemia [E87.6]; Elevated troponin [R77.8] Subjective:  
 
Bl[facility] Parents   denies chest pain, chest pressure/discomfort, dyspnea, palpitations, irregular heart beats, near-syncope, syncope, fatigue, orthopnea, paroxysmal nocturnal dyspnea, exertional chest pressure/discomfort, claudication. Visit Vitals /61 Pulse 96 Temp 98.6 °F (37 °C) Resp 16 Ht 5' 5\" (1.651 m) Wt 107 lb 12.9 oz (48.9 kg) LMP 05/29/1997 (Approximate) SpO2 97% BMI 17.94 kg/m² Current Facility-Administered Medications Medication Dose Route Frequency  heparin 25,000 units in D5W 250 ml infusion  18-36 Units/kg/hr IntraVENous TITRATE  heparin (porcine) injection 1,950 Units  40 Units/kg IntraVENous PRN Or  
 heparin (porcine) injection 3,900 Units  80 Units/kg IntraVENous PRN  
 insulin lispro (HUMALOG) injection   SubCUTAneous AC&HS  
 glucose chewable tablet 16 g  4 Tab Oral PRN  
 dextrose (D50W) injection syrg 12.5-25 g  12.5-25 g IntraVENous PRN  
 glucagon (GLUCAGEN) injection 1 mg  1 mg IntraMUSCular PRN  
 anastrozole (ARIMIDEX) tablet 1 mg  1 mg Oral DAILY  gabapentin (NEURONTIN) capsule 600 mg  600 mg Oral QHS  insulin glargine (LANTUS) injection 8 Units  8 Units SubCUTAneous DAILY PRN  
 oxyCODONE-acetaminophen (PERCOCET) 5-325 mg per tablet 1 Tab  1 Tab Oral Q4H PRN  
 morphine injection 1 mg  1 mg IntraVENous Q4H PRN  
 sodium chloride (NS) flush 5-40 mL  5-40 mL IntraVENous Q8H  
 sodium chloride (NS) flush 5-40 mL  5-40 mL IntraVENous PRN  
 acetaminophen (TYLENOL) tablet 650 mg  650 mg Oral Q6H PRN Or  
 acetaminophen (TYLENOL) suppository 650 mg  650 mg Rectal Q6H PRN  polyethylene glycol (MIRALAX) packet 17 g  17 g Oral DAILY PRN  
 ondansetron (ZOFRAN ODT) tablet 4 mg  4 mg Oral Q8H PRN  Or  
  ondansetron (ZOFRAN) injection 4 mg  4 mg IntraVENous Q6H PRN  
 naloxone (NARCAN) injection 0.4 mg  0.4 mg IntraVENous EVERY 2 MINUTES AS NEEDED Objective:  
  
Visit Vitals /61 Pulse 96 Temp 98.6 °F (37 °C) Resp 16 Ht 5' 5\" (1.651 m) Wt 107 lb 12.9 oz (48.9 kg) SpO2 97% BMI 17.94 kg/m² Physical Exam: 
Resting comfortably. No resp distress. Extremities: extremities normal, atraumatic, no cyanosis or edema Heart: regular rate and rhythm, S1, S2 normal, no murmur, click, rub or gallop Lungs: clear to auscultation bilaterally Neurologic: Grossly normal 
 
Data Review:  
Labs:   
Recent Results (from the past 24 hour(s)) GLUCOSE, POC Collection Time: 01/17/21  7:42 AM  
Result Value Ref Range Glucose (POC) 75 65 - 100 mg/dL Performed by O[facility]o PTT Collection Time: 01/17/21  9:35 AM  
Result Value Ref Range aPTT 38.7 (H) 22.1 - 31.0 sec  
 aPTT, therapeutic range     58.0 - 77.0 SECS  
GLUCOSE, POC Collection Time: 01/17/21 11:11 AM  
Result Value Ref Range Glucose (POC) 145 (H) 65 - 100 mg/dL Performed by [facility]o GLUCOSE, POC Collection Time: 01/17/21  3:55 PM  
Result Value Ref Range Glucose (POC) 112 (H) 65 - 100 mg/dL Performed by O[facility]o PTT Collection Time: 01/17/21  5:38 PM  
Result Value Ref Range aPTT 62.5 (H) 22.1 - 31.0 sec  
 aPTT, therapeutic range     58.0 - 77.0 SECS  
GLUCOSE, POC Collection Time: 01/17/21  8:15 PM  
Result Value Ref Range Glucose (POC) 152 (H) 65 - 100 mg/dL Performed by Gisela Serrano PTT Collection Time: 01/18/21 12:21 AM  
Result Value Ref Range aPTT 53.2 (H) 22.1 - 31.0 sec  
 aPTT, therapeutic range     58.0 - 07.2 SECS  
METABOLIC PANEL, BASIC Collection Time: 01/18/21 12:21 AM  
Result Value Ref Range Sodium 134 (L) 136 - 145 mmol/L Potassium 3.5 3.5 - 5.1 mmol/L  Chloride 100 97 - 108 mmol/L  
 CO2 29 21 - 32 mmol/L Anion gap 5 5 - 15 mmol/L Glucose 128 (H) 65 - 100 mg/dL BUN 5 (L) 6 - 20 MG/DL Creatinine 0.39 (L) 0.55 - 1.02 MG/DL  
 BUN/Creatinine ratio 13 12 - 20 GFR est AA >60 >60 ml/min/1.73m2 GFR est non-AA >60 >60 ml/min/1.73m2 Calcium 8.5 8.5 - 10.1 MG/DL  
CBC WITH AUTOMATED DIFF Collection Time: 01/18/21 12:21 AM  
Result Value Ref Range WBC 5.2 3.6 - 11.0 K/uL  
 RBC 3.25 (L) 3.80 - 5.20 M/uL HGB 9.4 (L) 11.5 - 16.0 g/dL HCT 29.5 (L) 35.0 - 47.0 % MCV 90.8 80.0 - 99.0 FL  
 MCH 28.9 26.0 - 34.0 PG  
 MCHC 31.9 30.0 - 36.5 g/dL  
 RDW 14.5 11.5 - 14.5 % PLATELET 423 155 - 137 K/uL MPV 10.5 8.9 - 12.9 FL  
 NRBC 0.0 0  WBC ABSOLUTE NRBC 0.00 0.00 - 0.01 K/uL NEUTROPHILS 57 32 - 75 % LYMPHOCYTES 27 12 - 49 % MONOCYTES 14 (H) 5 - 13 % EOSINOPHILS 2 0 - 7 % BASOPHILS 0 0 - 1 % IMMATURE GRANULOCYTES 0 0.0 - 0.5 % ABS. NEUTROPHILS 2.9 1.8 - 8.0 K/UL  
 ABS. LYMPHOCYTES 1.4 0.8 - 3.5 K/UL  
 ABS. MONOCYTES 0.7 0.0 - 1.0 K/UL  
 ABS. EOSINOPHILS 0.1 0.0 - 0.4 K/UL  
 ABS. BASOPHILS 0.0 0.0 - 0.1 K/UL  
 ABS. IMM. GRANS. 0.0 0.00 - 0.04 K/UL  
 DF AUTOMATED Telemetry: SR 
 
 
Assessment:  
 
Active Problems: 
  Pancreatic mass (5/16/2019) Sinus tachycardia (1/15/2021) Hypokalemia (1/15/2021) Elevated troponin (1/15/2021) Bilateral pulmonary embolism ([address] (1/15/2021) Plan: 1. Non specific troponin: due to PE. Echo noted. IV AC for PE for now. No BB or ACEi due to borderline BP. Further cardiac evaluation as out pt. Switch to DOAC once k with medicine.

## 2021-01-18 NOTE — PROGRESS NOTES
Pharmacy Automatic Direct Oral Anticoagulant Renal Dosing Protocol Patient currently receiving Apixaban 10 mg bid for 7 days then 5 mg bid  with an indication of PE. Start date:  
 
Major interacting medications:  
 
Platelet inhibitors (dose/frequency):  
 
Labs: 
Recent Labs  
  01/18/21 
0021 01/17/21 
0210 01/16/21 
0155 CREA 0.39* 0.30* 0.36* HGB 9.4*  --  9.5*   --  196 Wt Readings from Last 1 Encounters:  
01/16/21 48.9 kg (107 lb 12.9 oz) Creatinine Clearance: 58.6 (Actual Body Weight) Impression/Plan:  
Apixaban 10 mg BID Pharmacy will follow daily and adjust as appropriate. Thank you, 
Joyce Rosado, [facility] Child Nathan Score calculator 
[employer] Zack Alvarnega Edoxaban Rivaroxaban 
 
[email or web address]. [email or web address]

## 2021-01-18 NOTE — PROGRESS NOTES
Comprehensive Nutrition Assessment Type and Reason for Visit: Initial(BMI 17.9) Nutrition Recommendations/Plan: · RD liberalized diet to Regular with No Added Salt as pt with very poor nutritional intake and meets criteria for severe malnutrition. Will monitor labs and adjust diet as necessary. · RD ordered Ensure Clear po BID with meals. Per pt/daughter, unable to tolerate regular ensure but does well with the clear. · Please document % meals and supplements consumed in flowsheet I/O's under intake. Nutrition Assessment:     
1/18: Chart reviewed; med noted for pancreatic mass and recently undergoing chemo with the last treatment about 3 weeks ago. Per pt, she ate a little this afternoon for lunch. Daughter present during my visit who reports pt tolerates Ensure Clear shakes at home but the regular Ensure shakes send her to the bathroom. RD ordered Ensure Clear and adjusted pt's diet as per nutrition focused physical exam, pt meets criteria for severe malnutrition with muscle wasting and fat loss of the upper and lower extremities. Pt has also lost ~ 7.7% x 1 month. Noted for weakness with decreased appetite PTA. Daughter expressed concerns for dehydration prior to admission and asked if pt has received more IVF. Currently, no orders for IVF so both RN and RD encouraged fluids. Offered Crystal Light packets to help flavor the water. No data found. Last Weight Metric Weight Loss Metrics 1/16/2021 12/30/2020 12/1/2020 10/20/2020 10/7/2020 8/3/2020 5/4/2020 Today's Wt 107 lb 12.9 oz 116 lb 117 lb 8 oz 118 lb 119 lb 14.4 oz 126 lb 134 lb BMI 17.94 kg/m2 19.3 kg/m2 19.55 kg/m2 19.64 kg/m2 19.95 kg/m2 20.97 kg/m2 22.3 kg/m2 Malnutrition Assessment: 
Malnutrition Status:  Severe malnutrition Context:  Chronic illness Findings of the 6 clinical characteristics of malnutrition:  
Energy Intake:  7 - 75% or less est energy requirements for 1 month or longer Weight Loss:  7 - Greater than 5% over 1 month Body Fat Loss:  7 - Severe body fat loss, Triceps, Orbital  
Muscle Mass Loss:  7 - Severe muscle mass loss, Scapula (trapezius), Clavicles (pectoralis &deltoids), Temples (temporalis) Estimated Daily Nutrient Needs: 
Energy (kcal): 1568 (BMR 1014 x 1. 3AF) + 250 kcals; Weight Used for Energy Requirements: Current Protein (g): 60 (1.2 g/kg bw); Weight Used for Protein Requirements: Current Fluid (ml/day): 1600 ml/day; Method Used for Fluid Requirements: 1 ml/kcal 
 
Nutrition Related Findings:  BM: none documented; Labs: Mg+ 1.2, lipase 17; ; Meds: humalog Wounds:   
None Current Nutrition Therapies: DIET REGULAR 3-4 GM (MARY) DIET NUTRITIONAL SUPPLEMENTS Breakfast, Dinner; Ensure Clear Anthropometric Measures: 
· Height:  5' 5\" (165.1 cm) · Current Body Wt:  48.9 kg (107 lb 12.9 oz) · Ideal Body Wt:  125 lbs:  86.2 % · BMI Category:  Underweight (BMI less than 22) age over 72 Nutrition Diagnosis: · Severe malnutrition, In context of chronic illness related to catabolic illness as evidenced by weight loss greater than or equal to 5% in 1 month, severe loss of subcutaneous fat, severe muscle loss Nutrition Interventions:  
Food and/or Nutrient Delivery: Continue current diet, Start oral nutrition supplement Nutrition Education and Counseling: No recommendations at this time Coordination of Nutrition Care: No recommendation at this time Goals: 
Trend PO intake at least 50% of meals + 240 ml ONS next 2-4 days Nutrition Monitoring and Evaluation:  
Behavioral-Environmental Outcomes: None identified Food/Nutrient Intake Outcomes: Food and nutrient intake, Supplement intake Physical Signs/Symptoms Outcomes: Biochemical data, GI status, Nutrition focused physical findings, Weight, Skin Discharge Planning:   
Continue current diet, Continue oral nutrition supplement Electronically signed by Nicole Morse RD on 1/18/2021 at 2:41 PM

## 2021-01-18 NOTE — PROGRESS NOTES
End of Shift Note Bedside shift change report given to Daniel MCCRARY (oncoming nurse) by Rey Jansen (offgoing nurse). Report included the following information SBAR, Kardex, Intake/Output and Recent Results Shift worked:  7a-7p Shift summary and any significant changes:  
  Heparin Drip d/paramjit. Started on Eliquis at 1800. Patient had BM today. Vitals stable w/ continued tachycardia. Abdominal pain worsened towards end of shift. PRN pain medication given. Concerns for physician to address:  none Zone phone for oncoming shift:   [phone] Activity: 
Activity Level: Bed Rest 
Number times ambulated in hallways past shift: 0 Number of times OOB to chair past shift: 5 Cardiac:  
Cardiac Monitoring: Yes     
Cardiac Rhythm: Sinus tachycardia Access:  
Current line(s): port Genitourinary:  
Urinary status: voiding Respiratory:  
O2 Device: Room air Chronic home O2 use?: NO Incentive spirometer at bedside: YES 
  
GI: 
  
Current diet:  DIET REGULAR 3-4 GM (MARY) DIET NUTRITIONAL SUPPLEMENTS Breakfast, Dinner; Ensure Clear Passing flatus: YES Tolerating current diet: YES Pain Management:  
Patient states pain is manageable on current regimen: YES Skin: 
Bruce Score: 20 Interventions: increase time out of bed Patient Safety: 
Fall Score: Total Score: 3 Interventions: assistive device (walker, cane, etc), gripper socks and pt to call before getting OOB High Fall Risk: Yes Length of Stay: 
Expected LOS: - - - Actual LOS: 3 Rey Jansen

## 2021-01-19 NOTE — PROGRESS NOTES
01/19/21 0750 Vital Signs Temp 98.4 °F (36.9 °C) Pulse (Heart Rate) (!) 125 Heart Rate Source Monitor Cardiac Rhythm Sinus Tach Resp Rate 20  
O2 Sat (%) 100 % Level of Consciousness Alert /60 MAP (Calculated) 74 MEWS Score 3 MD aware of high HR. Patient is asymptomatic.

## 2021-01-19 NOTE — PROGRESS NOTES
Physician Progress Note Stephanie Laws 
[facility] #:                  [id number] :                       1950 ADMIT DATE:       1/15/2021 12:17 PM 
[address] [place] DATE:        2021 3:38 PM 
RESPONDING 
PROVIDER #:        Juliann López MD 
 
 
 
 
QUERY TEXT: 
 
Patient admitted with PE. If possible, please document in progress notes and discharge summary if you are evaluating and /or treating any of the following: The medical record reflects the following: 
Risk Factors:pancreatic mass and recently undergoing chemo Clinical Indicators:  RD: Nutrition Diagnosis: Severe malnutrition, In context of chronic illness related to catabolic illness as evidenced by weight loss greater than or equal to 5% in 1 month, severe loss of subcutaneous fat, severe muscle loss Treatment: RD cx, supplements, I&Os Thanks, Jerry Eldridge RN/CDI  Options provided: -- Protein calorie malnutrition severe 
-- Underweight with BMI 17 
-- Other - I will add my own diagnosis -- Disagree - Not applicable / Not valid -- Disagree - Clinically unable to determine / Unknown 
-- Refer to Clinical Documentation Reviewer PROVIDER RESPONSE TEXT: 
 
This patient is underweight with a BMI of 17.  
 
Query created by: Lindie Seip on 2021 1:52 PM 
 
 
Electronically signed by:  Juliann López MD 2021 4:55 PM

## 2021-01-19 NOTE — DISCHARGE SUMMARY
Hospitalist Discharge Summary Patient ID: Audi Plata [id number] 
89 y.o. 
1950 PCP on record: Konstantin Guevara NP Admit date: 1/15/2021 Discharge date and time: 1/19/2021 DISCHARGE DIAGNOSIS: 
 
Pulmonary embolism. CONSULTATIONS: 
IP CONSULT TO ONCOLOGY 
IP CONSULT TO CARDIOLOGY Excerpted HPI from H&P of Jeancarlos Wallace MD: Ti Wylie is a 72 y.o.  female who presents with above complaints from oncology office with daughter. Patient presents with chief complaint of low blood pressure Eliquis feeling very weak since past. 
History of pancreatic cancer on chemotherapy. Denies any history of clots in the past 
  
patient was found to have bilateral pulmonary embolism on CT imaging after she was found to have mildly elevated troponins 0.20 and sinus tachycardia on EKG. ______________________________________________________________________ DISCHARGE SUMMARY/HOSPITAL COURSE:  for full details see H&P, daily progress notes, labs, consult notes. Patient was in the hospital for management of acute bilateral pulmonary embolism. Patient is known to have pancreatic cancer nonoperable on chemo. Patient was on heparin and then transition to Eliquis. Patient will be taking 10 mg twice daily for 7 days and then transition to 5 mg Eliquis probably the rest of her life. Patient to follow-up with primary care doctor. Patient looks comfortable today and want to go home as well. The monitor shows heart rate of 100s 110s on discharge. But blood pressure was on the soft side but normal.  Patient to continue to follow-up with PCP and monitor heart rate and blood pressure as well. I talked to patient's daughter about discharge plans. Patient will resume her ACE inhibitor once blood pressure starts to rise up. Patient will continue to take her diabetic medication as well. _______________________________________________________________________ Patient seen and examined by me on discharge day. Pertinent Findings: 
Gen:    Not in distress Chest: Clear lungs CVS:   Regular rhythm. No edema Abd:  Soft, not distended, not tender Neuro:  Alert, oriented x4 
_______________________________________________________________________ DISCHARGE MEDICATIONS:  
Discharge Medication List as of 1/19/2021 12:24 PM  
  
START taking these medications Details  
apixaban (ELIQUIS) 5 mg tablet Take 2 Tabs by mouth two (2) times a day for 5 days. 2 tab po bid until January 24 Then take 1 tab po Bid starting january 25 on., Normal, Disp-60 Tab, R-0  
  
  
CONTINUE these medications which have NOT CHANGED Details  
gabapentin (NEURONTIN) 600 mg tablet TK 1 T PO QD HS, Historical Med  
  
BD Ultra-Fine Mini Pen Needle 31 gauge x 3/16\" ndle USE DAILY WITH LANTUS, Normal, Disp-100 Pen Needle,R-1  
  
insulin glargine (Lantus Solostar U-100 Insulin) 100 unit/mL (3 mL) inpn 8 units SQ daily as needed for blood sugar greater than 150, Normal, Disp-18 mL,R-2**Patient requests 90 days supply** OneTouch Ultra Blue Test Strip strip TEST THREE TIMES A DAY, Normal, Disp-100 Strip,R-11 OneTouch Delica Lancets 30 gauge misc TEST THREE TIMES A DAY, Normal, Disp-100 Lancet,R-4,MARÍA  
  
metFORMIN ER (GLUCOPHAGE XR) 500 mg tablet TAKE ONE TABLET BY MOUTH ONCE DAILY WITH DINNER, Normal, Disp-90 Tab,R-2  
  
oxyCODONE-acetaminophen (PERCOCET) 5-325 mg per tablet Take  by mouth every four (4) hours as needed for Pain., Historical Med  
  
magnesium 250 mg tab Take 1 Tab by mouth daily. , Normal, Disp-90 Tab, R-0  
  
ondansetron hcl (ZOFRAN) 8 mg tablet TAKE ONE TABLET BY MOUTH EVERY 8 HOURS THREE TIMES DAILY AS NEEDED FOR unrelieved nausea, Historical Med, R-1  
  
LORazepam (ATIVAN) 1 mg tablet take 1/2-1 tablets by mouth every 6 hours if needed for nausea, Historical Med, R-0  
  
 prochlorperazine (COMPAZINE) 10 mg tablet TAKE ONE TABLET BY MOUTH EVERY 6 HOURS AS NEEDED FOR NAUSEA, Historical Med, R-1  
  
ONETOUCH ULTRAMINI monitoring kit use as directed TO CHECK BLOOD SUGAR THREE TIMES DAILY, Historical Med, R-0, MARÍA Insulin Needles, Disposable, 30 gauge x 1/3\" To use daily with lantus, Normal, Disp-100 Package, R-11  
  
anastrozole (ARIMIDEX) 1 mg tablet Take 1 mg by mouth daily. , Historical Med  
  
denosumab (PROLIA) 60 mg/mL injection 60 mg by SubCUTAneous route every 6 months. At [facility], Historical Med  
  
multivitamin (ONE A DAY) tablet Take 1 Tab by mouth daily (with lunch). , Historical Med CALCIUM CARBONATE/VITAMIN D3 (CALTRATE 600 + D PO) Take 2 Tabs by mouth daily (with lunch). , Historical Med  
  
  
STOP taking these medications  
  
 ibuprofen (ADVIL) 200 mg tablet Comments:  
Reason for Stopping:   
   
 aspirin delayed-release 81 mg tablet Comments:  
Reason for Stopping: My Recommended Diet, Activity, Wound Care, and follow-up labs are listed in the patient's Discharge Insturctions which I have personally completed and reviewed. ______________________________________________________________________ Risk of deterioration: Moderate Condition at Discharge:  Stable 
______________________________________________________________________ Disposition Home with family, no needs 
______ Care Plan discussed with:  
Patient, Family, RN, Care Manager, Consultant Comment:  
______________________________________________________________________ Total NON critical care TIME:  35Minutes Code Status: DNR/DNI 
___

## 2021-01-19 NOTE — PROGRESS NOTES
TRISTAN: Home with Follow-up Appointments 1:00pm-No further CM needs identified. CM notified pt's nurse of d/c. 
 
12:25pm- CM met with pt at bedside to discuss d/c plan. Pt stated that her  will transport at d/c. Pt stated that her  will arrive around 2:30pm.  Medicare pt has received, reviewed, and signed 2nd IM letter informing them of their right to appeal the discharge. Signed copy has been placed on pt bedside chart. CM provided pt with 30 day free Eliquis Card. 10:55am-CM sent emailed to [facility] Specialist to schedule pt's follow-up appointment. Care Management Interventions PCP Verified by CM: Yes 
Palliative Care Criteria Met (RRAT>21 & CHF Dx)?: No 
Mode of Transport at Discharge: Other (see comment)(Pt's  will transport at d/c ) Hospital Transport Time of Discharge: 1500 Transition of Care Consult (CM Consult): Other(Home with Follow-up Appointment) Discharge Durable Medical Equipment: No 
Physical Therapy Consult: No 
Occupational Therapy Consult: No 
Speech Therapy Consult: No 
Current Support Network: Lives with Spouse, Own Home Confirm Follow Up Transport: Self [employer] Information Provided?: No 
Discharge Location Discharge Placement: Home(Home with Follow-up Appointment) Baljeet Gaspar [address] 
[phone]

## 2021-01-19 NOTE — PROGRESS NOTES
[facility] INTERDISCIPLINARY ROUNDS Cardiopulmonary Care Interdisciplinary Rounds were held today to discuss patient's plan of care and outcomes. The following members were present: NP/Physician, Pharmacy, Nursing and Case Management. PLAN OF CARE:  
Continue current treatment plan, plan for possible discharge today.   
 
Expected Length of Stay:  - - -

## 2021-01-19 NOTE — PROGRESS NOTES
Kalyn Received orders from MD Sales to order 500 units (5mL) for portacath removal prior to discharge. PT RETURNED [address]

## 2021-01-19 NOTE — PROGRESS NOTES
Hematology Oncology Progress Note Follow up for: Pancreatic Ca and PE Chart notes reviewed since last visit. Case discussed with following: . Patient complains of the following: she is feeling better Additional concerns noted by the staff:  
 
Patient Vitals for the past 24 hrs: 
 BP Temp Pulse Resp SpO2 Height 01/19/21 0312 115/64 98.1 °F (36.7 °C) 97 16 99 %   
01/18/21 2343 110/61 98.2 °F (36.8 °C) 98 18 98 %   
01/18/21 2044 124/70 98.1 °F (36.7 °C) 91 16 98 %   
01/18/21 1713 95/64 98 °F (36.7 °C) (!) 113 18 97 %   
01/18/21 1434      5' 5\" (1.651 m)  
01/18/21 1300 (!) 97/56 98 °F (36.7 °C) 100 18 99 %  Review of Systems: 
12 point ROS done and negative except as above Physical Examination: 
Gen NAD Resp no distress Psych normal 
 
Labs: 
Recent Results (from the past 24 hour(s)) GLUCOSE, POC Collection Time: 01/18/21 11:21 AM  
Result Value Ref Range Glucose (POC) 126 (H) 65 - 100 mg/dL Performed by Princess Francois GLUCOSE, POC Collection Time: 01/18/21  4:58 PM  
Result Value Ref Range Glucose (POC) 118 (H) 65 - 100 mg/dL Performed by Edith Eli GLUCOSE, POC Collection Time: 01/18/21  9:19 PM  
Result Value Ref Range Glucose (POC) 102 (H) 65 - 100 mg/dL Performed by Cassandra Joseph (CON) PCT   
GLUCOSE, POC Collection Time: 01/19/21  7:49 AM  
Result Value Ref Range Glucose (POC) 116 (H) 65 - 100 mg/dL Performed by Princess Francois Assessment and Plan:  
PE 
-now on eliquis Pancreatic Ca 
-f/u with Dr. Youngblood Favor to start new chemo since recently progresses Breast Ca 
-Cont arimidex

## 2021-01-19 NOTE — PROGRESS NOTES
1/19/2021 8:42 AM 
 
Admit Date: 1/15/2021 Admit Diagnosis: Bilateral pulmonary embolism ([facility] [address] [I26.99]; Sinus tachycardia [R00.0]; Hypokalemia [E87.6]; Elevated troponin [R77.8] Subjective:  
 
Heather Ryan   denies chest pain, chest pressure/discomfort, dyspnea, palpitations, irregular heart beats, near-syncope, syncope, fatigue, orthopnea, paroxysmal nocturnal dyspnea. Visit Vitals /64 Pulse 97 Temp 98.1 °F (36.7 °C) Resp 16 Ht 5' 5\" (1.651 m) Wt 107 lb 12.9 oz (48.9 kg) LMP 05/29/1997 (Approximate) SpO2 99% BMI 17.94 kg/m² Current Facility-Administered Medications Medication Dose Route Frequency  apixaban (ELIQUIS) tablet 10 mg  10 mg Oral BID  insulin lispro (HUMALOG) injection   SubCUTAneous AC&HS  
 glucose chewable tablet 16 g  4 Tab Oral PRN  
 dextrose (D50W) injection syrg 12.5-25 g  12.5-25 g IntraVENous PRN  
 glucagon (GLUCAGEN) injection 1 mg  1 mg IntraMUSCular PRN  
 anastrozole (ARIMIDEX) tablet 1 mg  1 mg Oral DAILY  gabapentin (NEURONTIN) capsule 600 mg  600 mg Oral QHS  insulin glargine (LANTUS) injection 8 Units  8 Units SubCUTAneous DAILY PRN  
 oxyCODONE-acetaminophen (PERCOCET) 5-325 mg per tablet 1 Tab  1 Tab Oral Q4H PRN  
 morphine injection 1 mg  1 mg IntraVENous Q4H PRN  
 sodium chloride (NS) flush 5-40 mL  5-40 mL IntraVENous Q8H  
 sodium chloride (NS) flush 5-40 mL  5-40 mL IntraVENous PRN  
 acetaminophen (TYLENOL) tablet 650 mg  650 mg Oral Q6H PRN Or  
 acetaminophen (TYLENOL) suppository 650 mg  650 mg Rectal Q6H PRN  polyethylene glycol (MIRALAX) packet 17 g  17 g Oral DAILY PRN  
 ondansetron (ZOFRAN ODT) tablet 4 mg  4 mg Oral Q8H PRN Or  
 ondansetron (ZOFRAN) injection 4 mg  4 mg IntraVENous Q6H PRN  
 naloxone (NARCAN) injection 0.4 mg  0.4 mg IntraVENous EVERY 2 MINUTES AS NEEDED Objective:  
  
Visit Vitals /64 Pulse 97 Temp 98.1 °F (36.7 °C) Resp 16  
 Ht 5' 5\" (1.651 m) Wt 107 lb 12.9 oz (48.9 kg) SpO2 99% BMI 17.94 kg/m² Physical Exam: 
Resting comfortably. No resp distress. Extremities: extremities normal, atraumatic, no cyanosis or edema Heart: regular rate and rhythm, S1, S2 normal, no murmur, click, rub or gallop Lungs: clear to auscultation bilaterally Neurologic: Grossly normal 
 
Data Review:  
Labs:   
Recent Results (from the past 24 hour(s)) PTT Collection Time: 01/18/21  9:05 AM  
Result Value Ref Range aPTT 61.3 (H) 22.1 - 31.0 sec  
 aPTT, therapeutic range     58.0 - 77.0 SECS  
GLUCOSE, POC Collection Time: 01/18/21 11:21 AM  
Result Value Ref Range Glucose (POC) 126 (H) 65 - 100 mg/dL Performed by [employer]efanclub GLUCOSE, POC Collection Time: 01/18/21  4:58 PM  
Result Value Ref Range Glucose (POC) 118 (H) 65 - 100 mg/dL Performed by Cliffcathy Deem GLUCOSE, POC Collection Time: 01/18/21  9:19 PM  
Result Value Ref Range Glucose (POC) 102 (H) 65 - 100 mg/dL Performed by Jose Laughlin (ALBERTINA) PCT   
GLUCOSE, POC Collection Time: 01/19/21  7:49 AM  
Result Value Ref Range Glucose (POC) 116 (H) 65 - 100 mg/dL Performed by [employer] Telemetry: SR 
 
 
Assessment:  
 
Active Problems: 
  Pancreatic mass (5/16/2019) Sinus tachycardia (1/15/2021) Hypokalemia (1/15/2021) Elevated troponin (1/15/2021) Bilateral pulmonary embolism ([address] (1/15/2021) Plan: 1. Non specific troponin: due to PE. Echo noted. On DOAC now. No BB or ACEi due to borderline BP. Further cardiac evaluation as out pt. Please call if can be of any further assistance.

## 2021-01-19 NOTE — PROGRESS NOTES
End of Shift Note Bedside shift change report given to Yanique (oncoming nurse) by Andrey Messina (offgoing nurse). Report included the following information SBAR, Kardex and [facility] Shift worked:  night Shift summary and any significant changes:  
   
  
Concerns for physician to address:   
  
Zone phone for oncoming shift:    
  
 
Activity: 
Activity Level: Bed Rest 
Number times ambulated in hallways past shift: 0 Number of times OOB to chair past shift: 0 Cardiac:  
Cardiac Monitoring: Yes     
Cardiac Rhythm: Normal sinus rhythm Access:  
Current line(s): port Genitourinary:  
Urinary status: voiding Respiratory:  
O2 Device: Room air Chronic home O2 use?: NO Incentive spirometer at bedside: NO 
  
GI: 
  
Current diet:  DIET REGULAR 3-4 GM (MARY) DIET NUTRITIONAL SUPPLEMENTS Breakfast, Dinner; Ensure Clear DIET ONE TIME MESSAGE Passing flatus: YES Tolerating current diet: YES Pain Management:  
Patient states pain is manageable on current regimen: YES Skin: 
Bruce Score: 19 Interventions: increase time out of bed Patient Safety: 
Fall Score: Total Score: 3 Interventions: bed/chair alarm, assistive device (walker, cane, etc), gripper socks, pt to call before getting OOB and stay with me (per policy) High Fall Risk: Yes Length of Stay: 
Expected LOS: - - - Actual LOS: 4 Andrey Messina

## 2021-01-20 NOTE — PROGRESS NOTES
Patient contacted regarding Rey Prado. Discussed COVID-19 related testing which was available at this time. Test results were negative. Patient informed of results, if available? yes Care Transition Nurse/ Ambulatory Care Manager contacted the patient and Aguilar Henry (on [address] dated 10-7-20) by telephone to perform post discharge assessment. Call within 2 business days of discharge: Yes. Verified name and  with patient as identifiers. Provided introduction to self, and explanation of the CTN/ACM role, and reason for call due to risk factors for infection and/or exposure to COVID-19. Symptoms reviewed with patient who verbalized the following symptoms: Patient states she has no Covid-19 symptoms at this time. Due to no new or worsening symptoms encounter was not routed to provider for escalation. Discussed follow-up appointments. If no appointment was previously scheduled, appointment scheduling offered:  yes [address] follow up appointment(s):  
Future Appointments Date Time Provider Patience Hawk 2021 10:40 AM Parris Hodgkins, MD [facility] BS AMB  
2021 10:00 AM Joann Duong NP [facility] BS AMB Non-BS follow up appointment(s): None noted at this time. Advance Care Planning:  
Does patient have an Advance Directive:  not on file; education provided. Patient has following risk factors of: bilateral PE, pancreatic mass, and history of breast cancer per chart. CTN/ACM reviewed discharge instructions, medical action plan and red flags such as increased shortness of breath, increasing fever and signs of decompensation with patient who verbalized understanding. Discussed exposure protocols and quarantine with CDC Guidelines What to do if you are sick with coronavirus disease 2019.  Patient was given an opportunity for questions and concerns. The patient agrees to contact the [facility] exposure line [phone], local [facility] department  [address]  ([phone]) and PCP office for questions related to their healthcare. CTN/ACM provided contact information for future needs. Reviewed and educated patient on any new and changed medications related to discharge diagnosis Patient/family/caregiver given information for [employer] and agrees to enroll yes Patient's preferred e-mail: [email or web address] Patient's preferred phone number: [phone] Based on Loop alert triggers, patient will be contacted by nurse care manager for worsening symptoms. Pt will be further monitored by COVID Loop Team based on severity of symptoms and risk factors.

## 2021-01-27 NOTE — PROGRESS NOTES
Chief Complaint Patient presents with  Follow-up Transitional Care Management Progress Note Patient: Tate Foster : 1950 PCP: Betty Ann NP Date of office visit: 2021 Date of admission: 1/15/21 Date of discharge: 21 Hospital: [facility] Call initiated w/i 2 business dates of discharge: Yes Date of the most recent call to the patient: 2021 11:45 AM   
 
  
Subjective:  
Tate Foster is a 72 y.o. female presenting today for follow-up after hospital discharge. This encounter and supporting documentation was reviewed if available. Medication reconciliation was performed today. The main problem requiring admission was tachycardia, diagnosed with bilateral PE on CT. Complications during admission: none Excerpted HPI from H&P of Liza Molina MD: Rebeca Ron is a 72 y.o.   female who presents with above complaints from oncology office with daughter. Patient presents with chief complaint of low blood pressure Eliquis feeling very weak since past. 
History of pancreatic cancer on chemotherapy.  
Denies any history of clots in the past 
  
patient was found to have bilateral pulmonary embolism on CT imaging after she was found to have mildly elevated troponins 0.20 and sinus tachycardia on EKG. 
  
DISCHARGE SUMMARY/HOSPITAL COURSE:  for full details see H&P, daily progress notes, labs, consult notes.  
  
 Patient was in the hospital for management of acute bilateral pulmonary embolism. Patient is known to have pancreatic cancer nonoperable on chemo. Patient was on heparin and then transition to Eliquis. Patient will be taking 10 mg twice daily for 7 days and then transition to 5 mg Eliquis probably the rest of her life. Patient to follow-up with primary care doctor. Patient looks comfortable today and want to go home as well. The monitor shows heart rate of 100s 110s on discharge. But blood pressure was on the soft side but normal.  Patient to continue to follow-up with PCP and monitor heart rate and blood pressure as well. I talked to patient's daughter about discharge plans. Patient will resume her ACE inhibitor once blood pressure starts to rise up. Patient will continue to take her diabetic medication as well. Admitting symptoms have: improved Was seen by oncology Monday 1/25/21, gave her IVF then and has to go back today for IVF again. She is not drinking much, will drink some tea and gatorade. She is drinking clear ensure about once per day. No diarrhea today, had couple episodes of diarrhea yesterday. Stomach still hurting, taking pain medication which helps, daughter says that she only taking twice per day when she could take it every 4 hours. Nausea occasional, none in past few days. Not on any chemo right now, thoughts to start new chemo but needs to be hydrated better. Was told to restart BP medication on discharge after BP came back up, daughter reports that her BP is lower at home so they have not restarted. Blood sugar has been stable. Not needed insulin but only a couple times over the past month. She is trying eat three meals per day. Blood sugars most of the time  in AM 
Last HgbA1c 5.8 on 8/6/2020 Medications marked \"taking\" at this time: 
Prior to Admission medications Medication Sig Start Date End Date Taking? Authorizing Provider apixaban (Eliquis) 5 mg tablet Take 5 mg by mouth two (2) times a day. Yes Provider, Historical  
lipase-protease-amylase (Creon) 24,000-76,000 -120,000 unit capsule Take  by mouth three (3) times daily (with meals). Yes Provider, Historical  
gabapentin (NEURONTIN) 600 mg tablet TK 1 T PO QD HS 10/28/20  Yes Provider, Historical  
BD Ultra-Fine Mini Pen Needle 31 gauge x 3/16\" ndle USE DAILY WITH LANTUS 9/8/20  Yes Zohreh Duong NP  
insulin glargine (Lantus Solostar U-100 Insulin) 100 unit/mL (3 mL) inpn 8 units SQ daily as needed for blood sugar greater than 150 8/20/20  Yes Zohreh Duong NP OneTouch Ultra Blue Test Strip strip TEST THREE TIMES A DAY 8/3/20  Yes Zohreh Duong NP OneTouch Delica Lancets 30 gauge misc TEST THREE TIMES A DAY 7/13/20  Yes Zohreh Duong NP  
metFORMIN ER (GLUCOPHAGE XR) 500 mg tablet TAKE ONE TABLET BY MOUTH ONCE DAILY WITH DINNER 6/8/20  Yes Zohreh Duong NP  
oxyCODONE-acetaminophen (PERCOCET) 5-325 mg per tablet Take  by mouth every four (4) hours as needed for Pain. Yes Provider, Historical  
magnesium 250 mg tab Take 1 Tab by mouth daily. Patient taking differently: Take 1 Tab by mouth daily.  Pt states she gets this intravenously- when needed 12/30/19  Yes Zohreh Duong NP  
ondansetron hcl (ZOFRAN) 8 mg tablet TAKE ONE TABLET BY MOUTH EVERY 8 HOURS THREE TIMES DAILY AS NEEDED FOR unrelieved nausea 7/29/19  Yes Provider, Historical  
LORazepam (ATIVAN) 1 mg tablet take 1/2-1 tablets by mouth every 6 hours if needed for nausea 7/24/19  Yes Provider, Historical  
prochlorperazine (COMPAZINE) 10 mg tablet TAKE ONE TABLET BY MOUTH EVERY 6 HOURS AS NEEDED FOR NAUSEA 7/29/19  Yes Provider, Historical  
Sita Valdivia monitoring kit use as directed TO [address] [place] [address] 5/11/19  Yes Provider, Historical  
Insulin Needles, Disposable, 30 gauge x 1/3\" To use daily with lantus 5/29/19  Yes Earl Simons, NP  
 anastrozole (ARIMIDEX) 1 mg tablet Take 1 mg by mouth daily. Yes Provider, Historical  
denosumab (PROLIA) 60 mg/mL injection 60 mg by SubCUTAneous route every 6 months. At [facility]   Yes Provider, Historical  
multivitamin (ONE A DAY) tablet Take 1 Tab by mouth daily (with lunch). Yes Provider, Historical  
CALCIUM CARBONATE/VITAMIN D3 (CALTRATE 600 + D PO) Take 2 Tabs by mouth daily (with lunch). Yes Provider, Historical  
  
 
ROS: 
Gen: +fatigue,- fever, -chills Eyes: no excessive tearing, itching, or discharge Nose: no rhinorrhea, no sinus pain Mouth: no oral lesions, no sore throat Resp: no shortness of breath, no wheezing, no cough CV: no chest pain, no paroxysmal nocturnal dyspnea Abd: intermittent nausea,  intermittent diarrhea, no constipation, intermittent abdominal pain Neuro: no headaches, no syncope or presyncopal episodes Endo: no polyuria, no polydipsia Patient Active Problem List  
 Diagnosis Date Noted  Sinus tachycardia 01/15/2021  Hypokalemia 01/15/2021  Elevated troponin 01/15/2021  Bilateral pulmonary embolism ([facility] [address] 01/15/2021  Back abscess 10/07/2020  Epidermal cyst 08/27/2019  Gastric adenocarcinoma ([address] 06/14/2019  
 HX: breast cancer 05/16/2019  Pancreatic mass 05/16/2019  Elevated bilirubin 05/16/2019  Seroma of breast 10/23/2017  Breast cancer ([address] 06/29/2017  Hx of breast cancer 01/19/2017  Abnormal MRI, breast 01/19/2017  Breast cancer of upper-outer quadrant of right female breast ([facility] [address] 01/19/2017 Current Outpatient Medications Medication Sig Dispense Refill  lipase-protease-amylase (Creon) 24,000-76,000 -120,000 unit capsule Take  by mouth three (3) times daily (with meals).  metoprolol succinate (TOPROL-XL) 25 mg XL tablet Take 0.5 Tabs by mouth daily. 30 Tab 0  
 apixaban (Eliquis) 5 mg tablet Take 1 Tab by mouth two (2) times a day. 60 Tab 6  gabapentin (NEURONTIN) 600 mg tablet TK 1 T PO QD HS    
 BD Ultra-Fine Mini Pen Needle 31 gauge x 3/16\" ndle USE DAILY WITH LANTUS 100 Pen Needle 1  
 insulin glargine (Lantus Solostar U-100 Insulin) 100 unit/mL (3 mL) inpn 8 units SQ daily as needed for blood sugar greater than 150 18 mL 2  
 OneTouch Ultra Blue Test Strip strip TEST THREE TIMES A  Strip 11  
 OneTouch Delica Lancets 30 gauge misc TEST THREE TIMES A  Lancet 4  
 oxyCODONE-acetaminophen (PERCOCET) 5-325 mg per tablet Take  by mouth every four (4) hours as needed for Pain.  magnesium 250 mg tab Take 1 Tab by mouth daily. (Patient taking differently: Take 1 Tab by mouth daily. Pt states she gets this intravenously- when needed) 90 Tab 0  
 ondansetron hcl (ZOFRAN) 8 mg tablet TAKE ONE TABLET BY MOUTH EVERY 8 HOURS THREE TIMES DAILY AS NEEDED FOR unrelieved nausea  1  
 LORazepam (ATIVAN) 1 mg tablet take 1/2-1 tablets by mouth every 6 hours if needed for nausea  0  prochlorperazine (COMPAZINE) 10 mg tablet TAKE ONE TABLET BY MOUTH EVERY 6 HOURS AS NEEDED FOR NAUSEA  1  
 ONETOUCH ULTRAMINI monitoring kit use as directed TO CHECK BLOOD SUGAR THREE TIMES DAILY  0  
 Insulin Needles, Disposable, 30 gauge x 1/3\" To use daily with lantus 100 Package 11  
 anastrozole (ARIMIDEX) 1 mg tablet Take 1 mg by mouth daily.  denosumab (PROLIA) 60 mg/mL injection 60 mg by SubCUTAneous route every 6 months. At [facility]  multivitamin (ONE A DAY) tablet Take 1 Tab by mouth daily (with lunch).  CALCIUM CARBONATE/VITAMIN D3 (CALTRATE 600 + D PO) Take 2 Tabs by mouth daily (with lunch). No Known Allergies Past Medical History:  
Diagnosis Date  Cancer [facility]) 2003, 2017  
 right breast cancer  Diabetes ([facility] [address]  Hypertension  Psychiatric disorder   
 anxiety Past Surgical History:  
Procedure Laterality Date  COLONOSCOPY N/A 4/11/2019 COLONOSCOPY performed by Bao Currie MD at [facility] ENDOSCOPY  
 HX MASTECTOMY Bilateral 6/29/2017 RIGHT AND LEFT MASTECTOMY, RIGHT AXILLARY SENTINAL LYMPH NODE BIOPSY performed by Suzette Jackson MD at [facility] MAIN OR  
 HX VASCULAR ACCESS    
 left chest portacath - removed 4/2019  [facility] BREAST SURGERY PROCEDURE UNLISTED  2003  
 right breast lumpectomy  VASCULAR SURGERY PROCEDURE UNLIST  06/20/2019 Port -a cath placement Objective:  
 
Visit Vitals /77 (BP 1 Location: Right arm, BP Patient Position: At rest) Pulse (!) 134 Temp 98 °F (36.7 °C) (Skin) Resp 16 Ht 5' 5\" (1.651 m) Wt 108 lb (49 kg) LMP 05/29/1997 (Approximate) SpO2 95% BMI 17.97 kg/m² Gen: alert, oriented, no acute distress Head: normocephalic, atraumatic Ears: external auditory canals clear, TMs without erythema or effusion Eyes: pupils equal round reactive to light, sclera clear, conjunctiva clear Nose: normal turbinates, no rhinorrhea Oral: dry mucus membranes, no oral lesions, no pharyngeal inflammation or exudate Neck: supple, no lymphadenopathy Resp: no increased work of breathing, lungs clear to ausculation bilaterally CV: tachy but regular rhythm, no murmurs, rubs, or gallops. Abd: soft, not tender, not distended. No hepatosplenomegaly. Normal bowel sounds. No hernias. Neuro: cranial nerves intact, normal strength and movement in all extremities, and sensation intact and symmetric. Skin: no lesion or rash Assessment/Plan:  
Differential diagnosis and treatment options reviewed with patient who is in agreement with treatment plan as outlined below. ICD-10-CM ICD-9-CM 1. Hospital discharge follow-up  Z09 V67.59 [facility] DISCHARGE MEDS RECONCILED W/ CURRENT OUTPATIENT MED LIST 2. Tachycardia  R00.0 785.0 metoprolol succinate (TOPROL-XL) 25 mg XL tablet 3. Controlled type 2 diabetes mellitus without complication, with long-term current use of insulin (HCC)  E11.9 250.00   
 Z79.4 V58.67   
4. Gastric adenocarcinoma (HCC)  C16.9 151.9 5. Bilateral pulmonary embolism (HCC)  I26.99 415.19 apixaban (Eliquis) 5 mg tablet 6. Acute deep vein thrombosis (DVT) of left lower extremity, unspecified vein (HCC)  I82.402 453.40 apixaban (Eliquis) 5 mg tablet 7. Dehydration  E86.0 276.51 Patient continues to lose weight. Her blood sugars have been controlled most of the time and she is having intermittent nausea and diarrhea D/c metformin. Cover elevated blood sugar with insulin as needed for now. Daughter will keep me posted on her blood sugars. BP stable, no need to add lisinopril back. Start low dose metoprolol for tachycardia. D/C if dizziness or hypotension. Push hydration. Patient will work on hydrating avoid caffeine products. Continue eliquis Continue care with oncology. Follow up in one month or sooner if needed. We discussed the expected course, resolution and complications of the diagnosis(es) in detail. Medication risks, benefits, costs, interactions, and alternatives were discussed as indicated. I advised her to contact the office if her condition worsens, changes or fails to improve as anticipated. She expressed understanding with the diagnosis(es) and plan.   
 
Ghazala Huggins NP

## 2021-01-27 NOTE — PATIENT INSTRUCTIONS

## 2021-01-27 NOTE — PROGRESS NOTES
Claudette Dupree is a 72 y.o. female Chief Complaint Patient presents with  Follow-up 1. Have you been to the ER, urgent care clinic since your last visit? Hospitalized since your last visit? [facility] hospitalized 2. Have you seen or consulted any other health care providers outside of the [address] [place] [address] since your last visit? [facility]  Include any pap smears or colon screening. No 
Health Maintenance Topic Date Due  Eye Exam Retinal or Dilated  07/17/1961  COVID-19 Vaccine (1 of 2) 07/17/1967  
 DTaP/Tdap/Td series (1 - Tdap) 07/17/1972  Shingrix Vaccine Age 50> (1 of 2) 07/17/2001  GLAUCOMA SCREENING Q2Y  07/17/2016  Pneumococcal 65+ years (2 of 2 - PPSV23) 06/26/2020  Foot Exam Q1  08/07/2020  Medicare Yearly Exam  09/04/2020  MICROALBUMIN Q1  08/03/2021  A1C test (Diabetic or Prediabetic)  08/06/2021  Lipid Screen  08/06/2021  Colorectal Cancer Screening Combo  04/11/2024  Hepatitis C Screening  Completed  Bone Densitometry (Dexa) Screening  Completed  Flu Vaccine  Completed Visit Vitals /77 (BP 1 Location: Right arm, BP Patient Position: At rest) Pulse (!) 134 Temp 98 °F (36.7 °C) (Skin) Resp 16 Ht 5' 5\" (1.651 m) Wt 108 lb (49 kg) SpO2 95% BMI 17.97 kg/m²

## 2021-02-23 NOTE — PATIENT INSTRUCTIONS
Sciatica: Exercises Introduction Here are some examples of typical rehabilitation exercises for your condition. Start each exercise slowly. Ease off the exercise if you start to have pain. Your doctor or physical therapist will tell you when you can start these exercises and which ones will work best for you. When you are not being active, find a comfortable position for rest. Some people are comfortable on the floor or a medium-firm bed with a small pillow under their head and another under their knees. Some people prefer to lie on their side with a pillow between their knees. Don't stay in one position for too long. Take short walks (10 to 20 minutes) every 2 to 3 hours. Avoid slopes, hills, and stairs until you feel better. Walk only distances you can manage without pain, especially leg pain. How to do the exercises Back stretches 1. Get down on your hands and knees on the floor. 2. Relax your head and allow it to droop. Round your back up toward the ceiling until you feel a nice stretch in your upper, middle, and lower back. Hold this stretch for as long as it feels comfortable, or about 15 to 30 seconds. 3. Return to the starting position with a flat back while you are on your hands and knees. 4. Let your back sway by pressing your stomach toward the floor. Lift your buttocks toward the ceiling. 5. Hold this position for 15 to 30 seconds. 6. Repeat 2 to 4 times. Follow-up care is a key part of your treatment and safety. Be sure to make and go to all appointments, and call your doctor if you are having problems. It's also a good idea to know your test results and keep a list of the medicines you take. Where can you learn more? Go to http://www.gray.com/ Enter Y067 in the search box to learn more about \"Sciatica: Exercises. \" Current as of: March 2, 2020               Content Version: 12.6 © 1242-6745 [employer]. Care instructions adapted under license by [employer] (which disclaims liability or warranty for this information). If you have questions about a medical condition or this instruction, always ask your healthcare professional. [address] any warranty or liability for your use of this information. Medicare Wellness Visit, Female The best way to live healthy is to have a lifestyle where you eat a well-balanced diet, exercise regularly, limit alcohol use, and quit all forms of tobacco/nicotine, if applicable. Regular preventive services are another way to keep healthy. Preventive services (vaccines, screening tests, monitoring & exams) can help personalize your care plan, which helps you manage your own care. Screening tests can find health problems at the earliest stages, when they are easiest to treat. [facility] follows the current, evidence-based guidelines published by the [facility] Garett Nara ([facility]) when recommending preventive services for our patients. Because we follow these guidelines, sometimes recommendations change over time as research supports it. (For example, mammograms used to be recommended annually. Even though Medicare will still pay for an annual mammogram, the newer guidelines recommend a mammogram every two years for women of average risk). Of course, you and your doctor may decide to screen more often for some diseases, based on your risk and your co-morbidities (chronic disease you are already diagnosed with). Preventive services for you include: - Medicare offers their members a free annual wellness visit, which is time for you and your primary care provider to discuss and plan for your preventive service needs. Take advantage of this benefit every year! -All adults over the age of 72 should receive the recommended pneumonia vaccines. Current USPSTF guidelines recommend a series of two vaccines for the best pneumonia protection.  
-All adults should have a flu vaccine yearly and a tetanus vaccine every 10 years.  
-All adults age 48 and older should receive the shingles vaccines (series of two vaccines). -All adults age 38-68 who are overweight should have a diabetes screening test once every three years.  
-All adults born between [birth year] and 1965 should be screened once for Hepatitis C. 
-Other screening tests and preventive services for persons with diabetes include: an eye exam to screen for diabetic retinopathy, a kidney function test, a foot exam, and stricter control over your cholesterol.  
-Cardiovascular screening for adults with routine risk involves an electrocardiogram (ECG) at intervals determined by your doctor.  
-Colorectal cancer screenings should be done for adults age 54-65 with no increased risk factors for colorectal cancer. There are a number of acceptable methods of screening for this type of cancer. Each test has its own benefits and drawbacks. Discuss with your doctor what is most appropriate for you during your annual wellness visit. The different tests include: colonoscopy (considered the best screening method), a fecal occult blood test, a fecal DNA test, and sigmoidoscopy. 
 
-A bone mass density test is recommended when a woman turns 65 to screen for osteoporosis. This test is only recommended one time, as a screening. Some providers will use this same test as a disease monitoring tool if you already have osteoporosis. -Breast cancer screenings are recommended every other year for women of normal risk, age 54-69. 
-Cervical cancer screenings for women over age 72 are only recommended with certain risk factors. Here is a list of your current Health Maintenance items (your personalized list of preventive services) with a due date: 
Health Maintenance Due Topic Date Due 24 [facility] Eye Exam  07/17/1961  COVID-19 Vaccine (1 of 2) 07/17/1967  
 DTaP/Tdap/Td  (1 - Tdap) 07/17/1972  Shingles Vaccine (1 of 2) 07/17/2001  Glaucoma Screening   07/17/2016  Pneumococcal Vaccine (2 of 2 - PPSV23) 06/26/2020 24 [facility] Diabetic Foot Care  08/07/2020 24 [facility] Annual Well Visit  09/04/2020

## 2021-02-23 NOTE — PROGRESS NOTES
Audi Plata is a 72 y.o. female HIPAA verified by two patient identifiers. Chief Complaint Patient presents with  
[facility] Follow Up Health Maintenance Due Topic  Eye Exam Retinal or Dilated  COVID-19 Vaccine (1 of 2)  DTaP/Tdap/Td series (1 - Tdap)  Shingrix Vaccine Age 50> (1 of 2)  GLAUCOMA SCREENING Q2Y  Pneumococcal 65+ years (2 of 2 - PPSV23) [address] [facility] Q[facility]   
 Medicare Yearly Exam   
 
Visit Vitals /74 (BP 1 Location: Left upper arm, BP Patient Position: Sitting, BP Cuff Size: Small adult) Pulse (!) 132 Temp 98.2 °F (36.8 °C) (Oral) Resp 20 Ht 5' 5\" (1.651 m) Wt 117 lb 6.4 oz (53.3 kg) LMP 05/29/1997 (Approximate) SpO2 99% BMI 19.54 kg/m² Pain Scale: 2/10 Pain Location: Generalized 1. Have you been to the ER, urgent care clinic since your last visit? Hospitalized since your last visit? No 
 
2. Have you seen or consulted any other health care providers outside of the [address] [place] [address] since your last visit? Include any pap smears or colon screening.  No

## 2021-02-23 NOTE — PROGRESS NOTES
Subjective: Chief Complaint Patient presents with  
[facility] Follow Up  
  
 
HPI: 
72 y.o.  presents for follow up appointment. D/C metformin last visit. Blood sugars have been stable. Blood sugars are doing okay since off the metformin. Only elevated blood sugar days are on days she has steroid with her chemo. Started metoprolol at last visit as well for tachycardia, low dose at 12.5mg No dizziness on metoprolol, stopped taking last week on 2/16/21 because BP was low at infusion center 88/60. Said she felt pretty good on the metoprolol though. Doing chemo every other week, started this new chemo on 2/2/2021 Next dose is scheduled next week. Since starting this new chemo, has been having some increase diarrhea, pretty much every time she eats. She has gained 9-10 lbs this month. She feels that her stomach is a little more bloated as well. Nausea is about the same. Right buttocks/upper posterior thigh pain, ongoing since in hospital.  Aching pain. No fall or trauma. No hospital, ER or specialist visits since last primary care visit except as noted above. Past Medical History:  
Diagnosis Date  Cancer [facility]) 2003, 2017  
 right breast cancer  Diabetes ([facility] [address]  Hypertension  Psychiatric disorder   
 anxiety Social History Tobacco Use  Smoking status: Never Smoker  Smokeless tobacco: Never Used Substance Use Topics  Alcohol use: No  
 Drug use: No  
 
 
Outpatient Medications Marked as Taking for the 2/23/21 encounter (Office Visit) with Ryan Douglass NP Medication Sig Dispense Refill  lipase-protease-amylase (Creon) 24,000-76,000 -120,000 unit capsule Take  by mouth three (3) times daily (with meals).  metoprolol succinate (TOPROL-XL) 25 mg XL tablet Take 0.5 Tabs by mouth daily. 30 Tab 0  
 apixaban (Eliquis) 5 mg tablet Take 1 Tab by mouth two (2) times a day. 60 Tab 6  gabapentin (NEURONTIN) 600 mg tablet TK 1 T PO QD HS    
 BD Ultra-Fine Mini Pen Needle 31 gauge x 3/16\" ndle USE DAILY WITH LANTUS 100 Pen Needle 1  
 insulin glargine (Lantus Solostar U-100 Insulin) 100 unit/mL (3 mL) inpn 8 units SQ daily as needed for blood sugar greater than 150 18 mL 2  
 OneTouch Ultra Blue Test Strip strip TEST THREE TIMES A  Strip 11  
 OneTouch Delica Lancets 30 gauge misc TEST THREE TIMES A  Lancet 4  
 oxyCODONE-acetaminophen (PERCOCET) 5-325 mg per tablet Take  by mouth every four (4) hours as needed for Pain.  magnesium 250 mg tab Take 1 Tab by mouth daily. (Patient taking differently: Take 1 Tab by mouth daily. Pt states she gets this intravenously- when needed) 90 Tab 0  
 ondansetron hcl (ZOFRAN) 8 mg tablet TAKE ONE TABLET BY MOUTH EVERY 8 HOURS THREE TIMES DAILY AS NEEDED FOR unrelieved nausea  1  
 LORazepam (ATIVAN) 1 mg tablet take 1/2-1 tablets by mouth every 6 hours if needed for nausea  0  prochlorperazine (COMPAZINE) 10 mg tablet TAKE ONE TABLET BY MOUTH EVERY 6 HOURS AS NEEDED FOR NAUSEA  1  
 ONETOUCH ULTRAMINI monitoring kit use as directed TO CHECK BLOOD SUGAR THREE TIMES DAILY  0  
 Insulin Needles, Disposable, 30 gauge x 1/3\" To use daily with lantus 100 Package 11  
 anastrozole (ARIMIDEX) 1 mg tablet Take 1 mg by mouth daily.  denosumab (PROLIA) 60 mg/mL injection 60 mg by SubCUTAneous route every 6 months. At [facility]  multivitamin (ONE A DAY) tablet Take 1 Tab by mouth daily (with lunch).  CALCIUM CARBONATE/VITAMIN D3 (CALTRATE 600 + D PO) Take 2 Tabs by mouth daily (with lunch). No Known Allergies Health Maintenance reviewed ROS: 
Gen: no new fatigue, no fever, no chills, +unexplained weight gain Eyes: no excessive tearing, itching, or discharge Nose: no rhinorrhea, no sinus pain Mouth: no oral lesions, no sore throat, no difficulty swallowing Resp: no shortness of breath, no wheezing, no cough CV: no chest pain, no orthopnea, no paroxysmal nocturnal dyspnea, no lower extremity edema, no palpitations Abd: +nausea, no heartburn, +diarrhea, no constipation, +abdominal pain Neuro: no headaches, no syncope or presyncopal episodes Endo: no polyuria, no polydipsia. : no hematuria, no dysuria, no frequency, no incontinence Heme: no lymphadenopathy, no easy bruising or bleeding, no night sweats PE: 
Visit Vitals /74 (BP 1 Location: Left upper arm, BP Patient Position: Sitting, BP Cuff Size: Small adult) Pulse (!) 132 Temp 98.2 °F (36.8 °C) (Oral) Resp 20 Ht 5' 5\" (1.651 m) Wt 117 lb 6.4 oz (53.3 kg) LMP 05/29/1997 (Approximate) SpO2 99% BMI 19.54 kg/m² Gen: alert, oriented, no acute distress Head: normocephalic, atraumatic Eyes: pupils equal round reactive to light, sclera clear, conjunctiva clear Neck: symmetric normal sized thyroid, no carotid bruits, no jugular vein distention Resp: no increase work of breathing, lungs clear to ausculation bilaterally, no wheezing, rales or rhonchi CV: S1, S2 normal, tachy rate. Abd: soft, not tender, +mild distended. Normal bowel sounds. + small umbilical hernia. No abdominal or renal bruits. Neuro: cranial nerves intact, normal strength and movement in all extremities, and sensation intact and symmetric. Skin: no lesion or rash Extremities: no cyanosis or edema No results found for this visit on 02/23/21. Assessment/Plan: 
Differential diagnosis and treatment options reviewed with patient who is in agreement with treatment plan as outlined below. ICD-10-CM ICD-9-CM 1. Medicare annual wellness visit, subsequent  Z00.00 V70.0 2. Tachycardia  R00.0 785.0 3. Abdominal bloating  R14.0 787.3 4. Chemotherapy induced diarrhea  K52.1 787.91   
 T45.1X5A E933.1 5. Controlled type 2 diabetes mellitus without complication, with long-term current use of insulin (HCC)  E11.9 250.00   
 Z79.4 V58.67   
6. Gastric adenocarcinoma (HCC)  C16.9 151.9 7. Right sided sciatica  M54.31 724.3 DM is stable per home blood sugars. Continue current as needed lantus only. Weight is up and abdomen is a little distended, I recommend that she communicate with oncologist her symptoms as they are likely related to the new chemo. Sciatica pain:  She has pain medication but does not like to use it too much. Could try lidoderm patch topically and stretches, do not think that she is strong enough for PT. Will let me know if no improvement or worsening. HR is up, she could try taking half metoprolol pill in evening, hold if systolic BP <330. May need to increase water intake as well. Consider returning for pneumonia shot if cleared by oncology to get I have discussed the diagnosis with the patient and the intended plan as seen in the above orders. The patient has received an after-visit summary and questions were answered concerning future plans. I have discussed medication side effects and warnings with the patient as well. The patient verbalizes understanding and agreement with the plan. This is the Subsequent Medicare Annual Wellness Exam, performed 12 months or more after the Initial AWV or the last Subsequent AWV I have reviewed the patient's medical history in detail and updated the computerized patient record. Depression Risk Factor Screening:  
 
3 most recent PHQ Screens 2/23/2021 Little interest or pleasure in doing things Several days Feeling down, depressed, irritable, or hopeless Several days Total Score PHQ 2 2 Alcohol Risk Screen  Do you average more than 1 drink per night or more than 7 drinks a week:  No 
 
On any one occasion in the past three months have you have had more than 3 drinks containing alcohol:  No 
 
 
 
 Functional Ability and Level of Safety:  
 Hearing: Hearing is good. Activities of Daily Living: The home contains: no safety equipment. Patient needs help with:  transportation, preparing meals, laundry and housework Ambulation: with no difficulty Fall Risk: 
Fall Risk Assessment, last 12 mths 2/23/2021 Able to walk? Yes Fall in past 12 months? 0 Do you feel unsteady? 0 Are you worried about falling 0 Abuse Screen: 
Patient is not abused Cognitive Screening Has your family/caregiver stated any concerns about your memory: no 
  
 
 
Assessment/Plan Education and counseling provided: 
Are appropriate based on today's review and evaluation Pneumococcal Vaccine Influenza Vaccine Colorectal cancer screening tests Bone mass measurement (DEXA) Screening for glaucoma Medical nutrition therapy for individuals with diabetes or renal disease Diagnoses and all orders for this visit: 
 
1. Tachycardia 2. Abdominal bloating 3. Chemotherapy induced diarrhea 4. Controlled type 2 diabetes mellitus without complication, with long-term current use of insulin ([address] 5. Gastric adenocarcinoma ([facility] [address] 6. Right sided sciatica 7. Medicare annual wellness visit, subsequent Health Maintenance Due Health Maintenance Due Topic Date Due  Eye Exam Retinal or Dilated  07/17/1961  COVID-19 Vaccine (1 of 2) 07/17/1967  
 DTaP/Tdap/Td series (1 - Tdap) 07/17/1972  Shingrix Vaccine Age 50> (1 of 2) 07/17/2001  GLAUCOMA SCREENING Q2Y  07/17/2016  Pneumococcal 65+ years (2 of 2 - PPSV23) 06/26/2020  Foot Exam Q1  08/07/2020  Medicare Yearly Exam  09/04/2020 Patient Care Team  
Patient Care Team: 
Pascual Pope NP as PCP - General (Pediatric Medicine) Pascual Pope NP as PCP - REHABILITATION [facility] Empaneled Provider Saira Lima MD as Surgeon (General Surgery) History Patient Active Problem List  
Diagnosis Code • Hx of breast cancer Z85.3  
• Abnormal MRI, breast R92.8  
• Breast cancer of upper-outer quadrant of right female breast (HCC) C50.411  
• Breast cancer (HCC) C50.919  
• Seroma of breast N64.89  
• HX: breast cancer Z85.3  
• Pancreatic mass K86.89  
• Elevated bilirubin R17  
• Gastric adenocarcinoma (HCC) C16.9  
• Epidermal cyst L72.0  
• Back abscess L02.212  
• Sinus tachycardia R00.0  
• Hypokalemia E87.6  
• Elevated troponin R77.8  
• Bilateral pulmonary embolism (HCC) I26.99  
 
Past Medical History:  
Diagnosis Date  
• Cancer (HCC) 2003, 2017  
 right breast cancer  
• Diabetes (HCC)   
• Hypertension   
• Psychiatric disorder   
 anxiety  
  
Past Surgical History:  
Procedure Laterality Date  
• COLONOSCOPY N/A 4/11/2019  
 COLONOSCOPY performed by Sandor Rico MD at [facility] ENDOSCOPY  
• HX MASTECTOMY Bilateral 6/29/2017  
 RIGHT AND LEFT MASTECTOMY, RIGHT AXILLARY SENTINAL LYMPH NODE BIOPSY performed by Frank Coats MD at [facility] MAIN OR  
• HX VASCULAR ACCESS    
 left chest portacath - removed 4/2019  
• [place] BREAST SURGERY PROCEDURE UNLISTED  2003  
 right breast lumpectomy  
• VASCULAR SURGERY PROCEDURE UNLIST  06/20/2019  
 Port -a cath placement  
 
Current Outpatient Medications  
Medication Sig Dispense Refill  
• lipase-protease-amylase (Creon) 24,000-76,000 -120,000 unit capsule Take  by mouth three (3) times daily (with meals).    
• metoprolol succinate (TOPROL-XL) 25 mg XL tablet Take 0.5 Tabs by mouth daily. 30 Tab 0  
• apixaban (Eliquis) 5 mg tablet Take 1 Tab by mouth two (2) times a day. 60 Tab 6  
• gabapentin (NEURONTIN) 600 mg tablet TK 1 T PO QD HS    
• BD Ultra-Fine Mini Pen Needle 31 gauge x 3/16\" ndle USE DAILY WITH LANTUS 100 Pen Needle 1  
• insulin glargine (Lantus Solostar U-100 Insulin) 100 unit/mL (3 mL) inpn 8 units SQ daily as needed for blood sugar greater than 150 18 mL 2  
• OneTouch Ultra Blue Test Strip strip TEST THREE TIMES A  Strip 11  
  OneTouch Delica Lancets 30 gauge misc TEST THREE TIMES A  Lancet 4  
 oxyCODONE-acetaminophen (PERCOCET) 5-325 mg per tablet Take  by mouth every four (4) hours as needed for Pain.  magnesium 250 mg tab Take 1 Tab by mouth daily. (Patient taking differently: Take 1 Tab by mouth daily. Pt states she gets this intravenously- when needed) 90 Tab 0  
 ondansetron hcl (ZOFRAN) 8 mg tablet TAKE ONE TABLET BY MOUTH EVERY 8 HOURS THREE TIMES DAILY AS NEEDED FOR unrelieved nausea  1  
 LORazepam (ATIVAN) 1 mg tablet take 1/2-1 tablets by mouth every 6 hours if needed for nausea  0  prochlorperazine (COMPAZINE) 10 mg tablet TAKE ONE TABLET BY MOUTH EVERY 6 HOURS AS NEEDED FOR NAUSEA  1  
 ONETOUCH ULTRAMINI monitoring kit use as directed TO CHECK BLOOD SUGAR THREE TIMES DAILY  0  
 Insulin Needles, Disposable, 30 gauge x 1/3\" To use daily with lantus 100 Package 11  
 anastrozole (ARIMIDEX) 1 mg tablet Take 1 mg by mouth daily.  denosumab (PROLIA) 60 mg/mL injection 60 mg by SubCUTAneous route every 6 months. At [facility]  multivitamin (ONE A DAY) tablet Take 1 Tab by mouth daily (with lunch).  CALCIUM CARBONATE/VITAMIN D3 (CALTRATE 600 + D PO) Take 2 Tabs by mouth daily (with lunch). No Known Allergies Family History Problem Relation Age of Onset  Cancer Mother   
     kidney  Cancer Brother   
     lung  Heart Disease Father  Diabetes Sister  Diabetes Brother Social History Tobacco Use  Smoking status: Never Smoker  Smokeless tobacco: Never Used Substance Use Topics  Alcohol use:  No

## 2021-10-11 ENCOUNTER — PATIENT MESSAGE (OUTPATIENT)
Dept: FAMILY MEDICINE CLINIC | Age: 71
End: 2021-10-11

## 2025-01-24 NOTE — PROGRESS NOTES
Subjective No complaints, PO, ok, min abd pain Oncology consult pnd Temp:  [97.7 °F (36.5 °C)-98.9 °F (37.2 °C)] Pulse (Heart Rate):  [] BP: (120-162)/(62-93) Resp Rate:  [12-22] O2 Sat (%):  [95 %-100 %] Weight:  [80.6 kg (177 lb 11.1 oz)] No intake/output data recorded. 05/16 1901 - 05/18 0700 In: 1241.7 [I.V.:1241.7] Out: -  
 
 
Objective Pt alert and oriented Abdomen soft, NT,    NAD Active Problems: 
  HX: breast cancer (5/16/2019) Pancreatic mass (5/16/2019) Elevated bilirubin (5/16/2019) Assessment & Plan IF port needed after Onc consult, Dr Gama Divers can place port Monday May 20, 2019 but need to let me know. Pt questions answered. done

## (undated) DEVICE — SUTURE VCRL SZ 2-0 L27IN ABSRB UD L26MM SH 1/2 CIR J417H

## (undated) DEVICE — SPHINCTEROTOME: Brand: DREAMTOME™ RX 44

## (undated) DEVICE — CATH IV AUTOGRD BC BLU 22GA 25 -- INSYTE

## (undated) DEVICE — (D)PREP SKN CHLRAPRP APPL 26ML -- CONVERT TO ITEM 371833

## (undated) DEVICE — Device

## (undated) DEVICE — Device: Brand: BALLOON3

## (undated) DEVICE — SOLIDIFIER MEDC 1200ML -- CONVERT TO 356117

## (undated) DEVICE — NEEDLE HYPO 25GA L1.5IN BVL ORIENTED ECLIPSE

## (undated) DEVICE — CATH IV AUTOGRD BC PNK 20GA 25 -- INSYTE

## (undated) DEVICE — SOLUTION IV 1000ML 0.9% SOD CHL

## (undated) DEVICE — REM POLYHESIVE ADULT PATIENT RETURN ELECTRODE: Brand: VALLEYLAB

## (undated) DEVICE — ROCKER SWITCH PENCIL BLADE ELECTRODE, HOLSTER: Brand: EDGE

## (undated) DEVICE — BLADE ELECTRODE: Brand: EDGE

## (undated) DEVICE — NEONATAL-ADULT SPO2 SENSOR: Brand: NELLCOR

## (undated) DEVICE — SYRINGE MED 20ML STD CLR PLAS LUERLOCK TIP N CTRL DISP

## (undated) DEVICE — DERMABOND SKIN ADH 0.7ML -- DERMABOND ADVANCED 12/BX

## (undated) DEVICE — DEVON™ KNEE AND BODY STRAP 60" X 3" (1.5 M X 7.6 CM): Brand: DEVON

## (undated) DEVICE — BASIN EMSIS 16OZ GRAPHITE PLAS KID SHP MOLD GRAD FOR ORAL

## (undated) DEVICE — GOWN,PREVENTION PLUS,XL,ST,24/CS: Brand: MEDLINE

## (undated) DEVICE — HANDLE LT SNAP ON ULT DURABLE LENS FOR TRUMPF ALC DISPOSABLE

## (undated) DEVICE — SURGICAL PROCEDURE PACK BASIN MAJ SET CUST NO CAUT

## (undated) DEVICE — NEEDLE HYPO 18GA L1.5IN PNK S STL HUB POLYPR SHLD REG BVL

## (undated) DEVICE — SUTURE VCRL SZ 4-0 L27IN ABSRB UD L19MM PS-2 3/8 CIR PRIM J426H

## (undated) DEVICE — TOWEL 4 PLY TISS 19X30 SUE WHT

## (undated) DEVICE — SOLUTION IRRIG 1000ML H2O STRL BLT

## (undated) DEVICE — SET ADMIN 16ML TBNG L100IN 2 Y INJ SITE IV PIGGY BK DISP

## (undated) DEVICE — STERILE POLYISOPRENE POWDER-FREE SURGICAL GLOVES: Brand: PROTEXIS

## (undated) DEVICE — NDL PRT INJ NSAF BLNT 18GX1.5 --

## (undated) DEVICE — SUTURE PROL 2-0 L48IN NONABSORBABLE BLU SH L26MM 1/2 CIR 8533H

## (undated) DEVICE — SUT SLK 2-0SH 30IN BLK --

## (undated) DEVICE — (D)SYR 10ML 1/5ML GRAD NSAF -- PKGING CHANGE USE ITEM 338027

## (undated) DEVICE — CURVED, SMALL JAW, OPEN SEALER/DIVIDER: Brand: LIGASURE

## (undated) DEVICE — BITE BLK ENDOSCP AD 54FR GRN POLYETH ENDOSCP W STRP SLD

## (undated) DEVICE — SHEET, T, LAPAROTOMY, STERILE: Brand: MEDLINE

## (undated) DEVICE — INFECTION CONTROL KIT SYS

## (undated) DEVICE — ELECTRODE,RADIOTRANSLUCENT,FOAM,5PK: Brand: MEDLINE

## (undated) DEVICE — GUIDEWIRE ENDOSCP DIA0.025IN L270CM HYDRPHLC STR TIP RG

## (undated) DEVICE — Device: Brand: SINGLE USE ASPIRATION NEEDLE

## (undated) DEVICE — TOWEL SURG W17XL27IN STD BLU COT NONFENESTRATED PREWASHED

## (undated) DEVICE — SPONGE LAP 18X18IN STRL -- 5/PK

## (undated) DEVICE — FORCEPS BX L240CM JAW DIA2.8MM L CAP W/ NDL MIC MESH TOOTH

## (undated) DEVICE — Z DISCONTINUED PER MEDLINE LINE GAS SAMPLING O2/CO2 LNG AD 13 FT NSL W/ TBNG FILTERLINE

## (undated) DEVICE — DEVICE LCK BILI RAP EXCHG OLPS --

## (undated) DEVICE — DBD-PACK,LAPAROTOMY,2 REINFORCED GOWNS: Brand: MEDLINE

## (undated) DEVICE — YANKAUER,TAPERED BULBOUS TIP,W/O VENT: Brand: MEDLINE

## (undated) DEVICE — SYR 3ML LL TIP 1/10ML GRAD --

## (undated) DEVICE — BLADE ASSEMB CLP HAIR FINE --

## (undated) DEVICE — DRAIN SURG 19FR L025IN DIA63MM SIL CHN RND FULL FLUT CLS

## (undated) DEVICE — BAG SPEC BIOHZRD 10 X 10 IN --

## (undated) DEVICE — KENDALL RADIOLUCENT FOAM MONITORING ELECTRODE RECTANGULAR SHAPE: Brand: KENDALL

## (undated) DEVICE — 1200 GUARD II KIT W/5MM TUBE W/O VAC TUBE: Brand: GUARDIAN

## (undated) DEVICE — SUTURE VCRL SZ 3-0 L27IN ABSRB UD L26MM SH 1/2 CIR J416H

## (undated) DEVICE — DRAPE PRB US TRNSDCR 6X96IN --

## (undated) DEVICE — SOLUTION IV 500ML 0.9% SOD CHL FLX CONT

## (undated) DEVICE — CLIP INT SM WIDE RED TI TRNSVRS GRV CHEVRON SHP W PRECIS

## (undated) DEVICE — FALCON® SAMPLE CONTAINER, WITH LID, 4.5OZ (110ML), INDIVIDUALLY WRAPPED, STERILE, 100/CASE: Brand: FALCON A CORNING BRAND

## (undated) DEVICE — KENDALL SCD EXPRESS SLEEVES, KNEE LENGTH, MEDIUM: Brand: KENDALL SCD

## (undated) DEVICE — BLOCK BITE ENDOSCP AD 21 MM W/ DIL BLU LF DISP

## (undated) DEVICE — SYR 10ML LUER LOK 1/5ML GRAD --

## (undated) DEVICE — CONTAINER SPEC 20 ML LID NEUT BUFF FORMALIN 10 % POLYPR STS

## (undated) DEVICE — APPLICATOR BNDG 1MM ADH PREMIERPRO EXOFIN

## (undated) DEVICE — GOWN,SIRUS,NONRNF,SETINSLV,2XL,18/CS: Brand: MEDLINE

## (undated) DEVICE — STRAP,POSITIONING,KNEE/BODY,FOAM,4X60": Brand: MEDLINE

## (undated) DEVICE — DRAPE XR C ARM 41X74IN LF --

## (undated) DEVICE — CHEST/BREAST-LF: Brand: MEDLINE INDUSTRIES, INC.

## (undated) DEVICE — Device: Brand: DEFENDO VALVE AND CONNECTOR KIT

## (undated) DEVICE — FORCEPS BX L160CM DIA8MM GRSP DISECT CUP TIP NONLOCKING ROT

## (undated) DEVICE — SOLIDIFIER FLD 2OZ 1500CC N DISINF IN BTL DISP SAFESORB

## (undated) DEVICE — MEDI-VAC NON-CONDUCTIVE SUCTION TUBING: Brand: CARDINAL HEALTH

## (undated) DEVICE — Z CONVERTED USE 2107985 COVER FLROSCP W36XL28IN 4 SIDE ADH

## (undated) DEVICE — SUTURE VCRL SZ 3-0 L18IN ABSRB UD L26MM SH 1/2 CIR J864D

## (undated) DEVICE — WIREGUIDED CYTOLOGY BRUSH: Brand: RX CYTOLOGY BRUSH

## (undated) DEVICE — KIT INFECTION CTRL ST FRAN --

## (undated) DEVICE — TIP SUCT BLU PLAS BLB W/O CTRL VENT YANK

## (undated) DEVICE — STRAP POS KNEE BODY VELC

## (undated) DEVICE — RETRIEVAL BALLOON CATHETER: Brand: EXTRACTOR™ PRO RX

## (undated) DEVICE — ZINACTIVE USE 2641837 CLIP LIG M BLU TI HRT SHP WIRE HORZ 600 PER BX

## (undated) DEVICE — Device: Brand: ENDO SMARTCAP